# Patient Record
Sex: MALE | Race: OTHER | NOT HISPANIC OR LATINO | Employment: OTHER | URBAN - METROPOLITAN AREA
[De-identification: names, ages, dates, MRNs, and addresses within clinical notes are randomized per-mention and may not be internally consistent; named-entity substitution may affect disease eponyms.]

---

## 2017-01-12 ENCOUNTER — HISTORICAL (OUTPATIENT)
Dept: LAB | Facility: HOSPITAL | Age: 52
End: 2017-01-12

## 2017-01-13 ENCOUNTER — HISTORICAL (OUTPATIENT)
Dept: ADMINISTRATIVE | Facility: HOSPITAL | Age: 52
End: 2017-01-13

## 2017-01-26 ENCOUNTER — HISTORICAL (OUTPATIENT)
Dept: LAB | Facility: HOSPITAL | Age: 52
End: 2017-01-26

## 2017-01-27 ENCOUNTER — HISTORICAL (OUTPATIENT)
Dept: ADMINISTRATIVE | Facility: HOSPITAL | Age: 52
End: 2017-01-27

## 2017-05-25 ENCOUNTER — HISTORICAL (OUTPATIENT)
Dept: ADMINISTRATIVE | Facility: HOSPITAL | Age: 52
End: 2017-05-25

## 2017-05-25 LAB
ALBUMIN SERPL-MCNC: 3.7 GM/DL (ref 3.4–5)
ALBUMIN/GLOB SERPL: 1 RATIO (ref 1–2)
ALP SERPL-CCNC: 109 UNIT/L (ref 20–120)
ALT SERPL-CCNC: 21 UNIT/L
AST SERPL-CCNC: 22 UNIT/L
BILIRUB SERPL-MCNC: 0.6 MG/DL
BILIRUBIN DIRECT+TOT PNL SERPL-MCNC: 0.1 MG/DL
BILIRUBIN DIRECT+TOT PNL SERPL-MCNC: 0.5 MG/DL
BUN SERPL-MCNC: 8 MG/DL (ref 7–25)
CALCIUM SERPL-MCNC: 8.7 MG/DL (ref 8.4–10.3)
CHLORIDE SERPL-SCNC: 103 MMOL/L (ref 96–110)
CO2 SERPL-SCNC: 27 MMOL/L (ref 24–32)
CREAT SERPL-MCNC: 0.73 MG/DL (ref 0.7–1.4)
ERYTHROCYTE [DISTWIDTH] IN BLOOD BY AUTOMATED COUNT: 14.4 % (ref 11.5–14.5)
ERYTHROCYTE [SEDIMENTATION RATE] IN BLOOD: 28 MM/HR (ref 0–15)
GLOBULIN SER-MCNC: 4.2 GM/ML (ref 2.3–3.5)
GLUCOSE SERPL-MCNC: 172 MG/DL (ref 65–99)
HCT VFR BLD AUTO: 38.6 % (ref 40–51)
HGB BLD-MCNC: 12.3 GM/DL (ref 13.5–17.5)
MCH RBC QN AUTO: 27.2 PG (ref 26–34)
MCHC RBC AUTO-ENTMCNC: 31.9 GM/DL (ref 31–37)
MCV RBC AUTO: 85.4 FL (ref 80–100)
PLATELET # BLD AUTO: 248 X10(3)/MCL (ref 130–400)
PMV BLD AUTO: 13.6 FL (ref 7.4–10.4)
POTASSIUM SERPL-SCNC: 4.6 MMOL/L (ref 3.6–5.2)
PROT SERPL-MCNC: 7.9 GM/DL (ref 6–8)
RBC # BLD AUTO: 4.52 X10(6)/MCL (ref 4.5–5.9)
SODIUM SERPL-SCNC: 137 MMOL/L (ref 135–146)
WBC # SPEC AUTO: 11.5 X10(3)/MCL (ref 4.5–11)

## 2017-06-09 ENCOUNTER — HISTORICAL (OUTPATIENT)
Dept: GASTROENTEROLOGY | Facility: CLINIC | Age: 52
End: 2017-06-09

## 2017-06-19 ENCOUNTER — HISTORICAL (OUTPATIENT)
Dept: ADMINISTRATIVE | Facility: HOSPITAL | Age: 52
End: 2017-06-19

## 2017-06-19 LAB
APPEARANCE, UA: CLEAR
BACTERIA #/AREA URNS AUTO: ABNORMAL /[HPF]
BILIRUB UR QL STRIP: NEGATIVE
COLOR UR: YELLOW
GLUCOSE (UA): 200 MG/DL
HGB UR QL STRIP: NEGATIVE
HYALINE CASTS #/AREA URNS LPF: ABNORMAL /[LPF]
KETONES UR QL STRIP: NEGATIVE
LEUKOCYTE ESTERASE UR QL STRIP: NEGATIVE
NITRITE UR QL STRIP: NEGATIVE
PH UR STRIP: 5.5 [PH] (ref 4.5–8)
PROT UR QL STRIP: NEGATIVE
PSA SERPL-MCNC: 1.6 NG/ML (ref 0–4)
RBC #/AREA URNS AUTO: ABNORMAL /[HPF]
SP GR UR STRIP: 1.01 (ref 1–1.03)
SQUAMOUS #/AREA URNS LPF: ABNORMAL /[LPF]
UROBILINOGEN UR STRIP-ACNC: NORMAL
WBC #/AREA URNS AUTO: ABNORMAL /HPF

## 2017-08-03 ENCOUNTER — HISTORICAL (OUTPATIENT)
Dept: LAB | Facility: HOSPITAL | Age: 52
End: 2017-08-03

## 2017-08-03 LAB
ABS NEUT (OLG): 5.99 X10(3)/MCL (ref 2.1–9.2)
APTT PPP: 26 SECOND(S) (ref 21–30)
BUN SERPL-MCNC: 9 MG/DL (ref 7–18)
CALCIUM SERPL-MCNC: 9.2 MG/DL (ref 8.5–10.1)
CHLORIDE SERPL-SCNC: 102 MMOL/L (ref 98–107)
CO2 SERPL-SCNC: 27 MMOL/L (ref 21–32)
CREAT SERPL-MCNC: 1.1 MG/DL (ref 0.7–1.3)
ERYTHROCYTE [DISTWIDTH] IN BLOOD BY AUTOMATED COUNT: 13.2 % (ref 11.5–17)
GLUCOSE SERPL-MCNC: 252 MG/DL (ref 74–106)
HCT VFR BLD AUTO: 38.6 % (ref 42–52)
HGB BLD-MCNC: 12.8 GM/DL (ref 14–18)
INR PPP: 0.9
MCH RBC QN AUTO: 27.9 PG (ref 27–31)
MCHC RBC AUTO-ENTMCNC: 33.2 GM/DL (ref 33–36)
MCV RBC AUTO: 84.1 FL (ref 80–94)
PLATELET # BLD AUTO: 245 X10(3)/MCL (ref 130–400)
PMV BLD AUTO: 13.5 FL (ref 7.4–10.4)
POTASSIUM SERPL-SCNC: 4.4 MMOL/L (ref 3.5–5.1)
PROTHROMBIN TIME: 10 SECOND(S) (ref 9.8–12)
RBC # BLD AUTO: 4.59 X10(6)/MCL (ref 4.7–6.1)
SODIUM SERPL-SCNC: 136 MMOL/L (ref 136–145)
WBC # SPEC AUTO: 8.4 X10(3)/MCL (ref 4.5–11.5)

## 2017-08-04 ENCOUNTER — HISTORICAL (OUTPATIENT)
Dept: ADMINISTRATIVE | Facility: HOSPITAL | Age: 52
End: 2017-08-04

## 2017-08-05 LAB
ABS NEUT (OLG): 10.48 X10(3)/MCL (ref 2.1–9.2)
BASOPHILS # BLD AUTO: 0 X10(3)/MCL (ref 0–0.2)
BASOPHILS NFR BLD AUTO: 0 %
BUN SERPL-MCNC: 12 MG/DL (ref 7–18)
CALCIUM SERPL-MCNC: 9 MG/DL (ref 8.5–10.1)
CHLORIDE SERPL-SCNC: 102 MMOL/L (ref 98–107)
CO2 SERPL-SCNC: 28 MMOL/L (ref 21–32)
CREAT SERPL-MCNC: 1.02 MG/DL (ref 0.7–1.3)
EOSINOPHIL # BLD AUTO: 0.1 X10(3)/MCL (ref 0–0.9)
EOSINOPHIL NFR BLD AUTO: 1 %
ERYTHROCYTE [DISTWIDTH] IN BLOOD BY AUTOMATED COUNT: 13.2 % (ref 11.5–17)
GLUCOSE SERPL-MCNC: 242 MG/DL (ref 74–106)
HCT VFR BLD AUTO: 39.5 % (ref 42–52)
HGB BLD-MCNC: 12.7 GM/DL (ref 14–18)
LYMPHOCYTES # BLD AUTO: 1.8 X10(3)/MCL (ref 0.6–4.6)
LYMPHOCYTES NFR BLD AUTO: 14 %
MCH RBC QN AUTO: 27.3 PG (ref 27–31)
MCHC RBC AUTO-ENTMCNC: 32.2 GM/DL (ref 33–36)
MCV RBC AUTO: 84.9 FL (ref 80–94)
MONOCYTES # BLD AUTO: 0.7 X10(3)/MCL (ref 0.1–1.3)
MONOCYTES NFR BLD AUTO: 5 %
NEUTROPHILS # BLD AUTO: 10.48 X10(3)/MCL (ref 2.1–9.2)
NEUTROPHILS NFR BLD AUTO: 79 %
PLATELET # BLD AUTO: 252 X10(3)/MCL (ref 130–400)
PMV BLD AUTO: 13.3 FL (ref 9.4–12.4)
POTASSIUM SERPL-SCNC: 5.5 MMOL/L (ref 3.5–5.1)
RBC # BLD AUTO: 4.65 X10(6)/MCL (ref 4.7–6.1)
SODIUM SERPL-SCNC: 138 MMOL/L (ref 136–145)
WBC # SPEC AUTO: 13.2 X10(3)/MCL (ref 4.5–11.5)

## 2017-08-15 ENCOUNTER — HISTORICAL (OUTPATIENT)
Dept: ENDOSCOPY | Facility: HOSPITAL | Age: 52
End: 2017-08-15

## 2017-08-31 ENCOUNTER — HISTORICAL (OUTPATIENT)
Dept: LAB | Facility: HOSPITAL | Age: 52
End: 2017-08-31

## 2017-08-31 LAB
ABS NEUT (OLG): 6.77 X10(3)/MCL (ref 2.1–9.2)
APTT PPP: 26 SECOND(S) (ref 21–30)
BUN SERPL-MCNC: 8 MG/DL (ref 7–18)
CALCIUM SERPL-MCNC: 9.4 MG/DL (ref 8.5–10.1)
CHLORIDE SERPL-SCNC: 103 MMOL/L (ref 98–107)
CO2 SERPL-SCNC: 28 MMOL/L (ref 21–32)
CREAT SERPL-MCNC: 0.98 MG/DL (ref 0.7–1.3)
ERYTHROCYTE [DISTWIDTH] IN BLOOD BY AUTOMATED COUNT: 13.2 % (ref 11.5–17)
GLUCOSE SERPL-MCNC: 112 MG/DL (ref 74–106)
HCT VFR BLD AUTO: 38.7 % (ref 42–52)
HGB BLD-MCNC: 12.5 GM/DL (ref 14–18)
INR PPP: 0.9
MCH RBC QN AUTO: 27.4 PG (ref 27–31)
MCHC RBC AUTO-ENTMCNC: 32.3 GM/DL (ref 33–36)
MCV RBC AUTO: 84.7 FL (ref 80–94)
PLATELET # BLD AUTO: 245 X10(3)/MCL (ref 130–400)
PMV BLD AUTO: 12.7 FL (ref 7.4–10.4)
POTASSIUM SERPL-SCNC: 4.4 MMOL/L (ref 3.5–5.1)
PROTHROMBIN TIME: 9.8 SECOND(S) (ref 9.8–12)
RBC # BLD AUTO: 4.57 X10(6)/MCL (ref 4.7–6.1)
SODIUM SERPL-SCNC: 138 MMOL/L (ref 136–145)
WBC # SPEC AUTO: 9.4 X10(3)/MCL (ref 4.5–11.5)

## 2017-09-05 ENCOUNTER — HISTORICAL (OUTPATIENT)
Dept: ADMINISTRATIVE | Facility: HOSPITAL | Age: 52
End: 2017-09-05

## 2017-09-05 LAB
ABS NEUT (OLG): 8.33 X10(3)/MCL (ref 2.1–9.2)
APTT PPP: 56 SECOND(S) (ref 21–30)
BASOPHILS # BLD AUTO: 0.04 X10(3)/MCL (ref 0–0.2)
BASOPHILS NFR BLD AUTO: 0.3 % (ref 0–0.9)
BUN SERPL-MCNC: 10 MG/DL (ref 7–18)
CALCIUM SERPL-MCNC: 8.3 MG/DL (ref 8.5–10.1)
CHLORIDE SERPL-SCNC: 107 MMOL/L (ref 98–107)
CO2 SERPL-SCNC: 25 MMOL/L (ref 21–32)
CREAT SERPL-MCNC: 0.84 MG/DL (ref 0.7–1.3)
EOSINOPHIL # BLD AUTO: 0.26 X10(3)/MCL (ref 0–0.9)
EOSINOPHIL NFR BLD AUTO: 2.3 % (ref 0–6.5)
ERYTHROCYTE [DISTWIDTH] IN BLOOD BY AUTOMATED COUNT: 13.3 % (ref 11.5–17)
GLUCOSE SERPL-MCNC: 79 MG/DL (ref 74–106)
HCT VFR BLD AUTO: 34 % (ref 42–52)
HGB BLD-MCNC: 11.3 GM/DL (ref 14–18)
IMM GRANULOCYTES # BLD AUTO: 0.13 10*3/UL (ref 0–0.02)
IMM GRANULOCYTES NFR BLD AUTO: 1.1 % (ref 0–0.43)
INR PPP: 1.2
LYMPHOCYTES # BLD AUTO: 1.91 X10(3)/MCL (ref 0.6–4.6)
LYMPHOCYTES NFR BLD AUTO: 16.6 % (ref 16.2–38.3)
MCH RBC QN AUTO: 28 PG (ref 27–31)
MCHC RBC AUTO-ENTMCNC: 33.2 GM/DL (ref 33–36)
MCV RBC AUTO: 84.2 FL (ref 80–94)
MONOCYTES # BLD AUTO: 0.81 X10(3)/MCL (ref 0.1–1.3)
MONOCYTES NFR BLD AUTO: 7.1 % (ref 4.7–11.3)
NEUTROPHILS # BLD AUTO: 8.33 X10(3)/MCL (ref 2.1–9.2)
NEUTROPHILS NFR BLD AUTO: 72.6 % (ref 49.1–73.4)
NRBC BLD AUTO-RTO: 0 % (ref 0–0.2)
PLATELET # BLD AUTO: 235 X10(3)/MCL (ref 130–400)
PMV BLD AUTO: 12.4 FL (ref 7.4–10.4)
POTASSIUM SERPL-SCNC: 3.9 MMOL/L (ref 3.5–5.1)
PROTHROMBIN TIME: 13.1 SECOND(S) (ref 9.8–12)
RBC # BLD AUTO: 4.04 X10(6)/MCL (ref 4.7–6.1)
SODIUM SERPL-SCNC: 138 MMOL/L (ref 136–145)
WBC # SPEC AUTO: 11.5 X10(3)/MCL (ref 4.5–11.5)

## 2017-09-06 LAB
ABS NEUT (OLG): 12.01 X10(3)/MCL (ref 2.1–9.2)
BASOPHILS # BLD AUTO: 0.1 X10(3)/MCL (ref 0–0.2)
BASOPHILS NFR BLD AUTO: 0 %
BUN SERPL-MCNC: 15 MG/DL (ref 7–18)
CALCIUM SERPL-MCNC: 9 MG/DL (ref 8.5–10.1)
CHLORIDE SERPL-SCNC: 100 MMOL/L (ref 98–107)
CO2 SERPL-SCNC: 23 MMOL/L (ref 21–32)
CREAT SERPL-MCNC: 1.19 MG/DL (ref 0.7–1.3)
EOSINOPHIL # BLD AUTO: 0.3 X10(3)/MCL (ref 0–0.9)
EOSINOPHIL NFR BLD AUTO: 2 %
ERYTHROCYTE [DISTWIDTH] IN BLOOD BY AUTOMATED COUNT: 13.5 % (ref 11.5–17)
GLUCOSE SERPL-MCNC: 264 MG/DL (ref 74–106)
HCT VFR BLD AUTO: 40.7 % (ref 42–52)
HGB BLD-MCNC: 13.6 GM/DL (ref 14–18)
LYMPHOCYTES # BLD AUTO: 1.9 X10(3)/MCL (ref 0.6–4.6)
LYMPHOCYTES NFR BLD AUTO: 12 %
MCH RBC QN AUTO: 28.6 PG (ref 27–31)
MCHC RBC AUTO-ENTMCNC: 33.4 GM/DL (ref 33–36)
MCV RBC AUTO: 85.5 FL (ref 80–94)
MONOCYTES # BLD AUTO: 0.8 X10(3)/MCL (ref 0.1–1.3)
MONOCYTES NFR BLD AUTO: 5 %
NEUTROPHILS # BLD AUTO: 12.01 X10(3)/MCL (ref 1.4–7.9)
NEUTROPHILS NFR BLD AUTO: 79 %
PLATELET # BLD AUTO: 260 X10(3)/MCL (ref 130–400)
PMV BLD AUTO: 12.7 FL (ref 9.4–12.4)
POTASSIUM SERPL-SCNC: 4.8 MMOL/L (ref 3.5–5.1)
RBC # BLD AUTO: 4.76 X10(6)/MCL (ref 4.7–6.1)
SODIUM SERPL-SCNC: 133 MMOL/L (ref 136–145)
WBC # SPEC AUTO: 15.1 X10(3)/MCL (ref 4.5–11.5)

## 2018-01-09 ENCOUNTER — HISTORICAL (OUTPATIENT)
Dept: ENDOSCOPY | Facility: HOSPITAL | Age: 53
End: 2018-01-09

## 2018-02-14 ENCOUNTER — HISTORICAL (OUTPATIENT)
Dept: RADIOLOGY | Facility: HOSPITAL | Age: 53
End: 2018-02-14

## 2018-03-16 ENCOUNTER — HISTORICAL (OUTPATIENT)
Dept: SURGERY | Facility: HOSPITAL | Age: 53
End: 2018-03-16

## 2018-05-08 ENCOUNTER — HISTORICAL (OUTPATIENT)
Dept: ADMINISTRATIVE | Facility: HOSPITAL | Age: 53
End: 2018-05-08

## 2018-05-08 LAB
BUN SERPL-MCNC: 25 MG/DL (ref 7–18)
CALCIUM SERPL-MCNC: 8.8 MG/DL (ref 8.5–10.1)
CHLORIDE SERPL-SCNC: 104 MMOL/L (ref 98–107)
CO2 SERPL-SCNC: 27 MMOL/L (ref 21–32)
CREAT SERPL-MCNC: 1.6 MG/DL (ref 0.6–1.3)
CREAT/UREA NIT SERPL: 16
EST. AVERAGE GLUCOSE BLD GHB EST-MCNC: 169 MG/DL
GLUCOSE SERPL-MCNC: 136 MG/DL (ref 74–106)
HBA1C MFR BLD: 7.5 % (ref 4.2–6.3)
POTASSIUM SERPL-SCNC: 4.7 MMOL/L (ref 3.5–5.1)
SODIUM SERPL-SCNC: 137 MMOL/L (ref 136–145)

## 2018-08-09 ENCOUNTER — HISTORICAL (OUTPATIENT)
Dept: INTERNAL MEDICINE | Facility: CLINIC | Age: 53
End: 2018-08-09

## 2018-08-09 LAB
ABS NEUT (OLG): 10.19 X10(3)/MCL (ref 2.1–9.2)
ALBUMIN SERPL-MCNC: 3.7 GM/DL (ref 3.4–5)
ALBUMIN/GLOB SERPL: 1 RATIO (ref 1–2)
ALP SERPL-CCNC: 147 UNIT/L (ref 45–117)
ALT SERPL-CCNC: 29 UNIT/L (ref 12–78)
AST SERPL-CCNC: 21 UNIT/L (ref 15–37)
BASOPHILS # BLD AUTO: 0.07 X10(3)/MCL
BASOPHILS NFR BLD AUTO: 0 %
BILIRUB SERPL-MCNC: 0.3 MG/DL (ref 0.2–1)
BILIRUBIN DIRECT+TOT PNL SERPL-MCNC: <0.1 MG/DL
BILIRUBIN DIRECT+TOT PNL SERPL-MCNC: ABNORMAL MG/DL
BUN SERPL-MCNC: 27 MG/DL (ref 7–18)
CALCIUM SERPL-MCNC: 8.7 MG/DL (ref 8.5–10.1)
CHLORIDE SERPL-SCNC: 104 MMOL/L (ref 98–107)
CHOLEST SERPL-MCNC: 150 MG/DL
CHOLEST/HDLC SERPL: 3.8 {RATIO} (ref 0–5)
CO2 SERPL-SCNC: 27 MMOL/L (ref 21–32)
CREAT SERPL-MCNC: 1.8 MG/DL (ref 0.6–1.3)
CREAT UR-MCNC: 101 MG/DL
EOSINOPHIL # BLD AUTO: 0.32 10*3/UL
EOSINOPHIL NFR BLD AUTO: 2 %
ERYTHROCYTE [DISTWIDTH] IN BLOOD BY AUTOMATED COUNT: 12.1 % (ref 11.5–14.5)
EST. AVERAGE GLUCOSE BLD GHB EST-MCNC: 177 MG/DL
GLOBULIN SER-MCNC: 5 GM/ML (ref 2.3–3.5)
GLUCOSE SERPL-MCNC: 50 MG/DL (ref 74–106)
HBA1C MFR BLD: 7.8 % (ref 4.2–6.3)
HCT VFR BLD AUTO: 33.2 % (ref 40–51)
HDLC SERPL-MCNC: 39 MG/DL
HGB BLD-MCNC: 10.8 GM/DL (ref 13.5–17.5)
IMM GRANULOCYTES # BLD AUTO: 0.29 10*3/UL
IMM GRANULOCYTES NFR BLD AUTO: 2 %
LDLC SERPL CALC-MCNC: 70 MG/DL (ref 0–130)
LYMPHOCYTES # BLD AUTO: 1.85 X10(3)/MCL
LYMPHOCYTES NFR BLD AUTO: 14 % (ref 13–40)
MCH RBC QN AUTO: 28.9 PG (ref 26–34)
MCHC RBC AUTO-ENTMCNC: 32.5 GM/DL (ref 31–37)
MCV RBC AUTO: 88.8 FL (ref 80–100)
MICROALBUMIN UR-MCNC: 13 MG/L (ref 0–19)
MICROALBUMIN/CREAT RATIO PNL UR: 12.9 MCG/MG CR (ref 0–29)
MONOCYTES # BLD AUTO: 0.91 X10(3)/MCL
MONOCYTES NFR BLD AUTO: 7 % (ref 4–12)
NEUTROPHILS # BLD AUTO: 10.19 X10(3)/MCL
NEUTROPHILS NFR BLD AUTO: 75 X10(3)/MCL
PLATELET # BLD AUTO: 259 X10(3)/MCL (ref 130–400)
PMV BLD AUTO: 12.5 FL (ref 7.4–10.4)
POTASSIUM SERPL-SCNC: 4.2 MMOL/L (ref 3.5–5.1)
PROT SERPL-MCNC: 8.7 GM/DL (ref 6.4–8.2)
RBC # BLD AUTO: 3.74 X10(6)/MCL (ref 4.5–5.9)
SODIUM SERPL-SCNC: 138 MMOL/L (ref 136–145)
TRIGL SERPL-MCNC: 205 MG/DL
VLDLC SERPL CALC-MCNC: 41 MG/DL
WBC # SPEC AUTO: 13.6 X10(3)/MCL (ref 4.5–11)

## 2018-09-27 ENCOUNTER — HISTORICAL (OUTPATIENT)
Dept: RADIOLOGY | Facility: HOSPITAL | Age: 53
End: 2018-09-27

## 2018-09-27 LAB — POC CREATININE: 1.2 MG/DL (ref 0.6–1.3)

## 2018-10-09 LAB
ABS NEUT (OLG): 9.59 X10(3)/MCL (ref 2.1–9.2)
ALBUMIN SERPL-MCNC: 3.4 GM/DL (ref 3.4–5)
ALBUMIN/GLOB SERPL: 0.8 RATIO (ref 1.1–2)
ALP SERPL-CCNC: 140 UNIT/L (ref 50–136)
ALT SERPL-CCNC: 32 UNIT/L (ref 12–78)
APPEARANCE, UA: CLEAR
APTT PPP: 30.9 SECOND(S) (ref 24.8–36.9)
AST SERPL-CCNC: 21 UNIT/L (ref 15–37)
BACTERIA SPEC CULT: NORMAL /HPF
BASOPHILS # BLD AUTO: 0.1 X10(3)/MCL (ref 0–0.2)
BASOPHILS NFR BLD AUTO: 0 %
BILIRUB SERPL-MCNC: 0.4 MG/DL (ref 0.2–1)
BILIRUB UR QL STRIP: NEGATIVE
BILIRUBIN DIRECT+TOT PNL SERPL-MCNC: 0.1 MG/DL (ref 0–0.5)
BILIRUBIN DIRECT+TOT PNL SERPL-MCNC: 0.3 MG/DL (ref 0–0.8)
BUN SERPL-MCNC: 12 MG/DL (ref 7–18)
CALCIUM SERPL-MCNC: 8.8 MG/DL (ref 8.5–10.1)
CHLORIDE SERPL-SCNC: 104 MMOL/L (ref 98–107)
CHOLEST SERPL-MCNC: 163 MG/DL (ref 0–200)
CHOLEST/HDLC SERPL: 4.3 {RATIO} (ref 0–5)
CO2 SERPL-SCNC: 26 MMOL/L (ref 21–32)
COLOR UR: YELLOW
CREAT SERPL-MCNC: 1.42 MG/DL (ref 0.7–1.3)
EOSINOPHIL # BLD AUTO: 0.3 X10(3)/MCL (ref 0–0.9)
EOSINOPHIL NFR BLD AUTO: 2 %
ERYTHROCYTE [DISTWIDTH] IN BLOOD BY AUTOMATED COUNT: 13.2 % (ref 11.5–17)
GLOBULIN SER-MCNC: 4.4 GM/DL (ref 2.4–3.5)
GLUCOSE (UA): NEGATIVE
GLUCOSE SERPL-MCNC: 170 MG/DL (ref 74–106)
HCT VFR BLD AUTO: 32.9 % (ref 42–52)
HDLC SERPL-MCNC: 38 MG/DL (ref 35–60)
HGB BLD-MCNC: 10.2 GM/DL (ref 14–18)
HGB UR QL STRIP: NEGATIVE
INR PPP: 1.04 (ref 0–1.27)
KETONES UR QL STRIP: NEGATIVE
LDLC SERPL CALC-MCNC: 42 MG/DL (ref 0–129)
LEUKOCYTE ESTERASE UR QL STRIP: NEGATIVE
LYMPHOCYTES # BLD AUTO: 1.8 X10(3)/MCL (ref 0.6–4.6)
LYMPHOCYTES NFR BLD AUTO: 14 %
MCH RBC QN AUTO: 28 PG (ref 27–31)
MCHC RBC AUTO-ENTMCNC: 31 GM/DL (ref 33–36)
MCV RBC AUTO: 90.4 FL (ref 80–94)
MONOCYTES # BLD AUTO: 0.8 X10(3)/MCL (ref 0.1–1.3)
MONOCYTES NFR BLD AUTO: 6 %
NEUTROPHILS # BLD AUTO: 9.59 X10(3)/MCL (ref 2.1–9.2)
NEUTROPHILS NFR BLD AUTO: 75 %
NITRITE UR QL STRIP: NEGATIVE
PH UR STRIP: 5 [PH] (ref 5–9)
PLATELET # BLD AUTO: 235 X10(3)/MCL (ref 130–400)
PMV BLD AUTO: 13.3 FL (ref 9.4–12.4)
POTASSIUM SERPL-SCNC: 4.8 MMOL/L (ref 3.5–5.1)
PROT SERPL-MCNC: 7.8 GM/DL (ref 6.4–8.2)
PROT UR QL STRIP: NEGATIVE
PROTHROMBIN TIME: 13.9 SECOND(S) (ref 12.2–14.7)
RBC # BLD AUTO: 3.64 X10(6)/MCL (ref 4.7–6.1)
RBC #/AREA URNS HPF: NORMAL /[HPF]
SODIUM SERPL-SCNC: 138 MMOL/L (ref 136–145)
SP GR UR STRIP: 1.01 (ref 1–1.03)
SQUAMOUS EPITHELIAL, UA: NORMAL
TRIGL SERPL-MCNC: 413 MG/DL (ref 30–150)
UROBILINOGEN UR STRIP-ACNC: 0.2
VLDLC SERPL CALC-MCNC: 83 MG/DL
WBC # SPEC AUTO: 12.8 X10(3)/MCL (ref 4.5–11.5)
WBC #/AREA URNS HPF: NORMAL /HPF

## 2018-10-10 ENCOUNTER — HISTORICAL (OUTPATIENT)
Dept: CARDIOLOGY | Facility: HOSPITAL | Age: 53
End: 2018-10-10

## 2018-10-10 LAB
ABS NEUT (OLG): 11.06 X10(3)/MCL (ref 2.1–9.2)
ALBUMIN SERPL-MCNC: 3.9 GM/DL (ref 3.4–5)
ALBUMIN/GLOB SERPL: 0.8 RATIO (ref 1.1–2)
ALP SERPL-CCNC: 156 UNIT/L (ref 50–136)
ALT SERPL-CCNC: 34 UNIT/L (ref 12–78)
APTT PPP: 32.8 SECOND(S) (ref 24.8–36.9)
AST SERPL-CCNC: 41 UNIT/L (ref 15–37)
BASOPHILS # BLD AUTO: 0.1 X10(3)/MCL (ref 0–0.2)
BASOPHILS NFR BLD AUTO: 0 %
BILIRUB SERPL-MCNC: 0.5 MG/DL (ref 0.2–1)
BILIRUBIN DIRECT+TOT PNL SERPL-MCNC: 0.1 MG/DL (ref 0–0.5)
BILIRUBIN DIRECT+TOT PNL SERPL-MCNC: 0.4 MG/DL (ref 0–0.8)
BUN SERPL-MCNC: 10 MG/DL (ref 7–18)
CALCIUM SERPL-MCNC: 9.7 MG/DL (ref 8.5–10.1)
CHLORIDE SERPL-SCNC: 101 MMOL/L (ref 98–107)
CHOLEST SERPL-MCNC: 185 MG/DL (ref 0–200)
CHOLEST/HDLC SERPL: 4.3 {RATIO} (ref 0–5)
CO2 SERPL-SCNC: 27 MMOL/L (ref 21–32)
CREAT SERPL-MCNC: 1.24 MG/DL (ref 0.7–1.3)
EOSINOPHIL # BLD AUTO: 0.3 X10(3)/MCL (ref 0–0.9)
EOSINOPHIL NFR BLD AUTO: 2 %
ERYTHROCYTE [DISTWIDTH] IN BLOOD BY AUTOMATED COUNT: 13.1 % (ref 11.5–17)
GLOBULIN SER-MCNC: 4.6 GM/DL (ref 2.4–3.5)
GLUCOSE SERPL-MCNC: 161 MG/DL (ref 74–106)
HCT VFR BLD AUTO: 35.4 % (ref 42–52)
HDLC SERPL-MCNC: 43 MG/DL (ref 35–60)
HGB BLD-MCNC: 11.2 GM/DL (ref 14–18)
INR PPP: 0.93 (ref 0–1.27)
LDLC SERPL CALC-MCNC: 68 MG/DL (ref 0–129)
LYMPHOCYTES # BLD AUTO: 1.8 X10(3)/MCL (ref 0.6–4.6)
LYMPHOCYTES NFR BLD AUTO: 13 %
MCH RBC QN AUTO: 28 PG (ref 27–31)
MCHC RBC AUTO-ENTMCNC: 31.6 GM/DL (ref 33–36)
MCV RBC AUTO: 88.5 FL (ref 80–94)
MONOCYTES # BLD AUTO: 1 X10(3)/MCL (ref 0.1–1.3)
MONOCYTES NFR BLD AUTO: 7 %
NEUTROPHILS # BLD AUTO: 11.06 X10(3)/MCL (ref 2.1–9.2)
NEUTROPHILS NFR BLD AUTO: 77 %
PLATELET # BLD AUTO: 281 X10(3)/MCL (ref 130–400)
PMV BLD AUTO: 12.6 FL (ref 9.4–12.4)
POTASSIUM SERPL-SCNC: 4.9 MMOL/L (ref 3.5–5.1)
PROT SERPL-MCNC: 8.5 GM/DL (ref 6.4–8.2)
PROTHROMBIN TIME: 12.8 SECOND(S) (ref 12.2–14.7)
RBC # BLD AUTO: 4 X10(6)/MCL (ref 4.7–6.1)
SODIUM SERPL-SCNC: 137 MMOL/L (ref 136–145)
TRIGL SERPL-MCNC: 369 MG/DL (ref 30–150)
VLDLC SERPL CALC-MCNC: 74 MG/DL
WBC # SPEC AUTO: 14.4 X10(3)/MCL (ref 4.5–11.5)

## 2019-03-14 ENCOUNTER — HISTORICAL (OUTPATIENT)
Dept: RADIOLOGY | Facility: HOSPITAL | Age: 54
End: 2019-03-14

## 2019-04-05 ENCOUNTER — HISTORICAL (OUTPATIENT)
Dept: ENDOSCOPY | Facility: HOSPITAL | Age: 54
End: 2019-04-05

## 2019-04-25 ENCOUNTER — HISTORICAL (OUTPATIENT)
Dept: INTERNAL MEDICINE | Facility: CLINIC | Age: 54
End: 2019-04-25

## 2019-04-25 LAB
BUN SERPL-MCNC: 20 MG/DL (ref 7–18)
CALCIUM SERPL-MCNC: 9.6 MG/DL (ref 8.5–10.1)
CHLORIDE SERPL-SCNC: 100 MMOL/L (ref 98–107)
CO2 SERPL-SCNC: 27 MMOL/L (ref 21–32)
CREAT SERPL-MCNC: 1.5 MG/DL (ref 0.6–1.3)
CREAT/UREA NIT SERPL: 13
EST. AVERAGE GLUCOSE BLD GHB EST-MCNC: 163 MG/DL
GLUCOSE SERPL-MCNC: 204 MG/DL (ref 74–106)
HBA1C MFR BLD: 7.3 % (ref 4.2–6.3)
POTASSIUM SERPL-SCNC: 4.8 MMOL/L (ref 3.5–5.1)
SODIUM SERPL-SCNC: 134 MMOL/L (ref 136–145)

## 2019-05-08 ENCOUNTER — HOSPITAL ENCOUNTER (OUTPATIENT)
Dept: MEDSURG UNIT | Facility: HOSPITAL | Age: 54
End: 2019-05-09
Attending: INTERNAL MEDICINE | Admitting: INTERNAL MEDICINE

## 2019-05-09 LAB
ABS NEUT (OLG): 9.26 X10(3)/MCL (ref 2.1–9.2)
ALBUMIN SERPL-MCNC: 2.8 GM/DL (ref 3.4–5)
ALBUMIN/GLOB SERPL: 0.6 RATIO (ref 1.1–2)
ALP SERPL-CCNC: 152 UNIT/L (ref 50–136)
ALT SERPL-CCNC: 19 UNIT/L (ref 12–78)
AST SERPL-CCNC: 17 UNIT/L (ref 15–37)
BASOPHILS # BLD AUTO: 0 X10(3)/MCL (ref 0–0.2)
BASOPHILS NFR BLD AUTO: 0 %
BILIRUB SERPL-MCNC: 0.4 MG/DL (ref 0.2–1)
BILIRUBIN DIRECT+TOT PNL SERPL-MCNC: 0.1 MG/DL (ref 0–0.5)
BILIRUBIN DIRECT+TOT PNL SERPL-MCNC: 0.3 MG/DL (ref 0–0.8)
BUN SERPL-MCNC: 20 MG/DL (ref 7–18)
CALCIUM SERPL-MCNC: 9 MG/DL (ref 8.5–10.1)
CHLORIDE SERPL-SCNC: 105 MMOL/L (ref 98–107)
CO2 SERPL-SCNC: 25 MMOL/L (ref 21–32)
CREAT SERPL-MCNC: 1.18 MG/DL (ref 0.7–1.3)
EOSINOPHIL # BLD AUTO: 0.3 X10(3)/MCL (ref 0–0.9)
EOSINOPHIL NFR BLD AUTO: 2 %
ERYTHROCYTE [DISTWIDTH] IN BLOOD BY AUTOMATED COUNT: 12.9 % (ref 11.5–17)
GLOBULIN SER-MCNC: 4.4 GM/DL (ref 2.4–3.5)
GLUCOSE SERPL-MCNC: 102 MG/DL (ref 74–106)
HCT VFR BLD AUTO: 32.3 % (ref 42–52)
HGB BLD-MCNC: 9.8 GM/DL (ref 14–18)
LYMPHOCYTES # BLD AUTO: 1.3 X10(3)/MCL (ref 0.6–4.6)
LYMPHOCYTES NFR BLD AUTO: 11 %
MCH RBC QN AUTO: 26.9 PG (ref 27–31)
MCHC RBC AUTO-ENTMCNC: 30.3 GM/DL (ref 33–36)
MCV RBC AUTO: 88.7 FL (ref 80–94)
MONOCYTES # BLD AUTO: 1 X10(3)/MCL (ref 0.1–1.3)
MONOCYTES NFR BLD AUTO: 8 %
NEUTROPHILS # BLD AUTO: 9.26 X10(3)/MCL (ref 2.1–9.2)
NEUTROPHILS NFR BLD AUTO: 77 %
PLATELET # BLD AUTO: 210 X10(3)/MCL (ref 130–400)
PMV BLD AUTO: 13.3 FL (ref 9.4–12.4)
POTASSIUM SERPL-SCNC: 4.9 MMOL/L (ref 3.5–5.1)
PROT SERPL-MCNC: 7.2 GM/DL (ref 6.4–8.2)
RBC # BLD AUTO: 3.64 X10(6)/MCL (ref 4.7–6.1)
SODIUM SERPL-SCNC: 137 MMOL/L (ref 136–145)
WBC # SPEC AUTO: 12 X10(3)/MCL (ref 4.5–11.5)

## 2019-06-10 ENCOUNTER — HISTORICAL (OUTPATIENT)
Dept: ADMINISTRATIVE | Facility: HOSPITAL | Age: 54
End: 2019-06-10

## 2019-06-10 LAB
ABS NEUT (OLG): 11.65 X10(3)/MCL (ref 2.1–9.2)
BASOPHILS # BLD AUTO: 0.1 X10(3)/MCL (ref 0–0.2)
BASOPHILS NFR BLD AUTO: 0 %
BUN SERPL-MCNC: 19 MG/DL (ref 7–18)
CALCIUM SERPL-MCNC: 8.7 MG/DL (ref 8.5–10.1)
CHLORIDE SERPL-SCNC: 102 MMOL/L (ref 98–107)
CO2 SERPL-SCNC: 26 MMOL/L (ref 21–32)
CREAT SERPL-MCNC: 1.29 MG/DL (ref 0.7–1.3)
CREAT/UREA NIT SERPL: 14.7
EOSINOPHIL # BLD AUTO: 0.3 X10(3)/MCL (ref 0–0.9)
EOSINOPHIL NFR BLD AUTO: 2 %
ERYTHROCYTE [DISTWIDTH] IN BLOOD BY AUTOMATED COUNT: 13.1 % (ref 11.5–17)
GLUCOSE SERPL-MCNC: 210 MG/DL (ref 74–106)
HCT VFR BLD AUTO: 37.6 % (ref 42–52)
HGB BLD-MCNC: 11.5 GM/DL (ref 14–18)
LYMPHOCYTES # BLD AUTO: 2.2 X10(3)/MCL (ref 0.6–4.6)
LYMPHOCYTES NFR BLD AUTO: 15 %
MCH RBC QN AUTO: 27.3 PG (ref 27–31)
MCHC RBC AUTO-ENTMCNC: 30.6 GM/DL (ref 33–36)
MCV RBC AUTO: 89.3 FL (ref 80–94)
MONOCYTES # BLD AUTO: 0.9 X10(3)/MCL (ref 0.1–1.3)
MONOCYTES NFR BLD AUTO: 6 %
NEUTROPHILS # BLD AUTO: 11.65 X10(3)/MCL (ref 2.1–9.2)
NEUTROPHILS NFR BLD AUTO: 76 %
PLATELET # BLD AUTO: 290 X10(3)/MCL (ref 130–400)
PMV BLD AUTO: 12.7 FL (ref 9.4–12.4)
POTASSIUM SERPL-SCNC: 4.4 MMOL/L (ref 3.5–5.1)
RBC # BLD AUTO: 4.21 X10(6)/MCL (ref 4.7–6.1)
SODIUM SERPL-SCNC: 134 MMOL/L (ref 136–145)
WBC # SPEC AUTO: 15.4 X10(3)/MCL (ref 4.5–11.5)

## 2019-06-11 LAB
ABS NEUT (OLG): 10.26 X10(3)/MCL (ref 2.1–9.2)
ALBUMIN SERPL-MCNC: 3 GM/DL (ref 3.4–5)
ALBUMIN/GLOB SERPL: 0.7 RATIO (ref 1.1–2)
ALP SERPL-CCNC: 189 UNIT/L (ref 50–136)
ALT SERPL-CCNC: 20 UNIT/L (ref 12–78)
AST SERPL-CCNC: 16 UNIT/L (ref 15–37)
BASOPHILS # BLD AUTO: 0 X10(3)/MCL (ref 0–0.2)
BASOPHILS NFR BLD AUTO: 0 %
BILIRUB SERPL-MCNC: 0.5 MG/DL (ref 0.2–1)
BILIRUBIN DIRECT+TOT PNL SERPL-MCNC: 0.1 MG/DL (ref 0–0.5)
BILIRUBIN DIRECT+TOT PNL SERPL-MCNC: 0.4 MG/DL (ref 0–0.8)
BUN SERPL-MCNC: 28 MG/DL (ref 7–18)
CALCIUM SERPL-MCNC: 9.3 MG/DL (ref 8.5–10.1)
CHLORIDE SERPL-SCNC: 100 MMOL/L (ref 98–107)
CO2 SERPL-SCNC: 27 MMOL/L (ref 21–32)
CREAT SERPL-MCNC: 1.55 MG/DL (ref 0.7–1.3)
EOSINOPHIL # BLD AUTO: 0.2 X10(3)/MCL (ref 0–0.9)
EOSINOPHIL NFR BLD AUTO: 2 %
ERYTHROCYTE [DISTWIDTH] IN BLOOD BY AUTOMATED COUNT: 12.6 % (ref 11.5–17)
GLOBULIN SER-MCNC: 4.4 GM/DL (ref 2.4–3.5)
GLUCOSE SERPL-MCNC: 376 MG/DL (ref 74–106)
HCT VFR BLD AUTO: 31.7 % (ref 42–52)
HGB BLD-MCNC: 10 GM/DL (ref 14–18)
LYMPHOCYTES # BLD AUTO: 1.7 X10(3)/MCL (ref 0.6–4.6)
LYMPHOCYTES NFR BLD AUTO: 13 %
MAGNESIUM SERPL-MCNC: 2.8 MG/DL (ref 1.8–2.4)
MCH RBC QN AUTO: 27.6 PG (ref 27–31)
MCHC RBC AUTO-ENTMCNC: 31.5 GM/DL (ref 33–36)
MCV RBC AUTO: 87.6 FL (ref 80–94)
MONOCYTES # BLD AUTO: 0.8 X10(3)/MCL (ref 0.1–1.3)
MONOCYTES NFR BLD AUTO: 6 %
NEUTROPHILS # BLD AUTO: 10.26 X10(3)/MCL (ref 2.1–9.2)
NEUTROPHILS NFR BLD AUTO: 78 %
PLATELET # BLD AUTO: 248 X10(3)/MCL (ref 130–400)
PMV BLD AUTO: 12.8 FL (ref 9.4–12.4)
POTASSIUM SERPL-SCNC: 5.6 MMOL/L (ref 3.5–5.1)
PROT SERPL-MCNC: 7.4 GM/DL (ref 6.4–8.2)
RBC # BLD AUTO: 3.62 X10(6)/MCL (ref 4.7–6.1)
RESTICK: NORMAL
SODIUM SERPL-SCNC: 133 MMOL/L (ref 136–145)
WBC # SPEC AUTO: 13.2 X10(3)/MCL (ref 4.5–11.5)

## 2019-10-16 ENCOUNTER — HISTORICAL (OUTPATIENT)
Dept: CARDIOLOGY | Facility: HOSPITAL | Age: 54
End: 2019-10-16

## 2019-11-14 ENCOUNTER — HISTORICAL (OUTPATIENT)
Dept: INTERNAL MEDICINE | Facility: CLINIC | Age: 54
End: 2019-11-14

## 2019-11-14 LAB
ABS NEUT (OLG): 9.28 X10(3)/MCL (ref 2.1–9.2)
ALBUMIN SERPL-MCNC: 3.3 GM/DL (ref 3.4–5)
ALBUMIN/GLOB SERPL: 0.6 RATIO (ref 1.1–2)
ALP SERPL-CCNC: 159 UNIT/L (ref 45–117)
ALT SERPL-CCNC: 33 UNIT/L (ref 12–78)
AST SERPL-CCNC: 21 UNIT/L (ref 15–37)
BASOPHILS # BLD AUTO: 0 X10(3)/MCL (ref 0–0.2)
BASOPHILS NFR BLD AUTO: 0 %
BILIRUB SERPL-MCNC: 0.4 MG/DL (ref 0.2–1)
BILIRUBIN DIRECT+TOT PNL SERPL-MCNC: 0.1 MG/DL (ref 0–0.2)
BILIRUBIN DIRECT+TOT PNL SERPL-MCNC: 0.3 MG/DL
BUN SERPL-MCNC: 14 MG/DL (ref 7–18)
CALCIUM SERPL-MCNC: 9.3 MG/DL (ref 8.5–10.1)
CHLORIDE SERPL-SCNC: 103 MMOL/L (ref 98–107)
CHOLEST SERPL-MCNC: 192 MG/DL
CHOLEST/HDLC SERPL: 4.5 {RATIO} (ref 0–5)
CO2 SERPL-SCNC: 26 MMOL/L (ref 21–32)
CREAT SERPL-MCNC: 1.5 MG/DL (ref 0.6–1.3)
CREAT UR-MCNC: 108 MG/DL
EOSINOPHIL # BLD AUTO: 0.3 X10(3)/MCL (ref 0–0.9)
EOSINOPHIL NFR BLD AUTO: 2 %
ERYTHROCYTE [DISTWIDTH] IN BLOOD BY AUTOMATED COUNT: 14.4 % (ref 11.5–14.5)
EST. AVERAGE GLUCOSE BLD GHB EST-MCNC: 180 MG/DL
GLOBULIN SER-MCNC: 5.4 GM/ML (ref 2.3–3.5)
GLUCOSE SERPL-MCNC: 192 MG/DL (ref 74–106)
HBA1C MFR BLD: 7.9 % (ref 4.2–6.3)
HCT VFR BLD AUTO: 34.1 % (ref 40–51)
HDLC SERPL-MCNC: 43 MG/DL (ref 40–59)
HGB BLD-MCNC: 10.7 GM/DL (ref 13.5–17.5)
IMM GRANULOCYTES # BLD AUTO: 0.12 10*3/UL
IMM GRANULOCYTES NFR BLD AUTO: 1 %
LDLC SERPL CALC-MCNC: 86 MG/DL
LYMPHOCYTES # BLD AUTO: 1.7 X10(3)/MCL (ref 0.6–4.6)
LYMPHOCYTES NFR BLD AUTO: 14 %
MCH RBC QN AUTO: 26.6 PG (ref 26–34)
MCHC RBC AUTO-ENTMCNC: 31.4 GM/DL (ref 31–37)
MCV RBC AUTO: 84.6 FL (ref 80–100)
MICROALBUMIN UR-MCNC: 18.7 MG/L (ref 0–19)
MICROALBUMIN/CREAT RATIO PNL UR: 17.3 MCG/MG CR (ref 0–29)
MONOCYTES # BLD AUTO: 0.7 X10(3)/MCL (ref 0.1–1.3)
MONOCYTES NFR BLD AUTO: 6 %
NEUTROPHILS # BLD AUTO: 9.28 X10(3)/MCL (ref 2.1–9.2)
NEUTROPHILS NFR BLD AUTO: 76 %
PLATELET # BLD AUTO: 264 X10(3)/MCL (ref 130–400)
PMV BLD AUTO: 12.5 FL (ref 7.4–10.4)
POTASSIUM SERPL-SCNC: 4.7 MMOL/L (ref 3.5–5.1)
PROT SERPL-MCNC: 8.7 GM/DL (ref 6.4–8.2)
RBC # BLD AUTO: 4.03 X10(6)/MCL (ref 4.5–5.9)
SODIUM SERPL-SCNC: 135 MMOL/L (ref 136–145)
TRIGL SERPL-MCNC: 317 MG/DL
VLDLC SERPL CALC-MCNC: 63 MG/DL
WBC # SPEC AUTO: 12.1 X10(3)/MCL (ref 4.5–11)

## 2020-02-17 ENCOUNTER — HISTORICAL (OUTPATIENT)
Dept: INTERNAL MEDICINE | Facility: CLINIC | Age: 55
End: 2020-02-17

## 2020-02-17 LAB
ABS NEUT (OLG): 9.08 X10(3)/MCL (ref 2.1–9.2)
BASOPHILS # BLD AUTO: 0.1 X10(3)/MCL (ref 0–0.2)
BASOPHILS NFR BLD AUTO: 0 %
BUN SERPL-MCNC: 20 MG/DL (ref 7–18)
CALCIUM SERPL-MCNC: 9.1 MG/DL (ref 8.5–10.1)
CHLORIDE SERPL-SCNC: 105 MMOL/L (ref 98–107)
CO2 SERPL-SCNC: 27 MMOL/L (ref 21–32)
CREAT SERPL-MCNC: 1.4 MG/DL (ref 0.6–1.3)
CREAT/UREA NIT SERPL: 14
EOSINOPHIL # BLD AUTO: 0.4 X10(3)/MCL (ref 0–0.9)
EOSINOPHIL NFR BLD AUTO: 3 %
ERYTHROCYTE [DISTWIDTH] IN BLOOD BY AUTOMATED COUNT: 14.8 % (ref 11.5–14.5)
EST. AVERAGE GLUCOSE BLD GHB EST-MCNC: 189 MG/DL
GLUCOSE SERPL-MCNC: 152 MG/DL (ref 74–106)
HBA1C MFR BLD: 8.2 % (ref 4.2–6.3)
HCT VFR BLD AUTO: 35.6 % (ref 40–51)
HGB BLD-MCNC: 11 GM/DL (ref 13.5–17.5)
IMM GRANULOCYTES # BLD AUTO: 0.24 10*3/UL
IMM GRANULOCYTES NFR BLD AUTO: 2 %
LYMPHOCYTES # BLD AUTO: 2.7 X10(3)/MCL (ref 0.6–4.6)
LYMPHOCYTES NFR BLD AUTO: 20 %
MCH RBC QN AUTO: 26.8 PG (ref 26–34)
MCHC RBC AUTO-ENTMCNC: 30.9 GM/DL (ref 31–37)
MCV RBC AUTO: 86.8 FL (ref 80–100)
MONOCYTES # BLD AUTO: 1.1 X10(3)/MCL (ref 0.1–1.3)
MONOCYTES NFR BLD AUTO: 8 %
NEUTROPHILS # BLD AUTO: 9.08 X10(3)/MCL (ref 2.1–9.2)
NEUTROPHILS NFR BLD AUTO: 67 %
PLATELET # BLD AUTO: 296 X10(3)/MCL (ref 130–400)
PMV BLD AUTO: 12.4 FL (ref 7.4–10.4)
POTASSIUM SERPL-SCNC: 4.8 MMOL/L (ref 3.5–5.1)
RBC # BLD AUTO: 4.1 X10(6)/MCL (ref 4.5–5.9)
SODIUM SERPL-SCNC: 138 MMOL/L (ref 136–145)
TSH SERPL-ACNC: 3.38 MIU/L (ref 0.36–3.74)
WBC # SPEC AUTO: 13.6 X10(3)/MCL (ref 4.5–11)

## 2020-04-24 ENCOUNTER — HISTORICAL (OUTPATIENT)
Dept: CARDIOLOGY | Facility: HOSPITAL | Age: 55
End: 2020-04-24

## 2020-04-24 LAB
ABS NEUT (OLG): 10.56 X10(3)/MCL (ref 2.1–9.2)
BASOPHILS # BLD AUTO: 0 X10(3)/MCL (ref 0–0.2)
BASOPHILS NFR BLD AUTO: 0 %
BUN SERPL-MCNC: 17 MG/DL (ref 8.4–25.7)
CALCIUM SERPL-MCNC: 8.9 MG/DL (ref 8.4–10.2)
CHLORIDE SERPL-SCNC: 100 MMOL/L (ref 98–107)
CO2 SERPL-SCNC: 25 MMOL/L (ref 22–29)
CREAT SERPL-MCNC: 1.26 MG/DL (ref 0.73–1.18)
CREAT/UREA NIT SERPL: 13
EOSINOPHIL # BLD AUTO: 0.3 X10(3)/MCL (ref 0–0.9)
EOSINOPHIL NFR BLD AUTO: 2 %
ERYTHROCYTE [DISTWIDTH] IN BLOOD BY AUTOMATED COUNT: 14.2 % (ref 11.5–17)
GLUCOSE SERPL-MCNC: 99 MG/DL (ref 74–100)
HCT VFR BLD AUTO: 32.3 % (ref 42–52)
HGB BLD-MCNC: 10 GM/DL (ref 14–18)
LYMPHOCYTES # BLD AUTO: 3.2 X10(3)/MCL (ref 0.6–4.6)
LYMPHOCYTES NFR BLD AUTO: 21 %
MCH RBC QN AUTO: 27 PG (ref 27–31)
MCHC RBC AUTO-ENTMCNC: 31 GM/DL (ref 33–36)
MCV RBC AUTO: 87.3 FL (ref 80–94)
MONOCYTES # BLD AUTO: 1.2 X10(3)/MCL (ref 0.1–1.3)
MONOCYTES NFR BLD AUTO: 8 %
NEUTROPHILS # BLD AUTO: 10.56 X10(3)/MCL (ref 2.1–9.2)
NEUTROPHILS NFR BLD AUTO: 68 %
PLATELET # BLD AUTO: 238 X10(3)/MCL (ref 130–400)
PMV BLD AUTO: 13 FL (ref 9.4–12.4)
POTASSIUM SERPL-SCNC: 4.8 MMOL/L (ref 3.5–5.1)
RBC # BLD AUTO: 3.7 X10(6)/MCL (ref 4.7–6.1)
SODIUM SERPL-SCNC: 136 MMOL/L (ref 136–145)
WBC # SPEC AUTO: 15.6 X10(3)/MCL (ref 4.5–11.5)

## 2020-05-26 ENCOUNTER — HISTORICAL (OUTPATIENT)
Dept: LAB | Facility: HOSPITAL | Age: 55
End: 2020-05-26

## 2020-05-26 LAB
ABS NEUT (OLG): 8.32 X10(3)/MCL (ref 2.1–9.2)
BASOPHILS # BLD AUTO: 0.1 X10(3)/MCL (ref 0–0.2)
BASOPHILS NFR BLD AUTO: 0 %
BUN SERPL-MCNC: 14 MG/DL (ref 7–18)
CALCIUM SERPL-MCNC: 8.9 MG/DL (ref 8.5–10.1)
CHLORIDE SERPL-SCNC: 104 MMOL/L (ref 98–107)
CO2 SERPL-SCNC: 24 MMOL/L (ref 21–32)
CREAT SERPL-MCNC: 1.5 MG/DL (ref 0.6–1.3)
CREAT/UREA NIT SERPL: 9
EOSINOPHIL # BLD AUTO: 0.2 X10(3)/MCL (ref 0–0.9)
EOSINOPHIL NFR BLD AUTO: 2 %
ERYTHROCYTE [DISTWIDTH] IN BLOOD BY AUTOMATED COUNT: 13.5 % (ref 11.5–14.5)
EST. AVERAGE GLUCOSE BLD GHB EST-MCNC: 166 MG/DL
GLUCOSE SERPL-MCNC: 302 MG/DL (ref 74–106)
HBA1C MFR BLD: 7.4 % (ref 4.2–6.3)
HCT VFR BLD AUTO: 35.1 % (ref 40–51)
HGB BLD-MCNC: 11.1 GM/DL (ref 13.5–17.5)
IMM GRANULOCYTES # BLD AUTO: 0.11 10*3/UL
IMM GRANULOCYTES NFR BLD AUTO: 1 %
LYMPHOCYTES # BLD AUTO: 1.9 X10(3)/MCL (ref 0.6–4.6)
LYMPHOCYTES NFR BLD AUTO: 17 %
MCH RBC QN AUTO: 27.4 PG (ref 26–34)
MCHC RBC AUTO-ENTMCNC: 31.6 GM/DL (ref 31–37)
MCV RBC AUTO: 86.7 FL (ref 80–100)
MONOCYTES # BLD AUTO: 0.7 X10(3)/MCL (ref 0.1–1.3)
MONOCYTES NFR BLD AUTO: 6 %
NEUTROPHILS # BLD AUTO: 8.32 X10(3)/MCL (ref 2.1–9.2)
NEUTROPHILS NFR BLD AUTO: 74 %
PLATELET # BLD AUTO: 250 X10(3)/MCL (ref 130–400)
PMV BLD AUTO: 13.1 FL (ref 7.4–10.4)
POTASSIUM SERPL-SCNC: 5 MMOL/L (ref 3.5–5.1)
RBC # BLD AUTO: 4.05 X10(6)/MCL (ref 4.5–5.9)
SODIUM SERPL-SCNC: 135 MMOL/L (ref 136–145)
WBC # SPEC AUTO: 11.3 X10(3)/MCL (ref 4.5–11)

## 2020-06-22 ENCOUNTER — HISTORICAL (OUTPATIENT)
Dept: CARDIOLOGY | Facility: HOSPITAL | Age: 55
End: 2020-06-22

## 2020-06-22 LAB
ABS NEUT (OLG): 9.75 X10(3)/MCL (ref 2.1–9.2)
BASOPHILS # BLD AUTO: 0.1 X10(3)/MCL (ref 0–0.2)
BASOPHILS NFR BLD AUTO: 0 %
BUN SERPL-MCNC: 15.1 MG/DL (ref 8.4–25.7)
CALCIUM SERPL-MCNC: 8.6 MG/DL (ref 8.4–10.2)
CHLORIDE SERPL-SCNC: 103 MMOL/L (ref 98–107)
CO2 SERPL-SCNC: 23 MMOL/L (ref 22–29)
CREAT SERPL-MCNC: 1.12 MG/DL (ref 0.73–1.18)
CREAT/UREA NIT SERPL: 13
EOSINOPHIL # BLD AUTO: 0.2 X10(3)/MCL (ref 0–0.9)
EOSINOPHIL NFR BLD AUTO: 2 %
ERYTHROCYTE [DISTWIDTH] IN BLOOD BY AUTOMATED COUNT: 13.6 % (ref 11.5–17)
GLUCOSE SERPL-MCNC: 80 MG/DL (ref 74–100)
HCT VFR BLD AUTO: 32.8 % (ref 42–52)
HGB BLD-MCNC: 10.1 GM/DL (ref 14–18)
LYMPHOCYTES # BLD AUTO: 2 X10(3)/MCL (ref 0.6–4.6)
LYMPHOCYTES NFR BLD AUTO: 15 %
MCH RBC QN AUTO: 26.8 PG (ref 27–31)
MCHC RBC AUTO-ENTMCNC: 30.8 GM/DL (ref 33–36)
MCV RBC AUTO: 87 FL (ref 80–94)
MONOCYTES # BLD AUTO: 1.1 X10(3)/MCL (ref 0.1–1.3)
MONOCYTES NFR BLD AUTO: 8 %
NEUTROPHILS # BLD AUTO: 9.75 X10(3)/MCL (ref 2.1–9.2)
NEUTROPHILS NFR BLD AUTO: 73 %
PLATELET # BLD AUTO: 226 X10(3)/MCL (ref 130–400)
PMV BLD AUTO: 12.8 FL (ref 9.4–12.4)
POTASSIUM SERPL-SCNC: 4.3 MMOL/L (ref 3.5–5.1)
RBC # BLD AUTO: 3.77 X10(6)/MCL (ref 4.7–6.1)
SODIUM SERPL-SCNC: 138 MMOL/L (ref 136–145)
WBC # SPEC AUTO: 13.3 X10(3)/MCL (ref 4.5–11.5)

## 2020-08-07 ENCOUNTER — HISTORICAL (OUTPATIENT)
Dept: ADMINISTRATIVE | Facility: HOSPITAL | Age: 55
End: 2020-08-07

## 2020-08-07 LAB
ABS NEUT (OLG): 10.47 X10(3)/MCL (ref 2.1–9.2)
BASOPHILS # BLD AUTO: 0.1 X10(3)/MCL (ref 0–0.2)
BASOPHILS NFR BLD AUTO: 0 %
EOSINOPHIL # BLD AUTO: 0.2 X10(3)/MCL (ref 0–0.9)
EOSINOPHIL NFR BLD AUTO: 2 %
ERYTHROCYTE [DISTWIDTH] IN BLOOD BY AUTOMATED COUNT: 13.9 % (ref 11.5–14.5)
FERRITIN SERPL-MCNC: 10.4 NG/ML (ref 26–388)
HCT VFR BLD AUTO: 34 % (ref 40–51)
HGB BLD-MCNC: 10.7 GM/DL (ref 13.5–17.5)
IMM GRANULOCYTES # BLD AUTO: 0.17 10*3/UL
IMM GRANULOCYTES NFR BLD AUTO: 1 %
IRON SATN MFR SERPL: 18.4 % (ref 15–50)
IRON SERPL-MCNC: 88 MCG/DL (ref 65–175)
LYMPHOCYTES # BLD AUTO: 2.1 X10(3)/MCL (ref 0.6–4.6)
LYMPHOCYTES NFR BLD AUTO: 15 %
MCH RBC QN AUTO: 26.6 PG (ref 26–34)
MCHC RBC AUTO-ENTMCNC: 31.5 GM/DL (ref 31–37)
MCV RBC AUTO: 84.4 FL (ref 80–100)
MONOCYTES # BLD AUTO: 0.8 X10(3)/MCL (ref 0.1–1.3)
MONOCYTES NFR BLD AUTO: 6 %
NEUTROPHILS # BLD AUTO: 10.47 X10(3)/MCL (ref 2.1–9.2)
NEUTROPHILS NFR BLD AUTO: 76 %
PLATELET # BLD AUTO: 258 X10(3)/MCL (ref 130–400)
PMV BLD AUTO: 12.9 FL (ref 7.4–10.4)
RBC # BLD AUTO: 4.03 X10(6)/MCL (ref 4.5–5.9)
TIBC SERPL-MCNC: 479 MCG/DL (ref 250–450)
TRANSFERRIN SERPL-MCNC: 347 MG/DL (ref 200–360)
WBC # SPEC AUTO: 13.8 X10(3)/MCL (ref 4.5–11)

## 2020-11-02 ENCOUNTER — HISTORICAL (OUTPATIENT)
Dept: INTERNAL MEDICINE | Facility: CLINIC | Age: 55
End: 2020-11-02

## 2020-11-02 LAB
ABS NEUT (OLG): 12.78 X10(3)/MCL (ref 2.1–9.2)
ALBUMIN SERPL-MCNC: 3.8 GM/DL (ref 3.5–5)
ALBUMIN/GLOB SERPL: 0.7 RATIO (ref 1.1–2)
ALP SERPL-CCNC: 147 UNIT/L (ref 40–150)
ALT SERPL-CCNC: 15 UNIT/L (ref 0–55)
APPEARANCE, UA: CLEAR
AST SERPL-CCNC: 16 UNIT/L (ref 5–34)
BACTERIA #/AREA URNS AUTO: ABNORMAL /HPF
BASOPHILS # BLD AUTO: 0 X10(3)/MCL (ref 0–0.2)
BASOPHILS NFR BLD AUTO: 0 %
BILIRUB SERPL-MCNC: 0.5 MG/DL
BILIRUB UR QL STRIP: NEGATIVE
BILIRUBIN DIRECT+TOT PNL SERPL-MCNC: 0.2 MG/DL (ref 0–0.5)
BILIRUBIN DIRECT+TOT PNL SERPL-MCNC: 0.3 MG/DL (ref 0–0.8)
BUN SERPL-MCNC: 21 MG/DL (ref 8.4–25.7)
CALCIUM SERPL-MCNC: 9.9 MG/DL (ref 8.4–10.2)
CHLORIDE SERPL-SCNC: 99 MMOL/L (ref 98–107)
CHOLEST SERPL-MCNC: 197 MG/DL
CHOLEST/HDLC SERPL: 5 {RATIO} (ref 0–5)
CO2 SERPL-SCNC: 23 MMOL/L (ref 22–29)
COLOR UR: YELLOW
CREAT SERPL-MCNC: 1.6 MG/DL (ref 0.73–1.18)
CREAT UR-MCNC: 163.6 MG/DL (ref 58–161)
EOSINOPHIL # BLD AUTO: 0.2 X10(3)/MCL (ref 0–0.9)
EOSINOPHIL NFR BLD AUTO: 1 %
ERYTHROCYTE [DISTWIDTH] IN BLOOD BY AUTOMATED COUNT: 15.5 % (ref 11.5–14.5)
EST. AVERAGE GLUCOSE BLD GHB EST-MCNC: 145.6 MG/DL
GLOBULIN SER-MCNC: 5.3 GM/DL (ref 2.4–3.5)
GLUCOSE (UA): 70 MG/DL
GLUCOSE SERPL-MCNC: 162 MG/DL (ref 74–100)
HBA1C MFR BLD: 6.7 %
HCT VFR BLD AUTO: 38.2 % (ref 40–51)
HDLC SERPL-MCNC: 42 MG/DL (ref 35–60)
HGB BLD-MCNC: 11.6 GM/DL (ref 13.5–17.5)
HGB UR QL STRIP: NEGATIVE
HYALINE CASTS #/AREA URNS LPF: ABNORMAL /LPF
IMM GRANULOCYTES # BLD AUTO: 0.17 10*3/UL
IMM GRANULOCYTES NFR BLD AUTO: 1 %
KETONES UR QL STRIP: NEGATIVE
LDLC SERPL CALC-MCNC: 98 MG/DL (ref 50–140)
LEUKOCYTE ESTERASE UR QL STRIP: NEGATIVE
LYMPHOCYTES # BLD AUTO: 2.5 X10(3)/MCL (ref 0.6–4.6)
LYMPHOCYTES NFR BLD AUTO: 15 %
MCH RBC QN AUTO: 26 PG (ref 26–34)
MCHC RBC AUTO-ENTMCNC: 30.4 GM/DL (ref 31–37)
MCV RBC AUTO: 85.5 FL (ref 80–100)
MICROALBUMIN UR-MCNC: <50 UG/ML
MICROALBUMIN/CREAT RATIO PNL UR: ABNORMAL MG/GM CR (ref 0–30)
MONOCYTES # BLD AUTO: 0.9 X10(3)/MCL (ref 0.1–1.3)
MONOCYTES NFR BLD AUTO: 5 %
MUCOUS THREADS URNS QL MICRO: ABNORMAL
NEUTROPHILS # BLD AUTO: 12.78 X10(3)/MCL (ref 2.1–9.2)
NEUTROPHILS NFR BLD AUTO: 77 %
NITRITE UR QL STRIP: NEGATIVE
PH UR STRIP: 5.5 [PH] (ref 4.5–8)
PLATELET # BLD AUTO: 280 X10(3)/MCL (ref 130–400)
PMV BLD AUTO: 12.9 FL (ref 7.4–10.4)
POTASSIUM SERPL-SCNC: 4.4 MMOL/L (ref 3.5–5.1)
PROT SERPL-MCNC: 9.1 GM/DL (ref 6.4–8.3)
PROT UR QL STRIP: 20 MG/DL
PSA SERPL-MCNC: 0.77 NG/ML
RBC # BLD AUTO: 4.47 X10(6)/MCL (ref 4.5–5.9)
RBC #/AREA URNS AUTO: ABNORMAL /HPF
SODIUM SERPL-SCNC: 137 MMOL/L (ref 136–145)
SP GR UR STRIP: 1.01 (ref 1–1.03)
SQUAMOUS #/AREA URNS LPF: ABNORMAL /LPF
TRIGL SERPL-MCNC: 287 MG/DL (ref 34–140)
TSH SERPL-ACNC: 3.96 UIU/ML (ref 0.35–4.94)
UROBILINOGEN UR STRIP-ACNC: NORMAL
VLDLC SERPL CALC-MCNC: 57 MG/DL
WBC # SPEC AUTO: 16.6 X10(3)/MCL (ref 4.5–11)
WBC #/AREA URNS AUTO: ABNORMAL /HPF

## 2021-02-02 ENCOUNTER — HISTORICAL (OUTPATIENT)
Dept: INTERNAL MEDICINE | Facility: CLINIC | Age: 56
End: 2021-02-02

## 2021-02-02 LAB
ABS NEUT (OLG): 8.25 X10(3)/MCL (ref 2.1–9.2)
APPEARANCE, UA: CLEAR
BACTERIA #/AREA URNS AUTO: ABNORMAL /HPF
BASOPHILS # BLD AUTO: 0.1 X10(3)/MCL (ref 0–0.2)
BASOPHILS NFR BLD AUTO: 1 %
BILIRUB UR QL STRIP: NEGATIVE
BUN SERPL-MCNC: 21 MG/DL (ref 8.4–25.7)
CALCIUM SERPL-MCNC: 9.9 MG/DL (ref 8.4–10.2)
CHLORIDE SERPL-SCNC: 101 MMOL/L (ref 98–107)
CO2 SERPL-SCNC: 27 MMOL/L (ref 22–29)
COLOR UR: YELLOW
CREAT SERPL-MCNC: 1.12 MG/DL (ref 0.73–1.18)
CREAT/UREA NIT SERPL: 19
EOSINOPHIL # BLD AUTO: 0.3 X10(3)/MCL (ref 0–0.9)
EOSINOPHIL NFR BLD AUTO: 2 %
ERYTHROCYTE [DISTWIDTH] IN BLOOD BY AUTOMATED COUNT: 14.4 % (ref 11.5–14.5)
GLUCOSE (UA): NEGATIVE
GLUCOSE SERPL-MCNC: 103 MG/DL (ref 74–100)
HCT VFR BLD AUTO: 39.1 % (ref 40–51)
HGB BLD-MCNC: 12.4 GM/DL (ref 13.5–17.5)
HGB UR QL STRIP: NEGATIVE
HYALINE CASTS #/AREA URNS LPF: ABNORMAL /LPF
IMM GRANULOCYTES # BLD AUTO: 0.18 10*3/UL
IMM GRANULOCYTES NFR BLD AUTO: 2 %
KETONES UR QL STRIP: NEGATIVE
LEUKOCYTE ESTERASE UR QL STRIP: NEGATIVE
LYMPHOCYTES # BLD AUTO: 2 X10(3)/MCL (ref 0.6–4.6)
LYMPHOCYTES NFR BLD AUTO: 17 %
MCH RBC QN AUTO: 28.2 PG (ref 26–34)
MCHC RBC AUTO-ENTMCNC: 31.7 GM/DL (ref 31–37)
MCV RBC AUTO: 88.9 FL (ref 80–100)
MONOCYTES # BLD AUTO: 1 X10(3)/MCL (ref 0.1–1.3)
MONOCYTES NFR BLD AUTO: 9 %
NEUTROPHILS # BLD AUTO: 8.25 X10(3)/MCL (ref 2.1–9.2)
NEUTROPHILS NFR BLD AUTO: 70 %
NITRITE UR QL STRIP: NEGATIVE
PH UR STRIP: 6 [PH] (ref 4.5–8)
PLATELET # BLD AUTO: 198 X10(3)/MCL (ref 130–400)
PMV BLD AUTO: 13.4 FL (ref 7.4–10.4)
POTASSIUM SERPL-SCNC: 4.3 MMOL/L (ref 3.5–5.1)
PROT UR QL STRIP: 20 MG/DL
RBC # BLD AUTO: 4.4 X10(6)/MCL (ref 4.5–5.9)
RBC #/AREA URNS AUTO: ABNORMAL /HPF
SODIUM SERPL-SCNC: 138 MMOL/L (ref 136–145)
SP GR UR STRIP: 1.02 (ref 1–1.03)
SQUAMOUS #/AREA URNS LPF: ABNORMAL /LPF
UROBILINOGEN UR STRIP-ACNC: NORMAL
WBC # SPEC AUTO: 11.8 X10(3)/MCL (ref 4.5–11)
WBC #/AREA URNS AUTO: ABNORMAL /HPF

## 2021-04-28 ENCOUNTER — HISTORICAL (OUTPATIENT)
Dept: INTERNAL MEDICINE | Facility: CLINIC | Age: 56
End: 2021-04-28

## 2021-04-28 LAB
ALBUMIN SERPL-MCNC: 3.4 GM/DL (ref 3.5–5)
ALBUMIN/GLOB SERPL: 0.7 RATIO (ref 1.1–2)
ALP SERPL-CCNC: 134 UNIT/L (ref 40–150)
ALT SERPL-CCNC: 20 UNIT/L (ref 0–55)
AST SERPL-CCNC: 19 UNIT/L (ref 5–34)
BILIRUB SERPL-MCNC: 0.4 MG/DL
BILIRUBIN DIRECT+TOT PNL SERPL-MCNC: 0.1 MG/DL (ref 0–0.5)
BILIRUBIN DIRECT+TOT PNL SERPL-MCNC: 0.3 MG/DL (ref 0–0.8)
BUN SERPL-MCNC: 15.4 MG/DL (ref 8.4–25.7)
CALCIUM SERPL-MCNC: 9.5 MG/DL (ref 8.4–10.2)
CHLORIDE SERPL-SCNC: 103 MMOL/L (ref 98–107)
CO2 SERPL-SCNC: 22 MMOL/L (ref 22–29)
CREAT SERPL-MCNC: 1.24 MG/DL (ref 0.73–1.18)
CREAT UR-MCNC: 64.8 MG/DL (ref 58–161)
EST. AVERAGE GLUCOSE BLD GHB EST-MCNC: 157.1 MG/DL
GLOBULIN SER-MCNC: 4.8 GM/DL (ref 2.4–3.5)
GLUCOSE SERPL-MCNC: 133 MG/DL (ref 74–100)
HBA1C MFR BLD: 7.1 %
MICROALBUMIN UR-MCNC: 15.2 MG/L
MICROALBUMIN/CREAT RATIO PNL UR: 23.5 MG/GM CR (ref 0–30)
POTASSIUM SERPL-SCNC: 4.4 MMOL/L (ref 3.5–5.1)
PROT SERPL-MCNC: 8.2 GM/DL (ref 6.4–8.3)
SODIUM SERPL-SCNC: 136 MMOL/L (ref 136–145)

## 2021-05-13 ENCOUNTER — HISTORICAL (OUTPATIENT)
Dept: CARDIOLOGY | Facility: HOSPITAL | Age: 56
End: 2021-05-13

## 2021-06-03 ENCOUNTER — HISTORICAL (OUTPATIENT)
Dept: RADIOLOGY | Facility: HOSPITAL | Age: 56
End: 2021-06-03

## 2021-09-13 ENCOUNTER — HISTORICAL (OUTPATIENT)
Dept: INTERNAL MEDICINE | Facility: CLINIC | Age: 56
End: 2021-09-13

## 2021-09-13 LAB
ABS NEUT (OLG): 11.18 X10(3)/MCL (ref 2.1–9.2)
ALBUMIN SERPL-MCNC: 3.7 GM/DL (ref 3.5–5)
ALBUMIN/GLOB SERPL: 0.8 RATIO (ref 1.1–2)
ALP SERPL-CCNC: 141 UNIT/L (ref 40–150)
ALT SERPL-CCNC: 18 UNIT/L (ref 0–55)
AST SERPL-CCNC: 19 UNIT/L (ref 5–34)
BASOPHILS # BLD AUTO: 0.1 X10(3)/MCL (ref 0–0.2)
BASOPHILS NFR BLD AUTO: 0 %
BILIRUB SERPL-MCNC: 0.4 MG/DL
BILIRUBIN DIRECT+TOT PNL SERPL-MCNC: 0.1 MG/DL (ref 0–0.5)
BILIRUBIN DIRECT+TOT PNL SERPL-MCNC: 0.3 MG/DL (ref 0–0.8)
BUN SERPL-MCNC: 14 MG/DL (ref 8.4–25.7)
CALCIUM SERPL-MCNC: 9.5 MG/DL (ref 8.4–10.2)
CHLORIDE SERPL-SCNC: 102 MMOL/L (ref 98–107)
CHOLEST SERPL-MCNC: 175 MG/DL
CHOLEST/HDLC SERPL: 5 {RATIO} (ref 0–5)
CO2 SERPL-SCNC: 23 MMOL/L (ref 22–29)
CREAT SERPL-MCNC: 1.39 MG/DL (ref 0.73–1.18)
EOSINOPHIL # BLD AUTO: 0.2 X10(3)/MCL (ref 0–0.9)
EOSINOPHIL NFR BLD AUTO: 2 %
ERYTHROCYTE [DISTWIDTH] IN BLOOD BY AUTOMATED COUNT: 12.8 % (ref 11.5–14.5)
EST. AVERAGE GLUCOSE BLD GHB EST-MCNC: 168.6 MG/DL
GLOBULIN SER-MCNC: 4.9 GM/DL (ref 2.4–3.5)
GLUCOSE SERPL-MCNC: 104 MG/DL (ref 74–100)
HBA1C MFR BLD: 7.5 %
HCT VFR BLD AUTO: 38.9 % (ref 40–51)
HDLC SERPL-MCNC: 37 MG/DL (ref 35–60)
HGB BLD-MCNC: 12.3 GM/DL (ref 13.5–17.5)
IMM GRANULOCYTES # BLD AUTO: 0.31 10*3/UL
IMM GRANULOCYTES NFR BLD AUTO: 2 %
LDLC SERPL CALC-MCNC: 76 MG/DL (ref 50–140)
LYMPHOCYTES # BLD AUTO: 2.5 X10(3)/MCL (ref 0.6–4.6)
LYMPHOCYTES NFR BLD AUTO: 16 %
MCH RBC QN AUTO: 27.7 PG (ref 26–34)
MCHC RBC AUTO-ENTMCNC: 31.6 GM/DL (ref 31–37)
MCV RBC AUTO: 87.6 FL (ref 80–100)
MONOCYTES # BLD AUTO: 1.1 X10(3)/MCL (ref 0.1–1.3)
MONOCYTES NFR BLD AUTO: 7 %
NEUTROPHILS # BLD AUTO: 11.18 X10(3)/MCL (ref 2.1–9.2)
NEUTROPHILS NFR BLD AUTO: 73 %
NRBC BLD AUTO-RTO: 0 % (ref 0–0.2)
PLATELET # BLD AUTO: 262 X10(3)/MCL (ref 130–400)
PMV BLD AUTO: 12.7 FL (ref 7.4–10.4)
POTASSIUM SERPL-SCNC: 4.4 MMOL/L (ref 3.5–5.1)
PROT SERPL-MCNC: 8.6 GM/DL (ref 6.4–8.3)
RBC # BLD AUTO: 4.44 X10(6)/MCL (ref 4.5–5.9)
SODIUM SERPL-SCNC: 138 MMOL/L (ref 136–145)
TRIGL SERPL-MCNC: 309 MG/DL (ref 34–140)
TSH SERPL-ACNC: 3.09 UIU/ML (ref 0.35–4.94)
VLDLC SERPL CALC-MCNC: 62 MG/DL
WBC # SPEC AUTO: 15.4 X10(3)/MCL (ref 4.5–11)

## 2021-10-25 ENCOUNTER — HISTORICAL (OUTPATIENT)
Dept: HEMATOLOGY/ONCOLOGY | Facility: CLINIC | Age: 56
End: 2021-10-25

## 2021-10-25 LAB
ALBUMIN SERPL-MCNC: 3.5 GM/DL (ref 3.5–5)
ALBUMIN/GLOB SERPL: 0.8 RATIO (ref 1.1–2)
ALP SERPL-CCNC: 138 UNIT/L (ref 40–150)
ALT SERPL-CCNC: 19 UNIT/L (ref 0–55)
AST SERPL-CCNC: 21 UNIT/L (ref 5–34)
BILIRUB SERPL-MCNC: 0.5 MG/DL
BILIRUBIN DIRECT+TOT PNL SERPL-MCNC: 0.2 MG/DL (ref 0–0.5)
BILIRUBIN DIRECT+TOT PNL SERPL-MCNC: 0.3 MG/DL (ref 0–0.8)
BUN SERPL-MCNC: 15.2 MG/DL (ref 8.4–25.7)
CALCIUM SERPL-MCNC: 9.7 MG/DL (ref 8.7–10.5)
CHLORIDE SERPL-SCNC: 98 MMOL/L (ref 98–107)
CO2 SERPL-SCNC: 29 MMOL/L (ref 22–29)
CREAT SERPL-MCNC: 1.47 MG/DL (ref 0.73–1.18)
CRP SERPL-MCNC: 1.67 MG/DL
ERYTHROCYTE [DISTWIDTH] IN BLOOD BY AUTOMATED COUNT: 12.8 % (ref 11.5–14.5)
ERYTHROCYTE [SEDIMENTATION RATE] IN BLOOD: 65 MM/HR (ref 0–15)
GLOBULIN SER-MCNC: 4.5 GM/DL (ref 2.4–3.5)
GLUCOSE SERPL-MCNC: 200 MG/DL (ref 74–100)
HAPTOGLOB SERPL-MCNC: 436 MG/DL (ref 14–258)
HCT VFR BLD AUTO: 36.6 % (ref 40–51)
HGB BLD-MCNC: 11.6 GM/DL (ref 13.5–17.5)
IRON SATN MFR SERPL: 23 % (ref 20–50)
IRON SERPL-MCNC: 75 UG/DL (ref 65–175)
LDH SERPL-CCNC: 232 UNIT/L (ref 140–271)
MCH RBC QN AUTO: 27.6 PG (ref 26–34)
MCHC RBC AUTO-ENTMCNC: 31.7 GM/DL (ref 31–37)
MCV RBC AUTO: 87.1 FL (ref 80–100)
NRBC BLD AUTO-RTO: 0 % (ref 0–0.2)
PLATELET # BLD AUTO: 256 X10(3)/MCL (ref 130–400)
PMV BLD AUTO: 13 FL (ref 7.4–10.4)
POTASSIUM SERPL-SCNC: 4.7 MMOL/L (ref 3.5–5.1)
PROT SERPL-MCNC: 8 GM/DL (ref 6.4–8.3)
RBC # BLD AUTO: 4.2 X10(6)/MCL (ref 4.5–5.9)
RET# (OHS): 0.11 X10(6)/MCL (ref 0.02–0.09)
RETICULOCYTE COUNT AUTOMATED (OLG): 2.7 % (ref 0.5–1.5)
SODIUM SERPL-SCNC: 136 MMOL/L (ref 136–145)
TIBC SERPL-MCNC: 250 UG/DL (ref 69–240)
TIBC SERPL-MCNC: 325 UG/DL (ref 250–450)
TRANSFERRIN SERPL-MCNC: 293 MG/DL (ref 174–364)
VIT B12 SERPL-MCNC: 198 PG/ML (ref 213–816)
WBC # SPEC AUTO: 13.8 X10(3)/MCL (ref 4.5–11)

## 2021-11-08 ENCOUNTER — HISTORICAL (OUTPATIENT)
Dept: HEMATOLOGY/ONCOLOGY | Facility: CLINIC | Age: 56
End: 2021-11-08

## 2021-12-10 ENCOUNTER — HISTORICAL (OUTPATIENT)
Dept: ADMINISTRATIVE | Facility: HOSPITAL | Age: 56
End: 2021-12-10

## 2021-12-10 LAB
ABS NEUT (OLG): 9.05 X10(3)/MCL (ref 2.1–9.2)
ALBUMIN SERPL-MCNC: 3.7 GM/DL (ref 3.5–5)
ALBUMIN/GLOB SERPL: 0.9 RATIO (ref 1.1–2)
ALP SERPL-CCNC: 152 UNIT/L (ref 40–150)
ALT SERPL-CCNC: 18 UNIT/L (ref 0–55)
AST SERPL-CCNC: 17 UNIT/L (ref 5–34)
BASOPHILS # BLD AUTO: 0 X10(3)/MCL (ref 0–0.2)
BASOPHILS NFR BLD AUTO: 0 %
BILIRUB SERPL-MCNC: 0.4 MG/DL
BILIRUBIN DIRECT+TOT PNL SERPL-MCNC: 0.2 MG/DL (ref 0–0.5)
BILIRUBIN DIRECT+TOT PNL SERPL-MCNC: 0.2 MG/DL (ref 0–0.8)
BUN SERPL-MCNC: 16.2 MG/DL (ref 8.4–25.7)
CALCIUM SERPL-MCNC: 9.7 MG/DL (ref 8.7–10.5)
CHLORIDE SERPL-SCNC: 103 MMOL/L (ref 98–107)
CO2 SERPL-SCNC: 28 MMOL/L (ref 22–29)
CREAT SERPL-MCNC: 1.43 MG/DL (ref 0.73–1.18)
EOSINOPHIL # BLD AUTO: 0.2 X10(3)/MCL (ref 0–0.9)
EOSINOPHIL NFR BLD AUTO: 2 %
ERYTHROCYTE [DISTWIDTH] IN BLOOD BY AUTOMATED COUNT: 13 % (ref 11.5–14.5)
FERRITIN SERPL-MCNC: 106.83 NG/ML (ref 21.81–274.66)
GLOBULIN SER-MCNC: 4.2 GM/DL (ref 2.4–3.5)
GLUCOSE SERPL-MCNC: 79 MG/DL (ref 74–100)
HCT VFR BLD AUTO: 38 % (ref 40–51)
HGB BLD-MCNC: 12.1 GM/DL (ref 13.5–17.5)
IMM GRANULOCYTES # BLD AUTO: 0.25 10*3/UL
IMM GRANULOCYTES NFR BLD AUTO: 2 %
LYMPHOCYTES # BLD AUTO: 2.4 X10(3)/MCL (ref 0.6–4.6)
LYMPHOCYTES NFR BLD AUTO: 19 %
MCH RBC QN AUTO: 27.4 PG (ref 26–34)
MCHC RBC AUTO-ENTMCNC: 31.8 GM/DL (ref 31–37)
MCV RBC AUTO: 86 FL (ref 80–100)
MONOCYTES # BLD AUTO: 0.9 X10(3)/MCL (ref 0.1–1.3)
MONOCYTES NFR BLD AUTO: 7 %
NEUTROPHILS # BLD AUTO: 9.05 X10(3)/MCL (ref 2.1–9.2)
NEUTROPHILS NFR BLD AUTO: 70 %
NRBC BLD AUTO-RTO: 0 % (ref 0–0.2)
PLATELET # BLD AUTO: 276 X10(3)/MCL (ref 130–400)
PMV BLD AUTO: 12.6 FL (ref 7.4–10.4)
POTASSIUM SERPL-SCNC: 4.8 MMOL/L (ref 3.5–5.1)
PROT SERPL-MCNC: 7.9 GM/DL (ref 6.4–8.3)
RBC # BLD AUTO: 4.42 X10(6)/MCL (ref 4.5–5.9)
SODIUM SERPL-SCNC: 142 MMOL/L (ref 136–145)
VIT B12 SERPL-MCNC: 533 PG/ML (ref 213–816)
WBC # SPEC AUTO: 12.9 X10(3)/MCL (ref 4.5–11)

## 2021-12-15 ENCOUNTER — HISTORICAL (OUTPATIENT)
Dept: LAB | Facility: HOSPITAL | Age: 56
End: 2021-12-15

## 2021-12-15 LAB
ABS NEUT (OLG): 8.93 X10(3)/MCL (ref 2.1–9.2)
APPEARANCE, UA: CLEAR
BACTERIA #/AREA URNS AUTO: ABNORMAL /HPF
BASOPHILS # BLD AUTO: 0 X10(3)/MCL (ref 0–0.2)
BASOPHILS NFR BLD AUTO: 0 %
BILIRUB UR QL STRIP: NEGATIVE
COLOR UR: ABNORMAL
EOSINOPHIL # BLD AUTO: 0.2 X10(3)/MCL (ref 0–0.9)
EOSINOPHIL NFR BLD AUTO: 2 %
ERYTHROCYTE [DISTWIDTH] IN BLOOD BY AUTOMATED COUNT: 13.4 % (ref 11.5–14.5)
EST. AVERAGE GLUCOSE BLD GHB EST-MCNC: 171.4 MG/DL
GLUCOSE (UA): 200 MG/DL
HBA1C MFR BLD: 7.6 %
HCT VFR BLD AUTO: 40.3 % (ref 40–51)
HGB BLD-MCNC: 12.8 GM/DL (ref 13.5–17.5)
HGB UR QL STRIP: NEGATIVE
HYALINE CASTS #/AREA URNS LPF: ABNORMAL /LPF
IMM GRANULOCYTES # BLD AUTO: 0.17 10*3/UL
IMM GRANULOCYTES NFR BLD AUTO: 1 %
KETONES UR QL STRIP: NEGATIVE
LEUKOCYTE ESTERASE UR QL STRIP: NEGATIVE
LYMPHOCYTES # BLD AUTO: 2.3 X10(3)/MCL (ref 0.6–4.6)
LYMPHOCYTES NFR BLD AUTO: 18 %
MCH RBC QN AUTO: 27.4 PG (ref 26–34)
MCHC RBC AUTO-ENTMCNC: 31.8 GM/DL (ref 31–37)
MCV RBC AUTO: 86.1 FL (ref 80–100)
MONOCYTES # BLD AUTO: 0.8 X10(3)/MCL (ref 0.1–1.3)
MONOCYTES NFR BLD AUTO: 6 %
NEUTROPHILS # BLD AUTO: 8.93 X10(3)/MCL (ref 2.1–9.2)
NEUTROPHILS NFR BLD AUTO: 72 %
NITRITE UR QL STRIP: NEGATIVE
NRBC BLD AUTO-RTO: 0 % (ref 0–0.2)
PH UR STRIP: 5 [PH] (ref 4.5–8)
PLATELET # BLD AUTO: ABNORMAL 10*3/UL (ref 130–400)
PMV BLD AUTO: ABNORMAL FL (ref 7.4–10.4)
PROT UR QL STRIP: NEGATIVE
PSA SERPL-MCNC: 1.03 NG/ML
RBC # BLD AUTO: 4.68 X10(6)/MCL (ref 4.5–5.9)
RBC #/AREA URNS AUTO: ABNORMAL /HPF
SP GR UR STRIP: 1.01 (ref 1–1.03)
SQUAMOUS #/AREA URNS LPF: ABNORMAL /LPF
UROBILINOGEN UR STRIP-ACNC: NORMAL
VIT B12 SERPL-MCNC: 605 PG/ML (ref 213–816)
WBC # SPEC AUTO: 12.4 X10(3)/MCL (ref 4.5–11)
WBC #/AREA URNS AUTO: ABNORMAL /HPF

## 2022-03-02 ENCOUNTER — HISTORICAL (OUTPATIENT)
Dept: ADMINISTRATIVE | Facility: HOSPITAL | Age: 57
End: 2022-03-02

## 2022-04-04 ENCOUNTER — HISTORICAL (OUTPATIENT)
Dept: ADMINISTRATIVE | Facility: HOSPITAL | Age: 57
End: 2022-04-04

## 2022-04-12 ENCOUNTER — HISTORICAL (OUTPATIENT)
Dept: ADMINISTRATIVE | Facility: HOSPITAL | Age: 57
End: 2022-04-12
Payer: MEDICARE

## 2022-04-30 VITALS
HEIGHT: 73 IN | OXYGEN SATURATION: 97 % | SYSTOLIC BLOOD PRESSURE: 137 MMHG | BODY MASS INDEX: 36.29 KG/M2 | WEIGHT: 273.81 LBS | DIASTOLIC BLOOD PRESSURE: 88 MMHG

## 2022-04-30 NOTE — PROGRESS NOTES
Patient:   Luigi Gotti            MRN: [id number]            FIN: [id number]               Age:   53 years     Sex:  Male     :  1965   Associated Diagnoses:   None   Author:   Jim FLETCHER, Ese Le reviewed: Will discuss with patient during follow up appointment on  Aug. 15, 2018 at 7:30am.

## 2022-04-30 NOTE — OP NOTE
DATE OF SURGERY:    01/09/2018    SURGEON:  Maury Cochran MD    attending physician:  Maury Cochran MD    PREOPERATIVE DIAGNOSES:    1. Benign prostatic hypertrophy with obstructive voiding.  2. History of urethral stricture.    POSTOPERATIVE DIAGNOSES:    1. Benign prostatic hypertrophy with obstructive voiding.  2. History of urethral stricture.    PROCEDURE PERFORMED:  Cystoscopy.    COMPLICATIONS:  None.    BLOOD LOSS:  None.    FINDINGS:  Moderate BPH with a high bladder neck.  No evidence of urethral stricture at this time.    HISTORY OF PRESENT ILLNESS:  A 52-year-old male with history of urethral stricture.  He has been having increasing nocturia and decreased force of stream.  He is currently on Flomax 2 tablets a day.  His nocturia has improved slightly and his stream is a little better.  He comes in today to rule out a stricture.     Informed consent was obtained.  Taken the cysto suite.  He was placed supine.  He was prepped with Betadine.  Viscous lidocaine instilled into the urethra.  Flexible scope was inserted.  Urethra was unremarkable.  No evidence of any obstructing stricture.  Prostatic fossa with mild to moderate bilobar hypertrophy.  High bladder neck.  Looking in the bladder, ureteral orifices normal size, shape, position, clear efflux bilaterally.  No tumor, stones, foreign bodies seen.  Minimal to moderate trabeculation.  Dome of bladder neck reviewed by retroflexion scope and free of lesions.    ASSESSMENT:  Benign prostatic hypertrophy.      PLAN:  Continue the Flomax.  I am going to see him back in [facility] in 6 months, sooner should he have any problems.        ______________________________  Maury Cochran MD    TAB/MODL  DD:  01/09/2018  Time:  11:26AM  DT:  01/09/2018  Time:  11:47AM  Job #:  [id number]

## 2022-04-30 NOTE — DISCHARGE SUMMARY
Patient:   Luigi Gotti            MRN: [id number]            FIN: [id number]               Age:   53 years     Sex:  Male     :  1965   Associated Diagnoses:   None   Author:   Eusebio LUI, Matt WAGGONER      Basic Information   Patient well know to our [facility] RTC  complaining of constipation and lower abdominal discomfort. This is different than his complaint several months ago which was more upper abdominal cramping. His recent left heart catheterization in March did not image the celiac artery. His last CT angiogram was in .  We will repeat the CT angiogram to identify the exact of stenosis or occlusion and plan our surgical approach.    10/4/18 f/u visit  Radiologic interpretation of the CT angiogram indicates the celiac artery stent is patent. However, in my view I cannot say with complete surety that this is the case. Therefore, I will recommend abdominal aortogram with celiac angiogram next week. If there is an in-stent restenosis at or a kink, etc., we will fix it then in there. If the stent is widely patent we will stop there, or if the stent is occluded and we cannot traverse the site we will stop there as well.    Brief Operative Note   Operative Information   Date/ Time:  10/10/2018 12:20:00.     Preoperative Diagnosis: Celiac artery occlusion.     Postoperative Diagnosis: same.     Procedures Performed: Celiac angiogram  Flush aortagram  RCFA entry.     Surgeon: Rubina FLETCHER, Matt DALLAS.     Esimated blood loss: loss  50  cc.     Description of Procedure/Findings/    Complications: Could not cannulate the celiac. Pt will require open bypass procedure.          A/P  DC home today  RTC to discuss/ schedule surgical revascularization       Health Status   Allergies:    Allergic Reactions (Selected)  Severe  Pork- Pork.  Severity Not Documented  Plavix- No reactions were documented.,    Allergies (2) Active Reaction  Pork Pork  Plavix None Documented     Current medications:   (Selected)   Inpatient Medications  Ordered  Sodium Chloride 0.9% intravenous solution 1,000 mL: 1,000 mL, 1,000 mL, IV, 20 mL/hr, start date 10/10/18 8:11:00 CDT  Pending Complete  midazolam: 2 mg, form: Injection, IV Push, q5min, Order duration: 2 dose(s), first dose 12 7:10:00 CDT, stop date 12 7:19:00 CDT, (up to 5 mg for moderate anxiety)  Prescriptions  Prescribed  Effient 10 mg oral tablet: 10 mg = 1 tab(s), Oral, Daily, # 30 tab(s), 11 Refill(s)  Imdur 30 mg oral tablet, extended release: 30 mg = 1 tab(s), Oral, qAM, # 30 tab(s), 11 Refill(s), Pharmacy: [facility]/pharmacy #[id number]  NovoLIN 70/30 subcutaneous susp: 50 units, Subcutaneous, BID, # 10 mL, 4 Refill(s), Pharmacy: [facility]/pharmacy #[id number]  Ranexa 1000 mg oral tablet, extended release: 1,000 mg = 1 tab(s), Oral, BID, # 60 tab(s), 11 Refill(s), Pharmacy: [facility]/pharmacy #[id number]  aspirin 81 mg oral tablet: 81 mg = 1 tab(s), Oral, Daily, # 30 tab(s), 11 Refill(s)  omeprazole 40 mg oral DR capsule: 40 mg = 1 cap(s), Oral, Daily, # 30 cap(s), 6 Refill(s), Pharmacy: [facility]/pharmacy #[id number]  tamsulosin 0.4 mg oral capsule: See Instructions, TAKE ONE CAPSULE BY MOUTH ONCE EVERY DAY BEFORE MEALS, # 30 cap(s), 11 Refill(s), eRx: [facility]/pharmacy #[id number]  Documented Medications  Documented  CLONIDINE    TAB 0.1M.1 mg = 1 tab(s), Oral, qPM  Coreg 25 mg oral tablet: 25 mg = 1 tab(s), Oral, BID, # 60 tab(s), 0 Refill(s)  DIGOXIN      TAB 0.125M mcg = 1 tab(s), Oral, Daily  HYDROXYZ HCL TAB 50M mg = 1 tab(s), Oral, TID  Norco 10 mg-325 mg oral tablet: 1 tab(s), Oral, BID, PRN PRN for pain, 0 Refill(s)  Ventolin HFA 90 mcg/inh inhalation aerosol: 1 puff(s), INH, Once, PRN PRN as needed for wheezing, # 8 gm, 0 Refill(s)  atorvastatin 80 mg oral tablet: 80 mg = 1 tab(s), Oral, Daily, # 30 tab(s), 0 Refill(s)  azelastine 137 mcg/inh (0.1%) nasal spray: 1 spray(s), Nasal, BID, in each nostril, # 30 mL, 0 Refill(s)  fluticasone 50 mcg/inh nasal spray: 1 spray(s), Nasal,  BID, # 1 bottle(s), 0 Refill(s)  furosemide 40 mg oral tablet: 40 mg = 1 tab(s), Oral, Daily, # 30 tab(s), 0 Refill(s)  metoclopramide 10 mg oral tablet, disintegrating: 10 mg = 1 tab(s), Oral, Daily, # 30 tab(s), 0 Refill(s)  montelukast 10 mg oral TABLET: 10 mg = 1 tab(s), Oral, qPM, # 30 tab(s), 0 Refill(s)  nitroglycerin 0.4 mg sublingual spray: = 1 spray(s), SL, q5min, PRN PRN as needed for chest pain, # 12 gm, 0 Refill(s)  spironolactone 25 mg oral tablet: 25 mg = 1 tab(s), Oral, Daily, # 30 tab(s), 0 Refill(s),    Medications (1) Active  Scheduled: (0)  Continuous: (1)  sodium chloride 0.9% 1,000 mL  1,000 mL, IV, 20 mL/hr  PRN: (0)     Problem list:    All Problems  Atherosclerosis / SNOMED CT 657477392 / Confirmed  LIMB AND CLAUDICATION  BPH without urinary obstruction / SNOMED CT 6716539616 / Confirmed  CAD (coronary artery disease) / SNOMED CT [id number] / Confirmed  CHF - Congestive heart failure / SNOMED CT 216888702 / Confirmed  Claudication / SNOMED CT 994403701 / Confirmed  CAD (coronary artery disease) / SNOMED CT 43160072 / Confirmed  Dysphagia / SNOMED CT 21867197 / Confirmed  Gastroparesis / SNOMED CT [id number] / Confirmed  Hyperlipidemia / SNOMED CT 49106025 / Confirmed  MI (myocardial infarction) / SNOMED CT [id number] / Confirmed  PVD (peripheral vascular disease) / SNOMED CT 6023588577 / Confirmed  Sleep apnea, unspecified / SNOMED CT [id number] / Confirmed  Celiac artery stenosis / SNOMED CT 783124713 / Confirmed      Physical Examination      Vital Signs (last 24 hrs)_____  Last Charted___________  Temp Oral     36.8 DegC  (OCT 10 08:41)  Heart Rate Peripheral   83 bpm  (OCT 10 08:41)  SBP      H 162mmHg  (OCT 10 08:41)  DBP      H 92mmHg  (OCT 10 08:41)  SpO2      97 %  (OCT 10 08:41)  Weight      110.9 kg  (OCT 10 08:21)        Review / Management   Results review:     Labs (Last four  charted values)  WBC                  H 14.4 (OCT 10) H 12.8 (OCT 09)   Hgb                  L 11.2 (OCT 10) L 10.2 (OCT 09)   Hct                  L 35.4 (OCT 10) L 32.9 (OCT 09)   Plt                  281 (OCT 10) 235 (OCT 09)   Na                   137 (OCT 10) 138 (OCT 09)   K                    4.9 (OCT 10) 4.8 (OCT 09)   CO2                  27.0 (OCT 10) 26.0 (OCT 09)   Cl                   101 (OCT 10) 104 (OCT 09)   Cr                   1.24 (OCT 10) H 1.42 (OCT 09)   BUN                  10.0 (OCT 10) 12.0 (OCT 09)   Glucose Random       H 161 (OCT 10) H 170 (OCT 09)   PT                   12.8 (OCT 10) 13.9 (OCT 09)   INR                  0.93 (OCT 10) 1.04 (OCT 09)   PTT                  32.8 (OCT 10) 30.9 (OCT 09) .

## 2022-04-30 NOTE — OP NOTE
DATE OF SURGERY:    09/05/2017    SURGEON:  Kedar Reilly MD    PROCEDURE:  Abdominal aortography, bilateral peripheral angiogram, laser atherectomy of the left superficial femoral artery, percutaneous transluminal angioplasty of the left superficial femoral artery, filter placement of the left superficial femoral artery.    PREOPERATIVE DIAGNOSIS:  Symptom limiting claudication, Faby Class 3, in a patient with known history of peripheral vascular disease, known history of peripheral intervention, known history of peripheral atherectomy, known history of peripheral stenting, with abnormal noninvasive studies, on maximal medical treatment, with continued symptoms of severe claudication.    POSTOPERATIVE DIAGNOSIS:  Symptom limiting claudication, [place] Class 3, in a patient with known history of peripheral vascular disease, known history of peripheral intervention, known history of peripheral atherectomy, known history of peripheral stenting, with abnormal noninvasive studies, on maximal medical treatment, with continued symptoms of severe claudication.    PROCEDURE IN DETAIL:  After the procedure was explained to the patient in detail and consents obtained the right groin was prepped and draped in a sterile fashion.  A 7-Cymraes sheath was placed to the right common femoral artery.  Omniflush catheter was advanced into the abdominal aorta.  Aortography was performed.  The iliac circulation was patent bilaterally.  A 7-Cymraes crossover sheath was placed to the left external iliac artery with the use of a guidewire under fluoroscopic guidance.  Angiography of the left lower extremity revealed severe disease of the superficial femoral artery with multiple lesions exceeding 90% throughout the course of the superficial femoral artery in the left lower extremity.  Three vessel runoff was noted in the infrapopliteal circulation.  After the appropriate administration of Angiomax, a filter wire was  placed into the popliteal artery.  Laser atherectomy was performed with a 2.3 mm laser atherectomy device.  The lesions were postdilated with a 6 mm x 150 mm balloon with multiple inflations.  Excellent angiographic results were obtained with less than 10% residual stenosis and brisk distal flow.  Upon retrieval of stent, there was noted to be embolic material.  The sheath was then placed into the right external iliac artery.  Angiography of the right lower extremity revealed mild disease of the right superficial femoral artery with three vessel runoff in the right lower extremity.  The patient tolerated the procedure well.    IMPRESSION:    1. Severe peripheral vascular disease.  2. Sequential 90% lesions of the left superficial femoral artery.  3. Mild disease of the right superficial femoral artery.  4. Three vessel runoff in the infrapopliteal circulation bilaterally.  5. Successful filter placement in the left superficial femoral artery.  6. Laser atherectomy of the left superficial femoral artery.  7. Percutaneous transluminal angioplasty of the left superficial femoral artery with a 6 x 150 balloon with multiple inflations.  8. Less than 10% residual stenosis of the multiple lesions with brisk distal flow.  9. Effient 60 mg loading dose.  10. Daily aspirin and Effient therapy.  11. 0 cc blood loss.  12. No specimens removed.  13. Cardiac rehabilitation consultation.  14. Smoking cessation.  15. Sodium restriction.  16. Cardiac diet.  17. Diet, exercise, weight loss to improve cardiovascular outcomes.  18. Identification and modification of any and all possibly existing cardiovascular risk factors to decrease future cardiovascular event rate such as increased risk of cardiovascular morbidity, increased risk of cardiovascular mortality, increased risk of cardiovascular complications, in an overall attempt to improve medical outcomes and prognosis.        ______________________________  Kedar Reilly  MD JO/ANALI  DD:  09/05/2017  Time:  01:23PM  DT:  09/06/2017  Time:  09:32AM  Job #:  [id number]

## 2022-04-30 NOTE — OP NOTE
DATE OF SURGERY:    08/04/2017    SURGEON:  Kedar Reilly MD    PROCEDURE:  Left heart catheterization,ventriculography, angiography of left internal mammary artery, abdominal aortography, bilateral peripheral angiogram, PTCA stent of the circumflex.    PREOPERATIVE DIAGNOSIS:  Anginal symptoms which inhibit the patient's daily activities which makes him anginal class 3 in a patient with a known history of coronary artery disease, a known history of coronary artery bypass surgery, a known history of coronary artery angioplasty, known history of coronary artery angioplasty and stenting, on multiple medications which places the patient at increased risk for future cardiovascular events such as increased risk for cardiovascular morbidity, increased risk of cardiovascular mortality, increased risk of cardiovascular complications, and overall poor medical outcomes and prognosis, with [place] Class 3 classification and claudication in a patient with known history of peripheral intervention, with known history of peripheral angioplasty and stenting on medical treatment.    POSTOPERATIVE DIAGNOSIS:  Anginal symptoms which inhibit the patient's daily activities which makes him anginal class 3 in a patient with a known history of coronary artery disease, a known history of coronary artery bypass surgery, a known history of coronary artery angioplasty, known history of coronary artery angioplasty and stenting, on multiple medications which places the patient at increased risk for future cardiovascular events such as increased risk for cardiovascular morbidity, increased risk of cardiovascular mortality, increased risk of cardiovascular complications, and overall poor medical outcomes and prognosis, with Faby Class 3 classification and claudication in a patient with known history of peripheral intervention, with known history of peripheral angioplasty and stenting on medical treatment.    PROCEDURE IN  DETAIL:  After the procedure was explained to the patient in detail, and consents obtained, the right groin was prepped and draped in a sterile fashion.  A 6-Mosotho sheath was placed into the right common femoral artery.  JR4 catheter was advanced to the ascending aorta.  Cannulation of the left main coronary artery was performed.  Angiography revealed patent left main coronary artery.  There was noted to be severe disease of the left anterior descending coronary artery.  There was noted to be a 90% lesion in the mid to distal portion of the circumflex coronary artery.  The JL4 catheter was then exchanged for a JR4 catheter.  Cannulation of the right coronary artery was performed.  Angiography revealed patent stent in the mid right coronary.  The right coronary artery was noted to be dominant in distribution.  A JR4 catheter was then placed to the left subclavian artery.  Angiography of the left internal mammary artery revealed patent left internal mammary artery to the diagonal with a jump segment to the left anterior descending coronary artery.  Brisk flow was noted.  JR4 catheter was then exchanged for a pigtail catheter.  Prolapse across the aortic valve was then performed.  Ventriculography was performed in the right anterior oblique projection and revealed moderate systolic dysfunction.  Pullback across the aortic valve revealed no significant gradient across the aortic valve.  Pigtail catheter was then placed to the abdominal aorta.  Aortography was then performed.  Renal arteries were noted to be patent bilaterally.  The pigtail catheter was then placed into the contralateral left external iliac.  Angiography revealed severe of the left superficial femoral artery.  Pigtail catheter was then placed into the right external iliac.  Angiography revealed significant disease of the right superficial femoral artery.  Catheter was then exchanged for a 6-Mosotho EBU guiding catheter.  The catheter was then advanced  into the ascending aorta.  Cannulation of the left main coronary artery was performed.  Angiography identified the high grade stenosis in the circumflex coronary artery.  After the appropriate administration of Angiomax, a Choice floppy tip wire was passed through the stenosis into the distal vessel.  A 2.25 mm x 12 mm drug eluting stent was then deployed in the affected portion at high atmospheres.  Excellent angiographic results were obtained with 0% residual stenosis and distal LYDIA 3 flow.  Patient tolerated the procedure well.    IMPRESSION:    1. Severe disease of the left anterior descending coronary artery.  2. 90% stenosis of the mid to distal portion of the circumflex.  3. Patent stent in the right coronary artery.  4. Right dominant coronary circulation.  5. Moderate left ventricular dysfunction.  6. Patent left internal mammary artery to the diagonal and left anterior descending coronary artery.  7. Moderate left ventricular dysfunction.  8. Patent renal arteries bilaterally.  9. Severe disease of the left superficial femoral artery.  10. Moderate disease of the right superficial femoral artery.  11. Successful PTCA stenting of the circumflex with a 2.25 mm x 12 mm drug eluting stent.  12. 0% residual stenosis with distal LYDIA 3 flow.  13. Continue Aggrastat infusion times 18 hours.  14. Load with Effient 60 mg.  15. Daily aspirin and Effient therapy.  16. Monitor patient overnight.  17. 0 cc blood loss.  18. No specimens removed.  19. Cardiac rehabilitation consultation.  20. Smoking cessation.  21. Sodium restriction.  22. Cardiac diet.  23. Diet, exercise, weight loss.  24. Beta blocker therapy if clinically indicated and as tolerated by the patient.  25. ACE inhibitor therapy if clinically indicated and as tolerated by the patient.  26. Angiotensin receptor blocker therapy if clinically indicated and as tolerated by the patient.  27. Statin therapy if clinically indicated and as tolerated by the  patient.  28. Identification and modification of any and all possibly existing cardiovascular risk factors to decrease future cardiovascular event rate such as increased risk of cardiovascular morbidity, increased risk of cardiovascular mortality, increased risk of cardiovascular complications, in an overall attempt to improve medical outcomes and prognosis.        ______________________________  MD DEYSI Pearson/ANALI  DD:  08/04/2017  Time:  12:11PM  DT:  08/06/2017  Time:  09:04AM  Job #:  [id number]

## 2022-04-30 NOTE — OP NOTE
DATE OF SURGERY:    04/05/2019    SURGEON:  Emmanuel Jc MD    PROCEDURE:  Esophagogastroduodenoscopy.    PREOPERATIVE DIAGNOSES:    1. Anemia.  2. Some element of nausea and vomiting and epigastric discomfort, need for ongoing Effient.    3. History of celiac occlusive disease with a stent.    4. Questionable history of indomethacin use.    POSTOPERATIVE DIAGNOSES:    1. Adequate sedation with Diprivan per Anesthesia.  2. Terrible dentition, extreme caution undertaken with bite block insertion.  3. Fairly straightforward endoscopy with a normal appearing esophagus.  Some liquid in the fundus suctioned, but no food stuff to suggest profound gastroparesis.  Normal appearing gastric mucosa with no evidence for gastritis or ulceration.  Patent pylorus with normal appearing duodenum in the 1st, 2nd and 3rd portions.   No biopsies were obtained, no other procedure is undertaken.    PROCEDURE IN DETAIL:  The patient consented for the procedure after a detailed explanation of the risks and complications, including bleeding and perforation, as well as adverse reaction to sedation.     The patient was placed in the left lateral decubitus position.  A video endoscope was inserted in the oropharynx and passed under direct vision.  The hypopharynx and larynx were briefly examined and the esophagus entered.  After evaluation of the upper, mid and lower esophagus, the scope was passed into the stomach and retroflexed.  The cardia and fundus were evaluated.  The scope was de-retroflexed and we evaluated the body and antrum.     We traversed the pylorus, evaluating the first and second portions of the duodenum.  The scope was withdrawn and biopsies and specimens obtained as outlined below.  The scope was removed in its entirety and the patient tolerated the procedure well.     Mr. Gotti's findings are as detailed.  He had a fairly straightforward exam, and though he had a bit of liquid in the fundus there was no  food stuff to suggest profound gastroparesis.  This exam was otherwise unremarkable and no biopsies were obtained.  There was no blood, and no bleed source evident.    DISCUSSION AND DISCHARGE SUMMARY:  After his procedure was complete, we discussed our findings with the patient and his attendant.  We examined him and found him stable for discharge.  We allowed that he could resume his usual diet today and activities tomorrow.  The patient has no source up top for blood loss.  I was encouraged by the fact that he did not have retained food stuff to suggest profound gastroparesis, and this procedure was undertaken early in the morning.  Despite the fact that indomethacin was listed in his previous medications, he has no evidence for gastritis or ulcer disease.     In reality, the patient's hemoglobin seemed relatively reasonable of late, given his multiple medical issues.  He is not evidencing any overt blood loss from below, and I am not inclined to propose that he should undergo a repeat colonoscopy at this point in time.  I will not alter his current regimen, but will allow him to remain on proton pump inhibition and the single dose Reglan.  He will resume his Effient given his vascular concerns.  We will have him call with updates down the line, and ask that blood counts be sent our way.  If he does evidence more in the way of anemia and it seems related to blood loss, then we can reconsider whether a colonoscopy or capsule enteroscopy is indicated.        ______________________________  MD ELIGIO Monahan/  DD:  04/05/2019  Time:  09:38AM  DT:  04/05/2019  Time:  09:58AM  Job #:  [id number]    cc: MD Cory Tapia MD James C. Bienvenu, MD

## 2022-04-30 NOTE — H&P
Patient:   Luigi Gotti            MRN: [id number]            FIN: [id number]               Age:   53 years     Sex:  Male     :  1965   Associated Diagnoses:   None   Author:   Matt Kaplan PA-C      Health Status   The H&P was reviewed, the patient was examined, and there are no changes to the patient's condition..

## 2022-04-30 NOTE — DISCHARGE SUMMARY
Patient:   Luigi Gotti            MRN: [id number]            FIN: [id number]               Age:   52 years     Sex:  Male     :  1965   Associated Diagnoses:   None   Author:   Abel Wadsworth NP      Basic Information   s/p aser atherectomy left superficial femoral artery, PT a of the left superficial femoral artery, Filter placement left superficial femoral artery on 17         Review of Systems   Constitutional:  Negative.    Eye:  Negative.    Ear/Nose/Mouth/Throat:  Negative.    Respiratory:  Negative.    Cardiovascular:  Negative.    Gastrointestinal:  Negative.    Genitourinary:  Negative.    Immunologic:  Negative.    Musculoskeletal:  Negative.    Integumentary:  Negative.    Neurologic:  Negative.    Psychiatric:  Negative.    All other systems are negative      Health Status   Allergies:    Allergies (1) Active Reaction  Plavix None Documented        Physical Examination      Vital Signs (last 24 hrs)_____  Last Charted___________  Resp Rate         18 br/min  (SEP 06 08:)  SBP      125 mmHg  (SEP 06 08:)  DBP      81 mmHg  (SEP 06 08:)  SpO2      96 %  (SEP 06 08:)     General:  Alert and oriented, No acute distress.    Eye:  Pupils are equal, round and reactive to light, Extraocular movements are intact.    HENT:  Normocephalic, Oral mucosa is moist.    Neck:  Supple, Non-tender.    Respiratory:  Lungs are clear to auscultation, Respirations are non-labored, Breath sounds are equal.    Cardiovascular:  Normal rate, Regular rhythm, No murmur, Good pulses equal in all extremities, Normal peripheral perfusion, No edema.    Gastrointestinal:  Soft, Non-tender, Non-distended, Normal bowel sounds, No organomegaly.    Musculoskeletal:  Normal range of motion, Normal strength.    Integumentary:  Warm, Dry, Pink, Intact, Groin Site is Clean, Dry and intact .    Neurologic:  Alert, Oriented, Normal sensory, Normal motor function.    Psychiatric:  Cooperative, Appropriate mood  & affect, Normal judgment.       Review / Management   Results review:  All Results   9/6/2017 5:45 CDT        WBC                       15.1 x10(3)/mcL  HI                             RBC                       4.76 x10(6)/mcL                             Hgb                       13.6 gm/dL  LOW                             Hct                       40.7 %  LOW                             Platelet                  260 x10(3)/mcL                             MCV                       85.5 fL                             MCH                       28.6 pg                             MCHC                      33.4 gm/dL                             RDW                       13.5 %                             MPV                       12.7 fL  HI                             Abs Neut                  12.01 x10(3)/mcL  HI                             Neutro Auto               79 %  NA                             Lymph Auto                12 %  NA                             Mono Auto                 5 %  NA                             Eos Auto                  2 %  NA                             Abs Eos                   0.3 x10(3)/mcL                             Basophil Auto             0 %  NA                             Abs Neutro                12.01 x10(3)/mcL  HI                             Abs Lymph                 1.9 x10(3)/mcL                             Abs Mono                  0.8 x10(3)/mcL                             Abs Baso                  0.1 x10(3)/mcL                             Sodium Lvl                133 mmol/L  LOW                             Potassium Lvl             4.8 mmol/L                             Chloride                  100 mmol/L                             CO2                       23.0 mmol/L                             Calcium Lvl               9.0 mg/dL                             Glucose Lvl               264 mg/dL  HI                             BUN                       15.0 mg/dL                              Creatinine                1.19 mg/dL                             eGFR-AA                   >60 mL/min/1.73 m2  NA                             eGFR-LEISA                  >60 mL/min/1.73 m2  NA  .    Cardiac Monitor:  At  9/6/2017 08:08:00, Rate  87 beats per minute, Reveals a Normal sinus rhythm.    Condition:  Stable.       Impression and Plan   PAD    -s/p aser atherectomy left superficial femoral artery, PT a of the left superficial femoral artery,  Filter placement left superficial femoral artery on (9.5.17)    -Hx of celiac stent    CAD   - CHUY CFX 8.4.17   - Multiple PCI and stenting on MV in the past   -EF 30-35%  DLP      PLAN:   Continue effient, aspirin and statin therapy   Groin Site is Clean, Dry and intact   Patient has been instructed on importance of adherence to medications   Discharge home today

## 2022-04-30 NOTE — PROGRESS NOTES
Patient:   Luigi Gotti            MRN: [id number]            FIN: [id number]               Age:   54 years     Sex:  Male     :  1965   Associated Diagnoses:   None   Author:   Jim FLETCHER, Ese Le reviewed: Will discuss with patient during follow up appointment on  May 1, 2019 at 9:30am.

## 2022-04-30 NOTE — OP NOTE
DATE OF SURGERY:    03/16/2018    SURGEON:  Kedar Reilly MD    PROCEDURE:  Left heart catheterization, angiography of left internal mammary artery, ventriculography, abdominal aortography, bilateral peripheral angiogram.    PREOPERATIVE DIAGNOSIS:  Chest discomfort, anginal class 3, with symptoms of claudication, Kendall Class 3, in a patient with a known history of coronary artery disease, a known history of coronary artery angioplasty, known history of coronary angioplasty and stenting, known history of coronary artery bypass surgery, with a history of peripheral intervention with multiple stents in the superficial femoral artery bilaterally with recurrent symptoms on medical treatment.    POSTOPERATIVE DIAGNOSIS:  Chest discomfort, anginal class 3, with symptoms of claudication, Kendall Class 3, in a patient with a known history of coronary artery disease, a known history of coronary artery angioplasty, known history of coronary angioplasty and stenting, known history of coronary artery bypass surgery, with a history of peripheral intervention with multiple stents in the superficial femoral artery bilaterally with recurrent symptoms on medical treatment.    PROCEDURE IN DETAIL:  After the procedure was explained to the patient in detail and consents obtained the right groin was prepped and draped in a sterile fashion.  A 5-Sammarinese sheath was placed to the right common femoral artery.  A JL4 catheter was advanced to the ascending aorta.  Cannulation of the left main coronary artery was performed.  Angiography revealed patent left main coronary artery.  Left anterior descending coronary artery was noted to be occluded.  There was noted to be competitive flow in the diagonal branch.  The stent in the circumflex was noted to be patent.  There was noted to be disease in the small obtuse marginal artery distally.  Catheter was then exchanged for a JR4 catheter.  Cannulation of the right coronary  artery was performed.  Angiography revealed luminal irregularities of the right coronary artery.  The stent was patent in the midportion of the right coronary artery.  The right coronary artery was dominant in distribution.  JL4 catheter was then placed into the left subclavian artery.  Cannulation of the left internal mammary artery was performed.  Angiography revealed patent left internal mammary artery which provided a sequential graft to the diagonal with jump segment to the left anterior descending coronary artery.  Brisk flow was noted.  JL4 catheter was then exchanged for a pigtail catheter.  Prolapse across the aortic valve was performed.  Ventriculography was performed in the right anterior oblique projection and revealed moderate LV dysfunction.  Pullback across the aortic valve revealed no significant gradient across the aortic valve.  Pigtail catheter was then placed into the abdominal aorta.  Aortography was performed.  Iliac system was noted to be patent bilaterally.  Pigtail catheter was then placed to the contralateral left external iliac artery.  Angiography of the left lower extremity revealed patent stents in the left superficial femoral artery.  Two vessel runoff was noted in the infrapopliteal circulation.  Pigtail catheter was then placed to the right external iliac artery.  Angiography of the right lower extremity revealed patent stents in the right superficial femoral artery with two vessel runoff in the infrapopliteal circulation.  The patient tolerated the procedure well.    IMPRESSION:    1. Occluded left anterior descending coronary artery.  2. Patent sequential vein graft to the diagonal and left anterior descending.  3. Patent stent in the circumflex.  4. Patent stent in the right coronary artery.  5. Right dominant coronary distribution.  6. Moderate left ventricular dysfunction.  7. Patent stent in the right superficial femoral artery.  8. Patent stent in the left superficial femoral  artery.  9. Two vessel runoff bilaterally in the infrapopliteal circulation.  10. Maximize medical treatment.  11. 0 cc blood loss.  12. No specimens removed.  13. Cardiac rehabilitation consultation.  14. Smoking cessation.  15. Sodium restriction.  16. Cardiac diet.  17. Diet, exercise, weight loss to improve cardiovascular outcomes.        ______________________________  MD DEYSI Pearson/ANALI  DD:  03/16/2018  Time:  11:12AM  DT:  03/17/2018  Time:  10:32AM  Job #:  [id number]

## 2022-04-30 NOTE — OP NOTE
DATE OF SURGERY:    08/15/2017    ATTENDING PHYSICIAN:  Dr. lSoan Stern MD    RESIDENT SURGEON:  Joy Chavez MD.    PROCEDURE PERFORMED:  Esophagogastroduodenoscopy, colonoscopy.    PREOPERATIVE DIAGNOSES:    1. Diabetes mellitus with gastroparesis.  2. History of ischemic colitis.    POSTOPERATIVE DIAGNOSES:    1. Diabetes mellitus with gastroparesis.  2. History of ischemic colitis.  3. Diverticulitis.   4. Gastritis.    FINDINGS:    1. Diverticulosis of the left colon.  2. Mild gastritis.  3. Small hiatal hernia.    COMPLICATIONS:  None.    SPECIMENS:  None.    ESTIMATED BLOOD LOSS:  0 cc.    PROCEDURE IN DETAIL:  A brief history was obtained in the preop holding area.  Risks and benefits of EGD and colonoscopy were discussed, and informed consent was obtained.  The patient was taken to the GI suite and placed in the left lateral decubitus position.  A bite block was placed in the patient's mouth.  The gastroscope was then inserted over the patient's tongue and through the esophagus.  The esophageal mucosa appeared normal, without pathology.  We passed through the GE junction and entered the stomach.  We noted scant areas of erosion characteristic of gastritis.  No other pathology was noted.  We then passed through the pylorus into the duodenum.  We transversed to the 3rd portion the duodenum and examined the duodenal mucosa for any abnormalities in color, texture, nodularity, ulceration, polyps, or other lesions.  No pathology was identified.  Moving back into the stomach, the scope was retroflexed to allow visualization of the proximal stomach, including the cardia, fundus, and proximal body.  We did note that the patient had a small hiatal hernia from this view.  No other pathology was noted, other than scant areas of gastritis.  The scope was then unretroflexed and retracted back through the GE junction and out the patient's mouth, and the procedure terminated.  The bed was then rotated to allow for  initiation of colonoscopy.     A digital rectal exam was performed, which was within normal limits.  A well-lubricated colonoscope was then inserted into the patient's rectum and advanced under direct visualization to the cecum.  The cecum was identified visually and with anatomic landmarks.  A photograph was obtained of the appendiceal orifice.  The scope was then gently retracted, examining the entire extent of colonic mucosa for any abnormalities in color, texture, nodularity, ulceration, polyps, diverticula, or other lesions.  We did note diverticulosis throughout the sigmoid colon and descending colon with no signs of diverticulitis.  The scope was retroflexed in the distal rectum to allow better visualization of the rectal and anal verge.  No pathology was noted.  The scope was then retracted to the patient's anus and the procedure terminated.  The patient was awakened from anesthesia and transferred to Recovery in stable condition.    RECOMMENDATIONS:    1. Continue optimal medical management for diabetes.   2. Patient should return for repeat colonoscopy in 10 years.        ______________________________  Joy Chavez MD    ______________________________  Sloan Stern MD AM/GISELLA  DD:  08/15/2017  Time:  01:44PM  DT:  08/15/2017  Time:  02:08PM  Job #:  [id number]

## 2022-04-30 NOTE — OP NOTE
DATE OF SURGERY:    10/10/2018    SURGEON:  Matt Cespedes MD    PREOPERATIVE DIAGNOSIS:  Chronic celiac artery occlusion.    POSTOPERATIVE DIAGNOSIS:  Chronic celiac artery occlusion.    PROCEDURES PERFORMED:    1. Celiac artery angiogram.    2. Flush aortogram via right common femoral artery entry, attempted cannulation of celiac artery stent.    INDICATIONS:  The patient is a 53-year-old gentleman with a long history of abdominal pain, previously treated by several percutaneous interventions placing stents into the ostium of his celiac artery.  For the past several months, the patient has been having increased abdominal pain and a CT angiogram as indicated that the stent was patent.  However, we have known that it had been occluded previously and so in order to settle the issue and decide whether or not to try a percutaneous intervention or open surgical procedure, we are performing angiography today.    DESCRIPTION OF PROCEDURE:  The patient was placed in the cath lab in the supine position and administered light intravenous sedation.  Both groins were prepped and draped in a sterile field, 1% Xylocaine was used for local anesthesia in the right groin.  The right common femoral artery was entered percutaneously with a Cook needle.  There was a significant amount of scar tissue and so we had to run an Amplatz wire through the needle, followed by a micropuncture cannula, followed by a 4-Nepalese dilator, followed by the 5-Nepalese sheath.  Once this was done, flush iliac angiography showed this to be a satisfactory puncture on the mid common femoral artery.  An Omni Flush catheter was then directed into the upper abdominal aorta and in the [place] axis.  Flush aortography was performed which showed complete occlusion of the distal aspect of the stent approximately 1 to 1.5 cm past the origin.  A JR4 catheter was then exchanged and cannulated the stent as the end of it was sticking out into the aorta and  several passes with a Glidewire were made and we could not get past the obstruction.  In spite of our best efforts, we could not cannulate and, therefore, could not achieve a percutaneous solution.  The Glidewire and the     catheter were removed and the procedure was terminated at that point.  The sheath will be removed in the CV short-stay.        ______________________________  MD MAU Zacarias/ALONSO  DD:  10/10/2018  Time:  12:29PM  DT:  10/10/2018  Time:  12:43PM  Job #:  [id number]

## 2022-06-24 ENCOUNTER — TELEPHONE (OUTPATIENT)
Dept: INTERNAL MEDICINE | Facility: CLINIC | Age: 57
End: 2022-06-24

## 2022-06-24 NOTE — TELEPHONE ENCOUNTER
Shoshana from [facility] called and wants to schedule a F/U appt for the pt.

## 2022-07-12 PROBLEM — M19.90 ARTHRITIS: Status: ACTIVE | Noted: 2022-07-12

## 2022-07-12 PROBLEM — E78.5 HYPERLIPIDEMIA: Status: ACTIVE | Noted: 2022-03-23

## 2022-07-12 PROBLEM — I42.9 CARDIOMYOPATHY: Status: ACTIVE | Noted: 2022-07-12

## 2022-07-12 PROBLEM — I50.23 ACUTE ON CHRONIC SYSTOLIC HEART FAILURE: Status: ACTIVE | Noted: 2022-05-03

## 2022-07-12 PROBLEM — I77.1 STRICTURE OF ARTERY: Status: ACTIVE | Noted: 2022-07-12

## 2022-07-12 PROBLEM — G47.30 SLEEP APNEA: Status: ACTIVE | Noted: 2022-03-23

## 2022-07-12 PROBLEM — K31.84 GASTROPARESIS: Status: ACTIVE | Noted: 2022-03-23

## 2022-07-12 PROBLEM — I25.10 CORONARY ATHEROSCLEROSIS: Status: ACTIVE | Noted: 2022-03-23

## 2022-07-12 PROBLEM — I70.90 ARTERIOSCLEROTIC VASCULAR DISEASE: Status: ACTIVE | Noted: 2022-07-12

## 2022-07-12 PROBLEM — I10 ESSENTIAL HYPERTENSION: Status: ACTIVE | Noted: 2022-03-23

## 2022-07-12 PROBLEM — I50.9 CONGESTIVE HEART FAILURE: Status: ACTIVE | Noted: 2022-03-23

## 2022-07-12 PROBLEM — I73.9 CLAUDICATION: Status: ACTIVE | Noted: 2022-03-23

## 2022-07-12 PROBLEM — I21.9 MYOCARDIAL INFARCTION: Status: ACTIVE | Noted: 2022-03-23

## 2022-07-12 PROBLEM — K21.9 GASTROESOPHAGEAL REFLUX DISEASE: Status: ACTIVE | Noted: 2022-03-23

## 2022-07-12 PROBLEM — I87.2 VENOUS INSUFFICIENCY OF LOWER EXTREMITY: Status: ACTIVE | Noted: 2022-07-12

## 2022-07-12 PROBLEM — I77.1 STENOSIS OF CELIAC ARTERY: Status: ACTIVE | Noted: 2022-07-12

## 2022-07-12 PROBLEM — D64.9 ANEMIA: Status: ACTIVE | Noted: 2022-03-23

## 2022-07-12 PROBLEM — N40.0 BENIGN PROSTATIC HYPERPLASIA WITHOUT URINARY OBSTRUCTION: Status: ACTIVE | Noted: 2022-03-23

## 2022-07-12 PROBLEM — R06.09 DYSPNEA ON EXERTION: Status: ACTIVE | Noted: 2022-03-23

## 2022-07-12 PROBLEM — Z09 HOSPITAL DISCHARGE FOLLOW-UP: Status: ACTIVE | Noted: 2022-07-12

## 2022-07-12 PROBLEM — K51.90 ULCERATIVE COLITIS: Status: ACTIVE | Noted: 2022-03-23

## 2022-07-12 PROBLEM — G45.9 TRANSIENT ISCHEMIC ATTACK: Status: ACTIVE | Noted: 2022-07-12

## 2022-07-12 PROBLEM — K90.0 CELIAC DISEASE: Status: ACTIVE | Noted: 2022-03-23

## 2022-07-12 PROBLEM — D12.2 ADENOMATOUS POLYP OF ASCENDING COLON: Status: ACTIVE | Noted: 2020-09-30

## 2022-07-12 PROBLEM — I77.9 PERIPHERAL ARTERIAL OCCLUSIVE DISEASE: Status: ACTIVE | Noted: 2022-03-23

## 2022-07-12 PROBLEM — I77.4 STENOSIS OF CELIAC ARTERY: Status: ACTIVE | Noted: 2022-07-12

## 2022-07-12 PROBLEM — G62.9 PERIPHERAL NERVE DISEASE: Status: ACTIVE | Noted: 2022-03-23

## 2022-07-12 PROBLEM — I73.9 PERIPHERAL VASCULAR DISEASE: Status: ACTIVE | Noted: 2022-07-12

## 2022-07-12 NOTE — PROGRESS NOTES
RON Esquivel   [facility]  [address] [facility] [id number]      PATIENT NAME: Luigi Gotti  : 1965  DATE: 22  MRN: [id number]      Billing Provider: RON Esquivel  Level of Service:   Patient PCP Information     Provider PCP Type    RON Esquivel General          Reason for Visit / Chief Complaint: Follow-up (Hospital f/u)       History of Present Illness / Problem Focused Workflow     Luigi Gotti presents to the clinic with Follow-up (Hospital f/u)     Initial Visit (2020): 54 y.o. Mauritanian male presenting to [facility] [place] [place] to establish primary care.   Previous PCP: Dr. CHA Fernandez, last visit 19   PmHx: Type II DM controlled with insulin, Chronic GERD, Hx of Gastroparesis/Gastritis/Ischemic colitis (following GI, Dr. Green), CAD (following Dr. Oreilly, [facility]), PVD (following an interventional cardiologist, Dr. Cespedes), CHF, HLD, and BPH   SHx: EGD, Celiac Bypass, Colonoscopy, Visceral Arteriogram, coronary artery graft angiography, cataract extraction, CABG x 2, Tonsillectomy, Billie, mx vascular procedures (see hx)   FHx: HTN, T2DM, Clotting D/o (mother), Seizure, CVA (father), Breast Ca (mother)   Complaints today: Medication refills     Bp at goal at present.     HgA1c: 7.9%, slightly above  goal   Medications: Novolin 70/30 80 units in the morning and 90 units SQ at night  CBG log: None provided. But pt did express CBGs run between 140s-250s   S/S of hyperglycemia, no or hypoglycemia, no     Chronic GERD managed with Nexium.   He's following Cardio for hx of CAD, PVD, and CHF. He's currently taking ASA, Atorvastatin 80 mg daily, Coreg 25 mg po BID, Digoxin 125 mcg, Furosemide 40 mg po daily, Imdur 60 mg po daily, Ranexa 1,000 mg po BID, and Effient 10 mg po daily. Also has Nitro for use as needed.   Triglycerides 317, LDL 86. Taking Atorvastatin 80 mg po daily.     No acute concerns today.    (20):  "Pt presenting for f/u.   Bp at goal at present.   HgA1c: 8.2%, slightly above  goal and increased from previous   Medications: Novolin 70/30 80 units in the morning and 90 units SQ at night  CBG log: None provided. But pt did express CBGs run between 140s-180s; reports lowest in one-teens; fasting CBG was 152 on yesterday   Denies over S/S of hyperglycemia or hypoglycemia.     eGFR < 60 mL/min. Creatinine 1.40; slight improvement from previous.     Total WBC count elevated. Has been elevated since last year. Total RBC count, H/H slightly decreased. He does report being seen at [facility] clinic on [address] ~ 6  days ago due to cough. He states that he was diagnosed with "bronchitis with wheezing." He reports that a CXR was performed. He was prescribed a Z-pack and cough syrup, both of which he's completed. He continues to c/o dry cough. Denies fever, chills, HA, or SOB.    (8/7/2020): 54 y.o. Danish male with a PmHx of Type II DM controlled with insulin, Chronic GERD, h/o dysphagia, Hx of Gastroparesis/Gastritis/Ischemic colitis (following GI, Dr. Green), CAD (following Dr. Oreilly, [facility]), PVD (following an interventional cardiologist, Dr. Cespedes), CHF, HLD, and BPH, presenting for routine f/u. HgA1c 7.4%. He is taking Novolin 70/30 80 units in the morning and 92 units SQ at night. He denies any s/s of hypoglycemia or hyperglycemia. Total WBC count elevated. Has been elevated since last year. Total RBC count, H/H slightly decreased as well. He reports cough from last OV is improved and doesn't occur all of the time. Declines any work-up at present after being offered to do a CXR. He continues to follow Cards, Dr. Oreilly, and is following Dr. Georges for foot care. He does have a h/o BPH but hasn't followed up with Uro in some time. Reports feelings of incomplete ejaculation and was told it was probably from prostate issues by another provider. No other problems stated.    (11/9/2020): 55 y.o. " "Salvad[place] male with a PmHx of Type II DM controlled with insulin, Chronic GERD, h/o dysphagia, Hx of Gastroparesis/Gastritis/Ischemic colitis (following GI, Dr. Green), CAD (following [facility]), PVD (following an interventional cardiologist, Dr. Cespedes), CHF, HLD, and BPH, presenting for routine f/u. HgA1c 6.7%. He is taking Novolin 70/30 80 units in the morning and 92 units SQ at night. He denies any s/s of hypoglycemia or hyperglycemia. Total WBC count elevated. Has been elevated since last year. Renal indices slightly decreased w/ small rise in creatinine. He admits that he's  not been drinking a lot of water. Previously referred to Uro but reports no appt made. He is requesting the second Shingrix today. He continues to follow Cards, CV surgeon, GI, and podiatry. States had a colonoscopy about 1 week ago and noted to have polyps. Reports GI prescribed ferrous gluconate. Will request results. He reports chronic dyspnea x many years. Reports no improvement after cardiac sx but denies any worsening dyspnea. No other problems stated.    Telemedicine Visit (12/21/2020): 55 y.o. Salvadorean male with a PmHx of Type II DM controlled with insulin, Chronic GERD, h/o dysphagia, Hx of Gastroparesis/Gastritis/Ischemic colitis (following GI, Dr. Green), CAD (following [facility]), PVD (following an interventional cardiologist, Dr. Cespedes), CHF, HLD, and BPH, presenting for ED f/u via telemedicine. Apparently pt presented to [facility] last week due to issues with blood sugars but states he was diagnosed with influenza and hospitalized for suspected COVID. He is stating that sugar was in the "20s," but also stated that during the hospitalization "they checked it six times a day until it came down to 100." So unknown if pt had issues with hyperglycemia or hypoglycemia. States "They gave me something (insulin) different in there and it messed my numbers up!" At present, he remains on his routine dose of Novolin " "70/30 and reports CBGs have been in the 120s-140s. He reports, "I'm doing good now." At time of visit, ED records have not been received but have been requested. Reports has a cough but it is improved. Denies CP or SOB. He has no other concerns. F/u scheduled 2/2021.    (2/10/2021): 55 y.o. Nigerien male with a PmHx of Type II DM controlled with insulin, Chronic GERD, h/o dysphagia, Hx of Gastroparesis/Gastritis/Ischemic colitis (following GI, Dr. Green), Celiac Dz, CAD (following Dr. Oreilly, [facility]), PVD (following an interventional cardiologist, Dr. Cespedes), CHF, HLD, and BPH, presenting for routine f/u. Conducted a phone visit 12/2020 for ED f/u. At the time, pt reported that he was diagnosed and treated for the flu while hospitalized at [facility]. Records received later revealed that that pt was being treated for COVID pneumonia after testing + for COVID ~9 days prior to hospital admit on 12/10/20. However, pt denies this and states he was never told he had COVID despite prescription medications being prescribed on 12/1/20 by a physician who's noted to have worked in the ED at [facility]. During his hospitalization from 12/10/20 to 12/17/20, he was treated for COVID pneumonia. D-dimer was elevated but CTA was negative for PE. He is taking Eliquis. Plans to speak to Cards tomorrow about continued use of this as he's on mx antiplatelets. Chemistry panel improved. Glucose low 100s. He denies any s/s of hypoglycemia. Total WBC count improved. He was previously referred to Heme clinic for unexplained leukocytosis. He has an appt 3/23/21. He will see his Cardiologist, Dr. Oreilly, on tomorrow. He has a chronic h/o dyspnea (a CT thorax was ordered in November but pt never completed) and intermittent angina that is no different from baseline. He denies any CP or SOB at this time. Last used Nitro a few days ago. He has no other complaints.     During hospitalization, pt had a CTA chest that revealed:  1.2 cm nodule in right lobe " of thyroid gland, no evidence of PE, and consolidative changes throughout lungs    (5/11/2021): 55 y.o. Latvian male with a PmHx of Type II DM controlled with insulin, Chronic GERD, h/o dysphagia, Hx of Gastroparesis/Gastritis/Ischemic colitis (following GI, Dr. Green), Celiac Dz, CAD (following [facility]), PVD (following an interventional cardiologist, Dr. Cespedes), CHF, HLD, and BPH, presenting for routine f/u. He was previously referred to Heme clinic for unexplained leukocytosis. He had an appt 3/23/21 but was a No Show. A letter was mailed for pt to call and re-schedule. He will undergo a thyroid US to monitor a 1.2 cm nodule on 6/3/21. Reviewed colonoscopy done by Dr. Emmanuel Green on 9/30/2020. Dx: two polyps removed, tubular adenomas; diverticula disease.     Lab review:   HgA1c 7.1. He continues with Novolin N as prescribed. He does not check CBGs. Denies overt s/s of hypoglycemia or hyperglycemia.   Renal indices stable    He reports that he follows Dr. Alex Duckworth for a retinal d/o. Scheduled to f/u 5/18/21.   Scheduled for LHC 5/13/21 with Dr. Oreilly.   Scheduled with Uro 7/8/21.     No other concerns.    (9/14/2021): 56 y.o. Latvian male with a PmHx of Type II DM controlled with insulin, Chronic GERD, h/o dysphagia, Hx of Gastroparesis/Gastritis/Ischemic colitis (following GI, Dr. Green), Celiac Dz, CAD (following [facility]), PVD (following an interventional cardiologist, Dr. Cespedes), CHF, HLD, and BPH, presenting for routine f/u. Pt underwent a thyroid US in May that did not reveal any acute abnormalities. He was seen by Uro in July for abnl ejaculation. Told likely d/t Flomax. He wished to remain on Flomax. Can f/u PRN. HgA1c 7.5%. Reports compliance with insulin. No log provided. CBC stable compared to baseline. Previously referred to Heme/Onc. Triglycerides 309. Prescribed Atorvastatin 80 mg po daily and Zetia 10 mg po daily. Does not take Zetia daily. Pt will f/u  with Dr. Oreilly next month. No other problems stated.    (12/16/2021): 56 y.o. Argentine male with a PmHx of Type II DM controlled with insulin, Chronic GERD, h/o dysphagia, Hx of Gastroparesis/Gastritis/Ischemic colitis (following GI, Dr. Green), Celiac Dz, CAD (following [facility]), PVD (following an interventional cardiologist, Dr. Cespedes), CHF, HLD, CKD, and BPH, presenting for routine f/u. Pt previously referred to Heme/Onc for unexplained leukocytosis and anemia. So far, work-up unrevealing aside from dx of B12 deficiency. He was last seen 12/10 to projected 3 month f/u. HgA1c 7.5%. Reports compliance with insulin. Denies overt s/s of hypoglycemia. No log provided. PSA 1.03. Renal indices stable. Pt reports undergoing a vein procedure to LLE per Dr. Oreilly yesterday. F/u 1/5/22. He reports hitting right great toe ~2 weeks ago. Podiatrist removed nail. Area doing well. He is UTD on influenza vaccine. No other concerns.    Today's Visit (7/14/2022): 57 y.o. [place] male with a PmHx of Type II DM controlled with insulin, Chronic GERD, h/o dysphagia, Hx of Gastroparesis/Gastritis/Ischemic colitis (following GI, Dr. Green), Celiac Dz, CAD (following [facility]), PVD (following an interventional cardiologist, Dr. Cespedes), CHF, HLD, CKD, and BPH, presenting for routine f/u. Since last visit, patient was hospitalized on 4/4/22 until 4/13/22 (to [facility]) after experiencing PEA while en route to the ED via EMS from home. He achieved ROSC after about 12 min of CPD, epi, and bicarb. He was intubated and admitted to the ICU upon arrival the the ED. He was treated for possible PNA while hospitalized. Cards did see him but signed off, recommending outpatient f/u. He'd had a LHC 3/9/22 that revealed nonobstructive CAD. In hospital, an Echo done 4/5/22 showed LVEF 35-40% with grade 2 diastolic dysfunction. He required 2 units of PRBCs in hospital. He also experienced a fall during  admission. CT head was negative for acute bleeds. He was ultimately discharged to inpatient rehab at [facility] where he reports he stayed for 2 weeks. Then, he presented to [facility] ED on 5/2/22 with c/o shortness of breath and bilateral lower extremity swelling. EMS reported patient with respiratory distress and pulse ox in the mid 70s upon their arrival to his home. He was placed on CPAP and has some improvement in oxygenation and work of breathing. There is mention of seizures occurring while hospitalized at [facility] (apparently MRI brain and EEG done--unable to see results at this time). He developed a wound to right ankle/heel during last hospitalization as well. He was discharged from [facility] on 5/24/22 and [place] [facility] [facility] [place] ago with [facility]. He is receiving wound care to ankle/heel wound and P.T. at home. Admits some decompensation in overall physical status due to an approximate 3-month long hospitalization. He still mentions chronic dizziness, which was present prior to April hospitalization, but overall stable. Will see Dr. Oreilly on 7/22/22. This will be his first visit post-hospitalization. No other concerns.      Review of Systems     Review of Systems   Constitutional: Positive for fatigue.   HENT: Negative.    Eyes: Negative.    Respiratory: Negative.    Cardiovascular: Negative.    Gastrointestinal: Negative.    Endocrine: Negative.    Genitourinary: Negative.    Musculoskeletal: Negative.    Skin: Negative.    Allergic/Immunologic: Negative.    Neurological: Positive for dizziness and seizures.   Hematological: Negative.    Psychiatric/Behavioral: Negative.        Medical / Social / Family History     Past Medical History:   Diagnosis Date    BPH (benign prostatic hyperplasia)     CAD (coronary artery disease)     CHF (congestive heart failure)     GERD (gastroesophageal reflux disease)     Other hyperlipidemia     PVD (peripheral  vascular disease)     Type 2 diabetes mellitus without complications        Past Surgical History:   Procedure Laterality Date    CHOLECYSTECTOMY      CORONARY ARTERY BYPASS GRAFT      TONSILLECTOMY         Social History    reports that he has never smoked. He has never used smokeless tobacco. He reports previous alcohol use. He reports previous drug use.    Family History  's family history includes Cancer in his mother; Hypertension in his father and mother; Stroke in his father.    Medications and Allergies     Medications  Medication List with Changes/Refills   Current Medications    AMITRIPTYLINE (ELAVIL) 50 MG TABLET    Take 50 mg by mouth every evening.    AMLODIPINE (NORVASC) 5 MG TABLET    Take 5 mg by mouth once daily.    ASPIRIN (ECOTRIN) 81 MG EC TABLET    Take 81 mg by mouth once daily.    ATORVASTATIN (LIPITOR) 80 MG TABLET    Take 80 mg by mouth every evening.    AZELASTINE (ASTELIN) 137 MCG (0.1 %) NASAL SPRAY    1 spray by Nasal route 2 (two) times daily.    CARVEDILOL (COREG) 25 MG TABLET    Take 25 mg by mouth 2 (two) times daily.    CLONIDINE (CATAPRES) 0.1 MG TABLET    Take 0.1 mg by mouth once daily at 6am.    CYANOCOBALAMIN (VITAMIN B-12) 1000 MCG TABLET    Take 2,000 mcg by mouth once daily at 6am.    DIGOXIN (LANOXIN) 125 MCG TABLET    Take 0.125 mg by mouth once daily at 6am.    EZETIMIBE (ZETIA) 10 MG TABLET    Take 10 mg by mouth once daily at 6am.    FERROUS GLUCONATE (FERGON) 324 MG TABLET    Take 324 mg by mouth once daily.    FLUTICASONE PROPIONATE (FLONASE) 50 MCG/ACTUATION NASAL SPRAY    Flonase Allergy Relief Take No date recorded No form recorded No frequency recorded No route recorded No set duration recorded No set duration amount recorded active No dosage strength recorded No dosage strength units of measure recorded    GLUCAGON (GLUCAGEN HYPOKIT INJ)    Inject as directed.    HYDROCODONE-ACETAMINOPHEN (NORCO)  MG PER TABLET    Take 1 tablet by mouth 2 (two)  times daily as needed.    HYDROXYZINE (ATARAX) 50 MG TABLET    Take 50 mg by mouth 3 (three) times daily.    INSULIN ASPART U-100 (NOVOLOG) 100 UNIT/ML (3 ML) INPN PEN    Inject  Units into the skin 2 (two) times daily.    INSULIN GLARGINE,HUM.REC.ANLOG (LANTUS SUBQ)    Inject 65 Units into the skin 2 (two) times a day.    INSULIN LISPRO PROTAMIN-LISPRO (HUMALOG 50/50) 100 UNIT/ML (50-50) SUSP    Humalog Mix 50-50 Take No date recorded No form recorded No frequency recorded No route recorded No set duration recorded No set duration amount recorded active No dosage strength recorded No dosage strength units of measure recorded    INSULIN NPH-INSULIN REGULAR, 70/30, (NOVOLIN 70/30) 100 UNIT/ML (70-30) INJECTION    Inject 80 Units into the skin.    ISOSORBIDE MONONITRATE (IMDUR) 60 MG 24 HR TABLET    Take 60 mg by mouth once daily.    LACOSAMIDE (VIMPAT ORAL)    Take 50 mg by mouth 2 (two) times a day.    LEVETIRACETAM (KEPPRA) 500 MG TAB    Take 500 mg by mouth 3 (three) times daily.    LEVOCETIRIZINE (XYZAL) 5 MG TABLET    Take 5 mg by mouth once daily.    LEVOTHYROXINE (SYNTHROID) 25 MCG TABLET    Take 25 mcg by mouth once daily.    LOSARTAN (COZAAR) 100 MG TABLET    Take 100 mg by mouth once daily at 6am.    METOCLOPRAMIDE HCL (REGLAN) 10 MG TABLET    Take 10 mg by mouth once daily.    MONTELUKAST (SINGULAIR) 10 MG TABLET    Take 10 mg by mouth once daily.    NITROGLYCERIN (NITROSTAT) 0.4 MG SL TABLET    Place 0.4 mg under the tongue as needed.    NOVOLIN 70/30 U-100 INSULIN 100 UNIT/ML (70-30) INJECTION    INJECT 80 UNITS UNDER THE SKIN IN THE MORNING AND 92 UNITS IN THE EVENING    NOVOLOG U-100 INSULIN ASPART 100 UNIT/ML INJECTION    INJECT 20 - 25 UNITS UNDER THE SKIN 3 TIMES A DAY BEFORE MEALS    OMEPRAZOLE (PRILOSEC) 40 MG CAPSULE    Take 40 mg by mouth every evening.    PANTOPRAZOLE (PROTONIX) 40 MG TABLET    Take 40 mg by mouth once daily.    PRASUGREL (EFFIENT) 10 MG TAB    Take 10 mg by mouth once  daily.    QUETIAPINE (SEROQUEL) 25 MG TAB    Take 25 mg by mouth every evening.    RANOLAZINE (RANEXA) 500 MG TB12    Take 1,000 mg by mouth 2 (two) times a day.    RIVAROXABAN (XARELTO) 2.5 MG TAB    Take 2.5 mg by mouth 2 (two) times a day.    SPIRONOLACTONE (ALDACTONE) 25 MG TABLET    Take 25 mg by mouth once daily.    SUCRALFATE (CARAFATE) 1 GRAM TABLET    Take 1 g by mouth 4 (four) times daily before meals and nightly.    TAMSULOSIN (FLOMAX) 0.4 MG CAP    Take 0.4 mg by mouth once daily at 6am.    TORSEMIDE (DEMADEX) 20 MG TAB    Take 20 mg by mouth once daily at 6am.    TRUETRACK TEST STRP    TEST 3 TIMES A DAY   Discontinued Medications    AMLODIPINE BESYLATE, BULK, MISC    5 mg by Other route.    ESOMEPRAZOLE (NEXIUM) 40 MG CAPSULE    Take 40 mg by mouth once daily.    EZETIMIBE (ZETIA) 10 MG TABLET    Take 10 mg by mouth once daily.    FUROSEMIDE (LASIX) 40 MG TABLET    Take 40 mg by mouth once daily.    HYDROXYZINE (ATARAX) 50 MG TABLET    Take 50 mg by mouth 3 (three) times daily.       Allergies  Review of patient's allergies indicates:   Allergen Reactions    Pork derived (porcine)      Other reaction(s): Pork    Clopidogrel Hives and Rash       Physical Examination     Vitals:    07/14/22 1512   BP: 110/77   Pulse: 104   Resp:    Temp:        Physical Exam  Constitutional:       Appearance: Normal appearance.   HENT:      Head: Normocephalic and atraumatic.      Mouth/Throat:      Mouth: Mucous membranes are moist.      Dentition: Dental caries present.   Eyes:      Extraocular Movements: Extraocular movements intact.      Conjunctiva/sclera: Conjunctivae normal.      Pupils: Pupils are equal, round, and reactive to light.   Neck:      Vascular: No carotid bruit.   Cardiovascular:      Rate and Rhythm: Normal rate and regular rhythm.      Pulses:           Dorsalis pedis pulses are 1+ on the right side and 1+ on the left side.      Heart sounds: Normal heart sounds.   Pulmonary:      Effort:  Pulmonary effort is normal.      Breath sounds: Normal breath sounds.   Abdominal:      Palpations: Abdomen is soft.   Musculoskeletal:         General: Normal range of motion.      Cervical back: Normal range of motion.      Right lower leg: No edema.      Left lower leg: No edema.   Feet:      Right foot:      Protective Sensation: 5 sites tested. 4 sites sensed.      Skin integrity: Dry skin present.      Toenail Condition: Right toenails are abnormally thick.      Left foot:      Protective Sensation: 5 sites tested. 4 sites sensed.      Skin integrity: Dry skin present.      Toenail Condition: Left toenails are abnormally thick.   Skin:     General: Skin is warm and dry.      Findings: Wound present.          Neurological:      General: No focal deficit present.      Mental Status: He is alert and oriented to person, place, and time.   Psychiatric:         Mood and Affect: Mood normal.         Behavior: Behavior normal.         Thought Content: Thought content normal.         Judgment: Judgment normal.           Results     Lab Results   Component Value Date    WBC 8.8 04/13/2022    RBC 3.81 04/13/2022    HGB 10.6 04/13/2022    HCT 34.2 04/13/2022    MCV 89.8 04/13/2022    MCH 27.8 04/13/2022    MCHC 31.0 04/13/2022    RDW 14.9 04/13/2022     04/13/2022    MPV 12.3 04/13/2022     CMP  Sodium Level   Date Value Ref Range Status   04/13/2022 140 136 - 145      Potassium Level   Date Value Ref Range Status   04/13/2022 3.6 3.5 - 5.1      Carbon Dioxide   Date Value Ref Range Status   04/13/2022 27 22 - 29      Blood Urea Nitrogen   Date Value Ref Range Status   04/13/2022 16.9 8.4 - 25.7      Creatinine   Date Value Ref Range Status   04/13/2022 1.06 0.73 - 1.18      Calcium Level Total   Date Value Ref Range Status   04/13/2022 9.0 8.7 - 10.5      Albumin Level   Date Value Ref Range Status   04/13/2022 3.0 3.5 - 5.0      Bilirubin Total   Date Value Ref Range Status   04/13/2022 0.9 <=1.5      Alkaline  Phosphatase   Date Value Ref Range Status   04/13/2022 106 40 - 150      Aspartate Aminotransferase   Date Value Ref Range Status   04/13/2022 22 5 - 34      Alanine Aminotransferase   Date Value Ref Range Status   04/13/2022 36 0 - 55      Estimated GFR-Non    Date Value Ref Range Status   04/13/2022 >60       Lab Results   Component Value Date    CHOL 175 09/13/2021     Lab Results   Component Value Date    HDL 37 09/13/2021     No results found for: LDLCALC  Lab Results   Component Value Date    TRIG 148 05/22/2022     No results found for: CHOLHDL  Lab Results   Component Value Date    TSH 9.4282 03/05/2022     Lab Results   Component Value Date    PHUR 5.5 04/09/2022    PROTEINUA Negative 04/09/2022    GLUCUA 3+ 04/09/2022    KETONESU Negative 04/09/2022    OCCULTUA 1+ 04/09/2022    NITRITE Negative 04/09/2022    LEUKOCYTESUR Negative 04/09/2022           Assessment and Plan (including Health Maintenance)     Plan:         Health Maintenance Due   Topic Date Due    Hepatitis C Screening  Never done    HIV Screening  Never done    TETANUS VACCINE  Never done    Pneumococcal Vaccines (Age 0-64) (2 - PCV) 02/10/2022    Diabetes Urine Screening  04/28/2022    Eye Exam  05/14/2022    COVID-19 Vaccine (4 - Booster for Pfizer series) 06/11/2022    Hemoglobin A1c  06/15/2022       Problem List Items Addressed This Visit        Neuro    New onset seizure    Current Assessment & Plan     States started during hospitalization at [facility]. Now managed with Keppra and Vimpat. Denies sz activity since hospitalization  Continue medications  No driving at this time  Refer to Neuro           Relevant Orders    Ambulatory referral/consult to Neurology       Cardiac/Vascular    Acute on chronic systolic heart failure - Primary    Current Assessment & Plan     Take your medicines, even if you feel well   ?Watch for changes in your symptoms- notify the office with any concerns!  ?Call the office if you gain  weight suddenly - Weigh yourself every morning after you urinate, but before you eat breakfast. Wear roughly the same amount of clothing every time. And make sure to write down your weight every day on a calendar. Call if your weight goes up by 2 or more pounds (1 kilogram) in 1 day, or 4 or more pounds (2 kilograms) in 1 week. When you have heart failure, sudden weight gain is a sign that your body could be holding on to too much fluid. You might need a change in your medicines.  ?Cut down on salt - Try not to add salt at the table or when you cook. Also, avoid foods that come in boxes and cans, unless their labels say they are low in sodium. The best choices for food are fresh or fresh frozen foods, and foods you prepare yourself  ?Lose weight, if you are overweight - If you are overweight, your heart has to work extra hard to keep up with your body's needs.  ?Stop smoking - Smoking worsens heart failure and increases the chance that you will have a heart attack or die.  ?Limit alcohol - If you are a woman, do not have more than 1 drink a day. If you are a man, do not have more than 2.  ?Be active     Keep appt with Dr. Oreilly on 7/22/22  Strict ED precautions           Arteriosclerotic vascular disease    Current Assessment & Plan     Coronary artery disease is a type of heart disease that occurs when a substance called plaque builds up in the arteries that supply blood to the heart.     Heart disease is the leading cause of death in the [place]. The term heart disease refers to several types of heart conditions, with the most common being coronary artery disease. Other kinds of heart disease may involve the valves in the heart, or the heart may not pump well and cause heart failure. Some people are born with heart disease.      Are you at risk?   Anyone, including children, can develop heart disease.  It occurs when a substance called plaque builds up in your arteries. When this happens, your arteries can  narrow over time, reducing blood flow to the heart.  Smoking, eating an unhealthy diet, and not getting enough exercise all increase your risk for having heart disease.   Having high cholesterol, high blood pressure, or diabetes also can increase your risk for heart disease     What are the signs and symptoms?      For many people, chest discomfort or a heart attack is the first sign.     Someone having a heart attack may experience several symptoms, including:      Chest pain or discomfort that doesnt go away after a few minutes.   Pain or discomfort in the jaw, neck, or back.  Weakness, light-headedness, nausea, or a cold sweat.  Pain or discomfort in the arms or shoulder.  Shortness of breath.     If you think that you or someone you know is having a heart attack, call 9-1-1 immediately.    Keep appt with Dr. Oreilly on 7/22/22  Strict ED precautions           Coronary atherosclerosis    Current Assessment & Plan     Keep appt with Dr. Oreilly on 7/22/22  Strict ED precautions              Endocrine    Diabetes mellitus type 2, uncontrolled, with complications    Overview     Current medications: Novolin 70/30 80 units in the morning and 92 units SQ at night  Medication Adjustments: None at present  A1c level: 7.6%; goal <8% for pt due to risk of hypoglycemia  CBG trends: None provided  Educated on diabetic diet: Low-fat, Low-carb, Low-cholesterol. Avoid sugary/dark drinks/sodas and white foods (i.e., bread, pasta, potatoes, rice)  Aerobic exercise: 20-30 min/day x 5 days/week  Diabetic Eye Exam: UTD  Diabetic Foot Exam: UTD. Following Dr. Escalante  Kidney Protection: ARB  Statin Therapy: Yes  Educated on Hypoglycemic s/s and interventions. Call office with any questions or concerns  Strict glucose monitoring. Bring blood glucose logs/meter to each OV             Relevant Medications    insulin aspart U-100 (NOVOLOG) 100 unit/mL (3 mL) InPn pen    NOVOLOG U-100 INSULIN ASPART 100 unit/mL injection    insulin lispro  protamin-lispro (HUMALOG 50/50) 100 unit/mL (50-50) Susp    insulin NPH-insulin regular, 70/30, (NOVOLIN 70/30) 100 unit/mL (70-30) injection    NOVOLIN 70/30 U-100 INSULIN 100 unit/mL (70-30) injection    insulin glargine,hum.rec.anlog (LANTUS SUBQ)    Other Relevant Orders    CBC Auto Differential    Comprehensive Metabolic Panel    TSH    Hemoglobin A1C    Urinalysis, Reflex to Urine Culture Urine, Clean Catch    Microalbumin/Creatinine Ratio, Urine       Orthopedic    Pressure ulcer of right ankle, stage 2    Current Assessment & Plan     Continue wound care per [facility]. Offered to refer to wound care here, but patient declined at present. Overall, wound is stable. If no improvement at f/u, will proceed with wound care referral              Other    Hospital discharge follow-up      Other Visit Diagnoses     Routine screening for STI (sexually transmitted infection)        Relevant Orders    Hepatitis Panel, Acute    HIV 1/2 Ag/Ab (4th Gen)    SYPHILIS ANTIBODY (WITH REFLEX RPR)          Health Maintenance Topics with due status: Not Due       Topic Last Completion Date    Influenza Vaccine 09/18/2020    Lipid Panel 05/22/2022    Foot Exam 07/14/2022    Colorectal Cancer Screening Not Due       Future Appointments   Date Time Provider Department Center   8/24/2022  7:15 AM RON Esquivel [facility]            Signature:  RON Esquivel  [facility]  [address] [facility] [id number]    Date of encounter: 7/14/22

## 2022-07-13 RX ORDER — INSULIN ASPART 100 [IU]/ML
INJECTION, SOLUTION INTRAVENOUS; SUBCUTANEOUS
COMMUNITY
Start: 2022-03-04 | End: 2022-11-25 | Stop reason: DRUGHIGH

## 2022-07-13 RX ORDER — LOSARTAN POTASSIUM 100 MG/1
100 TABLET ORAL DAILY
COMMUNITY
Start: 2021-09-14 | End: 2022-11-25 | Stop reason: ALTCHOICE

## 2022-07-13 RX ORDER — AMITRIPTYLINE HYDROCHLORIDE 50 MG/1
50 TABLET, FILM COATED ORAL NIGHTLY
COMMUNITY
End: 2022-08-24

## 2022-07-13 RX ORDER — TORSEMIDE 20 MG/1
20 TABLET ORAL 2 TIMES DAILY
COMMUNITY
Start: 2022-03-12

## 2022-07-13 RX ORDER — FLUTICASONE PROPIONATE 50 MCG
SPRAY, SUSPENSION (ML) NASAL
COMMUNITY

## 2022-07-13 RX ORDER — MONTELUKAST SODIUM 10 MG/1
10 TABLET ORAL DAILY
COMMUNITY
Start: 2022-02-07 | End: 2022-08-24 | Stop reason: SDUPTHER

## 2022-07-13 RX ORDER — OMEPRAZOLE 40 MG/1
40 CAPSULE, DELAYED RELEASE ORAL NIGHTLY
COMMUNITY
Start: 2021-12-16 | End: 2022-08-24 | Stop reason: SDUPTHER

## 2022-07-13 RX ORDER — SUCRALFATE 1 G/1
1 TABLET ORAL
COMMUNITY
Start: 2022-03-11 | End: 2022-12-19 | Stop reason: ALTCHOICE

## 2022-07-13 RX ORDER — NITROGLYCERIN 0.4 MG/1
0.4 TABLET SUBLINGUAL
COMMUNITY

## 2022-07-13 RX ORDER — AZELASTINE 1 MG/ML
1 SPRAY, METERED NASAL 2 TIMES DAILY
COMMUNITY

## 2022-07-13 RX ORDER — AMLODIPINE BESYLATE 5 MG/1
5 TABLET ORAL DAILY
COMMUNITY
Start: 2022-03-11 | End: 2022-08-24

## 2022-07-13 RX ORDER — DIAPER,BRIEF,ADULT, DISPOSABLE
EACH MISCELLANEOUS
COMMUNITY
Start: 2022-03-08 | End: 2023-10-11

## 2022-07-13 RX ORDER — EZETIMIBE 10 MG/1
10 TABLET ORAL DAILY
COMMUNITY
Start: 2022-01-16 | End: 2022-07-14 | Stop reason: SDUPTHER

## 2022-07-13 RX ORDER — RANOLAZINE 500 MG/1
1000 TABLET, EXTENDED RELEASE ORAL 2 TIMES DAILY
COMMUNITY
End: 2022-08-24

## 2022-07-13 RX ORDER — DIGOXIN 125 MCG
0.12 TABLET ORAL DAILY
COMMUNITY
End: 2022-08-24

## 2022-07-13 RX ORDER — CLONIDINE HYDROCHLORIDE 0.1 MG/1
0.1 TABLET ORAL DAILY
COMMUNITY
End: 2022-08-24

## 2022-07-13 RX ORDER — HYDROCODONE BITARTRATE AND ACETAMINOPHEN 10; 325 MG/1; MG/1
1 TABLET ORAL 2 TIMES DAILY PRN
COMMUNITY
End: 2022-08-24

## 2022-07-13 RX ORDER — INSULIN ASPART 100 [IU]/ML
80-100 INJECTION, SOLUTION INTRAVENOUS; SUBCUTANEOUS 2 TIMES DAILY
COMMUNITY
End: 2022-08-24

## 2022-07-13 RX ORDER — LEVOCETIRIZINE DIHYDROCHLORIDE 5 MG/1
5 TABLET, FILM COATED ORAL DAILY
COMMUNITY
Start: 2022-01-29 | End: 2023-02-22 | Stop reason: SDUPTHER

## 2022-07-13 RX ORDER — ESOMEPRAZOLE MAGNESIUM 40 MG/1
40 CAPSULE, DELAYED RELEASE ORAL DAILY
COMMUNITY
End: 2022-07-14 | Stop reason: SDUPTHER

## 2022-07-13 RX ORDER — FUROSEMIDE 40 MG/1
40 TABLET ORAL DAILY
COMMUNITY
Start: 2022-01-24 | End: 2022-07-14

## 2022-07-13 RX ORDER — FERROUS GLUCONATE 324(38)MG
324 TABLET ORAL DAILY
COMMUNITY
Start: 2022-01-03 | End: 2022-12-19

## 2022-07-13 RX ORDER — PRASUGREL 10 MG/1
10 TABLET, FILM COATED ORAL DAILY
COMMUNITY

## 2022-07-13 RX ORDER — ATORVASTATIN CALCIUM 80 MG/1
80 TABLET, FILM COATED ORAL NIGHTLY
COMMUNITY
Start: 2021-12-16

## 2022-07-13 RX ORDER — ISOSORBIDE MONONITRATE 60 MG/1
60 TABLET, EXTENDED RELEASE ORAL DAILY
COMMUNITY
Start: 2022-05-15 | End: 2022-10-14 | Stop reason: ALTCHOICE

## 2022-07-13 RX ORDER — RIVAROXABAN 2.5 MG/1
2.5 TABLET, FILM COATED ORAL 2 TIMES DAILY
COMMUNITY
Start: 2021-09-14 | End: 2022-11-25 | Stop reason: ALTCHOICE

## 2022-07-13 RX ORDER — EZETIMIBE 10 MG/1
10 TABLET ORAL DAILY
COMMUNITY
Start: 2022-01-16

## 2022-07-13 RX ORDER — HUMAN INSULIN 100 [USP'U]/ML
45 INJECTION, SUSPENSION SUBCUTANEOUS 2 TIMES DAILY WITH MEALS
COMMUNITY
Start: 2022-02-18 | End: 2023-05-15 | Stop reason: SDUPTHER

## 2022-07-13 RX ORDER — TAMSULOSIN HYDROCHLORIDE 0.4 MG/1
0.4 CAPSULE ORAL DAILY
COMMUNITY
Start: 2021-10-29 | End: 2022-11-22

## 2022-07-13 RX ORDER — HYDROXYZINE HYDROCHLORIDE 50 MG/1
50 TABLET, FILM COATED ORAL 3 TIMES DAILY
COMMUNITY
Start: 2022-01-24

## 2022-07-13 RX ORDER — CARVEDILOL 25 MG/1
25 TABLET ORAL 2 TIMES DAILY
COMMUNITY
Start: 2022-01-11

## 2022-07-13 RX ORDER — SPIRONOLACTONE 25 MG/1
25 TABLET ORAL DAILY
COMMUNITY
Start: 2022-01-24 | End: 2022-08-24 | Stop reason: SDUPTHER

## 2022-07-13 RX ORDER — METOCLOPRAMIDE 10 MG/1
10 TABLET ORAL DAILY
COMMUNITY
Start: 2022-01-24 | End: 2023-08-17 | Stop reason: SDUPTHER

## 2022-07-13 RX ORDER — HYDROXYZINE HYDROCHLORIDE 50 MG/1
50 TABLET, FILM COATED ORAL 3 TIMES DAILY
COMMUNITY
End: 2022-07-14 | Stop reason: SDUPTHER

## 2022-07-13 RX ORDER — LANOLIN ALCOHOL/MO/W.PET/CERES
2000 CREAM (GRAM) TOPICAL DAILY
COMMUNITY

## 2022-07-13 RX ORDER — ASPIRIN 81 MG/1
81 TABLET ORAL DAILY
COMMUNITY

## 2022-07-13 RX ORDER — LEVOTHYROXINE SODIUM 25 UG/1
25 TABLET ORAL DAILY
COMMUNITY
Start: 2022-03-11 | End: 2022-08-24 | Stop reason: SDUPTHER

## 2022-07-14 ENCOUNTER — OFFICE VISIT (OUTPATIENT)
Dept: INTERNAL MEDICINE | Facility: CLINIC | Age: 57
End: 2022-07-14
Payer: MEDICARE

## 2022-07-14 VITALS
WEIGHT: 227.38 LBS | OXYGEN SATURATION: 98 % | RESPIRATION RATE: 18 BRPM | TEMPERATURE: 98 F | DIASTOLIC BLOOD PRESSURE: 77 MMHG | HEIGHT: 73 IN | BODY MASS INDEX: 30.14 KG/M2 | SYSTOLIC BLOOD PRESSURE: 110 MMHG | HEART RATE: 104 BPM

## 2022-07-14 DIAGNOSIS — R56.9 NEW ONSET SEIZURE: ICD-10-CM

## 2022-07-14 DIAGNOSIS — Z09 HOSPITAL DISCHARGE FOLLOW-UP: ICD-10-CM

## 2022-07-14 DIAGNOSIS — I70.90 ARTERIOSCLEROTIC VASCULAR DISEASE: ICD-10-CM

## 2022-07-14 DIAGNOSIS — I25.10 ATHEROSCLEROSIS OF CORONARY ARTERY, UNSPECIFIED VESSEL OR LESION TYPE, UNSPECIFIED WHETHER ANGINA PRESENT, UNSPECIFIED WHETHER NATIVE OR TRANSPLANTED HEART: ICD-10-CM

## 2022-07-14 DIAGNOSIS — Z11.3 ROUTINE SCREENING FOR STI (SEXUALLY TRANSMITTED INFECTION): ICD-10-CM

## 2022-07-14 DIAGNOSIS — I50.23 ACUTE ON CHRONIC SYSTOLIC HEART FAILURE: Primary | ICD-10-CM

## 2022-07-14 DIAGNOSIS — L89.512 PRESSURE ULCER OF RIGHT ANKLE, STAGE 2: ICD-10-CM

## 2022-07-14 PROBLEM — I50.9 CONGESTIVE HEART FAILURE: Status: RESOLVED | Noted: 2022-03-23 | Resolved: 2022-07-14

## 2022-07-14 PROBLEM — G62.9 PERIPHERAL NERVE DISEASE: Status: RESOLVED | Noted: 2022-03-23 | Resolved: 2022-07-14

## 2022-07-14 PROBLEM — I73.9 CLAUDICATION: Status: RESOLVED | Noted: 2022-03-23 | Resolved: 2022-07-14

## 2022-07-14 PROBLEM — R06.09 DYSPNEA ON EXERTION: Status: RESOLVED | Noted: 2022-03-23 | Resolved: 2022-07-14

## 2022-07-14 PROCEDURE — 99215 OFFICE O/P EST HI 40 MIN: CPT | Mod: PBBFAC | Performed by: NURSE PRACTITIONER

## 2022-07-14 PROCEDURE — 99215 OFFICE O/P EST HI 40 MIN: CPT | Mod: S$PBB,,, | Performed by: NURSE PRACTITIONER

## 2022-07-14 PROCEDURE — 99215 PR OFFICE/OUTPT VISIT, EST, LEVL V, 40-54 MIN: ICD-10-PCS | Mod: S$PBB,,, | Performed by: NURSE PRACTITIONER

## 2022-07-14 RX ORDER — LEVETIRACETAM 500 MG/1
500 TABLET ORAL 3 TIMES DAILY
COMMUNITY
End: 2022-08-24 | Stop reason: SDUPTHER

## 2022-07-14 RX ORDER — PANTOPRAZOLE SODIUM 40 MG/1
40 TABLET, DELAYED RELEASE ORAL DAILY
COMMUNITY
End: 2022-08-24

## 2022-07-14 RX ORDER — QUETIAPINE FUMARATE 25 MG/1
25 TABLET, FILM COATED ORAL NIGHTLY
COMMUNITY
End: 2022-08-24 | Stop reason: SDUPTHER

## 2022-07-14 NOTE — ASSESSMENT & PLAN NOTE
Take your medicines, even if you feel well   ?Watch for changes in your symptoms- notify the office with any concerns!  ?Call the office if you gain weight suddenly - Weigh yourself every morning after you urinate, but before you eat breakfast. Wear roughly the same amount of clothing every time. And make sure to write down your weight every day on a calendar. Call if your weight goes up by 2 or more pounds (1 kilogram) in 1 day, or 4 or more pounds (2 kilograms) in 1 week. When you have heart failure, sudden weight gain is a sign that your body could be holding on to too much fluid. You might need a change in your medicines.  ?Cut down on salt - Try not to add salt at the table or when you cook. Also, avoid foods that come in boxes and cans, unless their labels say they are low in sodium. The best choices for food are fresh or fresh frozen foods, and foods you prepare yourself  ?Lose weight, if you are overweight - If you are overweight, your heart has to work extra hard to keep up with your body's needs.  ?Stop smoking - Smoking worsens heart failure and increases the chance that you will have a heart attack or die.  ?Limit alcohol - If you are a woman, do not have more than 1 drink a day. If you are a man, do not have more than 2.  ?Be active     Keep appt with Dr. Oreilly on 7/22/22  Strict ED precautions

## 2022-07-14 NOTE — ASSESSMENT & PLAN NOTE
Coronary artery disease is a type of heart disease that occurs when a substance called plaque builds up in the arteries that supply blood to the heart.     Heart disease is the leading cause of death in the [place]. The term heart disease refers to several types of heart conditions, with the most common being coronary artery disease. Other kinds of heart disease may involve the valves in the heart, or the heart may not pump well and cause heart failure. Some people are born with heart disease.      Are you at risk?   Anyone, including children, can develop heart disease.  It occurs when a substance called plaque builds up in your arteries. When this happens, your arteries can narrow over time, reducing blood flow to the heart.  Smoking, eating an unhealthy diet, and not getting enough exercise all increase your risk for having heart disease.   Having high cholesterol, high blood pressure, or diabetes also can increase your risk for heart disease     What are the signs and symptoms?      For many people, chest discomfort or a heart attack is the first sign.     Someone having a heart attack may experience several symptoms, including:      Chest pain or discomfort that doesnt go away after a few minutes.   Pain or discomfort in the jaw, neck, or back.  Weakness, light-headedness, nausea, or a cold sweat.  Pain or discomfort in the arms or shoulder.  Shortness of breath.     If you think that you or someone you know is having a heart attack, call 9-1-1 immediately.    Keep appt with Dr. Oreilly on 7/22/22  Strict ED precautions

## 2022-07-14 NOTE — ASSESSMENT & PLAN NOTE
Continue wound care per [facility]. Offered to refer to wound care here, but patient declined at present. Overall, wound is stable. If no improvement at f/u, will proceed with wound care referral

## 2022-07-14 NOTE — ASSESSMENT & PLAN NOTE
States started during hospitalization at [facility]. Now managed with Keppra and Vimpat. Denies sz activity since hospitalization  Continue medications  No driving at this time  Refer to Neuro

## 2022-08-22 ENCOUNTER — LAB VISIT (OUTPATIENT)
Dept: LAB | Facility: HOSPITAL | Age: 57
End: 2022-08-22
Attending: NURSE PRACTITIONER
Payer: MEDICARE

## 2022-08-22 DIAGNOSIS — Z11.3 ROUTINE SCREENING FOR STI (SEXUALLY TRANSMITTED INFECTION): ICD-10-CM

## 2022-08-22 LAB
ALBUMIN SERPL-MCNC: 3.6 GM/DL (ref 3.5–5)
ALBUMIN/GLOB SERPL: 0.8 RATIO (ref 1.1–2)
ALP SERPL-CCNC: 123 UNIT/L (ref 40–150)
ALT SERPL-CCNC: 15 UNIT/L (ref 0–55)
AST SERPL-CCNC: 17 UNIT/L (ref 5–34)
BASOPHILS # BLD AUTO: 0.05 X10(3)/MCL (ref 0–0.2)
BASOPHILS NFR BLD AUTO: 0.4 %
BILIRUBIN DIRECT+TOT PNL SERPL-MCNC: 0.4 MG/DL
BUN SERPL-MCNC: 14.4 MG/DL (ref 8.4–25.7)
CALCIUM SERPL-MCNC: 9.8 MG/DL (ref 8.4–10.2)
CHLORIDE SERPL-SCNC: 102 MMOL/L (ref 98–107)
CO2 SERPL-SCNC: 28 MMOL/L (ref 22–29)
CREAT SERPL-MCNC: 1.62 MG/DL (ref 0.73–1.18)
EOSINOPHIL # BLD AUTO: 0.33 X10(3)/MCL (ref 0–0.9)
EOSINOPHIL NFR BLD AUTO: 2.9 %
ERYTHROCYTE [DISTWIDTH] IN BLOOD BY AUTOMATED COUNT: 15.9 % (ref 11.5–17)
EST. AVERAGE GLUCOSE BLD GHB EST-MCNC: 185.8 MG/DL
GFR SERPLBLD CREATININE-BSD FMLA CKD-EPI: 49 MLS/MIN/1.73/M2
GLOBULIN SER-MCNC: 4.8 GM/DL (ref 2.4–3.5)
GLUCOSE SERPL-MCNC: 91 MG/DL (ref 74–100)
HAV IGM SERPL QL IA: NONREACTIVE
HBA1C MFR BLD: 8.1 %
HBV CORE IGM SERPL QL IA: NONREACTIVE
HBV SURFACE AG SERPL QL IA: NONREACTIVE
HCT VFR BLD AUTO: 34.9 % (ref 42–52)
HCV AB SERPL QL IA: NONREACTIVE
HGB BLD-MCNC: 11.3 GM/DL (ref 14–18)
HIV 1+2 AB+HIV1 P24 AG SERPL QL IA: NONREACTIVE
IMM GRANULOCYTES # BLD AUTO: 0.05 X10(3)/MCL (ref 0–0.04)
IMM GRANULOCYTES NFR BLD AUTO: 0.4 %
LYMPHOCYTES # BLD AUTO: 2.24 X10(3)/MCL (ref 0.6–4.6)
LYMPHOCYTES NFR BLD AUTO: 19.7 %
MCH RBC QN AUTO: 26.5 PG (ref 27–31)
MCHC RBC AUTO-ENTMCNC: 32.4 MG/DL (ref 33–36)
MCV RBC AUTO: 81.9 FL (ref 80–94)
MONOCYTES # BLD AUTO: 0.65 X10(3)/MCL (ref 0.1–1.3)
MONOCYTES NFR BLD AUTO: 5.7 %
NEUTROPHILS # BLD AUTO: 8.1 X10(3)/MCL (ref 2.1–9.2)
NEUTROPHILS NFR BLD AUTO: 70.9 %
NRBC BLD AUTO-RTO: 0 %
PLATELET # BLD AUTO: 253 X10(3)/MCL (ref 130–400)
PMV BLD AUTO: 13.3 FL (ref 7.4–10.4)
POTASSIUM SERPL-SCNC: 4 MMOL/L (ref 3.5–5.1)
PROT SERPL-MCNC: 8.4 GM/DL (ref 6.4–8.3)
RBC # BLD AUTO: 4.26 X10(6)/MCL (ref 4.7–6.1)
SODIUM SERPL-SCNC: 141 MMOL/L (ref 136–145)
T PALLIDUM AB SER QL: NONREACTIVE
TSH SERPL-ACNC: 2.52 UIU/ML (ref 0.35–4.94)
WBC # SPEC AUTO: 11.4 X10(3)/MCL (ref 4.5–11.5)

## 2022-08-22 PROCEDURE — 87389 HIV-1 AG W/HIV-1&-2 AB AG IA: CPT

## 2022-08-22 PROCEDURE — 80053 COMPREHEN METABOLIC PANEL: CPT

## 2022-08-22 PROCEDURE — 85025 COMPLETE CBC W/AUTO DIFF WBC: CPT

## 2022-08-22 PROCEDURE — 83036 HEMOGLOBIN GLYCOSYLATED A1C: CPT

## 2022-08-22 PROCEDURE — 84443 ASSAY THYROID STIM HORMONE: CPT

## 2022-08-22 PROCEDURE — 86780 TREPONEMA PALLIDUM: CPT

## 2022-08-22 PROCEDURE — 36415 COLL VENOUS BLD VENIPUNCTURE: CPT

## 2022-08-22 PROCEDURE — 80074 ACUTE HEPATITIS PANEL: CPT

## 2022-08-23 LAB — PATH REV: NORMAL

## 2022-08-24 ENCOUNTER — OFFICE VISIT (OUTPATIENT)
Dept: INTERNAL MEDICINE | Facility: CLINIC | Age: 57
End: 2022-08-24
Payer: MEDICARE

## 2022-08-24 VITALS
BODY MASS INDEX: 30.96 KG/M2 | TEMPERATURE: 99 F | HEART RATE: 86 BPM | HEIGHT: 73 IN | DIASTOLIC BLOOD PRESSURE: 72 MMHG | RESPIRATION RATE: 20 BRPM | SYSTOLIC BLOOD PRESSURE: 107 MMHG | WEIGHT: 233.63 LBS

## 2022-08-24 DIAGNOSIS — L89.512 PRESSURE ULCER OF RIGHT ANKLE, STAGE 2: ICD-10-CM

## 2022-08-24 DIAGNOSIS — I50.23 ACUTE ON CHRONIC SYSTOLIC HEART FAILURE: ICD-10-CM

## 2022-08-24 DIAGNOSIS — F41.9 ANXIETY: ICD-10-CM

## 2022-08-24 DIAGNOSIS — R94.4 DECREASED GFR: ICD-10-CM

## 2022-08-24 DIAGNOSIS — I10 ESSENTIAL HYPERTENSION: Primary | ICD-10-CM

## 2022-08-24 DIAGNOSIS — D51.8 OTHER VITAMIN B12 DEFICIENCY ANEMIA: ICD-10-CM

## 2022-08-24 DIAGNOSIS — R56.9 NEW ONSET SEIZURE: ICD-10-CM

## 2022-08-24 DIAGNOSIS — E03.9 HYPOTHYROIDISM, UNSPECIFIED TYPE: ICD-10-CM

## 2022-08-24 PROBLEM — Z09 HOSPITAL DISCHARGE FOLLOW-UP: Status: RESOLVED | Noted: 2022-07-12 | Resolved: 2022-08-24

## 2022-08-24 PROBLEM — J30.9 ALLERGIC RHINITIS: Status: ACTIVE | Noted: 2022-08-15

## 2022-08-24 PROCEDURE — 99214 PR OFFICE/OUTPT VISIT, EST, LEVL IV, 30-39 MIN: ICD-10-PCS | Mod: S$PBB,,, | Performed by: NURSE PRACTITIONER

## 2022-08-24 PROCEDURE — 99214 OFFICE O/P EST MOD 30 MIN: CPT | Mod: S$PBB,,, | Performed by: NURSE PRACTITIONER

## 2022-08-24 PROCEDURE — 99215 OFFICE O/P EST HI 40 MIN: CPT | Mod: PBBFAC | Performed by: NURSE PRACTITIONER

## 2022-08-24 RX ORDER — LACOSAMIDE 100 MG/1
100 TABLET ORAL 2 TIMES DAILY
COMMUNITY
Start: 2022-07-08 | End: 2022-08-24 | Stop reason: SDUPTHER

## 2022-08-24 RX ORDER — LACTULOSE 10 G/15ML
10 SOLUTION ORAL 2 TIMES DAILY PRN
Qty: 900 ML | Refills: 1 | Status: SHIPPED | OUTPATIENT
Start: 2022-08-24 | End: 2022-10-14 | Stop reason: SDUPTHER

## 2022-08-24 RX ORDER — LEVOTHYROXINE SODIUM 25 UG/1
25 TABLET ORAL
Qty: 90 TABLET | Refills: 1 | Status: SHIPPED | OUTPATIENT
Start: 2022-08-24 | End: 2022-10-14 | Stop reason: SDUPTHER

## 2022-08-24 RX ORDER — LACOSAMIDE 100 MG/1
100 TABLET ORAL 2 TIMES DAILY
Qty: 60 TABLET | Refills: 2 | Status: SHIPPED | OUTPATIENT
Start: 2022-08-24 | End: 2022-11-25 | Stop reason: SDUPTHER

## 2022-08-24 RX ORDER — HYDROCODONE BITARTRATE AND ACETAMINOPHEN 7.5; 325 MG/1; MG/1
1 TABLET ORAL EVERY 8 HOURS PRN
COMMUNITY
Start: 2022-08-11 | End: 2023-08-17

## 2022-08-24 RX ORDER — MONTELUKAST SODIUM 10 MG/1
10 TABLET ORAL DAILY
Qty: 90 TABLET | Refills: 1 | Status: SHIPPED | OUTPATIENT
Start: 2022-08-24 | End: 2023-04-12

## 2022-08-24 RX ORDER — RANOLAZINE 1000 MG/1
1000 TABLET, EXTENDED RELEASE ORAL 2 TIMES DAILY
COMMUNITY
Start: 2022-07-22

## 2022-08-24 RX ORDER — LEVETIRACETAM 500 MG/1
500 TABLET ORAL 2 TIMES DAILY
Qty: 180 TABLET | Refills: 1 | Status: SHIPPED | OUTPATIENT
Start: 2022-08-24 | End: 2022-11-25 | Stop reason: SDUPTHER

## 2022-08-24 RX ORDER — SPIRONOLACTONE 25 MG/1
25 TABLET ORAL DAILY
Qty: 90 TABLET | Refills: 1 | Status: SHIPPED | OUTPATIENT
Start: 2022-08-24 | End: 2023-02-14 | Stop reason: SDUPTHER

## 2022-08-24 RX ORDER — OMEPRAZOLE 40 MG/1
40 CAPSULE, DELAYED RELEASE ORAL NIGHTLY
Qty: 90 CAPSULE | Refills: 1 | Status: SHIPPED | OUTPATIENT
Start: 2022-08-24 | End: 2023-02-14 | Stop reason: SDUPTHER

## 2022-08-24 RX ORDER — QUETIAPINE FUMARATE 25 MG/1
25 TABLET, FILM COATED ORAL NIGHTLY
Qty: 90 TABLET | Refills: 1 | Status: SHIPPED | OUTPATIENT
Start: 2022-08-24 | End: 2023-02-14 | Stop reason: SDUPTHER

## 2022-08-24 NOTE — ASSESSMENT & PLAN NOTE
Diuretic therapy recently dec per Cards. Cautioned increasing water intake above levels recommended by Cards 2/2 CHF  Avoid NSAIDs (i.e., Advil, Aleve, Ibuprofen, Motrin, Naproxen, Diclofenac, Indocin, Celebrex, Mobic, Voltaren). Avoid the following GI meds: Milk of Mag, Mylanta, Fleets Enema, and Pepto-Bismol. Okay to take Tylenol (acetaminophen) (if no end-stage liver disease), low dose ASA (81 mg), Miralax, Tums, and Colace. Avoid dark sodas.  Repeat prior to f/u

## 2022-08-24 NOTE — ASSESSMENT & PLAN NOTE
A1c dayana slightly above goal, but pt relates elevated readings during recent hospitalization. Had one hypoglycemic episode last night due to taking insulin w/o eating for > 2 hours. Enc to eat breakfast, lunch, and dinner, and possible nighttime snack. Should not skip meals or go several hours w/o eating after taking insulin.  Continue insulin regimen for now  Will refer to Diabetic education

## 2022-08-24 NOTE — PROGRESS NOTES
RON Esquivel   [facility]  [address] [facility] [id number]      PATIENT NAME: Luigi Gotti  : 1965  DATE: 22  MRN: [id number]      Billing Provider: RON Esquivel  Level of Service:   Patient PCP Information     Provider PCP Type    RON Esquivel General          Reason for Visit / Chief Complaint: Follow-up (Lab review/Hypoglycemia/)       History of Present Illness / Problem Focused Workflow     Luigi Gotti presents to the clinic with Follow-up (Lab review/Hypoglycemia/)     Initial Visit (2020): 54 y.o. Mont[place] male presenting to [facility] to establish primary care.   Previous PCP: Dr. CHA Fernandez, last visit 19   PmHx: Type II DM controlled with insulin, Chronic GERD, Hx of Gastroparesis/Gastritis/Ischemic colitis (following GI, Dr. Green), CAD (following Dr. Oreilly, [facility]), PVD (following an interventional cardiologist, Dr. Cespedes), CHF, HLD, and BPH   SHx: EGD, Celiac Bypass, Colonoscopy, Visceral Arteriogram, coronary artery graft angiography, cataract extraction, CABG x 2, Tonsillectomy, Billie, mx vascular procedures (see hx)   FHx: HTN, T2DM, Clotting D/o (mother), Seizure, CVA (father), Breast Ca (mother)   Complaints today: Medication refills     Bp at goal at present.     HgA1c: 7.9%, slightly above  goal   Medications: Novolin 70/30 80 units in the morning and 90 units SQ at night  CBG log: None provided. But pt did express CBGs run between 140s-250s   S/S of hyperglycemia, no or hypoglycemia, no     Chronic GERD managed with Nexium.   He's following Cardio for hx of CAD, PVD, and CHF. He's currently taking ASA, Atorvastatin 80 mg daily, Coreg 25 mg po BID, Digoxin 125 mcg, Furosemide 40 mg po daily, Imdur 60 mg po daily, Ranexa 1,000 mg po BID, and Effient 10 mg po daily. Also has Nitro for use as needed.   Triglycerides 317, LDL 86. Taking Atorvastatin 80 mg po daily.     No acute  "concerns today.    (2/18/20): Pt presenting for f/u.   Bp at goal at present.   HgA1c: 8.2%, slightly above  goal and increased from previous   Medications: Novolin 70/30 80 units in the morning and 90 units SQ at night  CBG log: None provided. But pt did express CBGs run between 140s-180s; reports lowest in one-teens; fasting CBG was 152 on yesterday   Denies over S/S of hyperglycemia or hypoglycemia.     eGFR < 60 mL/min. Creatinine 1.40; slight improvement from previous.     Total WBC count elevated. Has been elevated since last year. Total RBC count, H/H slightly decreased. He does report being seen at [facility] clinic on [address] ~ 6  days ago due to cough. He states that he was diagnosed with "bronchitis with wheezing." He reports that a CXR was performed. He was prescribed a Z-pack and cough syrup, both of which he's completed. He continues to c/o dry cough. Denies fever, chills, HA, or SOB.    (8/7/2020): 54 y.o. [place] male with a PmHx of Type II DM controlled with insulin, Chronic GERD, h/o dysphagia, Hx of Gastroparesis/Gastritis/Ischemic colitis (following GI, Dr. Green), CAD (following Dr. Oreilly, [facility]), PVD (following an interventional cardiologist, Dr. Cespedes), CHF, HLD, and BPH, presenting for routine f/u. HgA1c 7.4%. He is taking Novolin 70/30 80 units in the morning and 92 units SQ at night. He denies any s/s of hypoglycemia or hyperglycemia. Total WBC count elevated. Has been elevated since last year. Total RBC count, H/H slightly decreased as well. He reports cough from last OV is improved and doesn't occur all of the time. Declines any work-up at present after being offered to do a CXR. He continues to follow Cards, Dr. Oreilly, and is following Dr. Georges for foot care. He does have a h/o BPH but hasn't followed up with Uro in some time. Reports feelings of incomplete ejaculation and was told it was probably from prostate issues by another provider. No other problems " "stated.    (11/9/2020): 55 y.o. [place] male with a PmHx of Type II DM controlled with insulin, Chronic GERD, h/o dysphagia, Hx of Gastroparesis/Gastritis/Ischemic colitis (following GI, Dr. Green), CAD (following [facility]), PVD (following an interventional cardiologist, Dr. Cespedes), CHF, HLD, and BPH, presenting for routine f/u. HgA1c 6.7%. He is taking Novolin 70/30 80 units in the morning and 92 units SQ at night. He denies any s/s of hypoglycemia or hyperglycemia. Total WBC count elevated. Has been elevated since last year. Renal indices slightly decreased w/ small rise in creatinine. He admits that he's  not been drinking a lot of water. Previously referred to Uro but reports no appt made. He is requesting the second Shingrix today. He continues to follow Cards, CV surgeon, GI, and podiatry. States had a colonoscopy about 1 week ago and noted to have polyps. Reports GI prescribed ferrous gluconate. Will request results. He reports chronic dyspnea x many years. Reports no improvement after cardiac sx but denies any worsening dyspnea. No other problems stated.    Telemedicine Visit (12/21/2020): 55 y.o. [place] male with a PmHx of Type II DM controlled with insulin, Chronic GERD, h/o dysphagia, Hx of Gastroparesis/Gastritis/Ischemic colitis (following GI, Dr. Green), CAD (following [facility]), PVD (following an interventional cardiologist, Dr. Cespedes), CHF, HLD, and BPH, presenting for ED f/u via telemedicine. Apparently pt presented to [facility] last week due to issues with blood sugars but states he was diagnosed with influenza and hospitalized for suspected COVID. He is stating that sugar was in the "20s," but also stated that during the hospitalization "they checked it six times a day until it came down to 100." So unknown if pt had issues with hyperglycemia or hypoglycemia. States "They gave me something (insulin) different in there and it messed my numbers up!" At present, he remains " "on his routine dose of Novolin 70/30 and reports CBGs have been in the 120s-140s. He reports, "I'm doing good now." At time of visit, ED records have not been received but have been requested. Reports has a cough but it is improved. Denies CP or SOB. He has no other concerns. F/u scheduled 2/2021.    (2/10/2021): 55 y.o. Ch[place] male with a PmHx of Type II DM controlled with insulin, Chronic GERD, h/o dysphagia, Hx of Gastroparesis/Gastritis/Ischemic colitis (following GI, Dr. Green), Celiac Dz, CAD (following Dr. Oreilly, [facility]), PVD (following an interventional cardiologist, Dr. Cespedes), CHF, HLD, and BPH, presenting for routine f/u. Conducted a phone visit 12/2020 for ED f/u. At the time, pt reported that he was diagnosed and treated for the flu while hospitalized at [facility]. Records received later revealed that that pt was being treated for COVID pneumonia after testing + for COVID ~9 days prior to hospital admit on 12/10/20. However, pt denies this and states he was never told he had COVID despite prescription medications being prescribed on 12/1/20 by a physician who's noted to have worked in the ED at [facility]. During his hospitalization from 12/10/20 to 12/17/20, he was treated for COVID pneumonia. D-dimer was elevated but CTA was negative for PE. He is taking Eliquis. Plans to speak to Cards tomorrow about continued use of this as he's on mx antiplatelets. Chemistry panel improved. Glucose low 100s. He denies any s/s of hypoglycemia. Total WBC count improved. He was previously referred to Heme clinic for unexplained leukocytosis. He has an appt 3/23/21. He will see his Cardiologist, Dr. Oreilly, on tomorrow. He has a chronic h/o dyspnea (a CT thorax was ordered in November but pt never completed) and intermittent angina that is no different from baseline. He denies any CP or SOB at this time. Last used Nitro a few days ago. He has no other complaints.     During hospitalization, pt had a CTA chest that " revealed:  1.2 cm nodule in right lobe of thyroid gland, no evidence of PE, and consolidative changes throughout lungs    (5/11/2021): 55 y.o. [place] male with a PmHx of Type II DM controlled with insulin, Chronic GERD, h/o dysphagia, Hx of Gastroparesis/Gastritis/Ischemic colitis (following GI, Dr. Green), Celiac Dz, CAD (following [facility]), PVD (following an interventional cardiologist, Dr. Cespedes), CHF, HLD, and BPH, presenting for routine f/u. He was previously referred to Heme clinic for unexplained leukocytosis. He had an appt 3/23/21 but was a No Show. A letter was mailed for pt to call and re-schedule. He will undergo a thyroid US to monitor a 1.2 cm nodule on 6/3/21. Reviewed colonoscopy done by Dr. Emmanuel Green on 9/30/2020. Dx: two polyps removed, tubular adenomas; diverticula disease.     Lab review:   HgA1c 7.1. He continues with Novolin N as prescribed. He does not check CBGs. Denies overt s/s of hypoglycemia or hyperglycemia.   Renal indices stable    He reports that he follows Dr. Alex Duckworth for a retinal d/o. Scheduled to f/u 5/18/21.   Scheduled for LHC 5/13/21 with Dr. Oreilly.   Scheduled with Uro 7/8/21.     No other concerns.    (9/14/2021): 56 y.o. [place] male with a PmHx of Type II DM controlled with insulin, Chronic GERD, h/o dysphagia, Hx of Gastroparesis/Gastritis/Ischemic colitis (following GI, Dr. Green), Celiac Dz, CAD (following [facility]), PVD (following an interventional cardiologist, Dr. Cespedes), CHF, HLD, and BPH, presenting for routine f/u. Pt underwent a thyroid US in May that did not reveal any acute abnormalities. He was seen by Uro in July for abnl ejaculation. Told likely d/t Flomax. He wished to remain on Flomax. Can f/u PRN. HgA1c 7.5%. Reports compliance with insulin. No log provided. CBC stable compared to baseline. Previously referred to Heme/Onc. Triglycerides 309. Prescribed Atorvastatin 80 mg po daily and Zetia 10 mg po daily.  Does not take Zetia daily. Pt will f/u with Dr. Oreilly next month. No other problems stated.    (12/16/2021): 56 y.o. Thai male with a PmHx of Type II DM controlled with insulin, Chronic GERD, h/o dysphagia, Hx of Gastroparesis/Gastritis/Ischemic colitis (following GI, Dr. Green), Celiac Dz, CAD (following Dr. Oreilly, [facility]), PVD (following an interventional cardiologist, Dr. Cespedes), CHF, HLD, CKD, and BPH, presenting for routine f/u. Pt previously referred to Heme/Onc for unexplained leukocytosis and anemia. So far, work-up unrevealing aside from dx of B12 deficiency. He was last seen 12/10 to projected 3 month f/u. HgA1c 7.5%. Reports compliance with insulin. Denies overt s/s of hypoglycemia. No log provided. PSA 1.03. Renal indices stable. Pt reports undergoing a vein procedure to LLE per Dr. Oreilly yesterday. F/u 1/5/22. He reports hitting right great toe ~2 weeks ago. Podiatrist removed nail. Area doing well. He is UTD on influenza vaccine. No other concerns.    (7/14/2022): 57 y.o. Thai male with a PmHx of Type II DM controlled with insulin, Chronic GERD, h/o dysphagia, Hx of Gastroparesis/Gastritis/Ischemic colitis (following GI, Dr. Green), Celiac Dz, CAD (following Dr. Oreilly, [facility]), PVD (following an interventional cardiologist, Dr. Cespedes), CHF, HLD, CKD, and BPH, presenting for routine f/u. Since last visit, patient was hospitalized on 4/4/22 until 4/13/22 (to [facility]) after experiencing PEA while en route to the ED via EMS from home. He achieved ROSC after about 12 min of CPD, epi, and bicarb. He was intubated and admitted to the ICU upon arrival the the ED. He was treated for possible PNA while hospitalized. Cards did see him but signed off, recommending outpatient f/u. He'd had a LHC 3/9/22 that revealed nonobstructive CAD. In hospital, an Echo done 4/5/22 showed LVEF 35-40% with grade 2 diastolic dysfunction. He required 2 units of PRBCs in hospital. He also  "experienced a fall during admission. CT head was negative for acute bleeds. He was ultimately discharged to inpatient rehab at [facility] where he reports he stayed for 2 weeks. Then, he presented to [facility] ED on 5/2/22 with c/o shortness of breath and bilateral lower extremity swelling. EMS reported patient with respiratory distress and pulse ox in the mid 70s upon their arrival to his home. He was placed on CPAP and has some improvement in oxygenation and work of breathing. There is mention of seizures occurring while hospitalized at [facility] (apparently MRI brain and EEG done--unable to see results at this time). He developed a wound to right ankle/heel during last hospitalization as well. He was discharged from [facility] on 5/24/22 and transferred to [facility]. States discharged from [facility] approximately one week ago with [facility]. He is receiving wound care to ankle/heel wound and P.T. at home. Admits some decompensation in overall physical status due to an approximate 3-month long hospitalization. He still mentions chronic dizziness, which was present prior to April hospitalization, but overall stable. Will see Dr. Oreilly on 7/22/22. This will be his first visit post-hospitalization. No other concerns.    Today's Visit (8/24/2022): 57 y.o. Citizen of [place] male with a PmHx of Type II DM controlled with insulin, Chronic GERD, h/o dysphagia, Hx of Gastroparesis/Gastritis/Ischemic colitis (following GI, Dr. Green), Celiac Dz, CAD (following Dr. Oreilly, [facility]), PVD (following an interventional cardiologist, Dr. Cespedes), CHF, HLD, CKD, and BPH, presenting for routine f/u. Lab review significant for: H/H 11.3/34.9, BUN 14, creatinine 1.62, eGFR 49, HgA1c 8.1, FBG 91. Patient reports recently seeing Dr. Oreilly and having "fluid pill" decreased by 50% 2/2 "dehydration." He will f/u at the end of September. Does report 1-lb weight gain since yesterday but no LE swelling at present, SOB, or CP. HgA1c did increase from previous, " "however, patient reports glucose was as high as 400s during hospitalization. States during last week, glucose 100s-200. Alleges glucose dropped to "15" last night. He did not lose consciousness, but was dizzy and weak. Drank orange juice with complete resolution in symptoms. Admits last ate [employer] around 4 pm yesterday and did not eat when he took insulin (Novolin 70/30) last night. Right ankle wound is improved per report. C/w home health care. Scheduled in Neuro in November. Will need refill on Keppra and Vimpat at this time. No seizure activity reported since last visit.    Follow-up        Review of Systems     Review of Systems   All other systems reviewed and are negative.      Medical / Social / Family History     Past Medical History:   Diagnosis Date    BPH (benign prostatic hyperplasia)     CAD (coronary artery disease)     CHF (congestive heart failure)     GERD (gastroesophageal reflux disease)     Other hyperlipidemia     PVD (peripheral vascular disease)     Type 2 diabetes mellitus without complications        Past Surgical History:   Procedure Laterality Date    CHOLECYSTECTOMY      CORONARY ARTERY BYPASS GRAFT      TONSILLECTOMY         Social History    reports that he has never smoked. He has never used smokeless tobacco. He reports previous alcohol use. He reports previous drug use.    Family History  's family history includes Cancer in his mother; Hypertension in his father and mother; Stroke in his father.    Medications and Allergies     Medications  Medication List with Changes/Refills   Current Medications    ASPIRIN (ECOTRIN) 81 MG EC TABLET    Take 81 mg by mouth once daily.    ATORVASTATIN (LIPITOR) 80 MG TABLET    Take 80 mg by mouth every evening.    AZELASTINE (ASTELIN) 137 MCG (0.1 %) NASAL SPRAY    1 spray by Nasal route 2 (two) times daily.    CARVEDILOL (COREG) 25 MG TABLET    Take 25 mg by mouth 2 (two) times daily.    CYANOCOBALAMIN (VITAMIN B-12) 1000 MCG TABLET "    Take 2,000 mcg by mouth once daily at 6am.    EZETIMIBE (ZETIA) 10 MG TABLET    Take 10 mg by mouth once daily at 6am.    FERROUS GLUCONATE (FERGON) 324 MG TABLET    Take 324 mg by mouth once daily.    FLUTICASONE PROPIONATE (FLONASE) 50 MCG/ACTUATION NASAL SPRAY    Flonase Allergy Relief Take No date recorded No form recorded No frequency recorded No route recorded No set duration recorded No set duration amount recorded active No dosage strength recorded No dosage strength units of measure recorded    HYDROCODONE-ACETAMINOPHEN (NORCO) 7.5-325 MG PER TABLET    Take 1 tablet by mouth every 8 (eight) hours as needed.    HYDROXYZINE (ATARAX) 50 MG TABLET    Take 50 mg by mouth 3 (three) times daily.    ISOSORBIDE MONONITRATE (IMDUR) 60 MG 24 HR TABLET    Take 60 mg by mouth once daily.    LEVOCETIRIZINE (XYZAL) 5 MG TABLET    Take 5 mg by mouth once daily.    LOSARTAN (COZAAR) 100 MG TABLET    Take 100 mg by mouth once daily at 6am.    METOCLOPRAMIDE HCL (REGLAN) 10 MG TABLET    Take 10 mg by mouth once daily.    NITROGLYCERIN (NITROSTAT) 0.4 MG SL TABLET    Place 0.4 mg under the tongue as needed.    NOVOLIN 70/30 U-100 INSULIN 100 UNIT/ML (70-30) INJECTION    INJECT 80 UNITS UNDER THE SKIN IN THE MORNING AND 92 UNITS IN THE EVENING    NOVOLOG U-100 INSULIN ASPART 100 UNIT/ML INJECTION    INJECT 20 - 25 UNITS UNDER THE SKIN 3 TIMES A DAY BEFORE MEALS    PRASUGREL (EFFIENT) 10 MG TAB    Take 10 mg by mouth once daily.    RANOLAZINE (RANEXA) 1,000 MG TB12    Take 1,000 mg by mouth 2 (two) times daily.    RIVAROXABAN (XARELTO) 2.5 MG TAB    Take 2.5 mg by mouth 2 (two) times a day.    SUCRALFATE (CARAFATE) 1 GRAM TABLET    Take 1 g by mouth 4 (four) times daily before meals and nightly.    TAMSULOSIN (FLOMAX) 0.4 MG CAP    Take 0.4 mg by mouth once daily at 6am.    TORSEMIDE (DEMADEX) 20 MG TAB    Take 20 mg by mouth once daily at 6am.    TRUETRACK TEST STRP    TEST 3 TIMES A DAY   Changed and/or Refilled Medications     Modified Medication Previous Medication    LACOSAMIDE (VIMPAT) 100 MG TAB lacosamide (VIMPAT) 100 mg Tab       Take 1 tablet (100 mg total) by mouth 2 (two) times daily.    Take 100 mg by mouth 2 (two) times daily.    LACTULOSE (CHRONULAC) 20 GRAM/30 ML SOLN lactulose (CHRONULAC) 20 gram/30 mL Soln       Take 15 mLs (10 g total) by mouth 2 (two) times daily as needed (constipation).    Take 15 mLs (10 g total) by mouth 2 (two) times daily as needed (constipation).    LEVETIRACETAM (KEPPRA) 500 MG TAB levETIRAcetam (KEPPRA) 500 MG Tab       Take 1 tablet (500 mg total) by mouth 2 (two) times daily.    Take 500 mg by mouth 3 (three) times daily.    LEVOTHYROXINE (SYNTHROID) 25 MCG TABLET levothyroxine (SYNTHROID) 25 MCG tablet       Take 1 tablet (25 mcg total) by mouth before breakfast.    Take 25 mcg by mouth once daily.    MONTELUKAST (SINGULAIR) 10 MG TABLET montelukast (SINGULAIR) 10 mg tablet       Take 1 tablet (10 mg total) by mouth once daily.    Take 10 mg by mouth once daily.    OMEPRAZOLE (PRILOSEC) 40 MG CAPSULE omeprazole (PRILOSEC) 40 MG capsule       Take 1 capsule (40 mg total) by mouth every evening.    Take 40 mg by mouth every evening.    QUETIAPINE (SEROQUEL) 25 MG TAB QUEtiapine (SEROQUEL) 25 MG Tab       Take 1 tablet (25 mg total) by mouth every evening.    Take 25 mg by mouth every evening.    SPIRONOLACTONE (ALDACTONE) 25 MG TABLET spironolactone (ALDACTONE) 25 MG tablet       Take 1 tablet (25 mg total) by mouth once daily.    Take 25 mg by mouth once daily.   Discontinued Medications    AMITRIPTYLINE (ELAVIL) 50 MG TABLET    Take 50 mg by mouth every evening.    AMLODIPINE (NORVASC) 5 MG TABLET    Take 5 mg by mouth once daily.    CLONIDINE (CATAPRES) 0.1 MG TABLET    Take 0.1 mg by mouth once daily at 6am.    DIGOXIN (LANOXIN) 125 MCG TABLET    Take 0.125 mg by mouth once daily at 6am.    GLUCAGON (GLUCAGEN HYPOKIT INJ)    Inject as directed.    HYDROCODONE-ACETAMINOPHEN (NORCO)   MG PER TABLET    Take 1 tablet by mouth 2 (two) times daily as needed.    INSULIN ASPART U-100 (NOVOLOG) 100 UNIT/ML (3 ML) INPN PEN    Inject  Units into the skin 2 (two) times daily.    INSULIN GLARGINE,HUM.REC.ANLOG (LANTUS SUBQ)    Inject 65 Units into the skin 2 (two) times a day.    INSULIN LISPRO PROTAMIN-LISPRO (HUMALOG 50/50) 100 UNIT/ML (50-50) SUSP    Humalog Mix 50-50 Take No date recorded No form recorded No frequency recorded No route recorded No set duration recorded No set duration amount recorded active No dosage strength recorded No dosage strength units of measure recorded    INSULIN NPH-INSULIN REGULAR, 70/30, (NOVOLIN 70/30) 100 UNIT/ML (70-30) INJECTION    Inject 80 Units into the skin.    LACOSAMIDE (VIMPAT ORAL)    Take 50 mg by mouth 2 (two) times a day.    PANTOPRAZOLE (PROTONIX) 40 MG TABLET    Take 40 mg by mouth once daily.    RANOLAZINE (RANEXA) 500 MG TB12    Take 1,000 mg by mouth 2 (two) times a day.       Allergies  Review of patient's allergies indicates:   Allergen Reactions    Pork derived (porcine)      Other reaction(s): Pork    Clopidogrel Hives and Rash       Physical Examination     Vitals:    08/24/22 0736   BP: 107/72   Pulse: 86   Resp: 20   Temp: 98.5 °F (36.9 °C)       Physical Exam  Constitutional:       Appearance: Normal appearance.   HENT:      Head: Normocephalic and atraumatic.      Mouth/Throat:      Mouth: Mucous membranes are moist.   Eyes:      Extraocular Movements: Extraocular movements intact.      Conjunctiva/sclera: Conjunctivae normal.      Pupils: Pupils are equal, round, and reactive to light.   Cardiovascular:      Rate and Rhythm: Normal rate and regular rhythm.   Pulmonary:      Effort: Pulmonary effort is normal.   Abdominal:      Palpations: Abdomen is soft.   Musculoskeletal:         General: Normal range of motion.      Cervical back: Normal range of motion.      Right lower leg: No edema.      Left lower leg: No edema.   Skin:      General: Skin is warm and dry.      Findings: Wound present.          Neurological:      General: No focal deficit present.      Mental Status: He is alert and oriented to person, place, and time.   Psychiatric:         Mood and Affect: Mood normal.         Behavior: Behavior normal.         Thought Content: Thought content normal.         Judgment: Judgment normal.           Results     Lab Results   Component Value Date    WBC 11.4 08/22/2022    RBC 4.26 (L) 08/22/2022    HGB 11.3 (L) 08/22/2022    HCT 34.9 (L) 08/22/2022    MCV 81.9 08/22/2022    MCH 26.5 (L) 08/22/2022    MCHC 32.4 (L) 08/22/2022    RDW 15.9 08/22/2022     08/22/2022    MPV 13.3 (H) 08/22/2022     CMP  Sodium Level   Date Value Ref Range Status   08/22/2022 141 136 - 145 mmol/L Final     Potassium Level   Date Value Ref Range Status   08/22/2022 4.0 3.5 - 5.1 mmol/L Final     Carbon Dioxide   Date Value Ref Range Status   08/22/2022 28 22 - 29 mmol/L Final     Blood Urea Nitrogen   Date Value Ref Range Status   08/22/2022 14.4 8.4 - 25.7 mg/dL Final     Creatinine   Date Value Ref Range Status   08/22/2022 1.62 (H) 0.73 - 1.18 mg/dL Final     Calcium Level Total   Date Value Ref Range Status   08/22/2022 9.8 8.4 - 10.2 mg/dL Final     Albumin Level   Date Value Ref Range Status   08/22/2022 3.6 3.5 - 5.0 gm/dL Final     Bilirubin Total   Date Value Ref Range Status   08/22/2022 0.4 <=1.5 mg/dL Final     Alkaline Phosphatase   Date Value Ref Range Status   08/22/2022 123 40 - 150 unit/L Final     Aspartate Aminotransferase   Date Value Ref Range Status   08/22/2022 17 5 - 34 unit/L Final     Alanine Aminotransferase   Date Value Ref Range Status   08/22/2022 15 0 - 55 unit/L Final     Estimated GFR-Non    Date Value Ref Range Status   04/13/2022 >60       Lab Results   Component Value Date    CHOL 175 09/13/2021     Lab Results   Component Value Date    HDL 37 09/13/2021     No results found for: LDLCALC  Lab Results    Component Value Date    TRIG 148 05/22/2022     No results found for: CHOLHDL  Lab Results   Component Value Date    TSH 2.5189 08/22/2022     Lab Results   Component Value Date    PHUR 5.5 04/09/2022    PROTEINUA Negative 04/09/2022    GLUCUA 3+ 04/09/2022    KETONESU Negative 04/09/2022    OCCULTUA 1+ 04/09/2022    NITRITE Negative 04/09/2022    LEUKOCYTESUR Negative 04/09/2022           Assessment and Plan (including Health Maintenance)     Plan:         Health Maintenance Due   Topic Date Due    Eye Exam  Never done    TETANUS VACCINE  Never done    Pneumococcal Vaccines (Age 0-64) (2 - PCV) 02/10/2022    Diabetes Urine Screening  04/28/2022    COVID-19 Vaccine (4 - Booster for Pfizer series) 06/11/2022       Problem List Items Addressed This Visit        Neuro    New onset seizure    Current Assessment & Plan     Appt with neuro 11/25/22           Relevant Medications    lacosamide (VIMPAT) 100 mg Tab    levETIRAcetam (KEPPRA) 500 MG Tab       Cardiac/Vascular    Acute on chronic systolic heart failure    Current Assessment & Plan     Take your medicines, even if you feel well   ?Watch for changes in your symptoms- notify the office with any concerns!  ?Call the office if you gain weight suddenly - Weigh yourself every morning after you urinate, but before you eat breakfast. Wear roughly the same amount of clothing every time. And make sure to write down your weight every day on a calendar. Call if your weight goes up by 2 or more pounds (1 kilogram) in 1 day, or 4 or more pounds (2 kilograms) in 1 week. When you have heart failure, sudden weight gain is a sign that your body could be holding on to too much fluid. You might need a change in your medicines.  ?Cut down on salt - Try not to add salt at the table or when you cook. Also, avoid foods that come in boxes and cans, unless their labels say they are low in sodium. The best choices for food are fresh or fresh frozen foods, and foods you prepare  yourself  ?Lose weight, if you are overweight - If you are overweight, your heart has to work extra hard to keep up with your body's needs.  ?Stop smoking - Smoking worsens heart failure and increases the chance that you will have a heart attack or die.  ?Limit alcohol - If you are a woman, do not have more than 1 drink a day. If you are a man, do not have more than 2.  ?Be active     F/u with Dr. Oreilly regarding diuretic therapy if fluid overload (rapid weight gain as above) occurs.           Relevant Medications    spironolactone (ALDACTONE) 25 MG tablet    Essential hypertension - Primary    Current Assessment & Plan     Bp at goal  Continue current regimen  Educated on aerobic exercise (3-5 days/week) and a low-fat, low-sodium diet  Avoid excess ETOH consumption. Smoking cessation if applicable  ED precautions (s/s of CVA, etc)                  Renal/    Decreased GFR    Current Assessment & Plan     Diuretic therapy recently dec per Cards. Cautioned increasing water intake above levels recommended by Cards 2/2 CHF  Avoid NSAIDs (i.e., Advil, Aleve, Ibuprofen, Motrin, Naproxen, Diclofenac, Indocin, Celebrex, Mobic, Voltaren). Avoid the following GI meds: Milk of Mag, Mylanta, Fleets Enema, and Pepto-Bismol. Okay to take Tylenol (acetaminophen) (if no end-stage liver disease), low dose ASA (81 mg), Miralax, Tums, and Colace. Avoid dark sodas.  Repeat prior to f/u           Relevant Orders    Comprehensive Metabolic Panel       Oncology    Anemia    Current Assessment & Plan     Pt previously referred to Heme/Onc for unexplained leukocytosis and anemia. So far, work-up unrevealing aside from dx of B12 deficiency. Overall, H/H stable              Endocrine    Diabetes mellitus type 2, uncontrolled, with complications    Overview     Current medications: Novolin 70/30 80 units in the morning and 92 units SQ at night  Medication Adjustments: None at present  A1c level: 8.1%; goal <8% for pt due to risk of  hypoglycemia  CBG trends: None provided  Educated on diabetic diet: Low-fat, Low-carb, Low-cholesterol. Avoid sugary/dark drinks/sodas and white foods (i.e., bread, pasta, potatoes, rice)  Aerobic exercise: 20-30 min/day x 5 days/week  Diabetic Eye Exam: UTD. Will see Dr. Barrientos 9/13/22  Diabetic Foot Exam: UTD. Following Dr. Escalante  Kidney Protection: ARB  Statin Therapy: Yes  Educated on Hypoglycemic s/s and interventions. Call office with any questions or concerns  Strict glucose monitoring. Bring blood glucose logs/meter to each OV             Current Assessment & Plan     A1c dayana slightly above goal, but pt relates elevated readings during recent hospitalization. Had one hypoglycemic episode last night due to taking insulin w/o eating for > 2 hours. Enc to eat breakfast, lunch, and dinner, and possible nighttime snack. Should not skip meals or go several hours w/o eating after taking insulin.  Continue insulin regimen for now  Will refer to Diabetic education           Relevant Orders    Comprehensive Metabolic Panel    Hemoglobin A1C    Urinalysis, Reflex to Urine Culture Urine, Clean Catch    Microalbumin/Creatinine Ratio, Urine    Ambulatory referral/consult to Diabetes Education    Hypothyroidism    Current Assessment & Plan     TSH WNL  Continue LT4 25 mcg po daily           Relevant Medications    levothyroxine (SYNTHROID) 25 MCG tablet       Orthopedic    Pressure ulcer of right ankle, stage 2    Current Assessment & Plan     Improved. Continue with HH care             Other Visit Diagnoses     Anxiety        Relevant Medications    QUEtiapine (SEROQUEL) 25 MG Tab          Health Maintenance Topics with due status: Not Due       Topic Last Completion Date    Influenza Vaccine 09/18/2020    Lipid Panel 05/22/2022    Foot Exam 07/14/2022    Hemoglobin A1c 08/22/2022    High Dose Statin 08/24/2022    Colorectal Cancer Screening Not Due       Future Appointments   Date Time Provider Department Center    10/5/2022  7:30 AM [facility]   11/25/2022 10:00 AM Claribel Canales MD [facility] NEURO [facility]        I spent a total of 39 minutes on the day of the visit.  This includes face to face time and non-face to face time preparing to see the patient (eg, review of tests), obtaining and/or reviewing separately obtained history, documenting clinical information in the electronic or other health record, independently interpreting results and communicating results to the patient/family/caregiver, or care coordinator.      Signature:  RON Esquivel  [facility]  [address] [id number]    Date of encounter: 8/24/22

## 2022-08-24 NOTE — ASSESSMENT & PLAN NOTE
Take your medicines, even if you feel well   ?Watch for changes in your symptoms- notify the office with any concerns!  ?Call the office if you gain weight suddenly - Weigh yourself every morning after you urinate, but before you eat breakfast. Wear roughly the same amount of clothing every time. And make sure to write down your weight every day on a calendar. Call if your weight goes up by 2 or more pounds (1 kilogram) in 1 day, or 4 or more pounds (2 kilograms) in 1 week. When you have heart failure, sudden weight gain is a sign that your body could be holding on to too much fluid. You might need a change in your medicines.  ?Cut down on salt - Try not to add salt at the table or when you cook. Also, avoid foods that come in boxes and cans, unless their labels say they are low in sodium. The best choices for food are fresh or fresh frozen foods, and foods you prepare yourself  ?Lose weight, if you are overweight - If you are overweight, your heart has to work extra hard to keep up with your body's needs.  ?Stop smoking - Smoking worsens heart failure and increases the chance that you will have a heart attack or die.  ?Limit alcohol - If you are a woman, do not have more than 1 drink a day. If you are a man, do not have more than 2.  ?Be active     F/u with Dr. Oreilly regarding diuretic therapy if fluid overload (rapid weight gain as above) occurs.

## 2022-08-24 NOTE — ASSESSMENT & PLAN NOTE
Pt previously referred to Heme/Onc for unexplained leukocytosis and anemia. So far, work-up unrevealing aside from dx of B12 deficiency. Overall, H/H stable

## 2022-08-24 NOTE — ASSESSMENT & PLAN NOTE
Bp at goal  Continue current regimen  Educated on aerobic exercise (3-5 days/week) and a low-fat, low-sodium diet  Avoid excess ETOH consumption. Smoking cessation if applicable  ED precautions (s/s of CVA, etc)

## 2022-09-20 ENCOUNTER — HOSPITAL ENCOUNTER (OUTPATIENT)
Facility: HOSPITAL | Age: 57
Discharge: HOME | End: 2022-09-20
Attending: INTERNAL MEDICINE | Admitting: INTERNAL MEDICINE
Payer: MEDICARE

## 2022-09-20 VITALS
RESPIRATION RATE: 19 BRPM | DIASTOLIC BLOOD PRESSURE: 80 MMHG | OXYGEN SATURATION: 99 % | HEART RATE: 74 BPM | HEIGHT: 73 IN | BODY MASS INDEX: 30.91 KG/M2 | TEMPERATURE: 98 F | SYSTOLIC BLOOD PRESSURE: 146 MMHG | WEIGHT: 233.25 LBS

## 2022-09-20 DIAGNOSIS — I42.9: ICD-10-CM

## 2022-09-20 DIAGNOSIS — I50.22 CHRONIC SYSTOLIC HEART FAILURE: Primary | ICD-10-CM

## 2022-09-20 LAB — POCT GLUCOSE: 254 MG/DL (ref 70–110)

## 2022-09-20 PROCEDURE — 33289 TCAT IMPL WRLS P-ART PRS SNR: CPT | Performed by: INTERNAL MEDICINE

## 2022-09-20 PROCEDURE — 63600175 PHARM REV CODE 636 W HCPCS: Performed by: INTERNAL MEDICINE

## 2022-09-20 PROCEDURE — 25500020 PHARM REV CODE 255: Performed by: INTERNAL MEDICINE

## 2022-09-20 PROCEDURE — 25000003 PHARM REV CODE 250: Performed by: INTERNAL MEDICINE

## 2022-09-20 PROCEDURE — 99153 MOD SED SAME PHYS/QHP EA: CPT | Performed by: INTERNAL MEDICINE

## 2022-09-20 PROCEDURE — C2624 WIRELESS PRESSURE SENSOR: HCPCS | Performed by: INTERNAL MEDICINE

## 2022-09-20 PROCEDURE — C1769 GUIDE WIRE: HCPCS | Performed by: INTERNAL MEDICINE

## 2022-09-20 PROCEDURE — C1894 INTRO/SHEATH, NON-LASER: HCPCS | Performed by: INTERNAL MEDICINE

## 2022-09-20 PROCEDURE — 99152 MOD SED SAME PHYS/QHP 5/>YRS: CPT | Performed by: INTERNAL MEDICINE

## 2022-09-20 PROCEDURE — 27201423 OPTIME MED/SURG SUP & DEVICES STERILE SUPPLY: Performed by: INTERNAL MEDICINE

## 2022-09-20 DEVICE — PA SENSOR AND DELIVERY SYSTEM
Type: IMPLANTABLE DEVICE | Site: CORONARY | Status: FUNCTIONAL
Brand: CARDIOMEMS™

## 2022-09-20 RX ORDER — SODIUM CHLORIDE 9 MG/ML
INJECTION, SOLUTION INTRAVENOUS ONCE
Status: DISCONTINUED | OUTPATIENT
Start: 2022-09-20 | End: 2022-10-14

## 2022-09-20 RX ORDER — DIAZEPAM 5 MG/1
10 TABLET ORAL ONCE
Status: COMPLETED | OUTPATIENT
Start: 2022-09-20 | End: 2022-09-20

## 2022-09-20 RX ORDER — MIDAZOLAM HYDROCHLORIDE 1 MG/ML
INJECTION, SOLUTION INTRAMUSCULAR; INTRAVENOUS
Status: DISCONTINUED | OUTPATIENT
Start: 2022-09-20 | End: 2022-09-20 | Stop reason: HOSPADM

## 2022-09-20 RX ORDER — SODIUM CHLORIDE 0.9 % (FLUSH) 0.9 %
10 SYRINGE (ML) INJECTION
Status: DISCONTINUED | OUTPATIENT
Start: 2022-09-20 | End: 2022-10-14

## 2022-09-20 RX ORDER — LIDOCAINE HYDROCHLORIDE 20 MG/ML
INJECTION, SOLUTION EPIDURAL; INFILTRATION; INTRACAUDAL; PERINEURAL
Status: DISCONTINUED | OUTPATIENT
Start: 2022-09-20 | End: 2022-09-20 | Stop reason: HOSPADM

## 2022-09-20 RX ORDER — HEPARIN SODIUM 1000 [USP'U]/ML
INJECTION, SOLUTION INTRAVENOUS; SUBCUTANEOUS
Status: DISCONTINUED | OUTPATIENT
Start: 2022-09-20 | End: 2022-09-20 | Stop reason: HOSPADM

## 2022-09-20 RX ORDER — FENTANYL CITRATE 50 UG/ML
INJECTION, SOLUTION INTRAMUSCULAR; INTRAVENOUS
Status: DISCONTINUED | OUTPATIENT
Start: 2022-09-20 | End: 2022-09-20 | Stop reason: HOSPADM

## 2022-09-20 RX ORDER — DIPHENHYDRAMINE HCL 25 MG
50 CAPSULE ORAL
Status: DISCONTINUED | OUTPATIENT
Start: 2022-09-20 | End: 2022-10-14

## 2022-09-20 RX ORDER — ACETAMINOPHEN 325 MG/1
650 TABLET ORAL EVERY 4 HOURS PRN
Status: DISCONTINUED | OUTPATIENT
Start: 2022-09-20 | End: 2022-09-20 | Stop reason: HOSPADM

## 2022-09-20 RX ORDER — ONDANSETRON 4 MG/1
8 TABLET, ORALLY DISINTEGRATING ORAL EVERY 8 HOURS PRN
Status: DISCONTINUED | OUTPATIENT
Start: 2022-09-20 | End: 2022-09-20 | Stop reason: HOSPADM

## 2022-09-20 RX ADMIN — DIPHENHYDRAMINE HYDROCHLORIDE 50 MG: 25 CAPSULE ORAL at 09:09

## 2022-09-20 RX ADMIN — DIAZEPAM 10 MG: 5 TABLET ORAL at 09:09

## 2022-09-20 NOTE — DISCHARGE SUMMARY
[facility]  Discharge Note  Short Stay    Procedure(s) (LRB):  INSERTION, CATHETER, RIGHT HEART (Right)    OUTCOME: Patient tolerated treatment/procedure well without complication and is now ready for discharge.    DISPOSITION: Home or Self Care    FINAL DIAGNOSIS:  <principal problem not specified>    FOLLOWUP: In clinic    DISCHARGE INSTRUCTIONS:    Discharge Procedure Orders   Diet Cardiac     Diet Cardiac     Diet diabetic     Remove dressing in 24 hours     Call MD for:  redness, tenderness, or signs of infection (pain, swelling, redness, odor or green/yellow discharge around incision site)     Call MD for:  temperature >100.4     Activity as tolerated         Clinical Reference Documents Added to Patient Instructions         Document    CARDIOMEMS DISCHARGE INSTRUCTIONS (ENGLISH)    MODERATE SEDATION IN ADULTS DISCHARGE INSTRUCTIONS (ENGLISH)            TIME SP[facility] ON [facility]GE: [facility]  Discharge Note  Short Stay    Procedure(s) (LRB):  INSERTION, CATHETER, RIGHT HEART (Right)    OUTCOME: Patient tolerated treatment/procedure well without complication and is now ready for discharge.    DISPOSITION: Home or Self Care    FINAL DIAGNOSIS:  Systolic HF - chronic    FOLLOWUP: In clinic    DISCHARGE INSTRUCTIONS:    Discharge Procedure Orders   Diet Cardiac     Diet Cardiac     Diet diabetic     Remove dressing in 24 hours     Call MD for:  redness, tenderness, or signs of infection (pain, swelling, redness, odor or green/yellow discharge around incision site)     Call MD for:  temperature >100.4     Activity as tolerated         Clinical Reference Documents Added to Patient Instructions         Document    CARDIOMEMS DISCHARGE INSTRUCTIONS (ENGLISH)    MODERATE SEDATION IN ADULTS DISCHARGE INSTRUCTIONS (ENGLISH)            TIME SPENT ON DISCHARGE: 31 minutes

## 2022-09-20 NOTE — Clinical Note
The right chest and right neck was prepped. The site was prepped with ChloraPrep. The site was clipped. The patient was draped. The patient was positioned supine.

## 2022-09-20 NOTE — Clinical Note
The catheter was inserted into the and was inserted over the wire into the RPW. Hemodynamics were performed.  An angiography was performed of the Wedge angio. The angiography was performed via hand injection with .

## 2022-09-20 NOTE — Clinical Note
dry, intact, no bleeding and no hematoma. 11F venous sheath to R neck left in place and dressed with tegaderm.

## 2022-10-11 ENCOUNTER — LAB VISIT (OUTPATIENT)
Dept: LAB | Facility: HOSPITAL | Age: 57
End: 2022-10-11
Attending: NURSE PRACTITIONER
Payer: MEDICARE

## 2022-10-11 DIAGNOSIS — R94.4 DECREASED GFR: ICD-10-CM

## 2022-10-11 LAB
ALBUMIN SERPL-MCNC: 3.8 GM/DL (ref 3.5–5)
ALBUMIN/GLOB SERPL: 0.8 RATIO (ref 1.1–2)
ALP SERPL-CCNC: 155 UNIT/L (ref 40–150)
ALT SERPL-CCNC: 13 UNIT/L (ref 0–55)
APPEARANCE UR: CLEAR
AST SERPL-CCNC: 15 UNIT/L (ref 5–34)
BACTERIA #/AREA URNS AUTO: ABNORMAL /HPF
BILIRUB UR QL STRIP.AUTO: NEGATIVE MG/DL
BILIRUBIN DIRECT+TOT PNL SERPL-MCNC: 0.8 MG/DL
BUN SERPL-MCNC: 23.2 MG/DL (ref 8.4–25.7)
CALCIUM SERPL-MCNC: 9.6 MG/DL (ref 8.4–10.2)
CHLORIDE SERPL-SCNC: 102 MMOL/L (ref 98–107)
CO2 SERPL-SCNC: 28 MMOL/L (ref 22–29)
COLOR UR AUTO: ABNORMAL
CREAT SERPL-MCNC: 1.78 MG/DL (ref 0.73–1.18)
CREAT UR-MCNC: 63.5 MG/DL (ref 63–166)
EST. AVERAGE GLUCOSE BLD GHB EST-MCNC: 159.9 MG/DL
GFR SERPLBLD CREATININE-BSD FMLA CKD-EPI: 44 MLS/MIN/1.73/M2
GLOBULIN SER-MCNC: 4.7 GM/DL (ref 2.4–3.5)
GLUCOSE SERPL-MCNC: 106 MG/DL (ref 74–100)
GLUCOSE UR QL STRIP.AUTO: NORMAL MG/DL
HBA1C MFR BLD: 7.2 %
HYALINE CASTS #/AREA URNS LPF: ABNORMAL /LPF
KETONES UR QL STRIP.AUTO: NEGATIVE MG/DL
LEUKOCYTE ESTERASE UR QL STRIP.AUTO: NEGATIVE UNIT/L
MICROALBUMIN UR-MCNC: <5 UG/ML
MICROALBUMIN/CREAT RATIO PNL UR: <7.9 MG/GM CR (ref 0–30)
MUCOUS THREADS URNS QL MICRO: ABNORMAL /LPF
NITRITE UR QL STRIP.AUTO: NEGATIVE
PH UR STRIP.AUTO: 5.5 [PH]
POTASSIUM SERPL-SCNC: 4.7 MMOL/L (ref 3.5–5.1)
PROT SERPL-MCNC: 8.5 GM/DL (ref 6.4–8.3)
PROT UR QL STRIP.AUTO: NEGATIVE MG/DL
RBC #/AREA URNS AUTO: ABNORMAL /HPF
RBC UR QL AUTO: NEGATIVE UNIT/L
SODIUM SERPL-SCNC: 140 MMOL/L (ref 136–145)
SP GR UR STRIP.AUTO: 1.01
SQUAMOUS #/AREA URNS LPF: ABNORMAL /HPF
UROBILINOGEN UR STRIP-ACNC: NORMAL MG/DL
WBC #/AREA URNS AUTO: ABNORMAL /HPF

## 2022-10-11 PROCEDURE — 81001 URINALYSIS AUTO W/SCOPE: CPT

## 2022-10-11 PROCEDURE — 36415 COLL VENOUS BLD VENIPUNCTURE: CPT

## 2022-10-11 PROCEDURE — 82043 UR ALBUMIN QUANTITATIVE: CPT

## 2022-10-11 PROCEDURE — 80053 COMPREHEN METABOLIC PANEL: CPT

## 2022-10-11 PROCEDURE — 83036 HEMOGLOBIN GLYCOSYLATED A1C: CPT

## 2022-10-14 ENCOUNTER — TELEPHONE (OUTPATIENT)
Dept: INTERNAL MEDICINE | Facility: CLINIC | Age: 57
End: 2022-10-14
Payer: MEDICARE

## 2022-10-14 ENCOUNTER — OFFICE VISIT (OUTPATIENT)
Dept: INTERNAL MEDICINE | Facility: CLINIC | Age: 57
End: 2022-10-14
Payer: MEDICARE

## 2022-10-14 VITALS
HEART RATE: 88 BPM | TEMPERATURE: 98 F | WEIGHT: 242 LBS | HEIGHT: 73 IN | DIASTOLIC BLOOD PRESSURE: 67 MMHG | SYSTOLIC BLOOD PRESSURE: 111 MMHG | BODY MASS INDEX: 32.07 KG/M2 | RESPIRATION RATE: 20 BRPM

## 2022-10-14 DIAGNOSIS — E03.9 HYPOTHYROIDISM, UNSPECIFIED TYPE: ICD-10-CM

## 2022-10-14 DIAGNOSIS — K59.00 CONSTIPATION, UNSPECIFIED CONSTIPATION TYPE: ICD-10-CM

## 2022-10-14 DIAGNOSIS — Z79.4 TYPE 2 DIABETES MELLITUS WITH DIABETIC POLYNEUROPATHY, WITH LONG-TERM CURRENT USE OF INSULIN: ICD-10-CM

## 2022-10-14 DIAGNOSIS — N18.32 STAGE 3B CHRONIC KIDNEY DISEASE: ICD-10-CM

## 2022-10-14 DIAGNOSIS — Z23 NEED FOR INFLUENZA VACCINATION: Primary | ICD-10-CM

## 2022-10-14 DIAGNOSIS — E11.42 TYPE 2 DIABETES MELLITUS WITH DIABETIC POLYNEUROPATHY, WITH LONG-TERM CURRENT USE OF INSULIN: ICD-10-CM

## 2022-10-14 PROCEDURE — 99214 OFFICE O/P EST MOD 30 MIN: CPT | Mod: PBBFAC,25 | Performed by: NURSE PRACTITIONER

## 2022-10-14 PROCEDURE — 99214 OFFICE O/P EST MOD 30 MIN: CPT | Mod: S$PBB,,, | Performed by: NURSE PRACTITIONER

## 2022-10-14 PROCEDURE — 99214 PR OFFICE/OUTPT VISIT, EST, LEVL IV, 30-39 MIN: ICD-10-PCS | Mod: S$PBB,,, | Performed by: NURSE PRACTITIONER

## 2022-10-14 PROCEDURE — G0008 ADMIN INFLUENZA VIRUS VAC: HCPCS | Mod: PBBFAC

## 2022-10-14 RX ORDER — LACTULOSE 10 G/15ML
10 SOLUTION ORAL 2 TIMES DAILY PRN
Qty: 900 ML | Refills: 3 | Status: SHIPPED | OUTPATIENT
Start: 2022-10-14 | End: 2022-11-22

## 2022-10-14 RX ORDER — AMLODIPINE BESYLATE 5 MG/1
5 TABLET ORAL DAILY
COMMUNITY
Start: 2022-10-05 | End: 2022-11-25 | Stop reason: ALTCHOICE

## 2022-10-14 RX ORDER — BLOOD-GLUCOSE METER
EACH MISCELLANEOUS
COMMUNITY
Start: 2022-09-21

## 2022-10-14 RX ORDER — LEVOTHYROXINE SODIUM 25 UG/1
25 TABLET ORAL
Qty: 90 TABLET | Refills: 1 | Status: SHIPPED | OUTPATIENT
Start: 2022-10-14 | End: 2023-02-23

## 2022-10-14 RX ORDER — ISOSORBIDE DINITRATE 20 MG/1
20 TABLET ORAL 3 TIMES DAILY
COMMUNITY
Start: 2022-07-11 | End: 2022-11-25 | Stop reason: CLARIF

## 2022-10-14 NOTE — ASSESSMENT & PLAN NOTE
Overall stable, but stage 3  Refer to Renal  Avoid NSAIDs (i.e., Advil, Aleve, Ibuprofen, Motrin, Naproxen, Diclofenac, Indocin, Celebrex, Mobic, Voltaren). Avoid the following GI meds: Milk of Mag, Mylanta, Fleets Enema, and Pepto-Bismol. Okay to take Tylenol (acetaminophen) (if no end-stage liver disease), low dose ASA (81 mg), Miralax, Tums, and Colace. Increase clear fluid intake. Avoid dark sodas.

## 2022-10-14 NOTE — PROGRESS NOTES
RON Esquivel   [facility]  [address] [address]      PATIENT NAME: Luigi Gotti  : 1965  DATE: 10/14/22  MRN: [id number]      Billing Provider: RON Esquivel  Level of Service:   Patient PCP Information       Provider PCP Type    RON Esquivel General            Reason for Visit / Chief Complaint: Follow-up (Lab review) and Constipation       History of Present Illness / Problem Focused Workflow     Luigi Gotti presents to the clinic with Follow-up (Lab review) and Constipation     Initial Visit (2020): 54 y.o. [place] [facility] [place].   Previous PCP: Dr. CHA Fernandez, last visit 19   PmHx: Type II DM controlled with insulin, Chronic GERD, Hx of Gastroparesis/Gastritis/Ischemic colitis (following GI, Dr. Green), CAD (following Dr. Oreilly, [facility]), PVD (following an interventional cardiologist, Dr. Cespedes), CHF, HLD, and BPH   SHx: EGD, Celiac Bypass, Colonoscopy, Visceral Arteriogram, coronary artery graft angiography, cataract extraction, CABG x 2, Tonsillectomy, Billie, mx vascular procedures (see hx)   FHx: HTN, T2DM, Clotting D/o (mother), Seizure, CVA (father), Breast Ca (mother)   Complaints today: Medication refills     Bp at goal at present.     HgA1c: 7.9%, slightly above  goal   Medications: Novolin 70/30 80 units in the morning and 90 units SQ at night  CBG log: None provided. But pt did express CBGs run between 140s-250s   S/S of hyperglycemia, no or hypoglycemia, no     Chronic GERD managed with Nexium.   He's following Cardio for hx of CAD, PVD, and CHF. He's currently taking ASA, Atorvastatin 80 mg daily, Coreg 25 mg po BID, Digoxin 125 mcg, Furosemide 40 mg po daily, Imdur 60 mg po daily, Ranexa 1,000 mg po BID, and Effient 10 mg po daily. Also has Nitro for use as needed.   Triglycerides 317, LDL 86. Taking Atorvastatin 80 mg po daily.     No  "acute concerns today.    (2/18/20): Pt presenting for f/u.   Bp at goal at present.   HgA1c: 8.2%, slightly above  goal and increased from previous   Medications: Novolin 70/30 80 units in the morning and 90 units SQ at night  CBG log: None provided. But pt did express CBGs run between 140s-180s; reports lowest in one-teens; fasting CBG was 152 on yesterday   Denies over S/S of hyperglycemia or hypoglycemia.     eGFR < 60 mL/min. Creatinine 1.40; slight improvement from previous.     Total WBC count elevated. Has been elevated since last year. Total RBC count, H/H slightly decreased. He does report being seen at [facility] clinic on [address] ~ 6  days ago due to cough. He states that he was diagnosed with "bronchitis with wheezing." He reports that a CXR was performed. He was prescribed a Z-pack and cough syrup, both of which he's completed. He continues to c/o dry cough. Denies fever, chills, HA, or SOB.    (8/7/2020): 54 y.o. [place]une[place] male with a PmHx of Type II DM controlled with insulin, Chronic GERD, h/o dysphagia, Hx of Gastroparesis/Gastritis/Ischemic colitis (following GI, Dr. Green), CAD (following Dr. Oreilly, [facility]), PVD (following an interventional cardiologist, Dr. Cespedes), CHF, HLD, and BPH, presenting for routine f/u. HgA1c 7.4%. He is taking Novolin 70/30 80 units in the morning and 92 units SQ at night. He denies any s/s of hypoglycemia or hyperglycemia. Total WBC count elevated. Has been elevated since last year. Total RBC count, H/H slightly decreased as well. He reports cough from last OV is improved and doesn't occur all of the time. Declines any work-up at present after being offered to do a CXR. He continues to follow Cards, Dr. Oreilly, and is following Dr. Georges for foot care. He does have a h/o BPH but hasn't followed up with Uro in some time. Reports feelings of incomplete ejaculation and was told it was probably from prostate issues by another provider. No other problems " "stated.    (11/9/2020): 55 y.o. Spanish male with a PmHx of Type II DM controlled with insulin, Chronic GERD, h/o dysphagia, Hx of Gastroparesis/Gastritis/Ischemic colitis (following GI, Dr. Green), CAD (following [facility]), PVD (following an interventional cardiologist, Dr. Cespedes), CHF, HLD, and BPH, presenting for routine f/u. HgA1c 6.7%. He is taking Novolin 70/30 80 units in the morning and 92 units SQ at night. He denies any s/s of hypoglycemia or hyperglycemia. Total WBC count elevated. Has been elevated since last year. Renal indices slightly decreased w/ small rise in creatinine. He admits that he's  not been drinking a lot of water. Previously referred to Uro but reports no appt made. He is requesting the second Shingrix today. He continues to follow Cards, CV surgeon, GI, and podiatry. States had a colonoscopy about 1 week ago and noted to have polyps. Reports GI prescribed ferrous gluconate. Will request results. He reports chronic dyspnea x many years. Reports no improvement after cardiac sx but denies any worsening dyspnea. No other problems stated.    Telemedicine Visit (12/21/2020): 55 y.o. Spanish male with a PmHx of Type II DM controlled with insulin, Chronic GERD, h/o dysphagia, Hx of Gastroparesis/Gastritis/Ischemic colitis (following GI, Dr. Green), CAD (following [facility]), PVD (following an interventional cardiologist, Dr. Cespedes), CHF, HLD, and BPH, presenting for ED f/u via telemedicine. Apparently pt presented to [facility] last week due to issues with blood sugars but states he was diagnosed with influenza and hospitalized for suspected COVID. He is stating that sugar was in the "20s," but also stated that during the hospitalization "they checked it six times a day until it came down to 100." So unknown if pt had issues with hyperglycemia or hypoglycemia. States "They gave me something [insulin] different in there and it messed my numbers up!" At present, he remains " "on his routine dose of Novolin 70/30 and reports CBGs have been in the 120s-140s. He reports, "I'm doing good now." At time of visit, ED records have not been received but have been requested. Reports has a cough but it is improved. Denies CP or SOB. He has no other concerns. F/u scheduled 2/2021.    (2/10/2021): 55 y.o. Mexican male with a PmHx of Type II DM controlled with insulin, Chronic GERD, h/o dysphagia, Hx of Gastroparesis/Gastritis/Ischemic colitis (following GI, Dr. Green), Celiac Dz, CAD (following Dr. Oreilly, [facility]), PVD (following an interventional cardiologist, Dr. Cespedes), CHF, HLD, and BPH, presenting for routine f/u. Conducted a phone visit 12/2020 for ED f/u. At the time, pt reported that he was diagnosed and treated for the flu while hospitalized at [facility]. Records received later revealed that that pt was being treated for COVID pneumonia after testing + for COVID ~9 days prior to hospital admit on 12/10/20. However, pt denies this and states he was never told he had COVID despite prescription medications being prescribed on 12/1/20 by a physician who's noted to have worked in the ED at [facility]. During his hospitalization from 12/10/20 to 12/17/20, he was treated for COVID pneumonia. D-dimer was elevated but CTA was negative for PE. He is taking Eliquis. Plans to speak to Cards tomorrow about continued use of this as he's on mx antiplatelets. Chemistry panel improved. Glucose low 100s. He denies any s/s of hypoglycemia. Total WBC count improved. He was previously referred to Heme clinic for unexplained leukocytosis. He has an appt 3/23/21. He will see his Cardiologist, Dr. Oreilly, on tomorrow. He has a chronic h/o dyspnea (a CT thorax was ordered in November but pt never completed) and intermittent angina that is no different from baseline. He denies any CP or SOB at this time. Last used Nitro a few days ago. He has no other complaints.     During hospitalization, pt had a CTA chest that " revealed:  1.2 cm nodule in right lobe of thyroid gland, no evidence of PE, and consolidative changes throughout lungs    (5/11/2021): 55 y.o. Swedish male with a PmHx of Type II DM controlled with insulin, Chronic GERD, h/o dysphagia, Hx of Gastroparesis/Gastritis/Ischemic colitis (following GI, Dr. Green), Celiac Dz, CAD (following [facility]), PVD (following an interventional cardiologist, Dr. Cespedes), CHF, HLD, and BPH, presenting for routine f/u. He was previously referred to Heme clinic for unexplained leukocytosis. He had an appt 3/23/21 but was a No Show. A letter was mailed for pt to call and re-schedule. He will undergo a thyroid US to monitor a 1.2 cm nodule on 6/3/21. Reviewed colonoscopy done by Dr. Emmanuel Green on 9/30/2020. Dx: two polyps removed, tubular adenomas; diverticula disease.     Lab review:   HgA1c 7.1. He continues with Novolin N as prescribed. He does not check CBGs. Denies overt s/s of hypoglycemia or hyperglycemia.   Renal indices stable    He reports that he follows Dr. Alex Duckworth for a retinal d/o. Scheduled to f/u 5/18/21.   Scheduled for LHC 5/13/21 with Dr. Oreilly.   Scheduled with Uro 7/8/21.     No other concerns.    (9/14/2021): 56 y.o. Swedish male with a PmHx of Type II DM controlled with insulin, Chronic GERD, h/o dysphagia, Hx of Gastroparesis/Gastritis/Ischemic colitis (following GI, Dr. Green), Celiac Dz, CAD (following [facility]), PVD (following an interventional cardiologist, Dr. Cespedes), CHF, HLD, and BPH, presenting for routine f/u. Pt underwent a thyroid US in May that did not reveal any acute abnormalities. He was seen by Uro in July for abnl ejaculation. Told likely d/t Flomax. He wished to remain on Flomax. Can f/u PRN. HgA1c 7.5%. Reports compliance with insulin. No log provided. CBC stable compared to baseline. Previously referred to Heme/Onc. Triglycerides 309. Prescribed Atorvastatin 80 mg po daily and Zetia 10 mg po daily.  Does not take Zetia daily. Pt will f/u with Dr. Oreilly next month. No other problems stated.    (12/16/2021): 56 y.o. Greenlandic male with a PmHx of Type II DM controlled with insulin, Chronic GERD, h/o dysphagia, Hx of Gastroparesis/Gastritis/Ischemic colitis (following GI, Dr. Green), Celiac Dz, CAD (following Dr. Oreilly, [facility]), PVD (following an interventional cardiologist, Dr. Cespedes), CHF, HLD, CKD, and BPH, presenting for routine f/u. Pt previously referred to Heme/Onc for unexplained leukocytosis and anemia. So far, work-up unrevealing aside from dx of B12 deficiency. He was last seen 12/10 to projected 3 month f/u. HgA1c 7.5%. Reports compliance with insulin. Denies overt s/s of hypoglycemia. No log provided. PSA 1.03. Renal indices stable. Pt reports undergoing a vein procedure to LLE per Dr. Oreilly yesterday. F/u 1/5/22. He reports hitting right great toe ~2 weeks ago. Podiatrist removed nail. Area doing well. He is UTD on influenza vaccine. No other concerns.    (7/14/2022): 57 y.o. Greenlandic male with a PmHx of Type II DM controlled with insulin, Chronic GERD, h/o dysphagia, Hx of Gastroparesis/Gastritis/Ischemic colitis (following GI, Dr. Green), Celiac Dz, CAD (following Dr. Oreilly, [facility]), PVD (following an interventional cardiologist, Dr. Cespedes), CHF, HLD, CKD, and BPH, presenting for routine f/u. Since last visit, patient was hospitalized on 4/4/22 until 4/13/22 (to [facility]) after experiencing PEA while en route to the ED via EMS from home. He achieved ROSC after about 12 min of CPD, epi, and bicarb. He was intubated and admitted to the ICU upon arrival the the ED. He was treated for possible PNA while hospitalized. Cards did see him but signed off, recommending outpatient f/u. He'd had a LHC 3/9/22 that revealed nonobstructive CAD. In hospital, an Echo done 4/5/22 showed LVEF 35-40% with grade 2 diastolic dysfunction. He required 2 units of PRBCs in hospital. He also  "experienced a fall during admission. CT head was negative for acute bleeds. He was ultimately discharged to inpatient rehab at [facility] where he reports he stayed for 2 weeks. Then, he presented to [facility] ED on 5/2/22 with c/o shortness of breath and bilateral lower extremity swelling. EMS reported patient with respiratory distress and pulse ox in the mid 70s upon their arrival to his home. He was placed on CPAP and has some improvement in oxygenation and work of breathing. There is mention of seizures occurring while hospitalized at [facility] (apparently MRI brain and EEG done--unable to see results at this time). He developed a wound to right ankle/heel during last hospitalization as well. He was discharged from [facility] on 5/24/22 and transferred to [facility]. States discharged from [facility] approximately one week ago with [facility]. He is receiving wound care to ankle/heel wound and P.T. at home. Admits some decompensation in overall physical status due to an approximate 3-month long hospitalization. He still mentions chronic dizziness, which was present prior to April hospitalization, but overall stable. Will see Dr. Oreilly on 7/22/22. This will be his first visit post-hospitalization. No other concerns.    (8/24/2022): 57 y.o. [place]and[place] male with a PmHx of Type II DM controlled with insulin, Chronic GERD, h/o dysphagia, Hx of Gastroparesis/Gastritis/Ischemic colitis (following GI, Dr. Green), Celiac Dz, CAD (following Dr. Oreilly, [facility]), PVD (following an interventional cardiologist, Dr. Cespedes), CHF, HLD, CKD, and BPH, presenting for routine f/u. Lab review significant for: H/H 11.3/34.9, BUN 14, creatinine 1.62, eGFR 49, HgA1c 8.1, FBG 91. Patient reports recently seeing Dr. Oreilly and having "fluid pill" decreased by 50% 2/2 "dehydration." He will f/u at the end of September. Does report 1-lb weight gain since yesterday but no LE swelling at present, SOB, or CP. HgA1c did increase from previous, however, " "patient reports glucose was as high as 400s during hospitalization. States during last week, glucose 100s-200. Alleges glucose dropped to "15" last night. He did not lose consciousness, but was dizzy and weak. Drank orange juice with complete resolution in symptoms. Admits last ate [employer] around 4 pm yesterday and did not eat when he took insulin (Novolin 70/30) last night. Right ankle wound is improved per report. C/w home health care. Scheduled in Neuro in November. Will need refill on Keppra and Vimpat at this time. No seizure activity reported since last visit.    Today's Visit (10/14/2022): 57 y.o. Brazilian male with a PmHx of Type II DM controlled with insulin, Chronic GERD, h/o dysphagia, Hx of Gastroparesis/Gastritis/Ischemic colitis (following GI, Dr. Green), Celiac Dz, CAD (following Dr. Oreilly, [facility]), PVD (following an interventional cardiologist, Dr. Cespedes), CHF, HLD, CKD, and BPH, presenting for routine f/u. Lab review significant for: creatinine 1.78, eGFR 44, HgA1c 7.2, , alk phos 155. Patient reports recently seeing Dr. Oreilly and having torsemide increased back to BID. Reports seen by Dr. Barrientos last month for eye exam. Right ankle wound continues to improve per report. C/w home health care. C/o hard, infrequent BM. Last BM 2 days ago. He was taking Lactulose with relief, but is out. Amenable to receiving the influenza vaccine. No other concerns.      Review of Systems     Review of Systems   Gastrointestinal:  Positive for constipation. Negative for abdominal distention, anal bleeding and blood in stool.   All other systems reviewed and are negative.    Medical / Social / Family History     Past Medical History:   Diagnosis Date    BPH (benign prostatic hyperplasia)     CAD (coronary artery disease)     CHF (congestive heart failure)     GERD (gastroesophageal reflux disease)     Other hyperlipidemia     PVD (peripheral vascular disease)     Type 2 diabetes mellitus without " complications        Past Surgical History:   Procedure Laterality Date    CHOLECYSTECTOMY      CORONARY ARTERY BYPASS GRAFT      RIGHT HEART CATHETERIZATION Right 9/20/2022    Procedure: INSERTION, CATHETER, RIGHT HEART;  Surgeon: Cory Oreilly MD;  Location: [facility] CATH LAB;  Service: Cardiology;  Laterality: Right;  CARDIOMEMS RJ ACCESS    TONSILLECTOMY         Social History    reports that he has never smoked. He has never used smokeless tobacco. He reports that he does not currently use alcohol. He reports that he does not currently use drugs.    Family History  's family history includes Cancer in his mother; Hypertension in his father and mother; Stroke in his father.    Medications and Allergies     Medications  Medication List with Changes/Refills   Current Medications    AMLODIPINE (NORVASC) 5 MG TABLET    Take 5 mg by mouth once daily.    ASPIRIN (ECOTRIN) 81 MG EC TABLET    Take 81 mg by mouth once daily.    ATORVASTATIN (LIPITOR) 80 MG TABLET    Take 80 mg by mouth every evening.    AZELASTINE (ASTELIN) 137 MCG (0.1 %) NASAL SPRAY    1 spray by Nasal route 2 (two) times daily.    CARVEDILOL (COREG) 25 MG TABLET    Take 25 mg by mouth 2 (two) times daily.    CYANOCOBALAMIN (VITAMIN B-12) 1000 MCG TABLET    Take 2,000 mcg by mouth once daily at 6am.    EZETIMIBE (ZETIA) 10 MG TABLET    Take 10 mg by mouth once daily at 6am.    FERROUS GLUCONATE (FERGON) 324 MG TABLET    Take 324 mg by mouth once daily.    FLUTICASONE PROPIONATE (FLONASE) 50 MCG/ACTUATION NASAL SPRAY    Flonase Allergy Relief Take No date recorded No form recorded No frequency recorded No route recorded No set duration recorded No set duration amount recorded active No dosage strength recorded No dosage strength units of measure recorded    HYDROCODONE-ACETAMINOPHEN (NORCO) 7.5-325 MG PER TABLET    Take 1 tablet by mouth every 8 (eight) hours as needed.    HYDROXYZINE (ATARAX) 50 MG TABLET    Take 50 mg by mouth 3 (three) times daily.     ISOSORBIDE DINITRATE (ISORDIL) 20 MG TABLET    Take 20 mg by mouth 3 (three) times daily.    LACOSAMIDE (VIMPAT) 100 MG TAB    Take 1 tablet (100 mg total) by mouth 2 (two) times daily.    LEVETIRACETAM (KEPPRA) 500 MG TAB    Take 1 tablet (500 mg total) by mouth 2 (two) times daily.    LEVOCETIRIZINE (XYZAL) 5 MG TABLET    Take 5 mg by mouth once daily.    LOSARTAN (COZAAR) 100 MG TABLET    Take 100 mg by mouth once daily at 6am.    METOCLOPRAMIDE HCL (REGLAN) 10 MG TABLET    Take 10 mg by mouth once daily.    MONTELUKAST (SINGULAIR) 10 MG TABLET    Take 1 tablet (10 mg total) by mouth once daily.    NITROGLYCERIN (NITROSTAT) 0.4 MG SL TABLET    Place 0.4 mg under the tongue as needed.    NOVOLIN 70/30 U-100 INSULIN 100 UNIT/ML (70-30) INJECTION    INJECT 80 UNITS UNDER THE SKIN IN THE MORNING AND 92 UNITS IN THE EVENING    NOVOLOG U-100 INSULIN ASPART 100 UNIT/ML INJECTION    INJECT 20 - 25 UNITS UNDER THE SKIN 3 TIMES A DAY BEFORE MEALS    OMEPRAZOLE (PRILOSEC) 40 MG CAPSULE    Take 1 capsule (40 mg total) by mouth every evening.    PRASUGREL (EFFIENT) 10 MG TAB    Take 10 mg by mouth once daily.    QUETIAPINE (SEROQUEL) 25 MG TAB    Take 1 tablet (25 mg total) by mouth every evening.    RANOLAZINE (RANEXA) 1,000 MG TB12    Take 1,000 mg by mouth 2 (two) times daily.    RIVAROXABAN (XARELTO) 2.5 MG TAB    Take 2.5 mg by mouth 2 (two) times a day.    SPIRONOLACTONE (ALDACTONE) 25 MG TABLET    Take 1 tablet (25 mg total) by mouth once daily.    SUCRALFATE (CARAFATE) 1 GRAM TABLET    Take 1 g by mouth 4 (four) times daily before meals and nightly.    TAMSULOSIN (FLOMAX) 0.4 MG CAP    Take 0.4 mg by mouth once daily at 6am.    TORSEMIDE (DEMADEX) 20 MG TAB    Take 20 mg by mouth 2 (two) times a day.    TRUE METRIX GLUCOSE METER MISC    TEST 3 TIMES A DAY    TRUETRACK TEST STRP    TEST 3 TIMES A DAY   Changed and/or Refilled Medications    Modified Medication Previous Medication    LACTULOSE (CHRONULAC) 20 GRAM/30  ML SOLN lactulose (CHRONULAC) 20 gram/30 mL Soln       Take 15 mLs (10 g total) by mouth 2 (two) times daily as needed (constipation).    Take 15 mLs (10 g total) by mouth 2 (two) times daily as needed (constipation).    LEVOTHYROXINE (SYNTHROID) 25 MCG TABLET levothyroxine (SYNTHROID) 25 MCG tablet       Take 1 tablet (25 mcg total) by mouth before breakfast.    Take 1 tablet (25 mcg total) by mouth before breakfast.   Discontinued Medications    ISOSORBIDE MONONITRATE (IMDUR) 60 MG 24 HR TABLET    Take 60 mg by mouth once daily.       Allergies  Review of patient's allergies indicates:   Allergen Reactions    Pork derived (porcine)      Other reaction(s): Pork    Clopidogrel Hives and Rash       Physical Examination     Vitals:    10/14/22 0837   BP: 111/67   Pulse: 88   Resp: 20   Temp: 97.8 °F (36.6 °C)       Physical Exam  Constitutional:       Appearance: Normal appearance.   HENT:      Head: Normocephalic and atraumatic.      Mouth/Throat:      Mouth: Mucous membranes are moist.   Eyes:      Extraocular Movements: Extraocular movements intact.      Conjunctiva/sclera: Conjunctivae normal.      Pupils: Pupils are equal, round, and reactive to light.   Cardiovascular:      Rate and Rhythm: Regular rhythm.      Pulses: Normal pulses.      Heart sounds: Normal heart sounds.   Pulmonary:      Effort: Pulmonary effort is normal.   Abdominal:      General: Bowel sounds are normal. There is no distension.      Palpations: Abdomen is soft. There is no mass.      Tenderness: There is no abdominal tenderness.   Musculoskeletal:         General: Normal range of motion.      Cervical back: Normal range of motion.      Right lower leg: No edema.      Left lower leg: No edema.   Skin:     General: Skin is warm and dry.      Findings: Wound present.          Neurological:      General: No focal deficit present.      Mental Status: He is alert and oriented to person, place, and time.   Psychiatric:         Mood and Affect:  Mood normal.         Behavior: Behavior normal.         Thought Content: Thought content normal.         Judgment: Judgment normal.         Results     Lab Results   Component Value Date    WBC 11.4 08/22/2022    RBC 4.26 (L) 08/22/2022    HGB 11.3 (L) 08/22/2022    HCT 34.9 (L) 08/22/2022    MCV 81.9 08/22/2022    MCH 26.5 (L) 08/22/2022    MCHC 32.4 (L) 08/22/2022    RDW 15.9 08/22/2022     08/22/2022    MPV 13.3 (H) 08/22/2022     CMP  Sodium Level   Date Value Ref Range Status   10/11/2022 140 136 - 145 mmol/L Final     Potassium Level   Date Value Ref Range Status   10/11/2022 4.7 3.5 - 5.1 mmol/L Final     Carbon Dioxide   Date Value Ref Range Status   10/11/2022 28 22 - 29 mmol/L Final     Blood Urea Nitrogen   Date Value Ref Range Status   10/11/2022 23.2 8.4 - 25.7 mg/dL Final     Creatinine   Date Value Ref Range Status   10/11/2022 1.78 (H) 0.73 - 1.18 mg/dL Final     Calcium Level Total   Date Value Ref Range Status   10/11/2022 9.6 8.4 - 10.2 mg/dL Final     Albumin Level   Date Value Ref Range Status   10/11/2022 3.8 3.5 - 5.0 gm/dL Final     Bilirubin Total   Date Value Ref Range Status   10/11/2022 0.8 <=1.5 mg/dL Final     Alkaline Phosphatase   Date Value Ref Range Status   10/11/2022 155 (H) 40 - 150 unit/L Final     Aspartate Aminotransferase   Date Value Ref Range Status   10/11/2022 15 5 - 34 unit/L Final     Alanine Aminotransferase   Date Value Ref Range Status   10/11/2022 13 0 - 55 unit/L Final     Estimated GFR-Non    Date Value Ref Range Status   04/13/2022 >60       Lab Results   Component Value Date    CHOL 175 09/13/2021     Lab Results   Component Value Date    HDL 37 09/13/2021     No results found for: LDLCALC  Lab Results   Component Value Date    TRIG 148 05/22/2022     No results found for: CHOLHDL  Lab Results   Component Value Date    TSH 2.5189 08/22/2022     Lab Results   Component Value Date    PHUR 5.5 04/09/2022    PROTEINUA Negative 10/11/2022     GLUCUA 3+ 04/09/2022    KETONESU Negative 04/09/2022    OCCULTUA 1+ 04/09/2022    NITRITE Negative 04/09/2022    LEUKOCYTESUR Negative 10/11/2022           Assessment and Plan (including Health Maintenance)     Plan:         Health Maintenance Due   Topic Date Due    Eye Exam  Never done    COVID-19 Vaccine (4 - Booster for [employer] series) 04/08/2022       Problem List Items Addressed This Visit          Renal/    Stage 3b chronic kidney disease    Current Assessment & Plan     Overall stable, but stage 3  Refer to Renal  Avoid NSAIDs (i.e., Advil, Aleve, Ibuprofen, Motrin, Naproxen, Diclofenac, Indocin, Celebrex, Mobic, Voltaren). Avoid the following GI meds: Milk of Mag, Mylanta, Fleets Enema, and Pepto-Bismol. Okay to take Tylenol (acetaminophen) (if no end-stage liver disease), low dose ASA (81 mg), Miralax, Tums, and Colace. Increase clear fluid intake. Avoid dark sodas.           Relevant Orders    Ambulatory referral/consult to Nephrology    Comprehensive Metabolic Panel       Endocrine    Type 2 diabetes mellitus with diabetic polyneuropathy, with long-term current use of insulin    Overview     Current medications: Novolin 70/30 80 units in the morning and 92 units SQ at night  A1c level: 7.2%; goal <8% for pt due to risk of hypoglycemia  CBG trends: None provided  Educated on diabetic diet: Low-fat, Low-carb, Low-cholesterol. Avoid sugary/dark drinks/sodas and white foods (i.e., bread, pasta, potatoes, rice)  Aerobic exercise: 20-30 min/day x 5 days/week  Diabetic Eye Exam: UTD. Will see Dr. Barrientos 9/13/22  Diabetic Foot Exam: UTD. Following Dr. Escalante  Kidney Protection: ARB  Statin Therapy: Yes             Current Assessment & Plan     Medication Adjustments: None at present         Relevant Orders    CBC Auto Differential    Hemoglobin A1C    Comprehensive Metabolic Panel    Lipid Panel    Hypothyroidism    Relevant Medications    levothyroxine (SYNTHROID) 25 MCG tablet       GI    Constipation     Relevant Medications    lactulose (CHRONULAC) 20 gram/30 mL Soln     Other Visit Diagnoses       Need for influenza vaccination    -  Primary    Relevant Orders    Influenza - Quadrivalent (PF) (Completed)            Health Maintenance Topics with due status: Not Due       Topic Last Completion Date    TETANUS VACCINE 05/08/2018    Lipid Panel 05/22/2022    Foot Exam 07/14/2022    Diabetes Urine Screening 10/11/2022    Hemoglobin A1c 10/11/2022    High Dose Statin 10/14/2022    Colorectal Cancer Screening Not Due       Future Appointments   Date Time Provider Department Center   11/25/2022 10:00 AM [facility]   2/14/2023  7:15 AM RON Esquivel [facility]      I spent a total of 31 minutes on the day of the visit.  This includes face to face time and non-face to face time preparing to see the patient (eg, review of tests), obtaining and/or reviewing separately obtained history, documenting clinical information in the electronic or other health record, independently interpreting results and communicating results to the patient/family/caregiver, or care coordinator.      Signature:  RON Esquivel  [facility]  [address] [id number]    Date of encounter: 10/14/22

## 2022-11-18 DIAGNOSIS — N18.9 CHRONIC KIDNEY DISEASE, UNSPECIFIED CKD STAGE: Primary | ICD-10-CM

## 2022-11-23 NOTE — PROGRESS NOTES
[facility] Neurology Initial Office Visit Note    Initial Visit Date: 11/25/2022  Current Visit Date:  11/25/2022    Chief Complaint:     Chief Complaint   Patient presents with    New Patient [id number] last one was 4 months        History of Present Illness:      This is 57 y.o. right hand dominant male with history of multiple cardiovascular risk factors, ischemic cardiomyopathy, insulin-dependent type 2 diabetes, CKD III, PEA arrest in 2/2022, who is referred for seizure disorder.    Age of Onset : 5/19/2022 while in the hospital for CHF exacerbation     Semiology: cannot recall    Frequency: last event during hospitalization in 5/2022     Provocation Factors: CHF exacerbation     Risk Factors  - Family history of seizure disorders:  No  - History of focal CNS lesions: Yes history of PEA arrest in 2/2022  - History of CNS infections: No  - History head trauma with prolonged LOC: No  - History of childhood seizures, including febrile seizures: No  - History of development delay: No   - History of underlying mood disorder: No   - History of sleep disorder: Yes JOESPH on CPAP  - Recreational drug use: No  - Alcohol use: No  - Family planning and contraceptive use: Not Applicable     Medications:     Current AEDs:  Vimpat 100 mg twice a day - only taking 100 mg once a day  Levetiracetam 500 mg twice a day    Prior AEDs:  Denied    Surgical Intervention & Devices:     - VNS:  - DBS  - RNS:  - Lobectomy:    Labs:     Results for orders placed or performed in visit on 10/11/22   Comprehensive Metabolic Panel   Result Value Ref Range    Sodium Level 140 136 - 145 mmol/L    Potassium Level 4.7 3.5 - 5.1 mmol/L    Chloride 102 98 - 107 mmol/L    Carbon Dioxide 28 22 - 29 mmol/L    Glucose Level 106 (H) 74 - 100 mg/dL    Blood Urea Nitrogen 23.2 8.4 - 25.7 mg/dL    Creatinine 1.78 (H) 0.73 - 1.18 mg/dL    Calcium Level Total 9.6 8.4 - 10.2 mg/dL    Protein Total 8.5 (H) 6.4 - 8.3 gm/dL    Albumin Level 3.8 3.5 - 5.0  gm/dL    Globulin 4.7 (H) 2.4 - 3.5 gm/dL    Albumin/Globulin Ratio 0.8 (L) 1.1 - 2.0 ratio    Bilirubin Total 0.8 <=1.5 mg/dL    Alkaline Phosphatase 155 (H) 40 - 150 unit/L    Alanine Aminotransferase 13 0 - 55 unit/L    Aspartate Aminotransferase 15 5 - 34 unit/L    eGFR 44 mls/min/1.73/m2   Hemoglobin A1C   Result Value Ref Range    Hemoglobin A1c 7.2 (H) <=7.0 %    Estimated Average Glucose 159.9 mg/dL   Urinalysis, Reflex to Urine Culture Urine, Clean Catch    Specimen: Urine   Result Value Ref Range    Color, UA Light-Yellow Yellow, Light-Yellow, Dark Yellow, Tiffany, Straw    Appearance, UA Clear Clear    Specific Gravity, UA 1.010     pH, UA 5.5 5.0, 5.5, 6.0, 6.5, 7.0, 7.5, 8.0, 8.5    Protein, UA Negative Negative mg/dL    Glucose, UA Normal Negative, Normal mg/dL    Ketones, UA Negative Negative mg/dL    Blood, UA Negative Negative unit/L    Bilirubin, UA Negative Negative mg/dL    Urobilinogen, UA Normal 0.2, 1.0, Normal mg/dL    Nitrites, UA Negative Negative    Leukocyte Esterase, UA Negative Negative unit/L    WBC, UA 0-5 None Seen, 0-2, 3-5, 0-5 /HPF    Bacteria, UA Trace (A) None Seen /HPF    Squamous Epithelial Cells, UA Occ (A) None Seen /HPF    Mucous, UA Trace (A) None Seen /LPF    Hyaline Casts, UA 11-20 (A) None Seen /lpf    RBC, UA 0-5 None Seen, 0-2, 3-5, 0-5 /HPF   Microalbumin/Creatinine Ratio, Urine   Result Value Ref Range    Urine Microalbumin <5.0 <=30.0 ug/ml    Urine Creatinine 63.5 63.0 - 166.0 mg/dL    Microalbumin Creatinine Ratio <7.9 0.0 - 30.0 mg/gm Cr       Studies:      - routine EEG:   - one hour EEG:   - 24 hour EEG 5/20/2022 - 5/21/2022 at [facility]:  Encephalopathic changes.  Probable left frontal central spike.  - Ambulatory EEG:  - EMU Video EEG:  - MRI Brain without contrast May 21, 2022 at [facility]:  As per documentation, extensive chronic microvascular changes.    - NCHCT:  - WADA:    Review of Systems:     Review of Systems   All other systems reviewed and are  negative.    Physical Exams:     Vitals:    11/25/22 0935   BP: 105/70   Pulse: 88   Resp: 16   Temp: 97.9 °F (36.6 °C)       Physical Exam  Vitals and nursing note reviewed.   Constitutional:       Appearance: Normal appearance.   HENT:      Head: Normocephalic and atraumatic.      Nose: Nose normal.      Mouth/Throat:      Mouth: Mucous membranes are moist.      Pharynx: Oropharynx is clear.   Eyes:      Conjunctiva/sclera: Conjunctivae normal.   Cardiovascular:      Rate and Rhythm: Normal rate and regular rhythm.      Pulses: Normal pulses.      Heart sounds: Normal heart sounds.   Pulmonary:      Effort: Pulmonary effort is normal.      Breath sounds: Normal breath sounds.   Abdominal:      General: Abdomen is flat.      Palpations: Abdomen is soft.   Musculoskeletal:         General: Normal range of motion.      Cervical back: Normal range of motion.   Neurological:      Mental Status: He is alert.   Psychiatric:         Mood and Affect: Mood normal.       Comprehensive Neurological Exam:  Mental Status: Alert Oriented to Self, Date, and Place. Comprehension wnl. No dysarthria.   CN II - XII: MARSHALL, No APD, VFFC, No ptosis OU, EOMI without nystagmus, LT/Temp symmetric in CN V1-3 distribution, Face Symmetric, Tongue and Uvula midline, Trapezius symmetric bilateral.   Motor: tone and bulk wnl throughout, no abnormal involuntary or voluntary movements, 5/5 confrontation, Fine finger movements wnl b/l, No pronator drift.   Sensory: LT, Proprioception, Vibration, PP, Temp symmetric. .   Reflexes: 1+ throughout  Cerebellar: FNF wnl b/l  Gait: normal.    Assessment:     This is 57 y.o. right hand dominant male with history of  multiple cardiovascular risk factors, insulin-dependent type 2 diabetes, CKD III, PEA arrest in 2/2022, who is referred for provoked seizure in the setting of acute illness. Seizure free since discharge in 5/2022.     Problem List Items Addressed This Visit          Cardiac/Vascular    Chronic  systolic heart failure    Arteriosclerotic vascular disease - Primary    Cardiomyopathy    Peripheral arterial occlusive disease    Essential hypertension       Endocrine    Type 2 diabetes mellitus with diabetic polyneuropathy, with long-term current use of insulin    Hypothyroidism       Other    RESOLVED: Hospital discharge follow-up       Plan:     [] decrease Lacosamide to 50 mg once a day   [] continue with Levetiracetam 500 mg twice a day    RTC 3 months      Seizure education provided: including family planning and teratogenicity of AEDs, risk of uncontrolled seizure disorder, SUDEP. No driving until seizure free for six months ([place] state law). No swimming, climbing heights, or operate heavy machinery without supervision. Patient is advised to avoid Benadryl, Tramadol, Wellbutrin, flashing lights, alcohol, sleep deprivation, and certain anti-biotics that can lower seizure threshold.     I have explained the treatment plan, diagnosis, and prognosis to patient. All questions are answered to the best of my knowledge.     Face to face time 45 minutes, including documentation, chart review, counseling, education, review of test results, relevant medical records, and coordination of care.   I have explained the treatment plan, diagnosis, and prognosis to patient. All questions are answered to the best of my knowledge.         Claribel Canales MD   General Neurology  11/25/2022

## 2022-11-25 ENCOUNTER — OFFICE VISIT (OUTPATIENT)
Dept: NEUROLOGY | Facility: CLINIC | Age: 57
End: 2022-11-25
Payer: MEDICARE

## 2022-11-25 VITALS
BODY MASS INDEX: 33.13 KG/M2 | HEART RATE: 88 BPM | DIASTOLIC BLOOD PRESSURE: 70 MMHG | HEIGHT: 73 IN | SYSTOLIC BLOOD PRESSURE: 105 MMHG | RESPIRATION RATE: 16 BRPM | OXYGEN SATURATION: 97 % | WEIGHT: 250 LBS | TEMPERATURE: 98 F

## 2022-11-25 DIAGNOSIS — Z79.4 TYPE 2 DIABETES MELLITUS WITH DIABETIC POLYNEUROPATHY, WITH LONG-TERM CURRENT USE OF INSULIN: ICD-10-CM

## 2022-11-25 DIAGNOSIS — E11.42 TYPE 2 DIABETES MELLITUS WITH DIABETIC POLYNEUROPATHY, WITH LONG-TERM CURRENT USE OF INSULIN: ICD-10-CM

## 2022-11-25 DIAGNOSIS — Z09 HOSPITAL DISCHARGE FOLLOW-UP: ICD-10-CM

## 2022-11-25 DIAGNOSIS — R56.9 NEW ONSET SEIZURE: Primary | ICD-10-CM

## 2022-11-25 DIAGNOSIS — I70.90 ARTERIOSCLEROTIC VASCULAR DISEASE: ICD-10-CM

## 2022-11-25 DIAGNOSIS — I10 ESSENTIAL HYPERTENSION: ICD-10-CM

## 2022-11-25 DIAGNOSIS — I50.22 CHRONIC SYSTOLIC HEART FAILURE: ICD-10-CM

## 2022-11-25 DIAGNOSIS — I77.9 PERIPHERAL ARTERIAL OCCLUSIVE DISEASE: ICD-10-CM

## 2022-11-25 DIAGNOSIS — I25.5 ISCHEMIC CARDIOMYOPATHY: ICD-10-CM

## 2022-11-25 DIAGNOSIS — E03.9 HYPOTHYROIDISM, UNSPECIFIED TYPE: ICD-10-CM

## 2022-11-25 PROCEDURE — 99204 PR OFFICE/OUTPT VISIT, NEW, LEVL IV, 45-59 MIN: ICD-10-PCS | Mod: S$PBB,,, | Performed by: PSYCHIATRY & NEUROLOGY

## 2022-11-25 PROCEDURE — 99204 OFFICE O/P NEW MOD 45 MIN: CPT | Mod: S$PBB,,, | Performed by: PSYCHIATRY & NEUROLOGY

## 2022-11-25 PROCEDURE — 99213 OFFICE O/P EST LOW 20 MIN: CPT | Mod: PBBFAC | Performed by: PSYCHIATRY & NEUROLOGY

## 2022-11-25 RX ORDER — LEVETIRACETAM 500 MG/1
500 TABLET ORAL 2 TIMES DAILY
Qty: 180 TABLET | Refills: 1 | Status: SHIPPED | OUTPATIENT
Start: 2022-11-25 | End: 2023-03-07 | Stop reason: SDUPTHER

## 2022-11-25 RX ORDER — LACOSAMIDE 50 MG/1
50 TABLET ORAL 2 TIMES DAILY
Qty: 60 TABLET | Refills: 3 | Status: SHIPPED | OUTPATIENT
Start: 2022-11-25 | End: 2022-11-25 | Stop reason: SDUPTHER

## 2022-11-25 RX ORDER — LACOSAMIDE 50 MG/1
50 TABLET ORAL DAILY
Qty: 30 TABLET | Refills: 3 | Status: SHIPPED | OUTPATIENT
Start: 2022-11-25 | End: 2023-03-07

## 2022-11-25 RX ORDER — ACETAMINOPHEN 500 MG
5000 TABLET ORAL DAILY
COMMUNITY
End: 2022-12-19

## 2022-11-25 RX ORDER — ISOSORBIDE MONONITRATE 60 MG/1
60 TABLET, EXTENDED RELEASE ORAL DAILY
COMMUNITY

## 2022-11-28 ENCOUNTER — LAB REQUISITION (OUTPATIENT)
Dept: LAB | Facility: HOSPITAL | Age: 57
End: 2022-11-28

## 2022-11-28 DIAGNOSIS — I13.0 HYPERTENSIVE HEART AND CHRONIC KIDNEY DISEASE WITH HEART FAILURE AND STAGE 1 THROUGH STAGE 4 CHRONIC KIDNEY DISEASE, OR UNSPECIFIED CHRONIC KIDNEY DISEASE: ICD-10-CM

## 2022-11-28 LAB
ANION GAP SERPL CALC-SCNC: 11 MEQ/L
BUN SERPL-MCNC: 21.4 MG/DL (ref 8.4–25.7)
CALCIUM SERPL-MCNC: 9.5 MG/DL (ref 8.4–10.2)
CHLORIDE SERPL-SCNC: 100 MMOL/L (ref 98–107)
CO2 SERPL-SCNC: 28 MMOL/L (ref 22–29)
CREAT SERPL-MCNC: 1.95 MG/DL (ref 0.73–1.18)
CREAT/UREA NIT SERPL: 11
GFR SERPLBLD CREATININE-BSD FMLA CKD-EPI: 39 MLS/MIN/1.73/M2
GLUCOSE SERPL-MCNC: 113 MG/DL (ref 74–100)
MAGNESIUM SERPL-MCNC: 2.2 MG/DL (ref 1.6–2.6)
POTASSIUM SERPL-SCNC: 4.3 MMOL/L (ref 3.5–5.1)
SODIUM SERPL-SCNC: 139 MMOL/L (ref 136–145)

## 2022-11-28 PROCEDURE — 80048 BASIC METABOLIC PNL TOTAL CA: CPT | Performed by: INTERNAL MEDICINE

## 2022-11-28 PROCEDURE — 83735 ASSAY OF MAGNESIUM: CPT | Performed by: INTERNAL MEDICINE

## 2022-12-16 ENCOUNTER — LAB VISIT (OUTPATIENT)
Dept: LAB | Facility: HOSPITAL | Age: 57
End: 2022-12-16
Attending: NURSE PRACTITIONER
Payer: MEDICARE

## 2022-12-16 DIAGNOSIS — N18.9 CHRONIC KIDNEY DISEASE, UNSPECIFIED CKD STAGE: ICD-10-CM

## 2022-12-16 LAB
ALBUMIN SERPL-MCNC: 3.7 G/DL (ref 3.5–5)
ALBUMIN/GLOB SERPL: 0.8 RATIO (ref 1.1–2)
ALP SERPL-CCNC: 148 UNIT/L (ref 40–150)
ALT SERPL-CCNC: 14 UNIT/L (ref 0–55)
APPEARANCE UR: CLEAR
AST SERPL-CCNC: 17 UNIT/L (ref 5–34)
BACTERIA #/AREA URNS AUTO: ABNORMAL /HPF
BASOPHILS # BLD AUTO: 0.05 X10(3)/MCL (ref 0–0.2)
BASOPHILS NFR BLD AUTO: 0.5 %
BILIRUB UR QL STRIP.AUTO: NEGATIVE MG/DL
BILIRUBIN DIRECT+TOT PNL SERPL-MCNC: 0.4 MG/DL
BUN SERPL-MCNC: 16.3 MG/DL (ref 8.4–25.7)
CALCIUM SERPL-MCNC: 10 MG/DL (ref 8.4–10.2)
CASTS URNS MICRO: ABNORMAL /LPF
CHLORIDE SERPL-SCNC: 105 MMOL/L (ref 98–107)
CO2 SERPL-SCNC: 26 MMOL/L (ref 22–29)
COLOR UR AUTO: ABNORMAL
CREAT SERPL-MCNC: 1.73 MG/DL (ref 0.73–1.18)
CREAT UR-MCNC: 53.1 MG/DL (ref 63–166)
EOSINOPHIL # BLD AUTO: 0.26 X10(3)/MCL (ref 0–0.9)
EOSINOPHIL NFR BLD AUTO: 2.6 %
ERYTHROCYTE [DISTWIDTH] IN BLOOD BY AUTOMATED COUNT: 12.7 % (ref 11.6–14.4)
GFR SERPLBLD CREATININE-BSD FMLA CKD-EPI: 45 MLS/MIN/1.73/M2
GLOBULIN SER-MCNC: 4.8 GM/DL (ref 2.4–3.5)
GLUCOSE SERPL-MCNC: 103 MG/DL (ref 74–100)
GLUCOSE UR QL STRIP.AUTO: NORMAL MG/DL
HCT VFR BLD AUTO: 37 % (ref 42–52)
HGB BLD-MCNC: 11.9 GM/DL (ref 14–18)
HYALINE CASTS #/AREA URNS LPF: ABNORMAL /LPF
IMM GRANULOCYTES # BLD AUTO: 0.04 X10(3)/MCL (ref 0–0.04)
IMM GRANULOCYTES NFR BLD AUTO: 0.4 %
KETONES UR QL STRIP.AUTO: NEGATIVE MG/DL
LEUKOCYTE ESTERASE UR QL STRIP.AUTO: NEGATIVE UNIT/L
LYMPHOCYTES # BLD AUTO: 1.74 X10(3)/MCL (ref 0.6–4.6)
LYMPHOCYTES NFR BLD AUTO: 17.5 %
MCH RBC QN AUTO: 27.6 PG
MCHC RBC AUTO-ENTMCNC: 32.2 MG/DL (ref 33–36)
MCV RBC AUTO: 85.8 FL (ref 80–94)
MONOCYTES # BLD AUTO: 0.72 X10(3)/MCL (ref 0.1–1.3)
MONOCYTES NFR BLD AUTO: 7.3 %
MUCOUS THREADS URNS QL MICRO: ABNORMAL /LPF
NEUTROPHILS # BLD AUTO: 7.12 X10(3)/MCL (ref 2.1–9.2)
NEUTROPHILS NFR BLD AUTO: 71.7 %
NITRITE UR QL STRIP.AUTO: NEGATIVE
NRBC BLD AUTO-RTO: 0 % (ref 0–1)
PH UR STRIP.AUTO: 5.5 [PH]
PLATELET # BLD AUTO: 233 X10(3)/MCL (ref 140–371)
PMV BLD AUTO: 12.8 FL (ref 9.4–12.4)
POTASSIUM SERPL-SCNC: 4.3 MMOL/L (ref 3.5–5.1)
PROT SERPL-MCNC: 8.5 GM/DL (ref 6.4–8.3)
PROT UR QL STRIP.AUTO: NEGATIVE MG/DL
PROT UR STRIP-MCNC: <6.8 MG/DL
RBC # BLD AUTO: 4.31 X10(6)/MCL (ref 4.7–6.1)
RBC #/AREA URNS AUTO: ABNORMAL /HPF
RBC UR QL AUTO: NEGATIVE UNIT/L
SODIUM SERPL-SCNC: 142 MMOL/L (ref 136–145)
SP GR UR STRIP.AUTO: 1.01
SQUAMOUS #/AREA URNS LPF: ABNORMAL /HPF
UROBILINOGEN UR STRIP-ACNC: NORMAL MG/DL
WBC # SPEC AUTO: 9.9 X10(3)/MCL (ref 4.5–11.5)
WBC #/AREA URNS AUTO: ABNORMAL /HPF

## 2022-12-16 PROCEDURE — 80053 COMPREHEN METABOLIC PANEL: CPT

## 2022-12-16 PROCEDURE — 82570 ASSAY OF URINE CREATININE: CPT

## 2022-12-16 PROCEDURE — 81001 URINALYSIS AUTO W/SCOPE: CPT

## 2022-12-16 PROCEDURE — 85025 COMPLETE CBC W/AUTO DIFF WBC: CPT

## 2022-12-16 PROCEDURE — 36415 COLL VENOUS BLD VENIPUNCTURE: CPT

## 2022-12-19 ENCOUNTER — OFFICE VISIT (OUTPATIENT)
Dept: NEPHROLOGY | Facility: CLINIC | Age: 57
End: 2022-12-19
Payer: MEDICARE

## 2022-12-19 VITALS
HEART RATE: 85 BPM | TEMPERATURE: 98 F | OXYGEN SATURATION: 99 % | RESPIRATION RATE: 18 BRPM | HEIGHT: 73 IN | BODY MASS INDEX: 33.8 KG/M2 | DIASTOLIC BLOOD PRESSURE: 75 MMHG | WEIGHT: 255 LBS | SYSTOLIC BLOOD PRESSURE: 117 MMHG

## 2022-12-19 DIAGNOSIS — I10 ESSENTIAL HYPERTENSION: ICD-10-CM

## 2022-12-19 DIAGNOSIS — N18.32 STAGE 3B CHRONIC KIDNEY DISEASE: Primary | ICD-10-CM

## 2022-12-19 DIAGNOSIS — R39.9 LOWER URINARY TRACT SYMPTOMS (LUTS): ICD-10-CM

## 2022-12-19 PROCEDURE — 99214 OFFICE O/P EST MOD 30 MIN: CPT | Mod: PBBFAC | Performed by: NURSE PRACTITIONER

## 2022-12-19 PROCEDURE — 99214 PR OFFICE/OUTPT VISIT, EST, LEVL IV, 30-39 MIN: ICD-10-PCS | Mod: S$PBB,,, | Performed by: NURSE PRACTITIONER

## 2022-12-19 PROCEDURE — 99214 OFFICE O/P EST MOD 30 MIN: CPT | Mod: S$PBB,,, | Performed by: NURSE PRACTITIONER

## 2022-12-19 NOTE — PROGRESS NOTES
"[facility]  Nephrology Clinic Note    Chief complaint: Chronic Kidney Disease (New pt, referred, c/o trouble urinating, wants to know kidney function)    History of present illness:   Luigi Gotti is a 57 y.o. [place] male with past medical history of chronic kidney disease, heart failure with reduced ejection fraction, coronary artery disease, gastroparesis, gastritis, ischemic colitis, peripheral vascular disease, dyslipidemia, hypertension, BPH, diabetes mellitus type 2, and obesity.  Patient presents to establish care in Nephrology Clinic today.  Complains of occasional dizziness.  Denies shortness of breath or lower extremity edema.      Review of Systems  12 point review of systems conducted, negative except as stated in the history of present illness.    Allergies: Patient is allergic to pork derived (porcine) and clopidogrel.     Past Medical History:  has a past medical history of BPH (benign prostatic hyperplasia), CAD (coronary artery disease), CHF (congestive heart failure), GERD (gastroesophageal reflux disease), Other hyperlipidemia, PVD (peripheral vascular disease), and Type 2 diabetes mellitus without complications.    Procedure History:  has a past surgical history that includes Coronary artery bypass graft; Tonsillectomy; Cholecystectomy; and Right heart catheterization (Right, 9/20/2022).    Family History: family history includes Cancer in his mother; Diabetes in his brother, mother, and sister; Hypertension in his father and mother; Kidney disease in his brother; Stroke in his father.    Social History:  reports that he has never smoked. He has never used smokeless tobacco. He reports that he does not currently use drugs. He reports that he does not drink alcohol.    Physical exam  /75 (BP Location: Right arm, Patient Position: Standing, BP Method: Large (Automatic))   Pulse 85   Temp 98 °F (36.7 °C) (Oral)   Resp 18   Ht 6' 0.84" (1.85 m)   Wt " 115.7 kg (255 lb)   SpO2 99%   BMI 33.80 kg/m²   General appearance: Patient is in no acute distress.  Skin: No rashes or wounds.  HEENT: PERRLA, EOMI, no scleral icterus, no JVD. Neck is supple.  Chest: Respirations are unlabored. Lungs sounds are clear.   Heart: S1, S2.   Abdomen: Benign.  Obese.  : Deferred.  Extremities: No edema, peripheral pulses are palpable.   Neuro: No focal deficits.     Home Medications:    Current Outpatient Medications:     aspirin (ECOTRIN) 81 MG EC tablet, Take 81 mg by mouth once daily., Disp: , Rfl:     atorvastatin (LIPITOR) 80 MG tablet, Take 80 mg by mouth every evening., Disp: , Rfl:     azelastine (ASTELIN) 137 mcg (0.1 %) nasal spray, 1 spray by Nasal route 2 (two) times daily., Disp: , Rfl:     carvediloL (COREG) 25 MG tablet, Take 25 mg by mouth 2 (two) times daily., Disp: , Rfl:     cyanocobalamin (VITAMIN B-12) 1000 MCG tablet, Take 2,000 mcg by mouth once daily at 6am., Disp: , Rfl:     ezetimibe (ZETIA) 10 mg tablet, Take 10 mg by mouth once daily at 6am., Disp: , Rfl:     fluticasone propionate (FLONASE) 50 mcg/actuation nasal spray, Flonase Allergy Relief Take No date recorded No form recorded No frequency recorded No route recorded No set duration recorded No set duration amount recorded active No dosage strength recorded No dosage strength units of measure recorded, Disp: , Rfl:     HYDROcodone-acetaminophen (NORCO) 7.5-325 mg per tablet, Take 1 tablet by mouth every 8 (eight) hours as needed., Disp: , Rfl:     hydrOXYzine (ATARAX) 50 MG tablet, Take 50 mg by mouth 3 (three) times daily., Disp: , Rfl:     isosorbide mononitrate (IMDUR) 60 MG 24 hr tablet, Take 60 mg by mouth once daily., Disp: , Rfl:     lacosamide (VIMPAT) 50 mg Tab, Take 1 tablet (50 mg total) by mouth once daily., Disp: 30 tablet, Rfl: 3    lactulose (CHRONULAC) 10 gram/15 mL solution, TAKE 15 MLS BY MOUTH 2 TIMES DAILY AS NEEDED FOR CONSTIPATION, Disp: 2700 mL, Rfl: 1    levETIRAcetam  (KEPPRA) 500 MG Tab, Take 1 tablet (500 mg total) by mouth 2 (two) times daily., Disp: 180 tablet, Rfl: 1    levocetirizine (XYZAL) 5 MG tablet, Take 5 mg by mouth once daily., Disp: , Rfl:     levothyroxine (SYNTHROID) 25 MCG tablet, Take 1 tablet (25 mcg total) by mouth before breakfast., Disp: 90 tablet, Rfl: 1    metoclopramide HCl (REGLAN) 10 MG tablet, Take 10 mg by mouth once daily., Disp: , Rfl:     montelukast (SINGULAIR) 10 mg tablet, Take 1 tablet (10 mg total) by mouth once daily., Disp: 90 tablet, Rfl: 1    nitroGLYCERIN (NITROSTAT) 0.4 MG SL tablet, Place 0.4 mg under the tongue as needed., Disp: , Rfl:     NOVOLIN 70/30 U-100 INSULIN 100 unit/mL (70-30) injection, 80 units am , 92 units pm and 3 times a day before each meal per sliding scale, Disp: , Rfl:     omeprazole (PRILOSEC) 40 MG capsule, Take 1 capsule (40 mg total) by mouth every evening., Disp: 90 capsule, Rfl: 1    prasugreL (EFFIENT) 10 mg Tab, Take 10 mg by mouth once daily., Disp: , Rfl:     QUEtiapine (SEROQUEL) 25 MG Tab, Take 1 tablet (25 mg total) by mouth every evening., Disp: 90 tablet, Rfl: 1    ranolazine (RANEXA) 1,000 mg Tb12, Take 1,000 mg by mouth 2 (two) times daily., Disp: , Rfl:     spironolactone (ALDACTONE) 25 MG tablet, Take 1 tablet (25 mg total) by mouth once daily., Disp: 90 tablet, Rfl: 1    tamsulosin (FLOMAX) 0.4 mg Cap, TAKE 1 CAPSULE BY MOUTH ONCE DAILY BEFORE A MEAL, Disp: 90 capsule, Rfl: 3    torsemide (DEMADEX) 20 MG Tab, Take 20 mg by mouth 2 (two) times a day., Disp: , Rfl:     TRUE METRIX GLUCOSE METER Misc, TEST 3 TIMES A DAY, Disp: , Rfl:     TRUETRACK TEST Strp, TEST 3 TIMES A DAY, Disp: , Rfl:     Laboratory data    Hematology  Lab Results   Component Value Date    WBC 9.9 12/16/2022    HGB 11.9 (L) 12/16/2022    HCT 37.0 (L) 12/16/2022     12/16/2022     Chemistry  Lab Results   Component Value Date     12/16/2022    K 4.3 12/16/2022    CHLORIDE 105 12/16/2022    BUN 16.3 12/16/2022     CREATININE 1.73 (H) 12/16/2022    EGFRNORACEVR 45 12/16/2022    GLUCOSE 103 (H) 12/16/2022    CALCIUM 10.0 12/16/2022    ALKPHOS 148 12/16/2022    LABPROT 8.5 (H) 12/16/2022    ALBUMIN 3.7 12/16/2022    BILIDIR 0.5 04/13/2022    IBILI 0.40 04/13/2022    AST 17 12/16/2022    ALT 14 12/16/2022    MG 1.70 04/12/2022    PHOS 2.5 03/10/2022      Urine:  Lab Results   Component Value Date    COLORUA Light-Yellow 12/16/2022    APPEARANCEUA Clear 12/16/2022    SGUA 1.009 12/16/2022    PHUA 5.5 12/16/2022    PROTEINUA Negative 12/16/2022    GLUCOSEUA Normal 12/16/2022    KETONESUA Negative 12/16/2022    BLOODUA Negative 12/16/2022    NITRITESUA Negative 12/16/2022    LEUKOCYTESUR Negative 12/16/2022    RBCUA 0-5 12/16/2022    WBCUA 0-5 12/16/2022    BACTERIA None Seen 12/16/2022    SQEPUA Occ (A) 12/16/2022    HYALINECASTS 6-10 (A) 12/16/2022    CREATRANDUR 53.1 (L) 12/16/2022    PROTEINURINE <6.8 12/16/2022         Lab Results   Component Value Date    HGBA1C 7.2 (H) 10/11/2022    GLUCOSE 103 (H) 12/16/2022     Lab Results   Component Value Date    IRON 19 04/05/2022    TIBC 291 04/05/2022    TRANS 270 04/05/2022    LABIRON 7 04/05/2022    FERRITIN 190.69 04/05/2022    FOLATE 6.1 04/05/2022    XIJBMYKU77 943 04/05/2022    HAPTO 436 (H) 10/25/2021     10/25/2021       Lab Results   Component Value Date    HIV Nonreactive 08/22/2022    HEPCAB Nonreactive 08/22/2022    HEPBSURFAG Nonreactive 08/22/2022         Imaging  US Retroperitoneal Complete   Right Kidney:  Length: 10.1 x 5 x 5.0 cm  Appearance: Normal echogenicity.  Collecting system: No hydronephrosis  Stones: Tiny echogenic foci identified no clear posterior shadowing  nonocclusive stone cannot be completely excluded  Cyst/Mass: None    Left Kidney:  Length: 12.9 x 5.2 x 5.6 cm  Appearance: Normal echogenicity.  Collecting system: No hydronephrosis  Stones: None  Cyst/Mass: None    Bladder:  Not optimally seen  IMPRESSION: No significant abnormality  identified      Impression and plan     Stage 3b chronic kidney disease  -     Ambulatory referral/consult to Nephrology  -     Comprehensive Metabolic Panel; Future; Expected date: 01/30/2023  -     Urinalysis, Reflex to Urine Culture Urine, Clean Catch; Future; Expected date: 01/30/2023  -     Protein/Creatinine Ratio, Urine; Future; Expected date: 01/30/2023  -     Phosphorus; Future; Expected date: 01/30/2023  -     US Retroperitoneal Limited; Future; Expected date: 01/09/2023  Serum creatinine has been relatively stable over the past several months, there is no significant proteinuria or active urinary sediment.  Continue risk factor management and periodic monitoring.  Will repeat kidney ultrasound to rule out obstructive uropathy.  Continue DANICA blockade.    Return to clinic with repeat labs in 2 months.      Essential hypertension  Blood pressure reading is at goal, continue current antihypertensive regimen and 2 g a day dietary sodium restriction.      BMI 33.0-33.9,adult  Lifestyle and dietary interventions discussed, patient counseled on weight loss using portion control, non- sedentary lifestyle, low-carbohydrate/low fat diet.    Lower urinary tract symptoms (LUTS)  Continue tamsulosin.  Will order kidney ultrasound to rule out obstructive uropathy.  Patient is reluctant to be referred to Urology at this time.  Will readdress referral on next visit.       12/19/2022  Robina Garrison NP  [facility] Nephrology

## 2022-12-28 ENCOUNTER — TELEPHONE (OUTPATIENT)
Dept: INTERNAL MEDICINE | Facility: CLINIC | Age: 57
End: 2022-12-28
Payer: MEDICARE

## 2022-12-28 NOTE — TELEPHONE ENCOUNTER
[facility] and will discharge pt after the 4 wks of therapy are completed. Stated that she will be back next week to re-evaluated pt because of how the week fell.     Please advise

## 2023-01-06 ENCOUNTER — HOSPITAL ENCOUNTER (OUTPATIENT)
Dept: RADIOLOGY | Facility: HOSPITAL | Age: 58
Discharge: HOME | End: 2023-01-06
Attending: NURSE PRACTITIONER
Payer: MEDICARE

## 2023-01-06 DIAGNOSIS — N18.32 STAGE 3B CHRONIC KIDNEY DISEASE: ICD-10-CM

## 2023-01-06 PROCEDURE — 76775 US EXAM ABDO BACK WALL LIM: CPT | Mod: TC

## 2023-01-10 ENCOUNTER — TELEPHONE (OUTPATIENT)
Dept: NEPHROLOGY | Facility: CLINIC | Age: 58
End: 2023-01-10
Payer: MEDICARE

## 2023-01-10 NOTE — TELEPHONE ENCOUNTER
----- Message from RON Valdez sent at 1/9/2023  6:00 PM CST -----  Regarding: Results  Please let patient know that there were no concerning findings on his ultrasound.  Thank you  ----- Message -----  From: Interface, Rad Results In  Sent: 1/6/2023   3:21 PM CST  To: RON Valdez

## 2023-02-03 ENCOUNTER — HOSPITAL ENCOUNTER (INPATIENT)
Facility: HOSPITAL | Age: 58
LOS: 3 days | Discharge: HOME | DRG: 287 | End: 2023-02-09
Attending: EMERGENCY MEDICINE | Admitting: INTERNAL MEDICINE
Payer: MEDICARE

## 2023-02-03 DIAGNOSIS — I25.10 ATHEROSCLEROSIS OF CORONARY ARTERY, UNSPECIFIED VESSEL OR LESION TYPE, UNSPECIFIED WHETHER ANGINA PRESENT, UNSPECIFIED WHETHER NATIVE OR TRANSPLANTED HEART: ICD-10-CM

## 2023-02-03 DIAGNOSIS — Z86.79 H/O CHF: Primary | ICD-10-CM

## 2023-02-03 DIAGNOSIS — R06.02 SOB (SHORTNESS OF BREATH): ICD-10-CM

## 2023-02-03 DIAGNOSIS — R06.02 SHORTNESS OF BREATH: ICD-10-CM

## 2023-02-03 DIAGNOSIS — N18.32 STAGE 3B CHRONIC KIDNEY DISEASE: ICD-10-CM

## 2023-02-03 LAB
ALBUMIN SERPL-MCNC: 3.8 G/DL (ref 3.5–5)
ALBUMIN/GLOB SERPL: 0.8 RATIO (ref 1.1–2)
ALP SERPL-CCNC: 129 UNIT/L (ref 40–150)
ALT SERPL-CCNC: 19 UNIT/L (ref 0–55)
AST SERPL-CCNC: 21 UNIT/L (ref 5–34)
BASOPHILS # BLD AUTO: 0.04 X10(3)/MCL (ref 0–0.2)
BASOPHILS NFR BLD AUTO: 0.4 %
BILIRUBIN DIRECT+TOT PNL SERPL-MCNC: 0.5 MG/DL
BNP BLD-MCNC: 89.6 PG/ML
BUN SERPL-MCNC: 21.2 MG/DL (ref 8.4–25.7)
CALCIUM SERPL-MCNC: 9.5 MG/DL (ref 8.4–10.2)
CHLORIDE SERPL-SCNC: 104 MMOL/L (ref 98–107)
CO2 SERPL-SCNC: 25 MMOL/L (ref 22–29)
CREAT SERPL-MCNC: 1.96 MG/DL (ref 0.73–1.18)
EOSINOPHIL # BLD AUTO: 0.28 X10(3)/MCL (ref 0–0.9)
EOSINOPHIL NFR BLD AUTO: 2.6 %
ERYTHROCYTE [DISTWIDTH] IN BLOOD BY AUTOMATED COUNT: 13 % (ref 11.5–17)
EST. AVERAGE GLUCOSE BLD GHB EST-MCNC: 134.1 MG/DL
GFR SERPLBLD CREATININE-BSD FMLA CKD-EPI: 39 MLS/MIN/1.73/M2
GLOBULIN SER-MCNC: 4.7 GM/DL (ref 2.4–3.5)
GLUCOSE SERPL-MCNC: 134 MG/DL (ref 74–100)
HBA1C MFR BLD: 6.3 %
HCT VFR BLD AUTO: 35.2 % (ref 42–52)
HGB BLD-MCNC: 11.3 GM/DL (ref 14–18)
IMM GRANULOCYTES # BLD AUTO: 0.1 X10(3)/MCL (ref 0–0.04)
IMM GRANULOCYTES NFR BLD AUTO: 0.9 %
INR BLD: 0.97 (ref 0–1.3)
LYMPHOCYTES # BLD AUTO: 2.14 X10(3)/MCL (ref 0.6–4.6)
LYMPHOCYTES NFR BLD AUTO: 19.7 %
MCH RBC QN AUTO: 27.6 PG
MCHC RBC AUTO-ENTMCNC: 32.1 MG/DL (ref 33–36)
MCV RBC AUTO: 85.9 FL (ref 80–94)
MONOCYTES # BLD AUTO: 0.73 X10(3)/MCL (ref 0.1–1.3)
MONOCYTES NFR BLD AUTO: 6.7 %
NEUTROPHILS # BLD AUTO: 7.57 X10(3)/MCL (ref 2.1–9.2)
NEUTROPHILS NFR BLD AUTO: 69.7 %
NRBC BLD AUTO-RTO: 0 %
PLATELET # BLD AUTO: 229 X10(3)/MCL (ref 130–400)
PMV BLD AUTO: 12.4 FL (ref 7.4–10.4)
POCT GLUCOSE: 108 MG/DL (ref 70–110)
POCT GLUCOSE: 48 MG/DL (ref 70–110)
POTASSIUM SERPL-SCNC: 4.2 MMOL/L (ref 3.5–5.1)
PROT SERPL-MCNC: 8.5 GM/DL (ref 6.4–8.3)
PROTHROMBIN TIME: 12.8 SECONDS (ref 12.5–14.5)
RBC # BLD AUTO: 4.1 X10(6)/MCL (ref 4.7–6.1)
SODIUM SERPL-SCNC: 139 MMOL/L (ref 136–145)
TROPONIN I SERPL-MCNC: <0.01 NG/ML (ref 0–0.04)
WBC # SPEC AUTO: 10.9 X10(3)/MCL (ref 4.5–11.5)

## 2023-02-03 PROCEDURE — 93010 EKG 12-LEAD: ICD-10-PCS | Mod: ,,, | Performed by: INTERNAL MEDICINE

## 2023-02-03 PROCEDURE — G0378 HOSPITAL OBSERVATION PER HR: HCPCS

## 2023-02-03 PROCEDURE — 82962 GLUCOSE BLOOD TEST: CPT

## 2023-02-03 PROCEDURE — 83880 ASSAY OF NATRIURETIC PEPTIDE: CPT | Performed by: NURSE PRACTITIONER

## 2023-02-03 PROCEDURE — 85025 COMPLETE CBC W/AUTO DIFF WBC: CPT | Performed by: NURSE PRACTITIONER

## 2023-02-03 PROCEDURE — 80053 COMPREHEN METABOLIC PANEL: CPT | Performed by: NURSE PRACTITIONER

## 2023-02-03 PROCEDURE — 25000003 PHARM REV CODE 250: Performed by: EMERGENCY MEDICINE

## 2023-02-03 PROCEDURE — 93010 ELECTROCARDIOGRAM REPORT: CPT | Mod: ,,, | Performed by: INTERNAL MEDICINE

## 2023-02-03 PROCEDURE — 93005 ELECTROCARDIOGRAM TRACING: CPT

## 2023-02-03 PROCEDURE — 83036 HEMOGLOBIN GLYCOSYLATED A1C: CPT | Performed by: EMERGENCY MEDICINE

## 2023-02-03 PROCEDURE — 99285 EMERGENCY DEPT VISIT HI MDM: CPT | Mod: 25

## 2023-02-03 PROCEDURE — 84484 ASSAY OF TROPONIN QUANT: CPT | Performed by: NURSE PRACTITIONER

## 2023-02-03 PROCEDURE — 85610 PROTHROMBIN TIME: CPT | Performed by: NURSE PRACTITIONER

## 2023-02-03 RX ORDER — GLUCAGON 1 MG
1 KIT INJECTION
Status: DISCONTINUED | OUTPATIENT
Start: 2023-02-03 | End: 2023-02-09 | Stop reason: HOSPADM

## 2023-02-03 RX ORDER — IBUPROFEN 200 MG
24 TABLET ORAL
Status: DISCONTINUED | OUTPATIENT
Start: 2023-02-03 | End: 2023-02-09 | Stop reason: HOSPADM

## 2023-02-03 RX ORDER — LEVETIRACETAM 500 MG/1
500 TABLET ORAL 2 TIMES DAILY
Status: DISCONTINUED | OUTPATIENT
Start: 2023-02-03 | End: 2023-02-09 | Stop reason: HOSPADM

## 2023-02-03 RX ORDER — IBUPROFEN 200 MG
16 TABLET ORAL
Status: DISCONTINUED | OUTPATIENT
Start: 2023-02-03 | End: 2023-02-09 | Stop reason: HOSPADM

## 2023-02-03 RX ORDER — ASPIRIN 325 MG
325 TABLET ORAL DAILY
Status: DISCONTINUED | OUTPATIENT
Start: 2023-02-04 | End: 2023-02-05

## 2023-02-03 RX ORDER — TALC
6 POWDER (GRAM) TOPICAL NIGHTLY PRN
Status: DISCONTINUED | OUTPATIENT
Start: 2023-02-03 | End: 2023-02-09 | Stop reason: HOSPADM

## 2023-02-03 RX ORDER — SODIUM CHLORIDE 0.9 % (FLUSH) 0.9 %
3 SYRINGE (ML) INJECTION EVERY 6 HOURS PRN
Status: DISCONTINUED | OUTPATIENT
Start: 2023-02-03 | End: 2023-02-09 | Stop reason: HOSPADM

## 2023-02-03 RX ORDER — ACETAMINOPHEN 325 MG/1
650 TABLET ORAL EVERY 8 HOURS PRN
Status: DISCONTINUED | OUTPATIENT
Start: 2023-02-03 | End: 2023-02-09 | Stop reason: HOSPADM

## 2023-02-03 RX ORDER — ONDANSETRON 2 MG/ML
4 INJECTION INTRAMUSCULAR; INTRAVENOUS EVERY 8 HOURS PRN
Status: DISCONTINUED | OUTPATIENT
Start: 2023-02-03 | End: 2023-02-09 | Stop reason: HOSPADM

## 2023-02-03 RX ORDER — LABETALOL HYDROCHLORIDE 5 MG/ML
10 INJECTION, SOLUTION INTRAVENOUS EVERY 4 HOURS PRN
Status: DISCONTINUED | OUTPATIENT
Start: 2023-02-03 | End: 2023-02-09 | Stop reason: HOSPADM

## 2023-02-03 RX ADMIN — LEVETIRACETAM 500 MG: 500 TABLET, FILM COATED ORAL at 08:02

## 2023-02-03 RX ADMIN — Medication 24 G: at 08:02

## 2023-02-03 NOTE — Clinical Note
The catheter was inserted into the left ventricle. Hemodynamics were performed.  and Pullback was recorded.  Pigtail

## 2023-02-03 NOTE — FIRST PROVIDER EVALUATION
"Medical screening examination initiated.  I have conducted a focused provider triage encounter, findings are as follows:    Brief history of present illness:  Patient states that SOB and "too much fluid."     There were no vitals filed for this visit.    Pertinent physical exam:  Awake, alert, ambulatory      Brief workup plan:  labs, EKG    Preliminary workup initiated; this workup will be continued and followed by the physician or advanced practice provider that is assigned to the patient when roomed.  "

## 2023-02-03 NOTE — Clinical Note
The catheter was repositioned into the ostial LIMA graft. An angiography was performed Multiple views were taken. The angiography was performed via power injection.

## 2023-02-03 NOTE — Clinical Note
The catheter was inserted into the ostium   left main. Hemodynamics were performed.  An angiography was performed of the left coronary arteries. Multiple views were taken. The angiography was performed via power injection.  JL

## 2023-02-03 NOTE — ED PROVIDER NOTES
Encounter Date: 2/3/2023    SCRIBE #1 NOTE: I, Yuri Buchanan, am scribing for, and in the presence of,  Dr. Calix. I have scribed the following portions of the note - the EKG reading. Other sections scribed: HPI, ROS, Physical Exam, MDM, Attending.     History     Chief Complaint   Patient presents with    Shortness of Breath     C/o SOB & abdominal swelling x3 months. Pt seen by cardiologist today & sent to ED for admission. Hx of CAD/stents. Compliant with lasix.     58 y/o CM with history of HLD, CHF, CAD s/p stents, type 2 DM and PVD presents to ED for SOB and abdominal distention onset about 3 months ago.  Pt also reports an episode of chest pain onset around 0800 this morning, which lasted about 15-20 minutes.  He says episodes like this are normal for him.  Pt was sent by his cardiologist for IV diuretics.  He is compliant with his Lasix.  Pt is on insulin and Xarelto 2.5mg BID as well.  Pt does not smoke tobacco.  He denies cough or fever.  His PCP is Dr. Guidry.    The history is provided by the patient.   Shortness of Breath  This is a recurrent problem. Episode onset: 3 months ago. Associated symptoms include chest pain (resolved). Pertinent negatives include no fever, no headaches, no sore throat, no ear pain, no cough, no wheezing, no vomiting, no abdominal pain, no rash and no leg swelling. Associated medical issues include CAD and heart failure.   Review of patient's allergies indicates:   Allergen Reactions    Pork derived (porcine)      Other reaction(s): Pork    Clopidogrel Hives and Rash     Past Medical History:   Diagnosis Date    BPH (benign prostatic hyperplasia)     CAD (coronary artery disease)     CHF (congestive heart failure)     GERD (gastroesophageal reflux disease)     Other hyperlipidemia     PVD (peripheral vascular disease)     Type 2 diabetes mellitus without complications      Past Surgical History:   Procedure Laterality Date    CHOLECYSTECTOMY      CORONARY ARTERY BYPASS GRAFT       RIGHT HEART CATHETERIZATION Right 9/20/2022    Procedure: INSERTION, CATHETER, RIGHT HEART;  Surgeon: Cory Oreilly MD;  Location: [facility] CATH LAB;  Service: Cardiology;  Laterality: Right;  CARDIOMEMS RJ ACCESS    TONSILLECTOMY       Family History   Problem Relation Age of Onset    Diabetes Mother     Hypertension Mother     Cancer Mother     Hypertension Father     Stroke Father     Diabetes Sister     Diabetes Brother     Kidney disease Brother      Social History     Tobacco Use    Smoking status: Never    Smokeless tobacco: Never   Substance Use Topics    Alcohol use: Never    Drug use: Not Currently     Review of Systems   Constitutional:  Negative for activity change, chills, diaphoresis, fatigue and fever.   HENT:  Negative for congestion, ear pain, sinus pain and sore throat.    Eyes:  Negative for visual disturbance.   Respiratory:  Positive for shortness of breath. Negative for cough, wheezing and stridor.    Cardiovascular:  Positive for chest pain (resolved). Negative for palpitations and leg swelling.   Gastrointestinal:  Negative for abdominal pain, constipation, diarrhea, nausea, rectal pain and vomiting.   Genitourinary:  Negative for dysuria and hematuria.   Musculoskeletal:  Negative for arthralgias, back pain and myalgias.   Skin:  Negative for rash.   Neurological:  Negative for dizziness, syncope, weakness, numbness and headaches.   All other systems reviewed and are negative.    Physical Exam     Initial Vitals [02/03/23 1309]   BP Pulse Resp Temp SpO2   (!) 159/84 84 20 98.1 °F (36.7 °C) 99 %      MAP       --         Physical Exam    Nursing note and vitals reviewed.  Constitutional: No distress.   HENT:   Head: Normocephalic and atraumatic.   Eyes: EOM are normal.   Neck: Trachea normal. Neck supple.   Normal range of motion.  Cardiovascular:  Normal rate and regular rhythm.           No murmur heard.  Pulmonary/Chest: Breath sounds normal. No respiratory distress.   Abdominal: Abdomen  is soft. Bowel sounds are normal. He exhibits no distension. There is no abdominal tenderness. There is no rebound and no guarding.   Musculoskeletal:         General: No edema. Normal range of motion.      Cervical back: Normal range of motion and neck supple.      Lumbar back: Normal.     Neurological: He is alert and oriented to person, place, and time. He has normal strength.   Skin: Skin is warm and dry. No rash noted.   Psychiatric: He has a normal mood and affect.       ED Course   Procedures  Labs Reviewed   COMPREHENSIVE METABOLIC PANEL - Abnormal; Notable for the following components:       Result Value    Glucose Level 134 (*)     Creatinine 1.96 (*)     Protein Total 8.5 (*)     Globulin 4.7 (*)     Albumin/Globulin Ratio 0.8 (*)     All other components within normal limits   CBC WITH DIFFERENTIAL - Abnormal; Notable for the following components:    RBC 4.10 (*)     Hgb 11.3 (*)     Hct 35.2 (*)     MCHC 32.1 (*)     MPV 12.4 (*)     IG# 0.10 (*)     All other components within normal limits   B-TYPE NATRIURETIC PEPTIDE - Normal   TROPONIN I - Normal   PROTIME-INR - Normal   CBC W/ AUTO DIFFERENTIAL    Narrative:     The following orders were created for panel order CBC Auto Differential.  Procedure                               Abnormality         Status                     ---------                               -----------         ------                     CBC with Differential[[id number]]        Abnormal            Final result                 Please view results for these tests on the individual orders.   HEMOGLOBIN A1C   POCT GLUCOSE MONITORING CONTINUOUS     EKG Readings: (Independently Interpreted)   Initial Reading: No STEMI. Rhythm: Normal Sinus Rhythm. Heart Rate: 79. Ectopy: No Ectopy. Conduction: Normal. ST Segments: Normal ST Segments. T Waves: Normal. Axis: Normal.   1308   ECG Results              EKG 12-lead (Final result)  Result time 02/03/23 15:53:29      Final result by Interface,  Lab In [facility] (02/03/23 15:53:29)                   Narrative:    Test Reason : R06.02,    Vent. Rate : 079 BPM     Atrial Rate : 079 BPM     P-R Int : 172 ms          QRS Dur : 094 ms      QT Int : 380 ms       P-R-T Axes : 024 -14 221 degrees     QTc Int : 435 ms    Normal sinus rhythm  Minimal voltage criteria for LVH, may be normal variant ( R in aVL )  Possible Lateral infarct ,age undetermined  Abnormal ECG  No previous ECGs available  Confirmed by Johan Perera MD ([id number]) on 2/3/2023 3:53:22 PM    Referred By:             Confirmed By:Johan Perera MD                                  Imaging Results              X-Ray Chest PA And Lateral (Final result)  Result time 02/03/23 13:44:21      Final result by Melisa Morales MD (02/03/23 13:44:21)                   Impression:      No acute abnormality of the chest.      Electronically signed by: Melisa Morales  Date:    02/03/2023  Time:    13:44               Narrative:    EXAMINATION:  XR CHEST PA AND LATERAL    CLINICAL HISTORY:  Shortness of Breath;    TECHNIQUE:  PA and lateral chest radiographs    COMPARISON:  Chest x-ray dated 04/09/2022    FINDINGS:  The heart is normal in size.  The lungs are clear without focal consolidation.  There is no pleural effusion or visible pneumothorax.                                       Medications   sodium chloride 0.9% flush 3 mL (has no administration in time range)   acetaminophen tablet 650 mg (has no administration in time range)   ondansetron injection 4 mg (has no administration in time range)   melatonin tablet 6 mg (has no administration in time range)   labetaloL injection 10 mg (has no administration in time range)   levETIRAcetam tablet 500 mg (has no administration in time range)   glucose chewable tablet 16 g (has no administration in time range)   glucose chewable tablet 24 g (has no administration in time range)   glucagon (human recombinant) injection 1 mg (has no administration in time  range)   dextrose 10% bolus 125 mL 125 mL (has no administration in time range)   dextrose 10% bolus 250 mL 250 mL (has no administration in time range)   aspirin tablet 325 mg (has no administration in time range)     Medical Decision Making:   Clinical Tests:   Lab Tests: Ordered and Reviewed  Radiological Study: Ordered and Reviewed  Medical Tests: Ordered and Reviewed        Scribe Attestation:   Scribe #1: I performed the above scribed service and the documentation accurately describes the services I performed. I attest to the accuracy of the note.    Attending Attestation:           Physician Attestation for Scribe:  Physician Attestation Statement for Scribe #1: I, reviewed documentation, as scribed by Yuri Buchanan in my presence, and it is both accurate and complete.           ED Course as of 02/03/23 2004 Fri Feb 03, 2023   1908 Yoanna RAMSEY, OK to admit for observation, give 1 dose of IV Lasix [KG]      ED Course User Index  [KG] Chirag Calix MD             Medical Decision Making  Differential diagnoses include, but are not limited to: CHF, bronchospasm, pneumonia, deconditioning, acute coronary syndrome    Amount and/or Complexity of Data Reviewed  Labs: ordered.  Radiology: ordered.  ECG/medicine tests: ordered.          Clinical Impression:   Final diagnoses:  [R06.02] SOB (shortness of breath)  [Z86.79] H/O CHF (Primary)  [R06.02] Shortness of breath        ED Disposition Condition    Observation Stable                Chirag Calix MD  02/03/23 2004

## 2023-02-04 LAB
ANION GAP SERPL CALC-SCNC: 11 MEQ/L
BNP BLD-MCNC: 226.9 PG/ML
BUN SERPL-MCNC: 17.6 MG/DL (ref 8.4–25.7)
CALCIUM SERPL-MCNC: 9.3 MG/DL (ref 8.4–10.2)
CHLORIDE SERPL-SCNC: 104 MMOL/L (ref 98–107)
CO2 SERPL-SCNC: 25 MMOL/L (ref 22–29)
CREAT SERPL-MCNC: 1.6 MG/DL (ref 0.73–1.18)
CREAT/UREA NIT SERPL: 11
GFR SERPLBLD CREATININE-BSD FMLA CKD-EPI: 50 MLS/MIN/1.73/M2
GLUCOSE SERPL-MCNC: 220 MG/DL (ref 74–100)
POCT GLUCOSE: 201 MG/DL (ref 70–110)
POCT GLUCOSE: 249 MG/DL (ref 70–110)
POCT GLUCOSE: 268 MG/DL (ref 70–110)
POTASSIUM SERPL-SCNC: 4.6 MMOL/L (ref 3.5–5.1)
SODIUM SERPL-SCNC: 140 MMOL/L (ref 136–145)
TROPONIN I SERPL-MCNC: <0.01 NG/ML (ref 0–0.04)

## 2023-02-04 PROCEDURE — 96372 THER/PROPH/DIAG INJ SC/IM: CPT

## 2023-02-04 PROCEDURE — 83880 ASSAY OF NATRIURETIC PEPTIDE: CPT | Performed by: NURSE PRACTITIONER

## 2023-02-04 PROCEDURE — G0378 HOSPITAL OBSERVATION PER HR: HCPCS

## 2023-02-04 PROCEDURE — 80048 BASIC METABOLIC PNL TOTAL CA: CPT | Performed by: EMERGENCY MEDICINE

## 2023-02-04 PROCEDURE — 84484 ASSAY OF TROPONIN QUANT: CPT | Performed by: EMERGENCY MEDICINE

## 2023-02-04 PROCEDURE — 25500020 PHARM REV CODE 255: Performed by: INTERNAL MEDICINE

## 2023-02-04 PROCEDURE — 25000003 PHARM REV CODE 250: Performed by: NURSE PRACTITIONER

## 2023-02-04 PROCEDURE — 63600175 PHARM REV CODE 636 W HCPCS

## 2023-02-04 PROCEDURE — 25000003 PHARM REV CODE 250: Performed by: EMERGENCY MEDICINE

## 2023-02-04 RX ORDER — TAMSULOSIN HYDROCHLORIDE 0.4 MG/1
0.4 CAPSULE ORAL DAILY
Status: DISCONTINUED | OUTPATIENT
Start: 2023-02-04 | End: 2023-02-09 | Stop reason: HOSPADM

## 2023-02-04 RX ORDER — FLUTICASONE PROPIONATE 50 MCG
1 SPRAY, SUSPENSION (ML) NASAL DAILY
Status: DISCONTINUED | OUTPATIENT
Start: 2023-02-05 | End: 2023-02-09 | Stop reason: HOSPADM

## 2023-02-04 RX ORDER — PRASUGREL 10 MG/1
10 TABLET, FILM COATED ORAL DAILY
Status: DISCONTINUED | OUTPATIENT
Start: 2023-02-04 | End: 2023-02-09 | Stop reason: HOSPADM

## 2023-02-04 RX ORDER — QUETIAPINE FUMARATE 25 MG/1
25 TABLET, FILM COATED ORAL NIGHTLY
Status: DISCONTINUED | OUTPATIENT
Start: 2023-02-04 | End: 2023-02-09 | Stop reason: HOSPADM

## 2023-02-04 RX ORDER — LEVOTHYROXINE SODIUM 25 UG/1
25 TABLET ORAL
Status: DISCONTINUED | OUTPATIENT
Start: 2023-02-05 | End: 2023-02-09 | Stop reason: HOSPADM

## 2023-02-04 RX ORDER — LEVETIRACETAM 500 MG/1
500 TABLET ORAL 2 TIMES DAILY
Status: DISCONTINUED | OUTPATIENT
Start: 2023-02-04 | End: 2023-02-04

## 2023-02-04 RX ORDER — ATORVASTATIN CALCIUM 40 MG/1
80 TABLET, FILM COATED ORAL NIGHTLY
Status: DISCONTINUED | OUTPATIENT
Start: 2023-02-04 | End: 2023-02-09 | Stop reason: HOSPADM

## 2023-02-04 RX ORDER — EZETIMIBE 10 MG/1
10 TABLET ORAL DAILY
Status: DISCONTINUED | OUTPATIENT
Start: 2023-02-04 | End: 2023-02-09 | Stop reason: HOSPADM

## 2023-02-04 RX ORDER — SPIRONOLACTONE 25 MG/1
25 TABLET ORAL DAILY
Status: DISCONTINUED | OUTPATIENT
Start: 2023-02-04 | End: 2023-02-06

## 2023-02-04 RX ORDER — IBUPROFEN 200 MG
24 TABLET ORAL
Status: DISCONTINUED | OUTPATIENT
Start: 2023-02-04 | End: 2023-02-09 | Stop reason: HOSPADM

## 2023-02-04 RX ORDER — LACTULOSE 10 G/15ML
10 SOLUTION ORAL EVERY 6 HOURS PRN
Status: DISCONTINUED | OUTPATIENT
Start: 2023-02-04 | End: 2023-02-08

## 2023-02-04 RX ORDER — ASPIRIN 81 MG/1
81 TABLET ORAL DAILY
Status: DISCONTINUED | OUTPATIENT
Start: 2023-02-04 | End: 2023-02-09 | Stop reason: HOSPADM

## 2023-02-04 RX ORDER — CETIRIZINE HYDROCHLORIDE 10 MG/1
10 TABLET ORAL DAILY
Status: DISCONTINUED | OUTPATIENT
Start: 2023-02-04 | End: 2023-02-09 | Stop reason: HOSPADM

## 2023-02-04 RX ORDER — IBUPROFEN 200 MG
16 TABLET ORAL
Status: DISCONTINUED | OUTPATIENT
Start: 2023-02-04 | End: 2023-02-09 | Stop reason: HOSPADM

## 2023-02-04 RX ORDER — UBIDECARENONE 75 MG
2000 CAPSULE ORAL DAILY
Status: DISCONTINUED | OUTPATIENT
Start: 2023-02-04 | End: 2023-02-09 | Stop reason: HOSPADM

## 2023-02-04 RX ORDER — HYDROCODONE BITARTRATE AND ACETAMINOPHEN 7.5; 325 MG/1; MG/1
1 TABLET ORAL EVERY 8 HOURS PRN
Status: DISCONTINUED | OUTPATIENT
Start: 2023-02-04 | End: 2023-02-09 | Stop reason: HOSPADM

## 2023-02-04 RX ORDER — INSULIN ASPART 100 [IU]/ML
1-10 INJECTION, SOLUTION INTRAVENOUS; SUBCUTANEOUS
Status: DISCONTINUED | OUTPATIENT
Start: 2023-02-04 | End: 2023-02-09 | Stop reason: HOSPADM

## 2023-02-04 RX ORDER — CARVEDILOL 12.5 MG/1
25 TABLET ORAL 2 TIMES DAILY
Status: DISCONTINUED | OUTPATIENT
Start: 2023-02-04 | End: 2023-02-09 | Stop reason: HOSPADM

## 2023-02-04 RX ORDER — HYDROXYZINE HYDROCHLORIDE 25 MG/1
50 TABLET, FILM COATED ORAL 3 TIMES DAILY
Status: DISCONTINUED | OUTPATIENT
Start: 2023-02-04 | End: 2023-02-09 | Stop reason: HOSPADM

## 2023-02-04 RX ORDER — RANOLAZINE 500 MG/1
1000 TABLET, EXTENDED RELEASE ORAL 2 TIMES DAILY
Status: DISCONTINUED | OUTPATIENT
Start: 2023-02-04 | End: 2023-02-09 | Stop reason: HOSPADM

## 2023-02-04 RX ORDER — TORSEMIDE 20 MG/1
20 TABLET ORAL 2 TIMES DAILY
Status: DISCONTINUED | OUTPATIENT
Start: 2023-02-04 | End: 2023-02-06

## 2023-02-04 RX ORDER — MONTELUKAST SODIUM 10 MG/1
10 TABLET ORAL DAILY
Status: DISCONTINUED | OUTPATIENT
Start: 2023-02-04 | End: 2023-02-09 | Stop reason: HOSPADM

## 2023-02-04 RX ORDER — PANTOPRAZOLE SODIUM 40 MG/1
40 TABLET, DELAYED RELEASE ORAL DAILY
Status: DISCONTINUED | OUTPATIENT
Start: 2023-02-04 | End: 2023-02-09 | Stop reason: HOSPADM

## 2023-02-04 RX ORDER — NITROGLYCERIN 0.4 MG/1
0.4 TABLET SUBLINGUAL EVERY 5 MIN PRN
Status: DISCONTINUED | OUTPATIENT
Start: 2023-02-04 | End: 2023-02-09 | Stop reason: HOSPADM

## 2023-02-04 RX ORDER — GLUCAGON 1 MG
1 KIT INJECTION
Status: DISCONTINUED | OUTPATIENT
Start: 2023-02-04 | End: 2023-02-09 | Stop reason: HOSPADM

## 2023-02-04 RX ORDER — LACOSAMIDE 50 MG/1
50 TABLET ORAL DAILY
Status: DISCONTINUED | OUTPATIENT
Start: 2023-02-04 | End: 2023-02-09 | Stop reason: HOSPADM

## 2023-02-04 RX ORDER — AZELASTINE 1 MG/ML
1 SPRAY, METERED NASAL 2 TIMES DAILY
Status: DISCONTINUED | OUTPATIENT
Start: 2023-02-04 | End: 2023-02-09 | Stop reason: HOSPADM

## 2023-02-04 RX ORDER — ISOSORBIDE MONONITRATE 60 MG/1
60 TABLET, EXTENDED RELEASE ORAL DAILY
Status: DISCONTINUED | OUTPATIENT
Start: 2023-02-04 | End: 2023-02-09 | Stop reason: HOSPADM

## 2023-02-04 RX ADMIN — CARVEDILOL 25 MG: 12.5 TABLET, FILM COATED ORAL at 10:02

## 2023-02-04 RX ADMIN — PANTOPRAZOLE SODIUM 40 MG: 40 TABLET, DELAYED RELEASE ORAL at 10:02

## 2023-02-04 RX ADMIN — RANOLAZINE 1000 MG: 500 TABLET, EXTENDED RELEASE ORAL at 08:02

## 2023-02-04 RX ADMIN — LEVETIRACETAM 500 MG: 500 TABLET, FILM COATED ORAL at 08:02

## 2023-02-04 RX ADMIN — TAMSULOSIN HYDROCHLORIDE 0.4 MG: 0.4 CAPSULE ORAL at 10:02

## 2023-02-04 RX ADMIN — SPIRONOLACTONE 25 MG: 25 TABLET ORAL at 10:02

## 2023-02-04 RX ADMIN — CETIRIZINE HYDROCHLORIDE 10 MG: 10 TABLET, FILM COATED ORAL at 10:02

## 2023-02-04 RX ADMIN — HYDROXYZINE HYDROCHLORIDE 50 MG: 25 TABLET ORAL at 03:02

## 2023-02-04 RX ADMIN — RANOLAZINE 1000 MG: 500 TABLET, EXTENDED RELEASE ORAL at 10:02

## 2023-02-04 RX ADMIN — MONTELUKAST 10 MG: 10 TABLET, FILM COATED ORAL at 10:02

## 2023-02-04 RX ADMIN — EZETIMIBE 10 MG: 10 TABLET ORAL at 10:02

## 2023-02-04 RX ADMIN — CARVEDILOL 25 MG: 12.5 TABLET, FILM COATED ORAL at 08:02

## 2023-02-04 RX ADMIN — ASPIRIN 325 MG ORAL TABLET 325 MG: 325 PILL ORAL at 08:02

## 2023-02-04 RX ADMIN — CYANOCOBALAMIN TAB 500 MCG 2000 MCG: 500 TAB at 10:02

## 2023-02-04 RX ADMIN — INSULIN ASPART 4 UNITS: 100 INJECTION, SOLUTION INTRAVENOUS; SUBCUTANEOUS at 06:02

## 2023-02-04 RX ADMIN — IOPAMIDOL 100 ML: 755 INJECTION, SOLUTION INTRAVENOUS at 10:02

## 2023-02-04 RX ADMIN — ATORVASTATIN CALCIUM 80 MG: 40 TABLET, FILM COATED ORAL at 08:02

## 2023-02-04 RX ADMIN — QUETIAPINE FUMARATE 25 MG: 25 TABLET ORAL at 08:02

## 2023-02-04 RX ADMIN — TORSEMIDE 20 MG: 20 TABLET ORAL at 10:02

## 2023-02-04 RX ADMIN — PRASUGREL 10 MG: 10 TABLET, FILM COATED ORAL at 10:02

## 2023-02-04 RX ADMIN — HYDROXYZINE HYDROCHLORIDE 50 MG: 25 TABLET ORAL at 08:02

## 2023-02-04 RX ADMIN — LACOSAMIDE 50 MG: 50 TABLET, FILM COATED ORAL at 10:02

## 2023-02-04 RX ADMIN — LEVETIRACETAM 500 MG: 500 TABLET, FILM COATED ORAL at 09:02

## 2023-02-04 RX ADMIN — TORSEMIDE 20 MG: 20 TABLET ORAL at 08:02

## 2023-02-04 RX ADMIN — ASPIRIN 81 MG: 81 TABLET, COATED ORAL at 10:02

## 2023-02-04 RX ADMIN — ISOSORBIDE MONONITRATE 60 MG: 60 TABLET, EXTENDED RELEASE ORAL at 10:02

## 2023-02-04 NOTE — CONSULTS
[facility]  Pulmonary Critical Care Note    Patient Name: Luigi Gotti  MRN: [id number]  Admission Date: 2/3/2023  Hospital Length of Stay: 0 days  Code Status: Full Code  Attending Provider: Cory Oreilly MD  Primary Care Provider: RON Esquivel     Subjective:     HPI:   This is a 57-year old male with a past medical history of HF and CAD who was sent to the ED by his cardiologist due to shortness of breath and abdominal swelling. He was sent for IV diuretics. Initial BNP was 89.6, however today it 226.9. Troponin WNL. CTA of chest negative for PE, lungs clear without consolidation or any significant findings. Pulmonary is being consulted for shortness of breath. He has not received IV diuretics and was started on oral spironolactone and torsemide. Patient reports ongoing shortness of breath for the past several weeks. He states he is a nonsmoker. Denies fever. Reports an occasional cough with clear sputum. No wheezing. Reports weight gain and abdominal swelling. Nursing stated he does use a CPAP at home but did not bring it to the hospital. He is on room air. Per cardiology notes his EF is 40% from echo on 1/26/23 with mild/mod MR and echo from 4/5/22 demonstrated grade 2 diastolic dysfunction.    Hospital Course/Significant events:  As noted above    24 Hour Interval History:  N/A    Past Medical History:   Diagnosis Date    BPH (benign prostatic hyperplasia)     CAD (coronary artery disease)     CHF (congestive heart failure)     GERD (gastroesophageal reflux disease)     Other hyperlipidemia     PVD (peripheral vascular disease)     Type 2 diabetes mellitus without complications        Past Surgical History:   Procedure Laterality Date    CHOLECYSTECTOMY      CORONARY ARTERY BYPASS GRAFT      RIGHT HEART CATHETERIZATION Right 9/20/2022    Procedure: INSERTION, CATHETER, RIGHT HEART;  Surgeon: Cory Oreilly MD;  Location: [facility] CATH LAB;  Service: Cardiology;   Laterality: Right;  CARDIOMEMS RJ ACCESS    TONSILLECTOMY         Social History     Socioeconomic History    Marital status:      Spouse name: Viktoria    Number of children: 1   Tobacco Use    Smoking status: Never    Smokeless tobacco: Never   Substance and Sexual Activity    Alcohol use: Never    Drug use: Not Currently     Social Determinants of Health     Financial Resource Strain: Low Risk     Difficulty of Paying Living Expenses: Not very hard   Food Insecurity: No Food Insecurity    Worried About Running Out of Food in the Last Year: Never true    Ran Out of Food in the Last Year: Never true   Transportation Needs: No Transportation Needs    Lack of Transportation (Medical): No    Lack of Transportation (Non-Medical): No   Physical Activity: Inactive    Days of Exercise per Week: 0 days    Minutes of Exercise per Session: 0 min   Stress: Stress Concern Present    Feeling of Stress : Very much   Social Connections: Socially Isolated    Frequency of Communication with Friends and Family: Once a week    Frequency of Social Gatherings with Friends and Family: Never    Attends Anabaptist Services: Never    Active Member of Clubs or Organizations: No    Attends Club or Organization Meetings: Never    Marital Status:    Housing Stability: Low Risk     Unable to Pay for Housing in the Last Year: No    Number of Places Lived in the Last Year: 1    Unstable Housing in the Last Year: No           Current Outpatient Medications   Medication Instructions    aspirin (ECOTRIN) 81 mg, Oral, Daily    atorvastatin (LIPITOR) 80 mg, Oral, Nightly    azelastine (ASTELIN) 137 mcg (0.1 %) nasal spray 1 spray, Nasal, 2 times daily    carvediloL (COREG) 25 mg, Oral, 2 times daily    cyanocobalamin (VITAMIN B-12) 2,000 mcg, Oral, Daily    ezetimibe (ZETIA) 10 mg, Oral, Daily    fluticasone propionate (FLONASE) 50 mcg/actuation nasal spray Flonase Allergy Relief Take No date recorded No form recorded No frequency recorded  No route recorded No set duration recorded No set duration amount recorded active No dosage strength recorded No dosage strength units of measure recorded    HYDROcodone-acetaminophen (NORCO) 7.5-325 mg per tablet 1 tablet, Oral, Every 8 hours PRN    hydrOXYzine (ATARAX) 50 mg, Oral, 3 times daily    isosorbide mononitrate (IMDUR) 60 mg, Oral, Daily    lacosamide (VIMPAT) 50 mg, Oral, Daily    lactulose (CHRONULAC) 10 gram/15 mL solution TAKE 15 MLS BY MOUTH 2 TIMES DAILY AS NEEDED FOR CONSTIPATION    levETIRAcetam (KEPPRA) 500 mg, Oral, 2 times daily    levocetirizine (XYZAL) 5 mg, Oral, Daily    levothyroxine (SYNTHROID) 25 mcg, Oral, Before breakfast    metoclopramide HCl (REGLAN) 10 mg, Oral, Daily    montelukast (SINGULAIR) 10 mg, Oral, Daily    nitroGLYCERIN (NITROSTAT) 0.4 mg, Sublingual, As needed (PRN)    NOVOLIN 70/30 U-100 INSULIN 100 unit/mL (70-30) injection 80 units am , 92 units pm and 3 times a day before each meal per sliding scale    omeprazole (PRILOSEC) 40 mg, Oral, Nightly    prasugreL (EFFIENT) 10 mg, Oral, Daily    QUEtiapine (SEROQUEL) 25 mg, Oral, Nightly    ranolazine (RANEXA) 1,000 mg, Oral, 2 times daily    spironolactone (ALDACTONE) 25 mg, Oral, Daily    tamsulosin (FLOMAX) 0.4 mg Cap TAKE 1 CAPSULE BY MOUTH ONCE DAILY BEFORE A MEAL    torsemide (DEMADEX) 20 mg, Oral, 2 times daily    TRUE METRIX GLUCOSE METER Misc TEST 3 TIMES A DAY    TRUETRACK TEST Strp TEST 3 TIMES A DAY       Current Inpatient Medications   aspirin  81 mg Oral Daily    aspirin  325 mg Oral Daily    atorvastatin  80 mg Oral QHS    azelastine  1 spray Nasal BID    carvediloL  25 mg Oral BID    cetirizine  10 mg Oral Daily    cyanocobalamin  2,000 mcg Oral Daily    ezetimibe  10 mg Oral Daily    [START ON 2/5/2023] fluticasone propionate  1 spray Each Nostril Daily    hydrOXYzine  50 mg Oral TID    insulin NPH-insulin regular (70/30)  45 Units Subcutaneous BID AC    isosorbide mononitrate  60 mg Oral Daily     lacosamide  50 mg Oral Daily    levETIRAcetam  500 mg Oral BID    levETIRAcetam  500 mg Oral BID    [START ON 2/5/2023] levothyroxine  25 mcg Oral Before breakfast    montelukast  10 mg Oral Daily    pantoprazole  40 mg Oral Daily    prasugreL  10 mg Oral Daily    QUEtiapine  25 mg Oral QHS    ranolazine  1,000 mg Oral BID    spironolactone  25 mg Oral Daily    tamsulosin  0.4 mg Oral Daily    torsemide  20 mg Oral BID       Current Intravenous Infusions        ROS       Objective:     No intake or output data in the 24 hours ending 02/04/23 1314      Vital Signs (Most Recent):  Temp: 98.1 °F (36.7 °C) (02/04/23 1144)  Pulse: 77 (02/04/23 1144)  Resp: 18 (02/04/23 1144)  BP: (!) 142/78 (02/04/23 1144)  SpO2: 98 % (02/04/23 1144)    Body mass index is 34.3 kg/m².  Weight: 117.9 kg (260 lb) Vital Signs (24h Range):  Temp:  [97.8 °F (36.6 °C)-98.1 °F (36.7 °C)] 98.1 °F (36.7 °C)  Pulse:  [72-83] 77  Resp:  [14-18] 18  SpO2:  [95 %-100 %] 98 %  BP: (133-169)/(77-98) 142/78     Physical Exam  Vitals reviewed.   Constitutional:       Appearance: Normal appearance.   HENT:      Head: Normocephalic and atraumatic.   Cardiovascular:      Rate and Rhythm: Normal rate and regular rhythm.   Pulmonary:      Effort: Pulmonary effort is normal.      Breath sounds: Normal breath sounds. No wheezing or rhonchi.   Abdominal:      General: Bowel sounds are normal. There is distension.      Palpations: Abdomen is soft.   Neurological:      General: No focal deficit present.      Mental Status: He is alert.   Psychiatric:         Mood and Affect: Mood normal.         Lines/Drains/Airways       Peripheral Intravenous Line  Duration                  Peripheral IV - Single Lumen 02/03/23 1740 20 G Right Antecubital <1 day                    Significant Labs:    Lab Results   Component Value Date    WBC 10.9 02/03/2023    HGB 11.3 (L) 02/03/2023    HCT 35.2 (L) 02/03/2023    MCV 85.9 02/03/2023     02/03/2023         BMP  Lab Results    Component Value Date     02/04/2023    K 4.6 02/04/2023    CHLORIDE 104 02/04/2023    CO2 25 02/04/2023    BUN 17.6 02/04/2023    CREATININE 1.60 (H) 02/04/2023    CALCIUM 9.3 02/04/2023    AGAP 11.0 02/04/2023    EGFRNONAA >60 04/13/2022       ABG  No results for input(s): PH, PO2, PCO2, HCO3, BE in the last 168 hours.    Mechanical Ventilation Support:       Significant Imaging:  I have reviewed the pertinent imaging within the past 24 hours.        Assessment/Plan:     Assessment  Shortness of breath  Chronic combined systolic and diastolic HF      Plan  - Reviewed CT of chest. There does not appear to be any pulmonary process evident on imaging to explain patients shortness of breath. Patient is not wheezing and does not have a history of asthma. He also reports being a nonsmoker. He is on room air.       RON Ag  Pulmonary Critical Care Medicine  [facility] - [facility]

## 2023-02-04 NOTE — NURSING
Pharmacy can't get 70/30 insulin, paged and NP earl Garcia ordered sliding scale for pt. If pt can bring home med she stated can adjust back to home dosing tomorrow.

## 2023-02-04 NOTE — H&P
[facility] - [facility] Telemetry  Cardiology  History and Physical     Patient Name: Luigi Gotti  MRN: [id number]  Admission Date: 2/3/2023  Code Status: Full Code   Attending Provider: Cory Oreilly MD   Primary Care Physician: RON Esquivel  Principal Problem:<principal problem not specified>    Patient information was obtained from patient, past medical records, and ER records.     Subjective:     Chief Complaint:  SOB     HPI:   Mr. Gotti is a 58 y/o male who is known to [facility], Dr. Oreilly. The patient presented to [facility] ER with c/o SOB after being seen in clinic by Dr. Oreilly.  Patient had been given multiple rounds of IV Lasix in the clinic without good response.  He remains significantly volume overloaded with increasing shortness of breath and increasing chest pain which was found to be volume related.  He was directed to the ER from clinic.  Patient was found to have NINFA on CKD.  BNP was within normal limits.  Chest x-ray was negative for acute cardiopulmonary process.  [facility] has admitted the patient for fluid volume overload.      PMH: CAD/CABG/PCI, HTN/HHD with Hx of HF, ICMO, HLD, PAD, VI, Celiac Artery Occlusion, DM II, Chronic Pain, GERD, Gastroparesis/Gastritis/Ischemic Colitis (Dr. Green), BPH  PSH: Tonsillectomy, Cholecystectomy, LHC/Stent, CABG x2 (LIMA to LAD and sequentially to Diagonal), Celiac Bypass, Visceral Arteriogram, Cataract Extraction  Social History: Denies ETOH, Tobacco, and Illicit Drug Use  Family History: Father, D, CVA; Mother, D, Breast Cancer, Clotting Disorder; Brother, D, Cardiac Arrhythmia, DM II, HTN, Seizures; Sister, L, DM II    Diagnostics:   Echo 1.26.23  The study quality is average.   The left ventricle is normal in size. Global left ventricular systolic function is normal. The left ventricular EF is 40%.   The left atrial diameter is mildly increased.   Dilatation of the aortic root is noted at 4 cms.  Mild to moderate (1-2+) mitral  regurgitation. Trace aortic regurgitation. Trace pulmonic regurgitation.   Inadequate TR to calculate RVSP.    Pulmonary angiogram/CardioMems 9.20.22  The estimated blood loss was <50 mL.  Normal left pulmonary angiogram  Successful placement of a CardioMEMS device in the left pulmonary artery    ECHO 4.5.22:  There is mid inferior and lateral hypokinesis with a moderately reduced LV systolic function. EF is estimated at 35-40%  Grade 2 diastolic dysfunction.  There is mild-moderate eccentric posterior jet of MR.  There is mild-moderate tricuspid regurgitation with RVSP estimated at 40-45mmHg    LHC 3.9.22:  Procedures performed:  1. IFR interrogation of left main to circumflex value 1.0, normal  2. Coronary angiography  3. Left ventricular dynamics  4. LIMA angiography  5. Ultrasound-guided right groin access  Findings:  1. Left main-distal 30% stenosis  2. Circumflex-diffuse 50% proximal stenosis, patent mid distal stents, IFR 1  3. RCA-proximal 20% stenosis, patent mid stents  4. LAD-chronic total occlusion, and stent  5. LIMA-jump graft to diagonal, LAD patent  6. LVEDP 26  Recommendations  1. Aggressive medical therapy for nonobstructive CAD and elevated EDP  2. Manual pressure for access site hemostasis  3. Outpatient intensive cardiac rehab referral for stable angina    ECHO 3.2.22  Technically limited exam due to poor acoustical windows and body habitus; Definity used.  LV Normal thickness and volume.  LV systolic function is moderately reduced .  Visually estimated EF% is 35-40% . Regional wall motion abnormality in the LAD territory.  Normal right ventricle size with preserved RV systolic function.  Imaging inadequate to measure pulmonary artery pressures.    ECHO 7.20.21  Dilated left atrium  Moderate LV systolic dysfunction EF 40-45%, with moderate concentric LVH  Moderate MR  Trace TR    C 5.13.21  1. CAD (findings similar to prior angiogram)  - Left main: Widely patent.  - LAD: Occluded mid vessel.  Competitive flow noted from LAD. Diagonal branches are patent but small in nature.  - Circumflex: Widely patent. Stents show minimal in-stent restenosis.  - LIMA to LAD is widely patent  2. Elevated left ventricular end-diastolic pressure of 26 mm Hg  3. Patent mid vessel abdominal aorta. Patent SMA. Widely patent celiac artery with minimal in-stent restenosis  4. Left lower extremity:  - Patent SFA with mild in-stent restenosis. Mid vessel 60-70% in-stent restenosis noted.  - Patent common femoral artery  - Left infrapopliteal: 2 vessel runoff  5. Right lower extremity:  - Patent common femoral and SFA with luminal irregularities. Distal SFA close to the adductor canal has 70% stenosis  prior to a recent placed stent. Distal SFA stent is widely patent. Popliteal artery is widely patent  - Right infrapopliteal: 2 vessel runoff    PET 8.17.20  This is an abnormal perfusion study. Study is consistent with mostly scar tissue with minimal ischemia.   This scan is suggestive of moderate risk for future cardiovascular events.   Small fixed perfusion abnormality of moderate intensity in the apical segment. Small fixed perfusion abnormality of moderate intensity in the anterior septal region. Small fixed perfusion abnormality of severe intensity in the inferior region. Small partially reversible perfusion abnormality of severe intensity in the inferior lateral region.   The left ventricular cavity is noted to be mildly enlarged on the stress studies. The stress left ventricular ejection fraction was calculated to be 47% and left ventricular global function is normal. The rest left ventricular cavity is noted to be normal. The rest left ventricular ejection fraction was calculated to be 39% and rest left ventricular global function is moderately reduced.   Persistent hypokinesis of the inferior region is noted in both rest and stress studies. When compared to the resting ejection fraction (39%), the stress ejection fraction  (47%) has increased.   The study quality is good.                Review of patient's allergies indicates:   Allergen Reactions    Pork derived (porcine)      Other reaction(s): Pork    Clopidogrel Hives and Rash       No current facility-administered medications on file prior to encounter.     Current Outpatient Medications on File Prior to Encounter   Medication Sig    aspirin (ECOTRIN) 81 MG EC tablet Take 81 mg by mouth once daily.    atorvastatin (LIPITOR) 80 MG tablet Take 80 mg by mouth every evening.    azelastine (ASTELIN) 137 mcg (0.1 %) nasal spray 1 spray by Nasal route 2 (two) times daily.    carvediloL (COREG) 25 MG tablet Take 25 mg by mouth 2 (two) times daily.    cyanocobalamin (VITAMIN B-12) 1000 MCG tablet Take 2,000 mcg by mouth once daily at 6am.    ezetimibe (ZETIA) 10 mg tablet Take 10 mg by mouth once daily at 6am.    fluticasone propionate (FLONASE) 50 mcg/actuation nasal spray Flonase Allergy Relief Take No date recorded No form recorded No frequency recorded No route recorded No set duration recorded No set duration amount recorded active No dosage strength recorded No dosage strength units of measure recorded    HYDROcodone-acetaminophen (NORCO) 7.5-325 mg per tablet Take 1 tablet by mouth every 8 (eight) hours as needed.    hydrOXYzine (ATARAX) 50 MG tablet Take 50 mg by mouth 3 (three) times daily.    isosorbide mononitrate (IMDUR) 60 MG 24 hr tablet Take 60 mg by mouth once daily.    lacosamide (VIMPAT) 50 mg Tab Take 1 tablet (50 mg total) by mouth once daily.    lactulose (CHRONULAC) 10 gram/15 mL solution TAKE 15 MLS BY MOUTH 2 TIMES DAILY AS NEEDED FOR CONSTIPATION    levETIRAcetam (KEPPRA) 500 MG Tab Take 1 tablet (500 mg total) by mouth 2 (two) times daily.    levocetirizine (XYZAL) 5 MG tablet Take 5 mg by mouth once daily.    levothyroxine (SYNTHROID) 25 MCG tablet Take 1 tablet (25 mcg total) by mouth before breakfast.    metoclopramide HCl (REGLAN) 10 MG tablet Take 10 mg by  mouth once daily.    montelukast (SINGULAIR) 10 mg tablet Take 1 tablet (10 mg total) by mouth once daily.    NOVOLIN 70/30 U-100 INSULIN 100 unit/mL (70-30) injection 80 units am , 92 units pm and 3 times a day before each meal per sliding scale    omeprazole (PRILOSEC) 40 MG capsule Take 1 capsule (40 mg total) by mouth every evening.    prasugreL (EFFIENT) 10 mg Tab Take 10 mg by mouth once daily.    QUEtiapine (SEROQUEL) 25 MG Tab Take 1 tablet (25 mg total) by mouth every evening.    ranolazine (RANEXA) 1,000 mg Tb12 Take 1,000 mg by mouth 2 (two) times daily.    spironolactone (ALDACTONE) 25 MG tablet Take 1 tablet (25 mg total) by mouth once daily.    tamsulosin (FLOMAX) 0.4 mg Cap TAKE 1 CAPSULE BY MOUTH ONCE DAILY BEFORE A MEAL    torsemide (DEMADEX) 20 MG Tab Take 20 mg by mouth 2 (two) times a day.    nitroGLYCERIN (NITROSTAT) 0.4 MG SL tablet Place 0.4 mg under the tongue as needed.    TRUE METRIX GLUCOSE METER Misc TEST 3 TIMES A DAY    TRUETRACK TEST Strp TEST 3 TIMES A DAY         Review of Systems   Constitutional: Negative.   HENT: Negative.     Eyes: Negative.    Cardiovascular:  Positive for chest pain, dyspnea on exertion and leg swelling.   Respiratory:  Positive for shortness of breath.    Endocrine: Negative.    Hematologic/Lymphatic: Negative.    Skin: Negative.    Musculoskeletal: Negative.    Gastrointestinal: Negative.    Genitourinary: Negative.    Neurological: Negative.    Psychiatric/Behavioral: Negative.     Allergic/Immunologic: Negative.    Objective:     Vital Signs (Most Recent):  Temp: 97.8 °F (36.6 °C) (02/04/23 0844)  Pulse: 72 (02/04/23 0844)  Resp: 16 (02/04/23 0844)  BP: 133/86 (02/04/23 0844)  SpO2: 95 % (02/04/23 0844) Vital Signs (24h Range):  Temp:  [97.8 °F (36.6 °C)-98.1 °F (36.7 °C)] 97.8 °F (36.6 °C)  Pulse:  [72-84] 72  Resp:  [14-20] 16  SpO2:  [95 %-100 %] 95 %  BP: (133-169)/(77-98) 133/86     Weight: 117.9 kg (260 lb)  Body mass index is 34.3 kg/m².    SpO2: 95  %       No intake or output data in the 24 hours ending 02/04/23 0857    Lines/Drains/Airways       Peripheral Intravenous Line  Duration                  Peripheral IV - Single Lumen 02/03/23 1740 20 G Right Antecubital <1 day                    Physical Exam  Constitutional:       Appearance: Normal appearance.   HENT:      Head: Normocephalic.   Eyes:      Extraocular Movements: Extraocular movements intact.      Conjunctiva/sclera: Conjunctivae normal.   Cardiovascular:      Rate and Rhythm: Normal rate and regular rhythm.      Pulses: Normal pulses.      Heart sounds: Normal heart sounds.   Pulmonary:      Effort: Pulmonary effort is normal.      Breath sounds: Normal breath sounds.   Abdominal:      General: Abdomen is flat.   Musculoskeletal:         General: Normal range of motion.      Cervical back: Normal range of motion and neck supple.   Skin:     General: Skin is warm and dry.   Neurological:      Mental Status: He is alert and oriented to person, place, and time.       Significant Labs: CMP   Recent Labs   Lab 02/03/23  1325 02/04/23  0328    140   K 4.2 4.6   CO2 25 25   BUN 21.2 17.6   CREATININE 1.96* 1.60*   CALCIUM 9.5 9.3   ALBUMIN 3.8  --    BILITOT 0.5  --    ALKPHOS 129  --    AST 21  --    ALT 19  --    , CBC   Recent Labs   Lab 02/03/23  1325   WBC 10.9   HGB 11.3*   HCT 35.2*      , and Troponin   Recent Labs   Lab 02/03/23  1325 02/04/23  0328   TROPONINI <0.010 <0.010       Significant Imaging:   Imaging Results              X-Ray Chest PA And Lateral (Final result)  Result time 02/03/23 13:44:21      Final result by Melisa Morales MD (02/03/23 13:44:21)                   Impression:      No acute abnormality of the chest.      Electronically signed by: Melisa Morales  Date:    02/03/2023  Time:    13:44               Narrative:    EXAMINATION:  XR CHEST PA AND LATERAL    CLINICAL HISTORY:  Shortness of Breath;    TECHNIQUE:  PA and lateral chest  radiographs    COMPARISON:  Chest x-ray dated 04/09/2022    FINDINGS:  The heart is normal in size.  The lungs are clear without focal consolidation.  There is no pleural effusion or visible pneumothorax.                                    EKG:      Assessment and Plan:   IMPRESSION:  Volume overload  NINFA on CKD  Chronic systolic HF   -EF 40%  CAD/CABG and PCI  HTN/HHD  ICMO  HLD  PAD  Venous insuffiency  IDDM  Anemia    Plan:  Continue home medications          Kike Corrales, [facility]  Cardiology  [facility] - [facility] Tele[facility]  02/04/2023 8:57 AM       I have seen and examined patient my self. I agree with Mr. Corrales NP. H and P, physical exam and assessment.  Patient is a 57-year-old male with past medical history of CAD status post bypass, hypertension, hyperlipidemia, peripheral artery disease, diabetes mellitus type 2, chronic pain, GERD, gastroparesis, hypertension and BPH admitted to hospital with several days of increased shortness of breath.  His physical exam is remarkable for obesity, no JVD noted, lungs are clear to auscultation.  Lower extremity edema is absent.  Blood work is unremarkable.  BNP is normal.  Chest x-ray also shows no evidence of significant congestion.  EKG is unremarkable.  Etiology of shortness of breath still unclear.  There is no evidence of significant fluid overload.  I am going to resume home medications, repeat BNP, obtain a CTA to rule out a PE, if negative we will consult pulmonology for evaluation of increased shortness of breath.  We will continue to follow him, please call us with any questions.

## 2023-02-04 NOTE — NURSING
Nurses Note -- 4 Eyes      2/4/2023   4:31 AM      Skin assessed during: Admit      [x] No Pressure Injuries Present    [x]Prevention Measures Documented      [] Yes- Altered Skin Integrity Present or Discovered   [] LDA Added if Not in Epic (Describe Wound)   [] New Altered Skin Integrity was Present on Admit and Documented in LDA   [] Wound Image Taken    Wound Care Consulted? No    Attending Nurse:  Lidya Adamson RN     Second RN/Staff Member:  PB Echavarria

## 2023-02-05 LAB
ALBUMIN SERPL-MCNC: 3.4 G/DL (ref 3.5–5)
ALBUMIN/GLOB SERPL: 0.8 RATIO (ref 1.1–2)
ALP SERPL-CCNC: 119 UNIT/L (ref 40–150)
ALT SERPL-CCNC: 17 UNIT/L (ref 0–55)
AST SERPL-CCNC: 13 UNIT/L (ref 5–34)
BASOPHILS # BLD AUTO: 0.04 X10(3)/MCL (ref 0–0.2)
BASOPHILS NFR BLD AUTO: 0.4 %
BILIRUBIN DIRECT+TOT PNL SERPL-MCNC: 0.7 MG/DL
BUN SERPL-MCNC: 26 MG/DL (ref 8.4–25.7)
CALCIUM SERPL-MCNC: 9.2 MG/DL (ref 8.4–10.2)
CHLORIDE SERPL-SCNC: 101 MMOL/L (ref 98–107)
CO2 SERPL-SCNC: 23 MMOL/L (ref 22–29)
CREAT SERPL-MCNC: 1.81 MG/DL (ref 0.73–1.18)
EOSINOPHIL # BLD AUTO: 0.28 X10(3)/MCL (ref 0–0.9)
EOSINOPHIL NFR BLD AUTO: 2.5 %
ERYTHROCYTE [DISTWIDTH] IN BLOOD BY AUTOMATED COUNT: 13 % (ref 11.5–17)
GFR SERPLBLD CREATININE-BSD FMLA CKD-EPI: 43 MLS/MIN/1.73/M2
GLOBULIN SER-MCNC: 4.1 GM/DL (ref 2.4–3.5)
GLUCOSE SERPL-MCNC: 329 MG/DL (ref 74–100)
HCT VFR BLD AUTO: 33.1 % (ref 42–52)
HGB BLD-MCNC: 10.8 GM/DL (ref 14–18)
IMM GRANULOCYTES # BLD AUTO: 0.06 X10(3)/MCL (ref 0–0.04)
IMM GRANULOCYTES NFR BLD AUTO: 0.5 %
LYMPHOCYTES # BLD AUTO: 1.97 X10(3)/MCL (ref 0.6–4.6)
LYMPHOCYTES NFR BLD AUTO: 17.9 %
MCH RBC QN AUTO: 27.8 PG
MCHC RBC AUTO-ENTMCNC: 32.6 MG/DL (ref 33–36)
MCV RBC AUTO: 85.3 FL (ref 80–94)
MONOCYTES # BLD AUTO: 0.7 X10(3)/MCL (ref 0.1–1.3)
MONOCYTES NFR BLD AUTO: 6.4 %
NEUTROPHILS # BLD AUTO: 7.94 X10(3)/MCL (ref 2.1–9.2)
NEUTROPHILS NFR BLD AUTO: 72.3 %
NRBC BLD AUTO-RTO: 0 %
PLATELET # BLD AUTO: 205 X10(3)/MCL (ref 130–400)
PMV BLD AUTO: 12.6 FL (ref 7.4–10.4)
POCT GLUCOSE: 299 MG/DL (ref 70–110)
POCT GLUCOSE: 324 MG/DL (ref 70–110)
POCT GLUCOSE: 377 MG/DL (ref 70–110)
POTASSIUM SERPL-SCNC: 4.6 MMOL/L (ref 3.5–5.1)
PROT SERPL-MCNC: 7.5 GM/DL (ref 6.4–8.3)
RBC # BLD AUTO: 3.88 X10(6)/MCL (ref 4.7–6.1)
SODIUM SERPL-SCNC: 134 MMOL/L (ref 136–145)
WBC # SPEC AUTO: 11 X10(3)/MCL (ref 4.5–11.5)

## 2023-02-05 PROCEDURE — 63600175 PHARM REV CODE 636 W HCPCS

## 2023-02-05 PROCEDURE — 80053 COMPREHEN METABOLIC PANEL: CPT | Performed by: NURSE PRACTITIONER

## 2023-02-05 PROCEDURE — 25000003 PHARM REV CODE 250: Performed by: NURSE PRACTITIONER

## 2023-02-05 PROCEDURE — 25000003 PHARM REV CODE 250: Performed by: EMERGENCY MEDICINE

## 2023-02-05 PROCEDURE — 85025 COMPLETE CBC W/AUTO DIFF WBC: CPT | Performed by: NURSE PRACTITIONER

## 2023-02-05 PROCEDURE — 96372 THER/PROPH/DIAG INJ SC/IM: CPT

## 2023-02-05 PROCEDURE — 25000242 PHARM REV CODE 250 ALT 637 W/ HCPCS: Performed by: NURSE PRACTITIONER

## 2023-02-05 PROCEDURE — G0378 HOSPITAL OBSERVATION PER HR: HCPCS

## 2023-02-05 RX ORDER — BUMETANIDE 0.25 MG/ML
2 INJECTION INTRAMUSCULAR; INTRAVENOUS ONCE
Status: COMPLETED | OUTPATIENT
Start: 2023-02-05 | End: 2023-02-05

## 2023-02-05 RX ADMIN — HYDROXYZINE HYDROCHLORIDE 50 MG: 25 TABLET ORAL at 02:02

## 2023-02-05 RX ADMIN — CARVEDILOL 25 MG: 12.5 TABLET, FILM COATED ORAL at 08:02

## 2023-02-05 RX ADMIN — ISOSORBIDE MONONITRATE 60 MG: 60 TABLET, EXTENDED RELEASE ORAL at 08:02

## 2023-02-05 RX ADMIN — LEVETIRACETAM 500 MG: 500 TABLET, FILM COATED ORAL at 08:02

## 2023-02-05 RX ADMIN — FLUTICASONE PROPIONATE 50 MCG: 50 SPRAY, METERED NASAL at 08:02

## 2023-02-05 RX ADMIN — INSULIN ASPART 8 UNITS: 100 INJECTION, SOLUTION INTRAVENOUS; SUBCUTANEOUS at 07:02

## 2023-02-05 RX ADMIN — LEVOTHYROXINE SODIUM 25 MCG: 25 TABLET ORAL at 05:02

## 2023-02-05 RX ADMIN — TORSEMIDE 20 MG: 20 TABLET ORAL at 08:02

## 2023-02-05 RX ADMIN — CYANOCOBALAMIN TAB 500 MCG 2000 MCG: 500 TAB at 08:02

## 2023-02-05 RX ADMIN — TAMSULOSIN HYDROCHLORIDE 0.4 MG: 0.4 CAPSULE ORAL at 08:02

## 2023-02-05 RX ADMIN — MONTELUKAST 10 MG: 10 TABLET, FILM COATED ORAL at 08:02

## 2023-02-05 RX ADMIN — CETIRIZINE HYDROCHLORIDE 10 MG: 10 TABLET, FILM COATED ORAL at 08:02

## 2023-02-05 RX ADMIN — RANOLAZINE 1000 MG: 500 TABLET, EXTENDED RELEASE ORAL at 08:02

## 2023-02-05 RX ADMIN — HYDROXYZINE HYDROCHLORIDE 50 MG: 25 TABLET ORAL at 08:02

## 2023-02-05 RX ADMIN — PRASUGREL 10 MG: 10 TABLET, FILM COATED ORAL at 08:02

## 2023-02-05 RX ADMIN — SPIRONOLACTONE 25 MG: 25 TABLET ORAL at 08:02

## 2023-02-05 RX ADMIN — EZETIMIBE 10 MG: 10 TABLET ORAL at 08:02

## 2023-02-05 RX ADMIN — ASPIRIN 81 MG: 81 TABLET, COATED ORAL at 08:02

## 2023-02-05 RX ADMIN — BUMETANIDE 2 MG: 0.25 INJECTION INTRAMUSCULAR; INTRAVENOUS at 01:02

## 2023-02-05 RX ADMIN — ATORVASTATIN CALCIUM 80 MG: 40 TABLET, FILM COATED ORAL at 08:02

## 2023-02-05 RX ADMIN — LACOSAMIDE 50 MG: 50 TABLET, FILM COATED ORAL at 08:02

## 2023-02-05 RX ADMIN — QUETIAPINE FUMARATE 25 MG: 25 TABLET ORAL at 08:02

## 2023-02-05 RX ADMIN — INSULIN ASPART 10 UNITS: 100 INJECTION, SOLUTION INTRAVENOUS; SUBCUTANEOUS at 02:02

## 2023-02-05 RX ADMIN — PANTOPRAZOLE SODIUM 40 MG: 40 TABLET, DELAYED RELEASE ORAL at 08:02

## 2023-02-05 NOTE — PROGRESS NOTES
[facility] - [facility] Telemetry  Cardiology  Progress Note    Patient Name: Luigi Gotti  MRN: [id number]  Admission Date: 2/3/2023  Hospital Length of Stay: 0 days  Code Status: Full Code   Attending Physician: Cory Oreilly MD   Primary Care Physician: RON Esquivel  Expected Discharge Date:   Principal Problem:<principal problem not specified>    Subjective:     Brief HPI:   Mr. Gotti is a 56 y/o male who is known to [facility], Dr. Oreilly. The patient presented to [facility] ER with c/o SOB after being seen in clinic by Dr. Oreilly.  Patient had been given multiple rounds of IV Lasix in the clinic without good response.  He remains significantly volume overloaded with increasing shortness of breath and increasing chest pain which was found to be volume related.  He was directed to the ER from clinic.  Patient was found to have NINFA on CKD.  BNP was within normal limits.  Chest x-ray was negative for acute cardiopulmonary process.  [facility] has admitted the patient for fluid volume overload.       Hospital Course:   2.5.23: NAD noted. VSS. SR on tele. Denies CP/Palps, still with some SOB.     PMH: CAD/CABG/PCI, HTN/HHD with Hx of HF, ICMO, HLD, PAD, VI, Celiac Artery Occlusion, DM II, Chronic Pain, GERD, Gastroparesis/Gastritis/Ischemic Colitis (Dr. Green), BPH  PSH: Tonsillectomy, Cholecystectomy, LHC/Stent, CABG x2 (LIMA to LAD and sequentially to Diagonal), Celiac Bypass, Visceral Arteriogram, Cataract Extraction  Social History: Denies ETOH, Tobacco, and Illicit Drug Use  Family History: Father, D, CVA; Mother, D, Breast Cancer, Clotting Disorder; Brother, D, Cardiac Arrhythmia, DM II, HTN, Seizures; Sister, L, DM II     Diagnostics:   Echo 1.26.23  The study quality is average.   The left ventricle is normal in size. Global left ventricular systolic function is normal. The left ventricular EF is 40%.   The left atrial diameter is mildly increased.   Dilatation of the aortic root is noted at 4  cms.  Mild to moderate (1-2+) mitral regurgitation. Trace aortic regurgitation. Trace pulmonic regurgitation.   Inadequate TR to calculate RVSP.     Pulmonary angiogram/CardioMems 9.20.22  The estimated blood loss was <50 mL.  Normal left pulmonary angiogram  Successful placement of a CardioMEMS device in the left pulmonary artery     ECHO 4.5.22:  There is mid inferior and lateral hypokinesis with a moderately reduced LV systolic function. EF is estimated at 35-40%  Grade 2 diastolic dysfunction.  There is mild-moderate eccentric posterior jet of MR.  There is mild-moderate tricuspid regurgitation with RVSP estimated at 40-45mmHg     LHC 3.9.22:  Procedures performed:  1. IFR interrogation of left main to circumflex value 1.0, normal  2. Coronary angiography  3. Left ventricular dynamics  4. LIMA angiography  5. Ultrasound-guided right groin access  Findings:  1. Left main-distal 30% stenosis  2. Circumflex-diffuse 50% proximal stenosis, patent mid distal stents, IFR 1  3. RCA-proximal 20% stenosis, patent mid stents  4. LAD-chronic total occlusion, and stent  5. LIMA-jump graft to diagonal, LAD patent  6. LVEDP 26  Recommendations  1. Aggressive medical therapy for nonobstructive CAD and elevated EDP  2. Manual pressure for access site hemostasis  3. Outpatient intensive cardiac rehab referral for stable angina     ECHO 3.2.22  Technically limited exam due to poor acoustical windows and body habitus; Definity used.  LV Normal thickness and volume.  LV systolic function is moderately reduced .  Visually estimated EF% is 35-40% . Regional wall motion abnormality in the LAD territory.  Normal right ventricle size with preserved RV systolic function.  Imaging inadequate to measure pulmonary artery pressures.     ECHO 7.20.21  Dilated left atrium  Moderate LV systolic dysfunction EF 40-45%, with moderate concentric LVH  Moderate MR  Trace TR     LHC 5.13.21  1. CAD (findings similar to prior angiogram)  - Left main:  Widely patent.  - LAD: Occluded mid vessel. Competitive flow noted from LAD. Diagonal branches are patent but small in nature.  - Circumflex: Widely patent. Stents show minimal in-stent restenosis.  - LIMA to LAD is widely patent  2. Elevated left ventricular end-diastolic pressure of 26 mm Hg  3. Patent mid vessel abdominal aorta. Patent SMA. Widely patent celiac artery with minimal in-stent restenosis  4. Left lower extremity:  - Patent SFA with mild in-stent restenosis. Mid vessel 60-70% in-stent restenosis noted.  - Patent common femoral artery  - Left infrapopliteal: 2 vessel runoff  5. Right lower extremity:  - Patent common femoral and SFA with luminal irregularities. Distal SFA close to the adductor canal has 70% stenosis  prior to a recent placed stent. Distal SFA stent is widely patent. Popliteal artery is widely patent  - Right infrapopliteal: 2 vessel runoff     PET 8.17.20  This is an abnormal perfusion study. Study is consistent with mostly scar tissue with minimal ischemia.   This scan is suggestive of moderate risk for future cardiovascular events.   Small fixed perfusion abnormality of moderate intensity in the apical segment. Small fixed perfusion abnormality of moderate intensity in the anterior septal region. Small fixed perfusion abnormality of severe intensity in the inferior region. Small partially reversible perfusion abnormality of severe intensity in the inferior lateral region.   The left ventricular cavity is noted to be mildly enlarged on the stress studies. The stress left ventricular ejection fraction was calculated to be 47% and left ventricular global function is normal. The rest left ventricular cavity is noted to be normal. The rest left ventricular ejection fraction was calculated to be 39% and rest left ventricular global function is moderately reduced.   Persistent hypokinesis of the inferior region is noted in both rest and stress studies. When compared to the resting ejection  fraction (39%), the stress ejection fraction (47%) has increased.   The study quality is good.          Review of Systems   Constitutional: Negative for chills and fever.   Cardiovascular:  Positive for dyspnea on exertion. Negative for chest pain and palpitations.   Respiratory:  Positive for shortness of breath.    Psychiatric/Behavioral:  Negative for altered mental status.          Objective:     Vital Signs (Most Recent):  Temp: 98.2 °F (36.8 °C) (02/05/23 0825)  Pulse: 81 (02/05/23 0825)  Resp: 19 (02/05/23 0514)  BP: 121/83 (02/05/23 0828)  SpO2: 98 % (02/05/23 0825) Vital Signs (24h Range):  Temp:  [97.6 °F (36.4 °C)-98.5 °F (36.9 °C)] 98.2 °F (36.8 °C)  Pulse:  [71-81] 81  Resp:  [16-19] 19  SpO2:  [95 %-98 %] 98 %  BP: (113-142)/(66-86) 121/83     Weight: 117.9 kg (260 lb)  Body mass index is 34.3 kg/m².    SpO2: 98 %         Intake/Output Summary (Last 24 hours) at 2/5/2023 0830  Last data filed at 2/4/2023 1514  Gross per 24 hour   Intake 480 ml   Output --   Net 480 ml       Lines/Drains/Airways       Peripheral Intravenous Line  Duration                  Peripheral IV - Single Lumen 02/03/23 1740 20 G Right Antecubital 1 day                    Significant Labs: CMP   Recent Labs   Lab 02/03/23  1325 02/04/23  0328 02/05/23  0733    140 134*   K 4.2 4.6 4.6   CO2 25 25 23   BUN 21.2 17.6 26.0*   CREATININE 1.96* 1.60* 1.81*   CALCIUM 9.5 9.3 9.2   ALBUMIN 3.8  --  3.4*   BILITOT 0.5  --  0.7   ALKPHOS 129  --  119   AST 21  --  13   ALT 19  --  17    and CBC   Recent Labs   Lab 02/03/23  1325 02/05/23  0733   WBC 10.9 11.0   HGB 11.3* 10.8*   HCT 35.2* 33.1*    205       Telemetry:  SR    Physical Exam:  Physical Exam  Constitutional:       Appearance: Normal appearance. He is obese.   HENT:      Head: Normocephalic.   Eyes:      Extraocular Movements: Extraocular movements intact.      Conjunctiva/sclera: Conjunctivae normal.   Cardiovascular:      Rate and Rhythm: Normal rate and regular  rhythm.      Pulses: Normal pulses.      Heart sounds: Normal heart sounds.   Pulmonary:      Effort: Pulmonary effort is normal.      Breath sounds: Normal breath sounds.   Abdominal:      Palpations: Abdomen is soft.   Musculoskeletal:         General: Normal range of motion.      Cervical back: Neck supple.   Skin:     General: Skin is warm and dry.   Neurological:      Mental Status: He is alert and oriented to person, place, and time. Mental status is at baseline.   Psychiatric:         Mood and Affect: Mood normal.         Behavior: Behavior normal.       Current Inpatient Medications:    Current Facility-Administered Medications:     acetaminophen tablet 650 mg, 650 mg, Oral, Q8H PRN, Chirag Calix MD    aspirin EC tablet 81 mg, 81 mg, Oral, Daily, CORRIE Orozco-BC, 81 mg at 02/05/23 0827    atorvastatin tablet 80 mg, 80 mg, Oral, QHS, CORRIE Orozco-BC, 80 mg at 02/04/23 2054    azelastine 137 mcg (0.1 %) nasal spray 137 mcg, 1 spray, Nasal, BID, CORRIE Orozco-BC    carvediloL tablet 25 mg, 25 mg, Oral, BID, NUNU OrozcoP-BC, 25 mg at 02/05/23 0827    cetirizine tablet 10 mg, 10 mg, Oral, Daily, CORRIE Orozco-BC, 10 mg at 02/05/23 0827    cyanocobalamin tablet 2,000 mcg, 2,000 mcg, Oral, Daily, CORRIE Orozco-BC, 2,000 mcg at 02/05/23 0827    dextrose 10% bolus 125 mL 125 mL, 12.5 g, Intravenous, PRN, Chirag Calix MD    dextrose 10% bolus 125 mL 125 mL, 12.5 g, Intravenous, PRN, PENNY PorterP    dextrose 10% bolus 125 mL 125 mL, 12.5 g, Intravenous, PRNLaura, FNP    dextrose 10% bolus 250 mL 250 mL, 25 g, Intravenous, PRN, Chirag Calix MD    dextrose 10% bolus 250 mL 250 mL, 25 g, Intravenous, PRNLaura, FNP    dextrose 10% bolus 250 mL 250 mL, 25 g, Intravenous, PRN, Laura Garcia, RON    ezetimibe tablet 10 mg, 10 mg, Oral, Daily, Kike Corrales, [facility]-BC, 10 mg at 02/05/23 0872     fluticasone propionate 50 mcg/actuation nasal spray 50 mcg, 1 spray, Each Nostril, Daily, CORRIE Orozco-BC, 50 mcg at 02/05/23 0826    glucagon (human recombinant) injection 1 mg, 1 mg, Intramuscular, PRN, Chirag Calix MD    glucagon (human recombinant) injection 1 mg, 1 mg, Intramuscular, PRN, RON Porter    glucose chewable tablet 16 g, 16 g, Oral, PRN, Chirag Calix MD    glucose chewable tablet 16 g, 16 g, Oral, PRN, RON Porter    glucose chewable tablet 24 g, 24 g, Oral, PRN, Chirag Calix MD, 24 g at 02/03/23 2020    glucose chewable tablet 24 g, 24 g, Oral, PRN, RON Porter    HYDROcodone-acetaminophen 7.5-325 mg per tablet 1 tablet, 1 tablet, Oral, Q8H PRN, ROB Orozco    hydrOXYzine HCL tablet 50 mg, 50 mg, Oral, TID, CORRIE Orozco-BC, 50 mg at 02/05/23 0827    insulin aspart U-100 injection 1-10 Units, 1-10 Units, Subcutaneous, QID (AC + HS) PRN, RON Porter, 4 Units at 02/04/23 1802    isosorbide mononitrate 24 hr tablet 60 mg, 60 mg, Oral, Daily, CORRIE Orozco-BC, 60 mg at 02/05/23 0827    labetaloL injection 10 mg, 10 mg, Intravenous, Q4H PRN, Chirag Calix MD    lacosamide tablet 50 mg, 50 mg, Oral, Daily, ROB Orozco, 50 mg at 02/05/23 0827    lactulose 10 gram/15 ml solution 10 g, 10 g, Oral, Q6H PRN, ROB Orozco    levETIRAcetam tablet 500 mg, 500 mg, Oral, BID, Chirag Calix MD, 500 mg at 02/05/23 0827    levothyroxine tablet 25 mcg, 25 mcg, Oral, Before breakfast, CORRIE Orozco-BC, 25 mcg at 02/05/23 0513    melatonin tablet 6 mg, 6 mg, Oral, Nightly PRN, Chirag Calix MD    montelukast tablet 10 mg, 10 mg, Oral, Daily, ROB Orozco, 10 mg at 02/05/23 0828    nitroGLYCERIN SL tablet 0.4 mg, 0.4 mg, Sublingual, Q5 Min PRN, ROB Orozco    ondansetron injection 4 mg, 4 mg, Intravenous, Q8H PRN, Chirag CROWE  MD Fifi    pantoprazole EC tablet 40 mg, 40 mg, Oral, Daily, ROB Orozco, 40 mg at 02/05/23 0827    prasugreL tablet 10 mg, 10 mg, Oral, Daily, CORRIE Orozco-BC, 10 mg at 02/05/23 0827    QUEtiapine tablet 25 mg, 25 mg, Oral, QHS, ROB Orozco, 25 mg at 02/04/23 2054    ranolazine 12 hr tablet 1,000 mg, 1,000 mg, Oral, BID, ROB Orozco, 1,000 mg at 02/05/23 0827    sodium chloride 0.9% flush 3 mL, 3 mL, Intravenous, Q6H PRN, Chirag Calix MD    spironolactone tablet 25 mg, 25 mg, Oral, Daily, ROB Orozco, 25 mg at 02/05/23 0827    tamsulosin 24 hr capsule 0.4 mg, 0.4 mg, Oral, Daily, ROB Orozco, 0.4 mg at 02/05/23 0827    torsemide tablet 20 mg, 20 mg, Oral, BID, ROB Orozco, 20 mg at 02/05/23 0828        Assessment:     IMPRESSION:  Multifactorial SOB  NINFA on CKD  Chronic systolic HF                   -EF 40%  CAD/CABG and PCI  HTN/HHD  ICMO  HLD  PAD  Venous insuffiency  IDDM  Anemia      Plan:     PLAN:  Continue home medications  Continue Torsemide   Strict I&O/daily weight  Low sodium diet    ROB Orozco  Cardiology  [facility] - [facility]  02/05/2023

## 2023-02-06 LAB
ALBUMIN SERPL-MCNC: 3.6 G/DL (ref 3.5–5)
ALBUMIN/GLOB SERPL: 1 RATIO (ref 1.1–2)
ALP SERPL-CCNC: 117 UNIT/L (ref 40–150)
ALT SERPL-CCNC: 14 UNIT/L (ref 0–55)
AST SERPL-CCNC: 12 UNIT/L (ref 5–34)
BASOPHILS # BLD AUTO: 0.04 X10(3)/MCL (ref 0–0.2)
BASOPHILS NFR BLD AUTO: 0.3 %
BILIRUBIN DIRECT+TOT PNL SERPL-MCNC: 0.6 MG/DL
BUN SERPL-MCNC: 32.9 MG/DL (ref 8.4–25.7)
CALCIUM SERPL-MCNC: 9.7 MG/DL (ref 8.4–10.2)
CHLORIDE SERPL-SCNC: 99 MMOL/L (ref 98–107)
CO2 SERPL-SCNC: 22 MMOL/L (ref 22–29)
CREAT SERPL-MCNC: 1.98 MG/DL (ref 0.73–1.18)
EOSINOPHIL # BLD AUTO: 0.34 X10(3)/MCL (ref 0–0.9)
EOSINOPHIL NFR BLD AUTO: 2.8 %
ERYTHROCYTE [DISTWIDTH] IN BLOOD BY AUTOMATED COUNT: 12.6 % (ref 11.5–17)
GFR SERPLBLD CREATININE-BSD FMLA CKD-EPI: 39 MLS/MIN/1.73/M2
GLOBULIN SER-MCNC: 3.7 GM/DL (ref 2.4–3.5)
GLUCOSE SERPL-MCNC: 319 MG/DL (ref 74–100)
HCT VFR BLD AUTO: 33.9 % (ref 42–52)
HGB BLD-MCNC: 11.2 GM/DL (ref 14–18)
IMM GRANULOCYTES # BLD AUTO: 0.07 X10(3)/MCL (ref 0–0.04)
IMM GRANULOCYTES NFR BLD AUTO: 0.6 %
LYMPHOCYTES # BLD AUTO: 2.31 X10(3)/MCL (ref 0.6–4.6)
LYMPHOCYTES NFR BLD AUTO: 19.3 %
MCH RBC QN AUTO: 27.7 PG
MCHC RBC AUTO-ENTMCNC: 33 MG/DL (ref 33–36)
MCV RBC AUTO: 83.7 FL (ref 80–94)
MONOCYTES # BLD AUTO: 0.87 X10(3)/MCL (ref 0.1–1.3)
MONOCYTES NFR BLD AUTO: 7.3 %
NEUTROPHILS # BLD AUTO: 8.36 X10(3)/MCL (ref 2.1–9.2)
NEUTROPHILS NFR BLD AUTO: 69.7 %
NRBC BLD AUTO-RTO: 0 %
PLATELET # BLD AUTO: 203 X10(3)/MCL (ref 130–400)
PMV BLD AUTO: 13.1 FL (ref 7.4–10.4)
POCT GLUCOSE: 297 MG/DL (ref 70–110)
POTASSIUM SERPL-SCNC: 4.7 MMOL/L (ref 3.5–5.1)
PROT SERPL-MCNC: 7.3 GM/DL (ref 6.4–8.3)
RBC # BLD AUTO: 4.05 X10(6)/MCL (ref 4.7–6.1)
SODIUM SERPL-SCNC: 134 MMOL/L (ref 136–145)
WBC # SPEC AUTO: 12 X10(3)/MCL (ref 4.5–11.5)

## 2023-02-06 PROCEDURE — 85025 COMPLETE CBC W/AUTO DIFF WBC: CPT | Performed by: NURSE PRACTITIONER

## 2023-02-06 PROCEDURE — 63600175 PHARM REV CODE 636 W HCPCS

## 2023-02-06 PROCEDURE — 96372 THER/PROPH/DIAG INJ SC/IM: CPT

## 2023-02-06 PROCEDURE — 25000003 PHARM REV CODE 250: Performed by: EMERGENCY MEDICINE

## 2023-02-06 PROCEDURE — 25000003 PHARM REV CODE 250: Performed by: NURSE PRACTITIONER

## 2023-02-06 PROCEDURE — 80053 COMPREHEN METABOLIC PANEL: CPT | Performed by: NURSE PRACTITIONER

## 2023-02-06 PROCEDURE — 21400001 HC TELEMETRY ROOM

## 2023-02-06 RX ORDER — SODIUM CHLORIDE 9 MG/ML
INJECTION, SOLUTION INTRAVENOUS CONTINUOUS
Status: ACTIVE | OUTPATIENT
Start: 2023-02-06 | End: 2023-02-06

## 2023-02-06 RX ADMIN — HYDROXYZINE HYDROCHLORIDE 50 MG: 25 TABLET ORAL at 02:02

## 2023-02-06 RX ADMIN — CYANOCOBALAMIN TAB 500 MCG 2000 MCG: 500 TAB at 10:02

## 2023-02-06 RX ADMIN — CARVEDILOL 25 MG: 12.5 TABLET, FILM COATED ORAL at 08:02

## 2023-02-06 RX ADMIN — FLUTICASONE PROPIONATE 50 MCG: 50 SPRAY, METERED NASAL at 10:02

## 2023-02-06 RX ADMIN — HYDROXYZINE HYDROCHLORIDE 50 MG: 25 TABLET ORAL at 08:02

## 2023-02-06 RX ADMIN — ISOSORBIDE MONONITRATE 60 MG: 60 TABLET, EXTENDED RELEASE ORAL at 10:02

## 2023-02-06 RX ADMIN — SODIUM CHLORIDE: 9 INJECTION, SOLUTION INTRAVENOUS at 10:02

## 2023-02-06 RX ADMIN — LEVETIRACETAM 500 MG: 500 TABLET, FILM COATED ORAL at 08:02

## 2023-02-06 RX ADMIN — LEVETIRACETAM 500 MG: 500 TABLET, FILM COATED ORAL at 10:02

## 2023-02-06 RX ADMIN — RANOLAZINE 1000 MG: 500 TABLET, EXTENDED RELEASE ORAL at 08:02

## 2023-02-06 RX ADMIN — LEVOTHYROXINE SODIUM 25 MCG: 25 TABLET ORAL at 05:02

## 2023-02-06 RX ADMIN — PANTOPRAZOLE SODIUM 40 MG: 40 TABLET, DELAYED RELEASE ORAL at 10:02

## 2023-02-06 RX ADMIN — QUETIAPINE FUMARATE 25 MG: 25 TABLET ORAL at 08:02

## 2023-02-06 RX ADMIN — MONTELUKAST 10 MG: 10 TABLET, FILM COATED ORAL at 10:02

## 2023-02-06 RX ADMIN — ATORVASTATIN CALCIUM 80 MG: 40 TABLET, FILM COATED ORAL at 08:02

## 2023-02-06 RX ADMIN — PRASUGREL 10 MG: 10 TABLET, FILM COATED ORAL at 10:02

## 2023-02-06 RX ADMIN — RANOLAZINE 1000 MG: 500 TABLET, EXTENDED RELEASE ORAL at 10:02

## 2023-02-06 RX ADMIN — HYDROXYZINE HYDROCHLORIDE 50 MG: 25 TABLET ORAL at 10:02

## 2023-02-06 RX ADMIN — CETIRIZINE HYDROCHLORIDE 10 MG: 10 TABLET, FILM COATED ORAL at 10:02

## 2023-02-06 RX ADMIN — EZETIMIBE 10 MG: 10 TABLET ORAL at 10:02

## 2023-02-06 RX ADMIN — LACOSAMIDE 50 MG: 50 TABLET, FILM COATED ORAL at 10:02

## 2023-02-06 RX ADMIN — INSULIN ASPART 6 UNITS: 100 INJECTION, SOLUTION INTRAVENOUS; SUBCUTANEOUS at 05:02

## 2023-02-06 RX ADMIN — TAMSULOSIN HYDROCHLORIDE 0.4 MG: 0.4 CAPSULE ORAL at 10:02

## 2023-02-06 RX ADMIN — ASPIRIN 81 MG: 81 TABLET, COATED ORAL at 10:02

## 2023-02-06 RX ADMIN — CARVEDILOL 25 MG: 12.5 TABLET, FILM COATED ORAL at 10:02

## 2023-02-06 NOTE — PROGRESS NOTES
[facility] - [facility] Medical Telemetry  Cardiology  Progress Note    Patient Name: Luigi Gotti  MRN: [id number]  Admission Date: 2/3/2023  Hospital Length of Stay: 0 days  Code Status: Full Code   Attending Physician: Cory Oreilly MD   Primary Care Physician: RON Esquivel  Expected Discharge Date: 2/6/2023  Principal Problem:<principal problem not specified>    Subjective:     Brief HPI:   Mr. Gotti is a 56 y/o male who is known to [facility], Dr. Oreilly. The patient presented to [facility] ER with c/o SOB after being seen in clinic by Dr. Oreilly.  Patient had been given multiple rounds of IV Lasix in the clinic without good response.  He remains significantly volume overloaded with increasing shortness of breath and increasing chest pain which was found to be volume related.  He was directed to the ER from clinic.  Patient was found to have NINFA on CKD.  BNP was within normal limits.  Chest x-ray was negative for acute cardiopulmonary process.  [facility] has admitted the patient for fluid volume overload.       Hospital Course:   2.5.23: NAD noted. VSS. SR on tele. Denies CP/Palps, still with some SOB.  2.6.23: NAD noted. VSS. SR on tele. Still having intermittent Cp and SOB, Denies Palps.     PMH: CAD/CABG/PCI, HTN/HHD with Hx of HF, ICMO, HLD, PAD, VI, Celiac Artery Occlusion, DM II, Chronic Pain, GERD, Gastroparesis/Gastritis/Ischemic Colitis (Dr. Green), BPH  PSH: Tonsillectomy, Cholecystectomy, LHC/Stent, CABG x2 (LIMA to LAD and sequentially to Diagonal), Celiac Bypass, Visceral Arteriogram, Cataract Extraction  Social History: Denies ETOH, Tobacco, and Illicit Drug Use  Family History: Father, D, CVA; Mother, D, Breast Cancer, Clotting Disorder; Brother, D, Cardiac Arrhythmia, DM II, HTN, Seizures; Sister, L, DM II     Diagnostics:   Echo 1.26.23  The study quality is average.   The left ventricle is normal in size. Global left ventricular systolic function is normal. The left ventricular EF is 40%.    The left atrial diameter is mildly increased.   Dilatation of the aortic root is noted at 4 cms.  Mild to moderate (1-2+) mitral regurgitation. Trace aortic regurgitation. Trace pulmonic regurgitation.   Inadequate TR to calculate RVSP.     Pulmonary angiogram/[facility]io[facility] 9.20.22  The estimated blood loss was <50 mL.  Normal left pulmonary angiogram  Successful placement of a CardioMEMS device in the left pulmonary artery     ECHO 4.5.22:  There is mid inferior and lateral hypokinesis with a moderately reduced LV systolic function. EF is estimated at 35-40%  Grade 2 diastolic dysfunction.  There is mild-moderate eccentric posterior jet of MR.  There is mild-moderate tricuspid regurgitation with RVSP estimated at 40-45mmHg     [facility] 3.9.22:  Procedures performed:  1. IFR interrogation of left main to circumflex value 1.0, normal  2. Coronary angiography  3. Left ventricular dynamics  4. LIMA angiography  5. Ultrasound-guided right groin access  Findings:  1. Left main-distal 30% stenosis  2. Circumflex-diffuse 50% proximal stenosis, patent mid distal stents, IFR 1  3. RCA-proximal 20% stenosis, patent mid stents  4. LAD-chronic total occlusion, and stent  5. LIMA-jump graft to diagonal, LAD patent  6. LVEDP 26  Recommendations  1. Aggressive medical therapy for nonobstructive CAD and elevated EDP  2. Manual pressure for access site hemostasis  3. Outpatient intensive cardiac rehab referral for stable angina     ECHO 3.2.22  Technically limited exam due to poor acoustical windows and body habitus; Definity used.  LV Normal thickness and volume.  LV systolic function is moderately reduced .  Visually estimated EF% is 35-40% . Regional wall motion abnormality in the LAD territory.  Normal right ventricle size with preserved RV systolic function.  Imaging inadequate to measure pulmonary artery pressures.     ECHO 7.20.21  Dilated left atrium  Moderate LV systolic dysfunction EF 40-45%, with moderate concentric  LVH  Moderate MR  Trace TR     [facility] 5.13.21  1. CAD (findings similar to prior angiogram)  - Left main: Widely patent.  - LAD: Occluded mid vessel. Competitive flow noted from LAD. Diagonal branches are patent but small in nature.  - Circumflex: Widely patent. Stents show minimal in-stent restenosis.  - LIMA to LAD is widely patent  2. Elevated left ventricular end-diastolic pressure of 26 mm Hg  3. Patent mid vessel abdominal aorta. Patent SMA. Widely patent celiac artery with minimal in-stent restenosis  4. Left lower extremity:  - Patent SFA with mild in-stent restenosis. Mid vessel 60-70% in-stent restenosis noted.  - Patent common femoral artery  - Left infrapopliteal: 2 vessel runoff  5. Right lower extremity:  - Patent common femoral and SFA with luminal irregularities. Distal SFA close to the adductor canal has 70% stenosis  prior to a recent placed stent. Distal SFA stent is widely patent. Popliteal artery is widely patent  - Right infrapopliteal: 2 vessel runoff     PET 8.17.20  This is an abnormal perfusion study. Study is consistent with mostly scar tissue with minimal ischemia.   This scan is suggestive of moderate risk for future cardiovascular events.   Small fixed perfusion abnormality of moderate intensity in the apical segment. Small fixed perfusion abnormality of moderate intensity in the anterior septal region. Small fixed perfusion abnormality of severe intensity in the inferior region. Small partially reversible perfusion abnormality of severe intensity in the inferior lateral region.   The left ventricular cavity is noted to be mildly enlarged on the stress studies. The stress left ventricular ejection fraction was calculated to be 47% and left ventricular global function is normal. The rest left ventricular cavity is noted to be normal. The rest left ventricular ejection fraction was calculated to be 39% and rest left ventricular global function is moderately reduced.   Persistent hypokinesis  of the inferior region is noted in both rest and stress studies. When compared to the resting ejection fraction (39%), the stress ejection fraction (47%) has increased.   The study quality is good.          Review of Systems   Constitutional: Negative for chills and fever.   Cardiovascular:  Positive for dyspnea on exertion. Negative for chest pain and palpitations.   Respiratory:  Positive for shortness of breath.    Psychiatric/Behavioral:  Negative for altered mental status.          Objective:     Vital Signs (Most Recent):  Temp: 98.3 °F (36.8 °C) (02/06/23 0722)  Pulse: 73 (02/06/23 0722)  Resp: 20 (02/06/23 0400)  BP: 125/79 (02/06/23 0722)  SpO2: 95 % (02/06/23 0722) Vital Signs (24h Range):  Temp:  [97.4 °F (36.3 °C)-98.3 °F (36.8 °C)] 98.3 °F (36.8 °C)  Pulse:  [67-75] 73  Resp:  [15-20] 20  SpO2:  [94 %-97 %] 95 %  BP: ()/(58-80) 125/79     Weight: 117.9 kg (260 lb)  Body mass index is 34.3 kg/m².    SpO2: 95 %         Intake/Output Summary (Last 24 hours) at 2/6/2023 0851  Last data filed at 2/5/2023 1446  Gross per 24 hour   Intake 600 ml   Output 350 ml   Net 250 ml         Lines/Drains/Airways       Peripheral Intravenous Line  Duration                  Peripheral IV - Single Lumen 02/03/23 1740 20 G Right Antecubital 2 days                    Significant Labs: CMP   Recent Labs   Lab 02/05/23  0733 02/06/23  0449   * 134*   K 4.6 4.7   CO2 23 22   BUN 26.0* 32.9*   CREATININE 1.81* 1.98*   CALCIUM 9.2 9.7   ALBUMIN 3.4* 3.6   BILITOT 0.7 0.6   ALKPHOS 119 117   AST 13 12   ALT 17 14      and CBC   Recent Labs   Lab 02/05/23  0733 02/06/23  0449   WBC 11.0 12.0*   HGB 10.8* 11.2*   HCT 33.1* 33.9*    203         Telemetry:  SR    Physical Exam:  Physical Exam  Constitutional:       Appearance: Normal appearance. He is obese.   HENT:      Head: Normocephalic.   Eyes:      Extraocular Movements: Extraocular movements intact.      Conjunctiva/sclera: Conjunctivae normal.    Cardiovascular:      Rate and Rhythm: Normal rate and regular rhythm.      Pulses: Normal pulses.      Heart sounds: Normal heart sounds.   Pulmonary:      Effort: Pulmonary effort is normal.      Breath sounds: Normal breath sounds.   Abdominal:      Palpations: Abdomen is soft.   Musculoskeletal:         General: Normal range of motion.      Cervical back: Neck supple.   Skin:     General: Skin is warm and dry.   Neurological:      Mental Status: He is alert and oriented to person, place, and time. Mental status is at baseline.   Psychiatric:         Mood and Affect: Mood normal.         Behavior: Behavior normal.       Current Inpatient Medications:    Current Facility-Administered Medications:     acetaminophen tablet 650 mg, 650 mg, Oral, Q8H PRN, Chirag Calix MD    aspirin EC tablet 81 mg, 81 mg, Oral, Daily, CORRIE Orozco-BC, 81 mg at 02/05/23 0827    atorvastatin tablet 80 mg, 80 mg, Oral, QHS, CORRIE Orozco-BC, 80 mg at 02/05/23 2019    azelastine 137 mcg (0.1 %) nasal spray 137 mcg, 1 spray, Nasal, BID, CORRIE Orozco-BC    carvediloL tablet 25 mg, 25 mg, Oral, BID, CORRIE Orozco-BC, 25 mg at 02/05/23 2018    cetirizine tablet 10 mg, 10 mg, Oral, Daily, CORRIE Orozco-BC, 10 mg at 02/05/23 0827    cyanocobalamin tablet 2,000 mcg, 2,000 mcg, Oral, Daily, CORRIE Orozco-BC, 2,000 mcg at 02/05/23 0827    dextrose 10% bolus 125 mL 125 mL, 12.5 g, Intravenous, PRN, Chirga Calix MD    dextrose 10% bolus 125 mL 125 mL, 12.5 g, Intravenous, PRN, Laura Garcia, FNP    dextrose 10% bolus 125 mL 125 mL, 12.5 g, Intravenous, PRN, Laura Garcia, FNP    dextrose 10% bolus 250 mL 250 mL, 25 g, Intravenous, PRN, Chirag Calix MD    dextrose 10% bolus 250 mL 250 mL, 25 g, Intravenous, PRN, Laura Garcia, FNP    dextrose 10% bolus 250 mL 250 mL, 25 g, Intravenous, PRN, Laura Garcia, RON    ezetimibe tablet 10 mg, 10 mg, Oral,  Daily, CORRIE Orozco-BC, 10 mg at 02/05/23 0827    fluticasone propionate 50 mcg/actuation nasal spray 50 mcg, 1 spray, Each Nostril, Daily, CORRIE Orozco-BC, 50 mcg at 02/05/23 0826    glucagon (human recombinant) injection 1 mg, 1 mg, Intramuscular, PRN, Chirag Calix MD    glucagon (human recombinant) injection 1 mg, 1 mg, Intramuscular, PRN, RON Porter    glucose chewable tablet 16 g, 16 g, Oral, PRN, Chirag Calix MD    glucose chewable tablet 16 g, 16 g, Oral, PRN, RON Porter    glucose chewable tablet 24 g, 24 g, Oral, PRN, Chirag Calix MD, 24 g at 02/03/23 2020    glucose chewable tablet 24 g, 24 g, Oral, PRN, RON Porter    HYDROcodone-acetaminophen 7.5-325 mg per tablet 1 tablet, 1 tablet, Oral, Q8H PRN, ROB Orozco    hydrOXYzine HCL tablet 50 mg, 50 mg, Oral, TID, CORRIE Orozco-BC, 50 mg at 02/05/23 2018    insulin aspart U-100 injection 1-10 Units, 1-10 Units, Subcutaneous, QID (AC + HS) PRN, RON Porter, 6 Units at 02/06/23 0549    isosorbide mononitrate 24 hr tablet 60 mg, 60 mg, Oral, Daily, CORRIE Orozco-BC, 60 mg at 02/05/23 0827    labetaloL injection 10 mg, 10 mg, Intravenous, Q4H PRN, Chirag Calix MD    lacosamide tablet 50 mg, 50 mg, Oral, Daily, CORRIE Orozco-BC, 50 mg at 02/05/23 0827    lactulose 10 gram/15 ml solution 10 g, 10 g, Oral, Q6H PRN, ROB Orozco    levETIRAcetam tablet 500 mg, 500 mg, Oral, BID, Chirag Calix MD, 500 mg at 02/05/23 2019    levothyroxine tablet 25 mcg, 25 mcg, Oral, Before breakfast, ROB Orozco, 25 mcg at 02/06/23 0539    melatonin tablet 6 mg, 6 mg, Oral, Nightly PRN, Chirag Calix MD    montelukast tablet 10 mg, 10 mg, Oral, Daily, ROB Orozco, 10 mg at 02/05/23 0828    nitroGLYCERIN SL tablet 0.4 mg, 0.4 mg, Sublingual, Q5 Min PRN, ROB Orozco     ondansetron injection 4 mg, 4 mg, Intravenous, Q8H PRN, Chirag Calix MD    pantoprazole EC tablet 40 mg, 40 mg, Oral, Daily, ROB Orozco, 40 mg at 02/05/23 0827    prasugreL tablet 10 mg, 10 mg, Oral, Daily, ROB Orozco, 10 mg at 02/05/23 0827    QUEtiapine tablet 25 mg, 25 mg, Oral, QHS, ROB Orozco, 25 mg at 02/05/23 2018    ranolazine 12 hr tablet 1,000 mg, 1,000 mg, Oral, BID, ROB Orozco, 1,000 mg at 02/05/23 2018    sodium chloride 0.9% flush 3 mL, 3 mL, Intravenous, Q6H PRN, Chirag Calix MD    tamsulosin 24 hr capsule 0.4 mg, 0.4 mg, Oral, Daily, ROB Orozco, 0.4 mg at 02/05/23 0827        Assessment:     IMPRESSION:  Multifactorial SOB  NINFA on CKD  Chronic systolic HF                   -EF 40%  CAD/CABG and PCI  HTN/HHD  ICMO  HLD  PAD  Venous insuffiency  IDDM  Anemia      Plan:     PLAN:  Continue home medications  Hold Torsemide and Aldactone  NPO p MN  Plan for LHC in AM  Risk, Benefits and Alternatives Reviewed and Discussed with the PT and their Family and they wish to proceed with above Procedure.  Strict I&O/daily weight  Low sodium diet  AM labs    ROB Orozco  Cardiology  [facility] - [facility] Tele[facility]  02/06/2023

## 2023-02-07 LAB
ALBUMIN SERPL-MCNC: 3.3 G/DL (ref 3.5–5)
ALBUMIN/GLOB SERPL: 0.8 RATIO (ref 1.1–2)
ALP SERPL-CCNC: 114 UNIT/L (ref 40–150)
ALT SERPL-CCNC: 15 UNIT/L (ref 0–55)
AST SERPL-CCNC: 10 UNIT/L (ref 5–34)
BASOPHILS # BLD AUTO: 0.04 X10(3)/MCL (ref 0–0.2)
BASOPHILS NFR BLD AUTO: 0.3 %
BILIRUBIN DIRECT+TOT PNL SERPL-MCNC: 0.5 MG/DL
BUN SERPL-MCNC: 30.3 MG/DL (ref 8.4–25.7)
CALCIUM SERPL-MCNC: 9 MG/DL (ref 8.4–10.2)
CHLORIDE SERPL-SCNC: 101 MMOL/L (ref 98–107)
CO2 SERPL-SCNC: 22 MMOL/L (ref 22–29)
CREAT SERPL-MCNC: 1.83 MG/DL (ref 0.73–1.18)
EOSINOPHIL # BLD AUTO: 0.27 X10(3)/MCL (ref 0–0.9)
EOSINOPHIL NFR BLD AUTO: 2.2 %
ERYTHROCYTE [DISTWIDTH] IN BLOOD BY AUTOMATED COUNT: 12.6 % (ref 11.5–17)
GFR SERPLBLD CREATININE-BSD FMLA CKD-EPI: 43 MLS/MIN/1.73/M2
GLOBULIN SER-MCNC: 4.1 GM/DL (ref 2.4–3.5)
GLUCOSE SERPL-MCNC: 413 MG/DL (ref 74–100)
HCT VFR BLD AUTO: 31.7 % (ref 42–52)
HGB BLD-MCNC: 10.6 GM/DL (ref 14–18)
IMM GRANULOCYTES # BLD AUTO: 0.07 X10(3)/MCL (ref 0–0.04)
IMM GRANULOCYTES NFR BLD AUTO: 0.6 %
LYMPHOCYTES # BLD AUTO: 2.26 X10(3)/MCL (ref 0.6–4.6)
LYMPHOCYTES NFR BLD AUTO: 18.7 %
MCH RBC QN AUTO: 28 PG
MCHC RBC AUTO-ENTMCNC: 33.4 MG/DL (ref 33–36)
MCV RBC AUTO: 83.9 FL (ref 80–94)
MONOCYTES # BLD AUTO: 0.94 X10(3)/MCL (ref 0.1–1.3)
MONOCYTES NFR BLD AUTO: 7.8 %
NEUTROPHILS # BLD AUTO: 8.53 X10(3)/MCL (ref 2.1–9.2)
NEUTROPHILS NFR BLD AUTO: 70.4 %
NRBC BLD AUTO-RTO: 0 %
PLATELET # BLD AUTO: 206 X10(3)/MCL (ref 130–400)
PMV BLD AUTO: 13.3 FL (ref 7.4–10.4)
POCT GLUCOSE: 279 MG/DL (ref 70–110)
POCT GLUCOSE: 285 MG/DL (ref 70–110)
POTASSIUM SERPL-SCNC: 4.5 MMOL/L (ref 3.5–5.1)
PROT SERPL-MCNC: 7.4 GM/DL (ref 6.4–8.3)
RBC # BLD AUTO: 3.78 X10(6)/MCL (ref 4.7–6.1)
SODIUM SERPL-SCNC: 133 MMOL/L (ref 136–145)
WBC # SPEC AUTO: 12.1 X10(3)/MCL (ref 4.5–11.5)

## 2023-02-07 PROCEDURE — 25000003 PHARM REV CODE 250: Performed by: EMERGENCY MEDICINE

## 2023-02-07 PROCEDURE — C1894 INTRO/SHEATH, NON-LASER: HCPCS | Performed by: INTERNAL MEDICINE

## 2023-02-07 PROCEDURE — 85025 COMPLETE CBC W/AUTO DIFF WBC: CPT | Performed by: NURSE PRACTITIONER

## 2023-02-07 PROCEDURE — 63600175 PHARM REV CODE 636 W HCPCS: Performed by: INTERNAL MEDICINE

## 2023-02-07 PROCEDURE — 63600175 PHARM REV CODE 636 W HCPCS

## 2023-02-07 PROCEDURE — 25000003 PHARM REV CODE 250: Performed by: INTERNAL MEDICINE

## 2023-02-07 PROCEDURE — 27000221 HC OXYGEN, UP TO 24 HOURS

## 2023-02-07 PROCEDURE — 25500020 PHARM REV CODE 255: Performed by: INTERNAL MEDICINE

## 2023-02-07 PROCEDURE — 21400001 HC TELEMETRY ROOM

## 2023-02-07 PROCEDURE — 99152 MOD SED SAME PHYS/QHP 5/>YRS: CPT | Performed by: INTERNAL MEDICINE

## 2023-02-07 PROCEDURE — 25000003 PHARM REV CODE 250: Performed by: NURSE PRACTITIONER

## 2023-02-07 PROCEDURE — 80053 COMPREHEN METABOLIC PANEL: CPT | Performed by: NURSE PRACTITIONER

## 2023-02-07 PROCEDURE — 93459 L HRT ART/GRFT ANGIO: CPT | Performed by: INTERNAL MEDICINE

## 2023-02-07 PROCEDURE — C1769 GUIDE WIRE: HCPCS | Performed by: INTERNAL MEDICINE

## 2023-02-07 RX ORDER — FENTANYL CITRATE 50 UG/ML
INJECTION, SOLUTION INTRAMUSCULAR; INTRAVENOUS
Status: DISCONTINUED | OUTPATIENT
Start: 2023-02-07 | End: 2023-02-09 | Stop reason: HOSPADM

## 2023-02-07 RX ORDER — SODIUM CHLORIDE 9 MG/ML
100 INJECTION, SOLUTION INTRAVENOUS CONTINUOUS
Status: ACTIVE | OUTPATIENT
Start: 2023-02-07 | End: 2023-02-07

## 2023-02-07 RX ORDER — MIDAZOLAM HYDROCHLORIDE 1 MG/ML
INJECTION INTRAMUSCULAR; INTRAVENOUS
Status: DISCONTINUED | OUTPATIENT
Start: 2023-02-07 | End: 2023-02-09 | Stop reason: HOSPADM

## 2023-02-07 RX ORDER — MORPHINE SULFATE 4 MG/ML
2 INJECTION, SOLUTION INTRAMUSCULAR; INTRAVENOUS EVERY 4 HOURS PRN
Status: DISCONTINUED | OUTPATIENT
Start: 2023-02-07 | End: 2023-02-09 | Stop reason: HOSPADM

## 2023-02-07 RX ORDER — LIDOCAINE HYDROCHLORIDE 10 MG/ML
INJECTION INFILTRATION; PERINEURAL
Status: DISCONTINUED | OUTPATIENT
Start: 2023-02-07 | End: 2023-02-09 | Stop reason: HOSPADM

## 2023-02-07 RX ORDER — HYDRALAZINE HYDROCHLORIDE 20 MG/ML
10 INJECTION INTRAMUSCULAR; INTRAVENOUS EVERY 4 HOURS PRN
Status: DISCONTINUED | OUTPATIENT
Start: 2023-02-07 | End: 2023-02-09 | Stop reason: HOSPADM

## 2023-02-07 RX ADMIN — CYANOCOBALAMIN TAB 500 MCG 2000 MCG: 500 TAB at 03:02

## 2023-02-07 RX ADMIN — CETIRIZINE HYDROCHLORIDE 10 MG: 10 TABLET, FILM COATED ORAL at 03:02

## 2023-02-07 RX ADMIN — FLUTICASONE PROPIONATE 50 MCG: 50 SPRAY, METERED NASAL at 04:02

## 2023-02-07 RX ADMIN — RANOLAZINE 1000 MG: 500 TABLET, EXTENDED RELEASE ORAL at 09:02

## 2023-02-07 RX ADMIN — PRASUGREL 10 MG: 10 TABLET, FILM COATED ORAL at 11:02

## 2023-02-07 RX ADMIN — RANOLAZINE 1000 MG: 500 TABLET, EXTENDED RELEASE ORAL at 11:02

## 2023-02-07 RX ADMIN — PANTOPRAZOLE SODIUM 40 MG: 40 TABLET, DELAYED RELEASE ORAL at 03:02

## 2023-02-07 RX ADMIN — LEVETIRACETAM 500 MG: 500 TABLET, FILM COATED ORAL at 11:02

## 2023-02-07 RX ADMIN — LACOSAMIDE 50 MG: 50 TABLET, FILM COATED ORAL at 11:02

## 2023-02-07 RX ADMIN — CARVEDILOL 25 MG: 12.5 TABLET, FILM COATED ORAL at 09:02

## 2023-02-07 RX ADMIN — INSULIN ASPART 6 UNITS: 100 INJECTION, SOLUTION INTRAVENOUS; SUBCUTANEOUS at 05:02

## 2023-02-07 RX ADMIN — CARVEDILOL 25 MG: 12.5 TABLET, FILM COATED ORAL at 11:02

## 2023-02-07 RX ADMIN — TAMSULOSIN HYDROCHLORIDE 0.4 MG: 0.4 CAPSULE ORAL at 03:02

## 2023-02-07 RX ADMIN — QUETIAPINE FUMARATE 25 MG: 25 TABLET ORAL at 09:02

## 2023-02-07 RX ADMIN — ATORVASTATIN CALCIUM 80 MG: 40 TABLET, FILM COATED ORAL at 09:02

## 2023-02-07 RX ADMIN — ASPIRIN 81 MG: 81 TABLET, COATED ORAL at 11:02

## 2023-02-07 RX ADMIN — INSULIN ASPART 10 UNITS: 100 INJECTION, SOLUTION INTRAVENOUS; SUBCUTANEOUS at 05:02

## 2023-02-07 RX ADMIN — HYDROXYZINE HYDROCHLORIDE 50 MG: 25 TABLET ORAL at 03:02

## 2023-02-07 RX ADMIN — LEVETIRACETAM 500 MG: 500 TABLET, FILM COATED ORAL at 09:02

## 2023-02-07 RX ADMIN — HYDROXYZINE HYDROCHLORIDE 50 MG: 25 TABLET ORAL at 09:02

## 2023-02-07 RX ADMIN — ISOSORBIDE MONONITRATE 60 MG: 60 TABLET, EXTENDED RELEASE ORAL at 03:02

## 2023-02-07 RX ADMIN — SODIUM CHLORIDE 100 ML: 9 INJECTION, SOLUTION INTRAVENOUS at 04:02

## 2023-02-07 RX ADMIN — EZETIMIBE 10 MG: 10 TABLET ORAL at 03:02

## 2023-02-07 RX ADMIN — MONTELUKAST 10 MG: 10 TABLET, FILM COATED ORAL at 03:02

## 2023-02-07 NOTE — PROGRESS NOTES
[facility] - [facility] Telemetry  Cardiology  Progress Note    Patient Name: Luigi Gotti  MRN: [id number]  Admission Date: 2/3/2023  Hospital Length of Stay: 1 days  Code Status: Full Code   Attending Physician: Cory Oreilly MD   Primary Care Physician: RON Esquivel  Expected Discharge Date: 2/8/2023  Principal Problem:<principal problem not specified>    Subjective:     Brief HPI:   Mr. Gotti is a 56 y/o male who is known to [facility], Dr. Oreilly. The patient presented to [facility] ER with c/o SOB after being seen in clinic by Dr. Oreilly.  Patient had been given multiple rounds of IV Lasix in the clinic without good response.  He remains significantly volume overloaded with increasing shortness of breath and increasing chest pain which was found to be volume related.  He was directed to the ER from clinic.  Patient was found to have NINFA on CKD.  BNP was within normal limits.  Chest x-ray was negative for acute cardiopulmonary process.  [facility] has admitted the patient for fluid volume overload.       Hospital Course:   2.5.23: NAD noted. VSS. SR on tele. Denies CP/Palps, still with some SOB.  2.6.23: NAD noted. VSS. SR on tele. Still having intermittent Cp and SOB, Denies Palps.  2.7.23: NAD noted. VSS. SR on tele. Still with intermittent CP and SOB. NPO for LHC today     PMH: CAD/CABG/PCI, HTN/HHD with Hx of HF, ICMO, HLD, PAD, VI, Celiac Artery Occlusion, DM II, Chronic Pain, GERD, Gastroparesis/Gastritis/Ischemic Colitis (Dr. Green), BPH  PSH: Tonsillectomy, Cholecystectomy, LHC/Stent, CABG x2 (LIMA to LAD and sequentially to Diagonal), Celiac Bypass, Visceral Arteriogram, Cataract Extraction  Social History: Denies ETOH, Tobacco, and Illicit Drug Use  Family History: Father, D, CVA; Mother, D, Breast Cancer, Clotting Disorder; Brother, D, Cardiac Arrhythmia, DM II, HTN, Seizures; Sister, L, DM II     Diagnostics:   Echo 1.26.23  The study quality is average.   The left ventricle is normal in  size. Global left ventricular systolic function is normal. The left ventricular EF is 40%.   The left atrial diameter is mildly increased.   Dilatation of the aortic root is noted at 4 cms.  Mild to moderate (1-2+) mitral regurgitation. Trace aortic regurgitation. Trace pulmonic regurgitation.   Inadequate TR to calculate RVSP.     Pulmonary angiogram/CardioMems 9.20.22  The estimated blood loss was <50 mL.  Normal left pulmonary angiogram  Successful placement of a CardioMEMS device in the left pulmonary artery     ECHO 4.5.22:  There is mid inferior and lateral hypokinesis with a moderately reduced LV systolic function. EF is estimated at 35-40%  Grade 2 diastolic dysfunction.  There is mild-moderate eccentric posterior jet of MR.  There is mild-moderate tricuspid regurgitation with RVSP estimated at 40-45mmHg     [facility] 3.9.22:  Procedures performed:  1. IFR interrogation of left main to circumflex value 1.0, normal  2. Coronary angiography  3. Left ventricular dynamics  4. LIMA angiography  5. Ultrasound-guided right groin access  Findings:  1. Left main-distal 30% stenosis  2. Circumflex-diffuse 50% proximal stenosis, patent mid distal stents, IFR 1  3. RCA-proximal 20% stenosis, patent mid stents  4. LAD-chronic total occlusion, and stent  5. LIMA-jump graft to diagonal, LAD patent  6. LVEDP 26  Recommendations  1. Aggressive medical therapy for nonobstructive CAD and elevated EDP  2. Manual pressure for access site hemostasis  3. Outpatient intensive cardiac rehab referral for stable angina     ECHO 3.2.22  Technically limited exam due to poor acoustical windows and body habitus; Definity used.  LV Normal thickness and volume.  LV systolic function is moderately reduced .  Visually estimated EF% is 35-40% . Regional wall motion abnormality in the LAD territory.  Normal right ventricle size with preserved RV systolic function.  Imaging inadequate to measure pulmonary artery pressures.     ECHO 7.20.21  Dilated  left atrium  Moderate LV systolic dysfunction EF 40-45%, with moderate concentric LVH  Moderate MR  Trace TR     LHC 5.13.21  1. CAD (findings similar to prior angiogram)  - Left main: Widely patent.  - LAD: Occluded mid vessel. Competitive flow noted from LAD. Diagonal branches are patent but small in nature.  - Circumflex: Widely patent. Stents show minimal in-stent restenosis.  - LIMA to LAD is widely patent  2. Elevated left ventricular end-diastolic pressure of 26 mm Hg  3. Patent mid vessel abdominal aorta. Patent SMA. Widely patent celiac artery with minimal in-stent restenosis  4. Left lower extremity:  - Patent SFA with mild in-stent restenosis. Mid vessel 60-70% in-stent restenosis noted.  - Patent common femoral artery  - Left infrapopliteal: 2 vessel runoff  5. Right lower extremity:  - Patent common femoral and SFA with luminal irregularities. Distal SFA close to the adductor canal has 70% stenosis  prior to a recent placed stent. Distal SFA stent is widely patent. Popliteal artery is widely patent  - Right infrapopliteal: 2 vessel runoff     PET 8.17.20  This is an abnormal perfusion study. Study is consistent with mostly scar tissue with minimal ischemia.   This scan is suggestive of moderate risk for future cardiovascular events.   Small fixed perfusion abnormality of moderate intensity in the apical segment. Small fixed perfusion abnormality of moderate intensity in the anterior septal region. Small fixed perfusion abnormality of severe intensity in the inferior region. Small partially reversible perfusion abnormality of severe intensity in the inferior lateral region.   The left ventricular cavity is noted to be mildly enlarged on the stress studies. The stress left ventricular ejection fraction was calculated to be 47% and left ventricular global function is normal. The rest left ventricular cavity is noted to be normal. The rest left ventricular ejection fraction was calculated to be 39% and rest  left ventricular global function is moderately reduced.   Persistent hypokinesis of the inferior region is noted in both rest and stress studies. When compared to the resting ejection fraction (39%), the stress ejection fraction (47%) has increased.   The study quality is good.          Review of Systems   Constitutional: Negative for chills and fever.   Cardiovascular:  Positive for dyspnea on exertion. Negative for chest pain and palpitations.   Respiratory:  Positive for shortness of breath.    Psychiatric/Behavioral:  Negative for altered mental status.          Objective:     Vital Signs (Most Recent):  Temp: 98 °F (36.7 °C) (02/07/23 0700)  Pulse: 70 (02/07/23 0700)  Resp: 20 (02/07/23 0400)  BP: 117/73 (02/07/23 0700)  SpO2: 97 % (02/07/23 0700) Vital Signs (24h Range):  Temp:  [97.8 °F (36.6 °C)-98.3 °F (36.8 °C)] 98 °F (36.7 °C)  Pulse:  [65-74] 70  Resp:  [20] 20  SpO2:  [94 %-99 %] 97 %  BP: (101-130)/(57-79) 117/73     Weight: 113.5 kg (250 lb 3.2 oz)  Body mass index is 33.01 kg/m².    SpO2: 97 %         Intake/Output Summary (Last 24 hours) at 2/7/2023 1004  Last data filed at 2/6/2023 2100  Gross per 24 hour   Intake 2640 ml   Output --   Net 2640 ml         Lines/Drains/Airways       Peripheral Intravenous Line  Duration                  Peripheral IV - Single Lumen 02/03/23 1740 20 G Right Antecubital 3 days                    Significant Labs: CMP   Recent Labs   Lab 02/06/23  0449 02/07/23  0402   * 133*   K 4.7 4.5   CO2 22 22   BUN 32.9* 30.3*   CREATININE 1.98* 1.83*   CALCIUM 9.7 9.0   ALBUMIN 3.6 3.3*   BILITOT 0.6 0.5   ALKPHOS 117 114   AST 12 10   ALT 14 15      and CBC   Recent Labs   Lab 02/06/23  0449 02/07/23  0402   WBC 12.0* 12.1*   HGB 11.2* 10.6*   HCT 33.9* 31.7*    206         Telemetry:  SR    Physical Exam:  Physical Exam  Constitutional:       Appearance: Normal appearance. He is obese.   HENT:      Head: Normocephalic.   Eyes:      Extraocular Movements:  Extraocular movements intact.      Conjunctiva/sclera: Conjunctivae normal.   Cardiovascular:      Rate and Rhythm: Normal rate and regular rhythm.      Pulses: Normal pulses.      Heart sounds: Normal heart sounds.   Pulmonary:      Effort: Pulmonary effort is normal.      Breath sounds: Normal breath sounds.   Abdominal:      Palpations: Abdomen is soft.   Musculoskeletal:         General: Normal range of motion.      Cervical back: Neck supple.   Skin:     General: Skin is warm and dry.   Neurological:      Mental Status: He is alert and oriented to person, place, and time. Mental status is at baseline.   Psychiatric:         Mood and Affect: Mood normal.         Behavior: Behavior normal.       Current Inpatient Medications:    Current Facility-Administered Medications:     acetaminophen tablet 650 mg, 650 mg, Oral, Q8H PRN, Chirag Calix MD    aspirin EC tablet 81 mg, 81 mg, Oral, Daily, CORRIE Orozco-BC, 81 mg at 02/06/23 1030    atorvastatin tablet 80 mg, 80 mg, Oral, QHS, CORRIE Orozco-BC, 80 mg at 02/06/23 2012    azelastine 137 mcg (0.1 %) nasal spray 137 mcg, 1 spray, Nasal, BID, NUNU OrozcoP-BC    carvediloL tablet 25 mg, 25 mg, Oral, BID, CORRIE Orozco-BC, 25 mg at 02/06/23 2012    cetirizine tablet 10 mg, 10 mg, Oral, Daily, CORRIE Orozco-BC, 10 mg at 02/06/23 1031    cyanocobalamin tablet 2,000 mcg, 2,000 mcg, Oral, Daily, CORRIE Orozco-BC, 2,000 mcg at 02/06/23 1030    dextrose 10% bolus 125 mL 125 mL, 12.5 g, Intravenous, PRN, Chirag Calix MD    dextrose 10% bolus 125 mL 125 mL, 12.5 g, Intravenous, PRN, Laura Garcia, PENNYP    dextrose 10% bolus 125 mL 125 mL, 12.5 g, Intravenous, PRN, Laura Garcia, FNP    dextrose 10% bolus 250 mL 250 mL, 25 g, Intravenous, PRN, Chirag Calix MD    dextrose 10% bolus 250 mL 250 mL, 25 g, Intravenous, PRN, Laura Garcia, FNP    dextrose 10% bolus 250 mL 250 mL, 25 g,  Intravenous, PRN, RON Porter    ezetimibe tablet 10 mg, 10 mg, Oral, Daily, CORRIE Orozco-BC, 10 mg at 02/06/23 1031    fluticasone propionate 50 mcg/actuation nasal spray 50 mcg, 1 spray, Each Nostril, Daily, CORRIE Orozco-BC, 50 mcg at 02/06/23 1033    glucagon (human recombinant) injection 1 mg, 1 mg, Intramuscular, PRN, Chirag Calix MD    glucagon (human recombinant) injection 1 mg, 1 mg, Intramuscular, PRN, RON Porter    glucose chewable tablet 16 g, 16 g, Oral, PRN, Chirag Calix MD    glucose chewable tablet 16 g, 16 g, Oral, PRN, RON Porter    glucose chewable tablet 24 g, 24 g, Oral, PRN, Chirag Calix MD, 24 g at 02/03/23 2020    glucose chewable tablet 24 g, 24 g, Oral, PRN, RON oPrter    HYDROcodone-acetaminophen 7.5-325 mg per tablet 1 tablet, 1 tablet, Oral, Q8H PRN, ROB Orozco    hydrOXYzine HCL tablet 50 mg, 50 mg, Oral, TID, CORRIE Orozco-BC, 50 mg at 02/06/23 2012    insulin aspart U-100 injection 1-10 Units, 1-10 Units, Subcutaneous, QID (AC + HS) PRN, RON Porter, 10 Units at 02/07/23 0555    isosorbide mononitrate 24 hr tablet 60 mg, 60 mg, Oral, Daily, CORRIE Orozco-BC, 60 mg at 02/06/23 1032    labetaloL injection 10 mg, 10 mg, Intravenous, Q4H PRN, Chirag Calix MD    lacosamide tablet 50 mg, 50 mg, Oral, Daily, CORRIE Orozco-BC, 50 mg at 02/06/23 1032    lactulose 10 gram/15 ml solution 10 g, 10 g, Oral, Q6H PRN, CORRIE Orozco-BC    levETIRAcetam tablet 500 mg, 500 mg, Oral, BID, Chirag Calix MD, 500 mg at 02/06/23 2011    levothyroxine tablet 25 mcg, 25 mcg, Oral, Before breakfast, MARY OrozcoCNP-BC, 25 mcg at 02/06/23 0539    melatonin tablet 6 mg, 6 mg, Oral, Nightly PRN, Chirag Calix MD    montelukast tablet 10 mg, 10 mg, Oral, Daily, MARY OrozcoCNP-BC, 10 mg at 02/06/23 1032    nitroGLYCERIN SL  tablet 0.4 mg, 0.4 mg, Sublingual, Q5 Min PRN, ROB Orozco    ondansetron injection 4 mg, 4 mg, Intravenous, Q8H PRN, Chirag Calix MD    pantoprazole EC tablet 40 mg, 40 mg, Oral, Daily, CORRIE Orozco-BC, 40 mg at 02/06/23 1032    prasugreL tablet 10 mg, 10 mg, Oral, Daily, ROB Orozco, 10 mg at 02/06/23 1030    QUEtiapine tablet 25 mg, 25 mg, Oral, QHS, ROB Orozco, 25 mg at 02/06/23 2011    ranolazine 12 hr tablet 1,000 mg, 1,000 mg, Oral, BID, ROB Orozco, 1,000 mg at 02/06/23 2011    sodium chloride 0.9% flush 3 mL, 3 mL, Intravenous, Q6H PRN, Chirag Calix MD    tamsulosin 24 hr capsule 0.4 mg, 0.4 mg, Oral, Daily, ROB Orozco, 0.4 mg at 02/06/23 1032        Assessment:     IMPRESSION:  Multifactorial SOB  NINFA on CKD, at baseline  Chronic systolic HF                   -EF 40%  CAD/CABG and PCI  HTN/HHD  ICMO  HLD  PAD  Venous insuffiency  IDDM  Anemia      Plan:     PLAN:  Continue home medications  Hold Torsemide and Aldactone  NPO  Plan for C today  Risk, Benefits and Alternatives Reviewed and Discussed with the PT and their Family and they wish to proceed with above Procedure.  Strict I&O/daily weight  Low sodium diet  AM labs    CORRIE Orozco-BC  Cardiology  [facility] Tele[facility]  02/07/2023

## 2023-02-08 LAB
ALBUMIN SERPL-MCNC: 3.7 G/DL (ref 3.5–5)
ALBUMIN/GLOB SERPL: 0.9 RATIO (ref 1.1–2)
ALP SERPL-CCNC: 135 UNIT/L (ref 40–150)
ALT SERPL-CCNC: 14 UNIT/L (ref 0–55)
AST SERPL-CCNC: 10 UNIT/L (ref 5–34)
BASOPHILS # BLD AUTO: 0.03 X10(3)/MCL (ref 0–0.2)
BASOPHILS NFR BLD AUTO: 0.3 %
BILIRUBIN DIRECT+TOT PNL SERPL-MCNC: 0.6 MG/DL
BUN SERPL-MCNC: 27.3 MG/DL (ref 8.4–25.7)
CALCIUM SERPL-MCNC: 9.9 MG/DL (ref 8.4–10.2)
CHLORIDE SERPL-SCNC: 102 MMOL/L (ref 98–107)
CO2 SERPL-SCNC: 23 MMOL/L (ref 22–29)
CREAT SERPL-MCNC: 1.99 MG/DL (ref 0.73–1.18)
EOSINOPHIL # BLD AUTO: 0.17 X10(3)/MCL (ref 0–0.9)
EOSINOPHIL NFR BLD AUTO: 1.4 %
ERYTHROCYTE [DISTWIDTH] IN BLOOD BY AUTOMATED COUNT: 12.6 % (ref 11.5–17)
GFR SERPLBLD CREATININE-BSD FMLA CKD-EPI: 38 MLS/MIN/1.73/M2
GLOBULIN SER-MCNC: 4.2 GM/DL (ref 2.4–3.5)
GLUCOSE SERPL-MCNC: 345 MG/DL (ref 74–100)
HCT VFR BLD AUTO: 36.7 % (ref 42–52)
HGB BLD-MCNC: 12 GM/DL (ref 14–18)
IMM GRANULOCYTES # BLD AUTO: 0.08 X10(3)/MCL (ref 0–0.04)
IMM GRANULOCYTES NFR BLD AUTO: 0.7 %
LYMPHOCYTES # BLD AUTO: 1.73 X10(3)/MCL (ref 0.6–4.6)
LYMPHOCYTES NFR BLD AUTO: 14.7 %
MCH RBC QN AUTO: 27.5 PG
MCHC RBC AUTO-ENTMCNC: 32.7 MG/DL (ref 33–36)
MCV RBC AUTO: 84 FL (ref 80–94)
MONOCYTES # BLD AUTO: 0.65 X10(3)/MCL (ref 0.1–1.3)
MONOCYTES NFR BLD AUTO: 5.5 %
NEUTROPHILS # BLD AUTO: 9.14 X10(3)/MCL (ref 2.1–9.2)
NEUTROPHILS NFR BLD AUTO: 77.4 %
NRBC BLD AUTO-RTO: 0 %
PLATELET # BLD AUTO: 241 X10(3)/MCL (ref 130–400)
PMV BLD AUTO: 13 FL (ref 7.4–10.4)
POCT GLUCOSE: 271 MG/DL (ref 70–110)
POCT GLUCOSE: 349 MG/DL (ref 70–110)
POCT GLUCOSE: 387 MG/DL (ref 70–110)
POCT GLUCOSE: 399 MG/DL (ref 70–110)
POCT GLUCOSE: 410 MG/DL (ref 70–110)
POTASSIUM SERPL-SCNC: 4.5 MMOL/L (ref 3.5–5.1)
PROT SERPL-MCNC: 7.9 GM/DL (ref 6.4–8.3)
RBC # BLD AUTO: 4.37 X10(6)/MCL (ref 4.7–6.1)
SODIUM SERPL-SCNC: 136 MMOL/L (ref 136–145)
WBC # SPEC AUTO: 11.8 X10(3)/MCL (ref 4.5–11.5)

## 2023-02-08 PROCEDURE — 25000003 PHARM REV CODE 250: Performed by: EMERGENCY MEDICINE

## 2023-02-08 PROCEDURE — 85025 COMPLETE CBC W/AUTO DIFF WBC: CPT | Performed by: NURSE PRACTITIONER

## 2023-02-08 PROCEDURE — 80053 COMPREHEN METABOLIC PANEL: CPT | Performed by: NURSE PRACTITIONER

## 2023-02-08 PROCEDURE — 27000221 HC OXYGEN, UP TO 24 HOURS

## 2023-02-08 PROCEDURE — 21400001 HC TELEMETRY ROOM

## 2023-02-08 PROCEDURE — 63600175 PHARM REV CODE 636 W HCPCS

## 2023-02-08 PROCEDURE — 25000003 PHARM REV CODE 250: Performed by: NURSE PRACTITIONER

## 2023-02-08 RX ORDER — BISACODYL 10 MG
10 SUPPOSITORY, RECTAL RECTAL DAILY PRN
Status: DISCONTINUED | OUTPATIENT
Start: 2023-02-08 | End: 2023-02-09 | Stop reason: HOSPADM

## 2023-02-08 RX ORDER — LACTULOSE 10 G/15ML
15 SOLUTION ORAL EVERY 6 HOURS PRN
Status: DISCONTINUED | OUTPATIENT
Start: 2023-02-08 | End: 2023-02-09 | Stop reason: HOSPADM

## 2023-02-08 RX ADMIN — HYDROXYZINE HYDROCHLORIDE 50 MG: 25 TABLET ORAL at 08:02

## 2023-02-08 RX ADMIN — BISACODYL 10 MG: 10 SUPPOSITORY RECTAL at 02:02

## 2023-02-08 RX ADMIN — EZETIMIBE 10 MG: 10 TABLET ORAL at 09:02

## 2023-02-08 RX ADMIN — PANTOPRAZOLE SODIUM 40 MG: 40 TABLET, DELAYED RELEASE ORAL at 09:02

## 2023-02-08 RX ADMIN — LACOSAMIDE 50 MG: 50 TABLET, FILM COATED ORAL at 09:02

## 2023-02-08 RX ADMIN — LEVETIRACETAM 500 MG: 500 TABLET, FILM COATED ORAL at 08:02

## 2023-02-08 RX ADMIN — INSULIN ASPART 10 UNITS: 100 INJECTION, SOLUTION INTRAVENOUS; SUBCUTANEOUS at 12:02

## 2023-02-08 RX ADMIN — HYDROXYZINE HYDROCHLORIDE 50 MG: 25 TABLET ORAL at 09:02

## 2023-02-08 RX ADMIN — CETIRIZINE HYDROCHLORIDE 10 MG: 10 TABLET, FILM COATED ORAL at 09:02

## 2023-02-08 RX ADMIN — LEVETIRACETAM 500 MG: 500 TABLET, FILM COATED ORAL at 09:02

## 2023-02-08 RX ADMIN — CARVEDILOL 25 MG: 12.5 TABLET, FILM COATED ORAL at 08:02

## 2023-02-08 RX ADMIN — HYDROXYZINE HYDROCHLORIDE 50 MG: 25 TABLET ORAL at 02:02

## 2023-02-08 RX ADMIN — INSULIN ASPART 3 UNITS: 100 INJECTION, SOLUTION INTRAVENOUS; SUBCUTANEOUS at 09:02

## 2023-02-08 RX ADMIN — ASPIRIN 81 MG: 81 TABLET, COATED ORAL at 09:02

## 2023-02-08 RX ADMIN — QUETIAPINE FUMARATE 25 MG: 25 TABLET ORAL at 08:02

## 2023-02-08 RX ADMIN — ATORVASTATIN CALCIUM 80 MG: 40 TABLET, FILM COATED ORAL at 08:02

## 2023-02-08 RX ADMIN — INSULIN ASPART 10 UNITS: 100 INJECTION, SOLUTION INTRAVENOUS; SUBCUTANEOUS at 06:02

## 2023-02-08 RX ADMIN — LACTULOSE 15 G: 10 SOLUTION ORAL at 05:02

## 2023-02-08 RX ADMIN — ISOSORBIDE MONONITRATE 60 MG: 60 TABLET, EXTENDED RELEASE ORAL at 09:02

## 2023-02-08 RX ADMIN — TAMSULOSIN HYDROCHLORIDE 0.4 MG: 0.4 CAPSULE ORAL at 09:02

## 2023-02-08 RX ADMIN — LACTULOSE 15 G: 10 SOLUTION ORAL at 10:02

## 2023-02-08 RX ADMIN — CARVEDILOL 25 MG: 12.5 TABLET, FILM COATED ORAL at 09:02

## 2023-02-08 RX ADMIN — RANOLAZINE 1000 MG: 500 TABLET, EXTENDED RELEASE ORAL at 09:02

## 2023-02-08 RX ADMIN — LEVOTHYROXINE SODIUM 25 MCG: 25 TABLET ORAL at 06:02

## 2023-02-08 RX ADMIN — MONTELUKAST 10 MG: 10 TABLET, FILM COATED ORAL at 09:02

## 2023-02-08 RX ADMIN — INSULIN ASPART 10 UNITS: 100 INJECTION, SOLUTION INTRAVENOUS; SUBCUTANEOUS at 05:02

## 2023-02-08 RX ADMIN — FLUTICASONE PROPIONATE 50 MCG: 50 SPRAY, METERED NASAL at 09:02

## 2023-02-08 RX ADMIN — CYANOCOBALAMIN TAB 500 MCG 2000 MCG: 500 TAB at 09:02

## 2023-02-08 RX ADMIN — PRASUGREL 10 MG: 10 TABLET, FILM COATED ORAL at 09:02

## 2023-02-08 RX ADMIN — RANOLAZINE 1000 MG: 500 TABLET, EXTENDED RELEASE ORAL at 08:02

## 2023-02-08 NOTE — PROGRESS NOTES
[facility] - [facility] Medical Telemetry  Cardiology  Progress Note    Patient Name: Luigi Gotti  MRN: [id number]  Admission Date: 2/3/2023  Hospital Length of Stay: 2 days  Code Status: Full Code   Attending Physician: Cory Oreilly MD   Primary Care Physician: RON Esquivel  Expected Discharge Date: 2/8/2023  Principal Problem:<principal problem not specified>    Subjective:     Brief HPI:   Mr. Gotti is a 56 y/o male who is known to [facility], Dr. Oreilly. The patient presented to [facility] ER with c/o SOB after being seen in clinic by Dr. Oreilly.  Patient had been given multiple rounds of IV Lasix in the clinic without good response.  He remains significantly volume overloaded with increasing shortness of breath and increasing chest pain which was found to be volume related.  He was directed to the ER from clinic.  Patient was found to have NINFA on CKD.  BNP was within normal limits.  Chest x-ray was negative for acute cardiopulmonary process.  [facility] has admitted the patient for fluid volume overload.       Hospital Course:   2.5.23: NAD noted. VSS. SR on tele. Denies CP/Palps, still with some SOB.  2.6.23: NAD noted. VSS. SR on tele. Still having intermittent Cp and SOB, Denies Palps.  2.7.23: NAD noted. VSS. SR on tele. Still with intermittent CP and SOB. NPO for LHC today  2.8.23: NAD noted. VSS. SR on tele. Still with Chronic CP and SOB. Denies Palps, complaining of Constipation. Right groin benign.     PMH: CAD/CABG/PCI, HTN/HHD with Hx of HF, ICMO, HLD, PAD, VI, Celiac Artery Occlusion, DM II, Chronic Pain, GERD, Gastroparesis/Gastritis/Ischemic Colitis (Dr. Green), BPH  PSH: Tonsillectomy, Cholecystectomy, LHC/Stent, CABG x2 (LIMA to LAD and sequentially to Diagonal), Celiac Bypass, Visceral Arteriogram, Cataract Extraction  Social History: Denies ETOH, Tobacco, and Illicit Drug Use  Family History: Father, D, CVA; Mother, D, Breast Cancer, Clotting Disorder; Brother, D, Cardiac Arrhythmia, DM  II, HTN, Seizures; Sister, L, DM II     Diagnostics:   [facility] 2.7.23  Left main-patent  Lad-mid InStent chronic total occlusion, competitive flow noted at diagonal  LCX-40% proximal InStent restenosis unchanged from previous cath.  Distal circumflex small vessel just beyond stent edge, appears to have 80% stenosis vessel is 1 mm in size  RCA-patent dominant vessel  Lima to Lad-diagonal jump graft- widely patent    Echo 1.26.23  The study quality is average.   The left ventricle is normal in size. Global left ventricular systolic function is normal. The left ventricular EF is 40%.   The left atrial diameter is mildly increased.   Dilatation of the aortic root is noted at 4 cms.  Mild to moderate (1-2+) mitral regurgitation. Trace aortic regurgitation. Trace pulmonic regurgitation.   Inadequate TR to calculate RVSP.     Pulmonary angiogram/CardioMems 9.20.22  The estimated blood loss was <50 mL.  Normal left pulmonary angiogram  Successful placement of a CardioMEMS device in the left pulmonary artery     ECHO 4.5.22:  There is mid inferior and lateral hypokinesis with a moderately reduced LV systolic function. EF is estimated at 35-40%  Grade 2 diastolic dysfunction.  There is mild-moderate eccentric posterior jet of MR.  There is mild-moderate tricuspid regurgitation with RVSP estimated at 40-45mmHg     [facility] 3.9.22:  Procedures performed:  1. IFR interrogation of left main to circumflex value 1.0, normal  2. Coronary angiography  3. Left ventricular dynamics  4. LIMA angiography  5. Ultrasound-guided right groin access  Findings:  1. Left main-distal 30% stenosis  2. Circumflex-diffuse 50% proximal stenosis, patent mid distal stents, IFR 1  3. RCA-proximal 20% stenosis, patent mid stents  4. LAD-chronic total occlusion, and stent  5. LIMA-jump graft to diagonal, LAD patent  6. LVEDP 26  Recommendations  1. Aggressive medical therapy for nonobstructive CAD and elevated EDP  2. Manual pressure for access site  hemostasis  3. Outpatient intensive cardiac rehab referral for stable angina     ECHO 3.2.22  Technically limited exam due to poor acoustical windows and body habitus; Definity used.  LV Normal thickness and volume.  LV systolic function is moderately reduced .  Visually estimated EF% is 35-40% . Regional wall motion abnormality in the LAD territory.  Normal right ventricle size with preserved RV systolic function.  Imaging inadequate to measure pulmonary artery pressures.     ECHO 7.20.21  Dilated left atrium  Moderate LV systolic dysfunction EF 40-45%, with moderate concentric LVH  Moderate MR  Trace TR     C 5.13.21  1. CAD (findings similar to prior angiogram)  - Left main: Widely patent.  - LAD: Occluded mid vessel. Competitive flow noted from LAD. Diagonal branches are patent but small in nature.  - Circumflex: Widely patent. Stents show minimal in-stent restenosis.  - LIMA to LAD is widely patent  2. Elevated left ventricular end-diastolic pressure of 26 mm Hg  3. Patent mid vessel abdominal aorta. Patent SMA. Widely patent celiac artery with minimal in-stent restenosis  4. Left lower extremity:  - Patent SFA with mild in-stent restenosis. Mid vessel 60-70% in-stent restenosis noted.  - Patent common femoral artery  - Left infrapopliteal: 2 vessel runoff  5. Right lower extremity:  - Patent common femoral and SFA with luminal irregularities. Distal SFA close to the adductor canal has 70% stenosis  prior to a recent placed stent. Distal SFA stent is widely patent. Popliteal artery is widely patent  - Right infrapopliteal: 2 vessel runoff     PET 8.17.20  This is an abnormal perfusion study. Study is consistent with mostly scar tissue with minimal ischemia.   This scan is suggestive of moderate risk for future cardiovascular events.   Small fixed perfusion abnormality of moderate intensity in the apical segment. Small fixed perfusion abnormality of moderate intensity in the anterior septal region. Small fixed  perfusion abnormality of severe intensity in the inferior region. Small partially reversible perfusion abnormality of severe intensity in the inferior lateral region.   The left ventricular cavity is noted to be mildly enlarged on the stress studies. The stress left ventricular ejection fraction was calculated to be 47% and left ventricular global function is normal. The rest left ventricular cavity is noted to be normal. The rest left ventricular ejection fraction was calculated to be 39% and rest left ventricular global function is moderately reduced.   Persistent hypokinesis of the inferior region is noted in both rest and stress studies. When compared to the resting ejection fraction (39%), the stress ejection fraction (47%) has increased.   The study quality is good.          Review of Systems   Constitutional: Negative for chills and fever.   Cardiovascular:  Positive for dyspnea on exertion. Negative for chest pain and palpitations.   Respiratory:  Positive for shortness of breath.    Psychiatric/Behavioral:  Negative for altered mental status.          Objective:     Vital Signs (Most Recent):  Temp: 97.9 °F (36.6 °C) (02/08/23 1129)  Pulse: 71 (02/08/23 1129)  Resp: 20 (02/08/23 0346)  BP: 120/79 (02/08/23 1129)  SpO2: 95 % (02/08/23 1129) Vital Signs (24h Range):  Temp:  [97.6 °F (36.4 °C)-98.4 °F (36.9 °C)] 97.9 °F (36.6 °C)  Pulse:  [65-75] 71  Resp:  [20] 20  SpO2:  [94 %-98 %] 95 %  BP: (104-134)/(64-83) 120/79     Weight: 113.5 kg (250 lb 3.2 oz)  Body mass index is 33.01 kg/m².    SpO2: 95 %         Intake/Output Summary (Last 24 hours) at 2/8/2023 1152  Last data filed at 2/7/2023 2223  Gross per 24 hour   Intake 480 ml   Output --   Net 480 ml         Lines/Drains/Airways       Peripheral Intravenous Line  Duration                  Peripheral IV - Single Lumen 02/03/23 1740 20 G Right Antecubital 4 days                    Significant Labs: CMP   Recent Labs   Lab 02/07/23  0402 02/08/23  0848   NA  133* 136   K 4.5 4.5   CO2 22 23   BUN 30.3* 27.3*   CREATININE 1.83* 1.99*   CALCIUM 9.0 9.9   ALBUMIN 3.3* 3.7   BILITOT 0.5 0.6   ALKPHOS 114 135   AST 10 10   ALT 15 14      and CBC   Recent Labs   Lab 02/07/23  0402 02/08/23  0848   WBC 12.1* 11.8*   HGB 10.6* 12.0*   HCT 31.7* 36.7*    241         Telemetry:  SR    Physical Exam:  Physical Exam  Constitutional:       Appearance: Normal appearance. He is obese.   HENT:      Head: Normocephalic.   Eyes:      Extraocular Movements: Extraocular movements intact.      Conjunctiva/sclera: Conjunctivae normal.   Cardiovascular:      Rate and Rhythm: Normal rate and regular rhythm.      Pulses: Normal pulses.      Heart sounds: Normal heart sounds.      Comments: R Groin Soft/Flat, Non-Tender, No Sign of Bleed/Infection. +2 BLE Palpable Pedal Pulses    Pulmonary:      Effort: Pulmonary effort is normal.      Breath sounds: Normal breath sounds.   Abdominal:      Palpations: Abdomen is soft.   Musculoskeletal:         General: Normal range of motion.      Cervical back: Neck supple.   Skin:     General: Skin is warm and dry.   Neurological:      Mental Status: He is alert and oriented to person, place, and time. Mental status is at baseline.   Psychiatric:         Mood and Affect: Mood normal.         Behavior: Behavior normal.       Current Inpatient Medications:    Current Facility-Administered Medications:     acetaminophen tablet 650 mg, 650 mg, Oral, Q8H PRN, Chirag Calix MD    aspirin EC tablet 81 mg, 81 mg, Oral, Daily, CORRIE Orozco-BC, 81 mg at 02/08/23 0931    atorvastatin tablet 80 mg, 80 mg, Oral, QHS, CORRIE Orozco-BC, 80 mg at 02/07/23 2120    azelastine 137 mcg (0.1 %) nasal spray 137 mcg, 1 spray, Nasal, BID, ROB Orozco    bisacodyL suppository 10 mg, 10 mg, Rectal, Daily PRN, ROB Orozco    carvediloL tablet 25 mg, 25 mg, Oral, BID, CORRIE Orozco-BC, 25 mg at 02/08/23 0985     cetirizine tablet 10 mg, 10 mg, Oral, Daily, Kike Corrales MARYCNP-BC, 10 mg at 02/08/23 0934    cyanocobalamin tablet 2,000 mcg, 2,000 mcg, Oral, Daily, Kike Corrales MARYCNP-BC, 2,000 mcg at 02/08/23 0931    dextrose 10% bolus 125 mL 125 mL, 12.5 g, Intravenous, PRN, Chirag Calix MD    dextrose 10% bolus 125 mL 125 mL, 12.5 g, Intravenous, PRN, Laura Alvarezkin, FNP    dextrose 10% bolus 125 mL 125 mL, 12.5 g, Intravenous, PRN, Laura Alvarezkin, FNP    dextrose 10% bolus 250 mL 250 mL, 25 g, Intravenous, PRN, Chirag Calix MD    dextrose 10% bolus 250 mL 250 mL, 25 g, Intravenous, PRN, Laura WHEAT Mankin, FNP    dextrose 10% bolus 250 mL 250 mL, 25 g, Intravenous, PRN, Laura Garcia, FNP    ezetimibe tablet 10 mg, 10 mg, Oral, Daily, Stokes MARGE Corrales NUNUBALDO-BC, 10 mg at 02/08/23 0932    fentaNYL injection, , , PRN, Jamin Melara MD, 50 mcg at 02/07/23 1343    fluticasone propionate 50 mcg/actuation nasal spray 50 mcg, 1 spray, Each Nostril, Daily, Kike Corrales MARYCNP-BC, 50 mcg at 02/08/23 0935    glucagon (human recombinant) injection 1 mg, 1 mg, Intramuscular, PRN, Chirag Calix MD    glucagon (human recombinant) injection 1 mg, 1 mg, Intramuscular, PRN, Laura Garcia, PENNYP    glucose chewable tablet 16 g, 16 g, Oral, PRN, Chirag Calix MD    glucose chewable tablet 16 g, 16 g, Oral, PRN, Laura Garcia, PENNYP    glucose chewable tablet 24 g, 24 g, Oral, PRN, Chirag Calix MD, 24 g at 02/03/23 2020    glucose chewable tablet 24 g, 24 g, Oral, PRN, Laura Garcia, FNP    hydrALAZINE injection 10 mg, 10 mg, Intravenous, Q4H PRN, Jamin Melara MD    HYDROcodone-acetaminophen 7.5-325 mg per tablet 1 tablet, 1 tablet, Oral, Q8H PRN, CORRIE Orozco-BC    hydrOXYzine HCL tablet 50 mg, 50 mg, Oral, TID, SCOTT OrozcoBC, 50 mg at 02/08/23 0931    insulin aspart U-100 injection 1-10 Units, 1-10 Units, Subcutaneous, QID (AC + HS) PRN,  Laura Garcia, FNP, 10 Units at 02/08/23 0619    iopamidoL (ISOVUE-370) injection, , , PRN, Jamin Melara MD, 20 mL at 02/07/23 1357    isosorbide mononitrate 24 hr tablet 60 mg, 60 mg, Oral, Daily, CORRIE Orozco-BC, 60 mg at 02/08/23 0933    labetaloL injection 10 mg, 10 mg, Intravenous, Q4H PRN, Chirag Calix MD    lacosamide tablet 50 mg, 50 mg, Oral, Daily, CORRIE Orozco-BC, 50 mg at 02/08/23 0932    lactulose 10 gram/15 ml solution 15 g, 15 g, Oral, Q6H PRN, CORRIE Orozco-BC, 15 g at 02/08/23 1031    levETIRAcetam tablet 500 mg, 500 mg, Oral, BID, Chirag Calix MD, 500 mg at 02/08/23 0931    levothyroxine tablet 25 mcg, 25 mcg, Oral, Before breakfast, CORRIE Orozco-BC, 25 mcg at 02/08/23 0613    LIDOcaine HCL 10 mg/ml (1%) injection, , , PRN, Jamin Melara MD, 10 mL at 02/07/23 1344    melatonin tablet 6 mg, 6 mg, Oral, Nightly PRN, Chirag Calix MD    midazolam (VERSED) 1 mg/mL injection, , , PRN, Jamin Melara MD, 1 mg at 02/07/23 1343    montelukast tablet 10 mg, 10 mg, Oral, Daily, CORRIE Orozco-BC, 10 mg at 02/08/23 0930    morphine injection 2 mg, 2 mg, Intravenous, Q4H PRN, Jamin Melara MD    nitroGLYCERIN SL tablet 0.4 mg, 0.4 mg, Sublingual, Q5 Min PRN, SCOTT OrozcoBC    ondansetron injection 4 mg, 4 mg, Intravenous, Q8H PRN, Chirag Calix MD    pantoprazole EC tablet 40 mg, 40 mg, Oral, Daily, CORRIE Orozco-BC, 40 mg at 02/08/23 0933    prasugreL tablet 10 mg, 10 mg, Oral, Daily, CORRIE Orozco-BC, 10 mg at 02/08/23 0931    QUEtiapine tablet 25 mg, 25 mg, Oral, QHS, CORRIE Orozco-BC, 25 mg at 02/07/23 2120    ranolazine 12 hr tablet 1,000 mg, 1,000 mg, Oral, BID, CORRIE Orozco-BC, 1,000 mg at 02/08/23 0933    sodium chloride 0.9% flush 3 mL, 3 mL, Intravenous, Q6H PRN, Chirag Calix MD    tamsulosin 24 hr capsule 0.4 mg, 0.4 mg, Oral, Daily,  Kike Corrales [facility], 0.4 mg at 02/08/23 0933        Assessment:     IMPRESSION:  Multifactorial SOB  NINFA on CKD, at baseline  Chronic systolic HF                   -EF 40%  CAD/CABG and PCI  HTN/HHD  ICMO  HLD  PAD  Venous insuffiency  IDDM  Anemia      Plan:     PLAN:  DC home once able to have BM.  Continue home medications  Hold Torsemide and Aldactone  Lactulose 15gm q6h PRN   Dulcolax 10mg qday PRN  Strict I&O/daily weight  Low sodium diet  Would benefit from Outpatient advance HF referral.    Kike Corrales [facility]  Cardiology  [facility] - [facility] Tele[facility]  02/08/2023      I have seen the patient, reviewed the Nurse Practitioner's note, assessment and plan. I have personally interviewed and examined the patient at bedside and agree with the findings. Medical decision making listed above were done under my guidance.

## 2023-02-08 NOTE — PLAN OF CARE
02/08/23 1430   Discharge Assessment   Assessment Type Discharge Planning Assessment   Source of Information patient   Communicated SABINE with patient/caregiver Date not available/Unable to determine   People in Home alone   Do you expect to return to your current living situation? Yes   Do you have help at home or someone to help you manage your care at home? Yes   Current cognitive status: Alert/Oriented   Readmission within 30 days? No   Patient currently being followed by outpatient case management? No   Do you currently have service(s) that help you manage your care at home? Yes   Name and Contact number of agency [facility]   Is the pt/caregiver preference to resume services with current agency Yes   Do you take prescription medications? Yes   Do you have prescription coverage? Yes   Do you have any problems affording any of your prescribed medications? No   Is the patient taking medications as prescribed? yes   Are you on dialysis? No   Do you take coumadin? No   Discharge Plan A Home Health   Discharge Plan B Home Health   DME Needed Upon Discharge  none   Discharge Plan discussed with: Patient   Discharge Barriers Identified None

## 2023-02-09 VITALS
DIASTOLIC BLOOD PRESSURE: 75 MMHG | HEART RATE: 72 BPM | TEMPERATURE: 98 F | OXYGEN SATURATION: 98 % | SYSTOLIC BLOOD PRESSURE: 126 MMHG | RESPIRATION RATE: 16 BRPM | HEIGHT: 73 IN | BODY MASS INDEX: 33.16 KG/M2 | WEIGHT: 250.19 LBS

## 2023-02-09 PROBLEM — Z86.79 H/O CHF: Status: ACTIVE | Noted: 2023-02-09

## 2023-02-09 LAB
POCT GLUCOSE: 258 MG/DL (ref 70–110)
POCT GLUCOSE: 446 MG/DL (ref 70–110)

## 2023-02-09 PROCEDURE — 25000003 PHARM REV CODE 250: Performed by: EMERGENCY MEDICINE

## 2023-02-09 PROCEDURE — 63600175 PHARM REV CODE 636 W HCPCS

## 2023-02-09 PROCEDURE — 25000003 PHARM REV CODE 250: Performed by: NURSE PRACTITIONER

## 2023-02-09 PROCEDURE — 27000221 HC OXYGEN, UP TO 24 HOURS

## 2023-02-09 RX ADMIN — FLUTICASONE PROPIONATE 50 MCG: 50 SPRAY, METERED NASAL at 09:02

## 2023-02-09 RX ADMIN — ISOSORBIDE MONONITRATE 60 MG: 60 TABLET, EXTENDED RELEASE ORAL at 09:02

## 2023-02-09 RX ADMIN — CARVEDILOL 25 MG: 12.5 TABLET, FILM COATED ORAL at 09:02

## 2023-02-09 RX ADMIN — LEVOTHYROXINE SODIUM 25 MCG: 25 TABLET ORAL at 05:02

## 2023-02-09 RX ADMIN — ASPIRIN 81 MG: 81 TABLET, COATED ORAL at 09:02

## 2023-02-09 RX ADMIN — MONTELUKAST 10 MG: 10 TABLET, FILM COATED ORAL at 09:02

## 2023-02-09 RX ADMIN — EZETIMIBE 10 MG: 10 TABLET ORAL at 09:02

## 2023-02-09 RX ADMIN — RANOLAZINE 1000 MG: 500 TABLET, EXTENDED RELEASE ORAL at 09:02

## 2023-02-09 RX ADMIN — PANTOPRAZOLE SODIUM 40 MG: 40 TABLET, DELAYED RELEASE ORAL at 09:02

## 2023-02-09 RX ADMIN — INSULIN ASPART 6 UNITS: 100 INJECTION, SOLUTION INTRAVENOUS; SUBCUTANEOUS at 06:02

## 2023-02-09 RX ADMIN — CETIRIZINE HYDROCHLORIDE 10 MG: 10 TABLET, FILM COATED ORAL at 09:02

## 2023-02-09 RX ADMIN — CYANOCOBALAMIN TAB 500 MCG 2000 MCG: 500 TAB at 09:02

## 2023-02-09 RX ADMIN — PRASUGREL 10 MG: 10 TABLET, FILM COATED ORAL at 09:02

## 2023-02-09 RX ADMIN — LEVETIRACETAM 500 MG: 500 TABLET, FILM COATED ORAL at 09:02

## 2023-02-09 RX ADMIN — LACOSAMIDE 50 MG: 50 TABLET, FILM COATED ORAL at 09:02

## 2023-02-09 RX ADMIN — TAMSULOSIN HYDROCHLORIDE 0.4 MG: 0.4 CAPSULE ORAL at 09:02

## 2023-02-09 RX ADMIN — HYDROXYZINE HYDROCHLORIDE 50 MG: 25 TABLET ORAL at 09:02

## 2023-02-09 NOTE — PLAN OF CARE
02/09/23 1018   Final Note   Assessment Type Final Discharge Note   Anticipated Discharge Disposition Home-Health   Hospital Resources/Appts/Education Provided Post-Acute resouces added to AVS   Post-Acute Status   Post-Acute Authorization Home Health   Home Health Status Referrals Sent  (Resume with [facility].)

## 2023-02-09 NOTE — DISCHARGE SUMMARY
[facility] - [facility] Medical Telemetry  Cardiology  Discharge Summary    Patient Name: Luigi Gotti  MRN: [id number]  Admission Date: 2/3/2023  Hospital Length of Stay: 3 days  Code Status: Full Code   Attending Physician: Cory Oreilly MD   Primary Care Physician: RON Esquivel  Expected Discharge Date: 2/9/2023  Principal Problem:<principal problem not specified>    Subjective:   Hospital course   Mr. Gotti is a 58 y/o male who is known to [facility], Dr. Oreilly. The patient presented to [facility] ER with c/o SOB after being seen in clinic by Dr. Oreilly.  Patient had been given multiple rounds of IV Lasix in the clinic without good response.  He remains significantly volume overloaded with increasing shortness of breath and increasing chest pain which was found to be volume related.  He was directed to the ER from clinic.  Patient was found to have NINFA on CKD.  BNP was within normal limits.  Chest x-ray was negative for acute cardiopulmonary process.  Patient was diuresed until euvolemic and then underwent LHC which revealed stable CAD.  Right groin benign.  Follow-up with Dr. Oreilly in 7-10 days.  Patient is stable for discharge.     2.5.23: NAD noted. VSS. SR on tele. Denies CP/Palps, still with some SOB.  2.6.23: NAD noted. VSS. SR on tele. Still having intermittent Cp and SOB, Denies Palps.  2.7.23: NAD noted. VSS. SR on tele. Still with intermittent CP and SOB. NPO for LHC today  2.8.23: NAD noted. VSS. SR on tele. Still with Chronic CP and SOB. Denies Palps, complaining of Constipation. Right groin benign.  2.9.23: NAD noted. VSS. SR on tele. Denies acute CP or SOB. Had BM overnight.      PMH: CAD/CABG/PCI, HTN/HHD with Hx of HF, ICMO, HLD, PAD, VI, Celiac Artery Occlusion, DM II, Chronic Pain, GERD, Gastroparesis/Gastritis/Ischemic Colitis (Dr. Green), BPH  PSH: Tonsillectomy, Cholecystectomy, LHC/Stent, CABG x2 (LIMA to LAD and sequentially to Diagonal), Celiac Bypass, Visceral Arteriogram,  Cataract Extraction  Social History: Denies ETOH, Tobacco, and Illicit Drug Use  Family History: Father, D, CVA; Mother, D, Breast Cancer, Clotting Disorder; Brother, D, Cardiac Arrhythmia, DM II, HTN, Seizures; Sister, L, DM II     Diagnostics:   [facility] 2.7.23  Left main-patent  Lad-mid InStent chronic total occlusion, competitive flow noted at diagonal  LCX-40% proximal InStent restenosis unchanged from previous cath.  Distal circumflex small vessel just beyond stent edge, appears to have 80% stenosis vessel is 1 mm in size  RCA-patent dominant vessel  Lima to Lad-diagonal jump graft- widely patent    Echo 1.26.23  The study quality is average.   The left ventricle is normal in size. Global left ventricular systolic function is normal. The left ventricular EF is 40%.   The left atrial diameter is mildly increased.   Dilatation of the aortic root is noted at 4 cms.  Mild to moderate (1-2+) mitral regurgitation. Trace aortic regurgitation. Trace pulmonic regurgitation.   Inadequate TR to calculate RVSP.     Pulmonary angiogram/[facility] 9.20.22  The estimated blood loss was <50 mL.  Normal left pulmonary angiogram  Successful placement of a CardioMEMS device in the left pulmonary artery     ECHO 4.5.22:  There is mid inferior and lateral hypokinesis with a moderately reduced LV systolic function. EF is estimated at 35-40%  Grade 2 diastolic dysfunction.  There is mild-moderate eccentric posterior jet of MR.  There is mild-moderate tricuspid regurgitation with RVSP estimated at 40-45mmHg     [facility] 3.9.22:  Procedures performed:  1. IFR interrogation of left main to circumflex value 1.0, normal  2. Coronary angiography  3. Left ventricular dynamics  4. LIMA angiography  5. Ultrasound-guided right groin access  Findings:  1. Left main-distal 30% stenosis  2. Circumflex-diffuse 50% proximal stenosis, patent mid distal stents, IFR 1  3. RCA-proximal 20% stenosis, patent mid stents  4. LAD-chronic total occlusion, and  stent  5. LIMA-jump graft to diagonal, LAD patent  6. LVEDP 26  Recommendations  1. Aggressive medical therapy for nonobstructive CAD and elevated EDP  2. Manual pressure for access site hemostasis  3. Outpatient intensive cardiac rehab referral for stable angina     ECHO 3.2.22  Technically limited exam due to poor acoustical windows and body habitus; Definity used.  LV Normal thickness and volume.  LV systolic function is moderately reduced .  Visually estimated EF% is 35-40% . Regional wall motion abnormality in the LAD territory.  Normal right ventricle size with preserved RV systolic function.  Imaging inadequate to measure pulmonary artery pressures.     ECHO 7.20.21  Dilated left atrium  Moderate LV systolic dysfunction EF 40-45%, with moderate concentric LVH  Moderate MR  Trace TR     [facility] 5.13.21  1. CAD (findings similar to prior angiogram)  - Left main: Widely patent.  - LAD: Occluded mid vessel. Competitive flow noted from LAD. Diagonal branches are patent but small in nature.  - Circumflex: Widely patent. Stents show minimal in-stent restenosis.  - LIMA to LAD is widely patent  2. Elevated left ventricular end-diastolic pressure of 26 mm Hg  3. Patent mid vessel abdominal aorta. Patent SMA. Widely patent celiac artery with minimal in-stent restenosis  4. Left lower extremity:  - Patent SFA with mild in-stent restenosis. Mid vessel 60-70% in-stent restenosis noted.  - Patent common femoral artery  - Left infrapopliteal: 2 vessel runoff  5. Right lower extremity:  - Patent common femoral and SFA with luminal irregularities. Distal SFA close to the adductor canal has 70% stenosis  prior to a recent placed stent. Distal SFA stent is widely patent. Popliteal artery is widely patent  - Right infrapopliteal: 2 vessel runoff     PET 8.17.20  This is an abnormal perfusion study. Study is consistent with mostly scar tissue with minimal ischemia.   This scan is suggestive of moderate risk for future cardiovascular  events.   Small fixed perfusion abnormality of moderate intensity in the apical segment. Small fixed perfusion abnormality of moderate intensity in the anterior septal region. Small fixed perfusion abnormality of severe intensity in the inferior region. Small partially reversible perfusion abnormality of severe intensity in the inferior lateral region.   The left ventricular cavity is noted to be mildly enlarged on the stress studies. The stress left ventricular ejection fraction was calculated to be 47% and left ventricular global function is normal. The rest left ventricular cavity is noted to be normal. The rest left ventricular ejection fraction was calculated to be 39% and rest left ventricular global function is moderately reduced.   Persistent hypokinesis of the inferior region is noted in both rest and stress studies. When compared to the resting ejection fraction (39%), the stress ejection fraction (47%) has increased.   The study quality is good.          Review of Systems   Constitutional: Negative for chills and fever.   Cardiovascular:  Positive for dyspnea on exertion. Negative for chest pain and palpitations.   Respiratory:  Positive for shortness of breath.    Psychiatric/Behavioral:  Negative for altered mental status.          Objective:     Vital Signs (Most Recent):  Temp: 97.6 °F (36.4 °C) (02/09/23 0757)  Pulse: 73 (02/09/23 0757)  Resp: 18 (02/09/23 0447)  BP: 139/85 (02/09/23 0757)  SpO2: 95 % (02/09/23 0757) Vital Signs (24h Range):  Temp:  [97.5 °F (36.4 °C)-98.2 °F (36.8 °C)] 97.6 °F (36.4 °C)  Pulse:  [66-73] 73  Resp:  [18-20] 18  SpO2:  [95 %-98 %] 95 %  BP: (107-139)/(64-85) 139/85     Weight: 113.5 kg (250 lb 3.2 oz)  Body mass index is 33.01 kg/m².    SpO2: 95 %         Intake/Output Summary (Last 24 hours) at 2/9/2023 0900  Last data filed at 2/8/2023 1400  Gross per 24 hour   Intake 960 ml   Output --   Net 960 ml         Lines/Drains/Airways       Peripheral Intravenous Line   Duration                  Peripheral IV - Single Lumen 02/03/23 1740 20 G Right Antecubital 5 days                    Significant Labs: CMP   Recent Labs   Lab 02/08/23  0848      K 4.5   CO2 23   BUN 27.3*   CREATININE 1.99*   CALCIUM 9.9   ALBUMIN 3.7   BILITOT 0.6   ALKPHOS 135   AST 10   ALT 14      and CBC   Recent Labs   Lab 02/08/23  0848   WBC 11.8*   HGB 12.0*   HCT 36.7*            Telemetry:  SR    Physical Exam:  Physical Exam  Constitutional:       Appearance: Normal appearance. He is obese.   HENT:      Head: Normocephalic.   Eyes:      Extraocular Movements: Extraocular movements intact.      Conjunctiva/sclera: Conjunctivae normal.   Cardiovascular:      Rate and Rhythm: Normal rate and regular rhythm.      Pulses: Normal pulses.      Heart sounds: Normal heart sounds.      Comments: R Groin Soft/Flat, Non-Tender, No Sign of Bleed/Infection. +2 BLE Palpable Pedal Pulses    Pulmonary:      Effort: Pulmonary effort is normal.      Breath sounds: Normal breath sounds.   Abdominal:      Palpations: Abdomen is soft.   Musculoskeletal:         General: Normal range of motion.      Cervical back: Neck supple.   Skin:     General: Skin is warm and dry.   Neurological:      Mental Status: He is alert and oriented to person, place, and time. Mental status is at baseline.   Psychiatric:         Mood and Affect: Mood normal.         Behavior: Behavior normal.       Current Inpatient Medications:    Current Facility-Administered Medications:     acetaminophen tablet 650 mg, 650 mg, Oral, Q8H PRN, Chirag Calix MD    aspirin EC tablet 81 mg, 81 mg, Oral, Daily, ROB Orozco, 81 mg at 02/08/23 0931    atorvastatin tablet 80 mg, 80 mg, Oral, QHS, ROB Orozco, 80 mg at 02/08/23 2058    azelastine 137 mcg (0.1 %) nasal spray 137 mcg, 1 spray, Nasal, BID, ROB Orozco    bisacodyL suppository 10 mg, 10 mg, Rectal, Daily PRN, ROB Orozco, 10  mg at 02/08/23 1458    carvediloL tablet 25 mg, 25 mg, Oral, BID, Stokes MARGE Corrales MARYCNP-BC, 25 mg at 02/08/23 2058    cetirizine tablet 10 mg, 10 mg, Oral, Daily, Kike Corrales MARYCNP-BC, 10 mg at 02/08/23 0934    cyanocobalamin tablet 2,000 mcg, 2,000 mcg, Oral, Daily, Stokes MARGE Antoni MARYCNP-BC, 2,000 mcg at 02/08/23 0931    dextrose 10% bolus 125 mL 125 mL, 12.5 g, Intravenous, PRN, Chirag Calix MD    dextrose 10% bolus 125 mL 125 mL, 12.5 g, Intravenous, PRN, Laura Alvarezkin, FNP    dextrose 10% bolus 125 mL 125 mL, 12.5 g, Intravenous, PRN, Laura WHEAT Mankin, FNP    dextrose 10% bolus 250 mL 250 mL, 25 g, Intravenous, PRN, Chirag Calix MD    dextrose 10% bolus 250 mL 250 mL, 25 g, Intravenous, PRN, Laura Alvarezkin, FNP    dextrose 10% bolus 250 mL 250 mL, 25 g, Intravenous, PRN, Laura WHEAT Mankin, FNP    ezetimibe tablet 10 mg, 10 mg, Oral, Daily, Kike Corrales MARYCNP-BC, 10 mg at 02/08/23 0932    fentaNYL injection, , , PRN, Jamin Melara MD, 50 mcg at 02/07/23 1343    fluticasone propionate 50 mcg/actuation nasal spray 50 mcg, 1 spray, Each Nostril, Daily, Kike Corrales NUNUBALDO-BC, 50 mcg at 02/08/23 0935    glucagon (human recombinant) injection 1 mg, 1 mg, Intramuscular, PRN, Chirag Calix MD    glucagon (human recombinant) injection 1 mg, 1 mg, Intramuscular, PRN, Laura Garcia, FNP    glucose chewable tablet 16 g, 16 g, Oral, PRN, Chirag Calix MD    glucose chewable tablet 16 g, 16 g, Oral, PRN, Laura Garcia, PENNYP    glucose chewable tablet 24 g, 24 g, Oral, PRN, Chirag Calix MD, 24 g at 02/03/23 2020    glucose chewable tablet 24 g, 24 g, Oral, PRN, RON Porter    hydrALAZINE injection 10 mg, 10 mg, Intravenous, Q4H PRN, Jamin Melara MD    HYDROcodone-acetaminophen 7.5-325 mg per tablet 1 tablet, 1 tablet, Oral, Q8H PRN, ROB Orozco    hydrOXYzine HCL tablet 50 mg, 50 mg, Oral, TID, ROB Orozco,  50 mg at 02/08/23 2058    insulin aspart U-100 injection 1-10 Units, 1-10 Units, Subcutaneous, QID (AC + HS) PRN, Laura Garcia, FNP, 6 Units at 02/09/23 0606    iopamidoL (ISOVUE-370) injection, , , PRN, Jamin Melara MD, 20 mL at 02/07/23 1357    isosorbide mononitrate 24 hr tablet 60 mg, 60 mg, Oral, Daily, ROB Orozco, 60 mg at 02/08/23 0933    labetaloL injection 10 mg, 10 mg, Intravenous, Q4H PRN, Chirag Calix MD    lacosamide tablet 50 mg, 50 mg, Oral, Daily, ROB Orozco, 50 mg at 02/08/23 0932    lactulose 10 gram/15 ml solution 15 g, 15 g, Oral, Q6H PRN, ROB Orozco, 15 g at 02/08/23 1753    levETIRAcetam tablet 500 mg, 500 mg, Oral, BID, Chirag Calix MD, 500 mg at 02/08/23 2058    levothyroxine tablet 25 mcg, 25 mcg, Oral, Before breakfast, ROB Orozco, 25 mcg at 02/09/23 0548    LIDOcaine HCL 10 mg/ml (1%) injection, , , PRN, Jamin Melara MD, 10 mL at 02/07/23 1344    melatonin tablet 6 mg, 6 mg, Oral, Nightly PRN, Chirag Calix MD    midazolam (VERSED) 1 mg/mL injection, , , PRN, Jamin Melara MD, 1 mg at 02/07/23 1343    montelukast tablet 10 mg, 10 mg, Oral, Daily, ROB Orozco, 10 mg at 02/08/23 0930    morphine injection 2 mg, 2 mg, Intravenous, Q4H PRN, Jamin Melara MD    nitroGLYCERIN SL tablet 0.4 mg, 0.4 mg, Sublingual, Q5 Min PRN, ROB Orozco    ondansetron injection 4 mg, 4 mg, Intravenous, Q8H PRN, Chirag Calix MD    pantoprazole EC tablet 40 mg, 40 mg, Oral, Daily, ROB Orozco, 40 mg at 02/08/23 0933    prasugreL tablet 10 mg, 10 mg, Oral, Daily, ROB Orozco, 10 mg at 02/08/23 0931    QUEtiapine tablet 25 mg, 25 mg, Oral, QHS, CORRIE Orozco-BC, 25 mg at 02/08/23 2058    ranolazine 12 hr tablet 1,000 mg, 1,000 mg, Oral, BID, ROB Orozco, 1,000 mg at 02/08/23 2058    sodium chloride 0.9% flush 3  mL, 3 mL, Intravenous, Q6H PRN, Chirag Calix MD    tamsulosin 24 hr capsule 0.4 mg, 0.4 mg, Oral, Daily, Kike Corrales, AGACNP-BC, 0.4 mg at 02/08/23 0933        Assessment:     IMPRESSION:  Multifactorial SOB  NINFA on CKD, at baseline  Chronic systolic HF                   -EF 40%  CAD/CABG and PCI  HTN/HHD  ICMO  HLD  PAD  Venous insuffiency  IDDM  Anemia      Plan:     PLAN:  DC home   Continue home medications  Resume Torsemide and Aldactone  Strict I&O/daily weight  Low sodium diet  Would benefit from Outpatient advance HF referral.    DISCHARGE PLAN:  Discharge: Home  Discharge Diet: Cardiac, Low Sodium  Discharge Condition: Stable  Discharge Activity:  As Tolerated, No Heavy Lifting > 5 lbs., Notify MD with Symptoms of Bleed/Infection  Discharge Time: 38 minutes    Discharge Medications:     Medication List        CONTINUE taking these medications      aspirin 81 MG EC tablet  Commonly known as: ECOTRIN     atorvastatin 80 MG tablet  Commonly known as: LIPITOR     azelastine 137 mcg (0.1 %) nasal spray  Commonly known as: ASTELIN     carvediloL 25 MG tablet  Commonly known as: COREG     cyanocobalamin 1000 MCG tablet  Commonly known as: VITAMIN B-12     ezetimibe 10 mg tablet  Commonly known as: ZETIA     fluticasone propionate 50 mcg/actuation nasal spray  Commonly known as: FLONASE     HYDROcodone-acetaminophen 7.5-325 mg per tablet  Commonly known as: NORCO     hydrOXYzine 50 MG tablet  Commonly known as: ATARAX     isosorbide mononitrate 60 MG 24 hr tablet  Commonly known as: IMDUR     lacosamide 50 mg Tab  Commonly known as: VIMPAT  Take 1 tablet (50 mg total) by mouth once daily.     lactulose 10 gram/15 mL solution  Commonly known as: CHRONULAC  TAKE 15 MLS BY MOUTH 2 TIMES DAILY AS NEEDED FOR CONSTIPATION     levETIRAcetam 500 MG Tab  Commonly known as: KEPPRA  Take 1 tablet (500 mg total) by mouth 2 (two) times daily.     levocetirizine 5 MG tablet  Commonly known as: XYZAL     levothyroxine  25 MCG tablet  Commonly known as: SYNTHROID  Take 1 tablet (25 mcg total) by mouth before breakfast.     metoclopramide HCl 10 MG tablet  Commonly known as: REGLAN     montelukast 10 mg tablet  Commonly known as: SINGULAIR  Take 1 tablet (10 mg total) by mouth once daily.     nitroGLYCERIN 0.4 MG SL tablet  Commonly known as: NITROSTAT     NovoLIN 70/30 U-100 Insulin 100 unit/mL (70-30) injection  Generic drug: insulin NPH-insulin regular (70/30)     omeprazole 40 MG capsule  Commonly known as: PRILOSEC  Take 1 capsule (40 mg total) by mouth every evening.     prasugreL 10 mg Tab  Commonly known as: EFFIENT     QUEtiapine 25 MG Tab  Commonly known as: SEROQUEL  Take 1 tablet (25 mg total) by mouth every evening.     ranolazine 1,000 mg Tb12  Commonly known as: RANEXA     spironolactone 25 MG tablet  Commonly known as: ALDACTONE  Take 1 tablet (25 mg total) by mouth once daily.     tamsulosin 0.4 mg Cap  Commonly known as: FLOMAX  TAKE 1 CAPSULE BY MOUTH ONCE DAILY BEFORE A MEAL     torsemide 20 MG Tab  Commonly known as: DEMADEX     TRUE METRIX GLUCOSE METER Misc  Generic drug: blood-glucose meter     TRUETRACK TEST Strp  Generic drug: blood sugar diagnostic                CORRIE Orozco-BC  Cardiology  [facility] - [facility] Medical Telemetry  02/09/2023

## 2023-02-10 ENCOUNTER — PATIENT OUTREACH (OUTPATIENT)
Dept: ADMINISTRATIVE | Facility: CLINIC | Age: 58
End: 2023-02-10
Payer: MEDICARE

## 2023-02-10 NOTE — PROGRESS NOTES
C3 nurse attempted to contact Luigi EVANGELINA Shen for a TCC post hospital discharge follow up call. No answer. Left voicemail with callback information. The patient has a scheduled HOSFU appointment with Cory Oreilly MD (Cardiology) 2/17/23 @ 10:50am.

## 2023-02-13 ENCOUNTER — LAB VISIT (OUTPATIENT)
Dept: LAB | Facility: HOSPITAL | Age: 58
End: 2023-02-13
Attending: NURSE PRACTITIONER
Payer: MEDICARE

## 2023-02-13 DIAGNOSIS — E11.42 TYPE 2 DIABETES MELLITUS WITH DIABETIC POLYNEUROPATHY, WITH LONG-TERM CURRENT USE OF INSULIN: ICD-10-CM

## 2023-02-13 DIAGNOSIS — N18.32 STAGE 3B CHRONIC KIDNEY DISEASE: ICD-10-CM

## 2023-02-13 DIAGNOSIS — Z79.4 TYPE 2 DIABETES MELLITUS WITH DIABETIC POLYNEUROPATHY, WITH LONG-TERM CURRENT USE OF INSULIN: ICD-10-CM

## 2023-02-13 LAB
ALBUMIN SERPL-MCNC: 4.1 G/DL (ref 3.5–5)
ALBUMIN/GLOB SERPL: 0.9 RATIO (ref 1.1–2)
ALP SERPL-CCNC: 140 UNIT/L (ref 40–150)
ALT SERPL-CCNC: 20 UNIT/L (ref 0–55)
AST SERPL-CCNC: 19 UNIT/L (ref 5–34)
BASOPHILS # BLD AUTO: 0.05 X10(3)/MCL (ref 0–0.2)
BASOPHILS NFR BLD AUTO: 0.4 %
BILIRUBIN DIRECT+TOT PNL SERPL-MCNC: 0.6 MG/DL
BUN SERPL-MCNC: 23 MG/DL (ref 8.4–25.7)
CALCIUM SERPL-MCNC: 9.8 MG/DL (ref 8.4–10.2)
CHLORIDE SERPL-SCNC: 104 MMOL/L (ref 98–107)
CHOLEST SERPL-MCNC: 121 MG/DL
CHOLEST/HDLC SERPL: 3 {RATIO} (ref 0–5)
CO2 SERPL-SCNC: 27 MMOL/L (ref 22–29)
CREAT SERPL-MCNC: 1.91 MG/DL (ref 0.73–1.18)
EOSINOPHIL # BLD AUTO: 0.27 X10(3)/MCL (ref 0–0.9)
EOSINOPHIL NFR BLD AUTO: 2.2 %
ERYTHROCYTE [DISTWIDTH] IN BLOOD BY AUTOMATED COUNT: 12.9 % (ref 11.5–17)
EST. AVERAGE GLUCOSE BLD GHB EST-MCNC: 154.2 MG/DL
GFR SERPLBLD CREATININE-BSD FMLA CKD-EPI: 40 MLS/MIN/1.73/M2
GLOBULIN SER-MCNC: 4.8 GM/DL (ref 2.4–3.5)
GLUCOSE SERPL-MCNC: 72 MG/DL (ref 74–100)
HBA1C MFR BLD: 7 %
HCT VFR BLD AUTO: 38.5 % (ref 42–52)
HDLC SERPL-MCNC: 37 MG/DL (ref 35–60)
HGB BLD-MCNC: 12.3 GM/DL (ref 14–18)
IMM GRANULOCYTES # BLD AUTO: 0.1 X10(3)/MCL (ref 0–0.04)
IMM GRANULOCYTES NFR BLD AUTO: 0.8 %
LDLC SERPL CALC-MCNC: 62 MG/DL (ref 50–140)
LYMPHOCYTES # BLD AUTO: 2.57 X10(3)/MCL (ref 0.6–4.6)
LYMPHOCYTES NFR BLD AUTO: 20.6 %
MCH RBC QN AUTO: 27.3 PG
MCHC RBC AUTO-ENTMCNC: 31.9 MG/DL (ref 33–36)
MCV RBC AUTO: 85.6 FL (ref 80–94)
MONOCYTES # BLD AUTO: 0.71 X10(3)/MCL (ref 0.1–1.3)
MONOCYTES NFR BLD AUTO: 5.7 %
NEUTROPHILS # BLD AUTO: 8.77 X10(3)/MCL (ref 2.1–9.2)
NEUTROPHILS NFR BLD AUTO: 70.3 %
NRBC BLD AUTO-RTO: 0 %
PHOSPHATE SERPL-MCNC: 3.1 MG/DL (ref 2.3–4.7)
PLATELET # BLD AUTO: 243 X10(3)/MCL (ref 130–400)
PLATELETS.RETICULATED NFR BLD AUTO: 19.3 % (ref 0.9–11.2)
PMV BLD AUTO: 13.6 FL (ref 7.4–10.4)
POTASSIUM SERPL-SCNC: 3.9 MMOL/L (ref 3.5–5.1)
PROT SERPL-MCNC: 8.9 GM/DL (ref 6.4–8.3)
RBC # BLD AUTO: 4.5 X10(6)/MCL (ref 4.7–6.1)
SODIUM SERPL-SCNC: 142 MMOL/L (ref 136–145)
TRIGL SERPL-MCNC: 110 MG/DL (ref 34–140)
VLDLC SERPL CALC-MCNC: 22 MG/DL
WBC # SPEC AUTO: 12.5 X10(3)/MCL (ref 4.5–11.5)

## 2023-02-13 PROCEDURE — 83036 HEMOGLOBIN GLYCOSYLATED A1C: CPT

## 2023-02-13 PROCEDURE — 84100 ASSAY OF PHOSPHORUS: CPT

## 2023-02-13 PROCEDURE — 80061 LIPID PANEL: CPT

## 2023-02-13 PROCEDURE — 85025 COMPLETE CBC W/AUTO DIFF WBC: CPT

## 2023-02-13 PROCEDURE — 80053 COMPREHEN METABOLIC PANEL: CPT

## 2023-02-13 PROCEDURE — 36415 COLL VENOUS BLD VENIPUNCTURE: CPT

## 2023-02-13 RX ORDER — AMLODIPINE BESYLATE 5 MG/1
5 TABLET ORAL DAILY
COMMUNITY
Start: 2023-01-02 | End: 2023-02-20 | Stop reason: ALTCHOICE

## 2023-02-14 ENCOUNTER — OFFICE VISIT (OUTPATIENT)
Dept: INTERNAL MEDICINE | Facility: CLINIC | Age: 58
End: 2023-02-14
Payer: MEDICARE

## 2023-02-14 VITALS
SYSTOLIC BLOOD PRESSURE: 106 MMHG | TEMPERATURE: 98 F | RESPIRATION RATE: 20 BRPM | BODY MASS INDEX: 33.75 KG/M2 | HEART RATE: 83 BPM | DIASTOLIC BLOOD PRESSURE: 70 MMHG | HEIGHT: 73 IN | WEIGHT: 254.63 LBS

## 2023-02-14 DIAGNOSIS — Z00.00 WELLNESS EXAMINATION: ICD-10-CM

## 2023-02-14 DIAGNOSIS — I50.23 ACUTE ON CHRONIC SYSTOLIC HEART FAILURE: ICD-10-CM

## 2023-02-14 DIAGNOSIS — F41.9 ANXIETY: ICD-10-CM

## 2023-02-14 DIAGNOSIS — I10 ESSENTIAL HYPERTENSION: ICD-10-CM

## 2023-02-14 DIAGNOSIS — K21.9 GASTROESOPHAGEAL REFLUX DISEASE, UNSPECIFIED WHETHER ESOPHAGITIS PRESENT: ICD-10-CM

## 2023-02-14 DIAGNOSIS — E11.42 TYPE 2 DIABETES MELLITUS WITH DIABETIC POLYNEUROPATHY, WITH LONG-TERM CURRENT USE OF INSULIN: ICD-10-CM

## 2023-02-14 DIAGNOSIS — Z79.4 TYPE 2 DIABETES MELLITUS WITH DIABETIC POLYNEUROPATHY, WITH LONG-TERM CURRENT USE OF INSULIN: ICD-10-CM

## 2023-02-14 DIAGNOSIS — I70.90 ARTERIOSCLEROTIC VASCULAR DISEASE: ICD-10-CM

## 2023-02-14 DIAGNOSIS — I50.22 CHRONIC SYSTOLIC HEART FAILURE: Primary | ICD-10-CM

## 2023-02-14 DIAGNOSIS — E03.9 HYPOTHYROIDISM, UNSPECIFIED TYPE: ICD-10-CM

## 2023-02-14 DIAGNOSIS — E78.5 HYPERLIPIDEMIA, UNSPECIFIED HYPERLIPIDEMIA TYPE: ICD-10-CM

## 2023-02-14 DIAGNOSIS — Z12.5 SCREENING FOR PROSTATE CANCER: ICD-10-CM

## 2023-02-14 DIAGNOSIS — D51.8 OTHER VITAMIN B12 DEFICIENCY ANEMIA: ICD-10-CM

## 2023-02-14 DIAGNOSIS — N18.32 STAGE 3B CHRONIC KIDNEY DISEASE: ICD-10-CM

## 2023-02-14 PROBLEM — I21.9 MYOCARDIAL INFARCTION: Status: RESOLVED | Noted: 2022-03-23 | Resolved: 2023-02-14

## 2023-02-14 PROBLEM — Z86.79 H/O CHF: Status: RESOLVED | Noted: 2023-02-09 | Resolved: 2023-02-14

## 2023-02-14 PROCEDURE — 99214 OFFICE O/P EST MOD 30 MIN: CPT | Mod: S$PBB,,, | Performed by: NURSE PRACTITIONER

## 2023-02-14 PROCEDURE — 99215 OFFICE O/P EST HI 40 MIN: CPT | Mod: PBBFAC | Performed by: NURSE PRACTITIONER

## 2023-02-14 PROCEDURE — 99214 PR OFFICE/OUTPT VISIT, EST, LEVL IV, 30-39 MIN: ICD-10-PCS | Mod: S$PBB,,, | Performed by: NURSE PRACTITIONER

## 2023-02-14 RX ORDER — OMEPRAZOLE 40 MG/1
40 CAPSULE, DELAYED RELEASE ORAL NIGHTLY
Qty: 90 CAPSULE | Refills: 1 | Status: SHIPPED | OUTPATIENT
Start: 2023-02-14 | End: 2023-08-17 | Stop reason: SDUPTHER

## 2023-02-14 RX ORDER — QUETIAPINE FUMARATE 25 MG/1
25 TABLET, FILM COATED ORAL NIGHTLY
Qty: 90 TABLET | Refills: 1 | Status: SHIPPED | OUTPATIENT
Start: 2023-02-14 | End: 2023-08-17 | Stop reason: SDUPTHER

## 2023-02-14 RX ORDER — SPIRONOLACTONE 25 MG/1
25 TABLET ORAL DAILY
Qty: 90 TABLET | Refills: 1 | Status: SHIPPED | OUTPATIENT
Start: 2023-02-14 | End: 2023-05-15

## 2023-02-14 NOTE — ASSESSMENT & PLAN NOTE
LDL at goal, 62  Continue statin and Zetia  Educated on a low-fat, low-cholesterol diet and aerobic exercise (20-30 mins/day x 5 days a week). Encouraged healthy fruits and veggie intake. Increase water intake. Avoid excess ETOH intake and smoking

## 2023-02-14 NOTE — PROGRESS NOTES
RON Esquivel   [facility]  [address] [address]      PATIENT NAME: Luigi Gotti  : 1965  DATE: 23  MRN: [id number]      Billing Provider: RON Esquivel  Level of Service:   Patient PCP Information       Provider PCP Type    RON Esquivel General            Reason for Visit / Chief Complaint: Follow-up (Post Rajan follow up/Lab review)       History of Present Illness / Problem Focused Workflow     Luigi Gotti presents to the clinic with Follow-up (Post Rajan follow up/Lab review)       Initial Visit (2020): 54 y.o. [place] male presenting to [facility] to establish primary care.   Previous PCP: Dr. CHA Fernandez, last visit 19   PmHx: Type II DM controlled with insulin, Chronic GERD, Hx of Gastroparesis/Gastritis/Ischemic colitis (following GI, Dr. Green), CAD (following Dr. Oreilly, [facility]), PVD (following an interventional cardiologist, Dr. Cespedes), CHF, HLD, and BPH   SHx: EGD, Celiac Bypass, Colonoscopy, Visceral Arteriogram, coronary artery graft angiography, cataract extraction, CABG x 2, Tonsillectomy, Billie, mx vascular procedures (see hx)   FHx: HTN, T2DM, Clotting D/o (mother), Seizure, CVA (father), Breast Ca (mother)   Complaints today: Medication refills     Bp at goal at present.     HgA1c: 7.9%, slightly above  goal   Medications: Novolin 70/30 80 units in the morning and 90 units SQ at night  CBG log: None provided. But pt did express CBGs run between 140s-250s   S/S of hyperglycemia, no or hypoglycemia, no     Chronic GERD managed with Nexium.   He's following Cardio for hx of CAD, PVD, and CHF. He's currently taking ASA, Atorvastatin 80 mg daily, Coreg 25 mg po BID, Digoxin 125 mcg, Furosemide 40 mg po daily, Imdur 60 mg po daily, Ranexa 1,000 mg po BID, and Effient 10 mg po daily. Also has Nitro for use as needed.   Triglycerides 317, LDL 86. Taking Atorvastatin 80 mg po  "daily.     No acute concerns today.    (2/18/20): Pt presenting for f/u.   Bp at goal at present.   HgA1c: 8.2%, slightly above  goal and increased from previous   Medications: Novolin 70/30 80 units in the morning and 90 units SQ at night  CBG log: None provided. But pt did express CBGs run between 140s-180s; reports lowest in one-teens; fasting CBG was 152 on yesterday   Denies over S/S of hyperglycemia or hypoglycemia.     eGFR < 60 mL/min. Creatinine 1.40; slight improvement from previous.     Total WBC count elevated. Has been elevated since last year. Total RBC count, H/H slightly decreased. He does report being seen at [facility] clinic on [address] ~ 6  days ago due to cough. He states that he was diagnosed with "bronchitis with wheezing." He reports that a CXR was performed. He was prescribed a Z-pack and cough syrup, both of which he's completed. He continues to c/o dry cough. Denies fever, chills, HA, or SOB.    (8/7/2020): 54 y.o. [place] male with a PmHx of Type II DM controlled with insulin, Chronic GERD, h/o dysphagia, Hx of Gastroparesis/Gastritis/Ischemic colitis (following GI, Dr. Green), CAD (following Dr. Oreilly, [facility]), PVD (following an interventional cardiologist, Dr. Cespedes), CHF, HLD, and BPH, presenting for routine f/u. HgA1c 7.4%. He is taking Novolin 70/30 80 units in the morning and 92 units SQ at night. He denies any s/s of hypoglycemia or hyperglycemia. Total WBC count elevated. Has been elevated since last year. Total RBC count, H/H slightly decreased as well. He reports cough from last OV is improved and doesn't occur all of the time. Declines any work-up at present after being offered to do a CXR. He continues to follow Cards, Dr. Oreilly, and is following Dr. Georges for foot care. He does have a h/o BPH but hasn't followed up with Uro in some time. Reports feelings of incomplete ejaculation and was told it was probably from prostate issues by another provider. No other " "problems stated.    (11/9/2020): 55 y.o. Vietnamese male with a PmHx of Type II DM controlled with insulin, Chronic GERD, h/o dysphagia, Hx of Gastroparesis/Gastritis/Ischemic colitis (following GI, Dr. Green), CAD (following [facility]), PVD (following an interventional cardiologist, Dr. Cespedes), CHF, HLD, and BPH, presenting for routine f/u. HgA1c 6.7%. He is taking Novolin 70/30 80 units in the morning and 92 units SQ at night. He denies any s/s of hypoglycemia or hyperglycemia. Total WBC count elevated. Has been elevated since last year. Renal indices slightly decreased w/ small rise in creatinine. He admits that he's  not been drinking a lot of water. Previously referred to Uro but reports no appt made. He is requesting the second Shingrix today. He continues to follow Cards, CV surgeon, GI, and podiatry. States had a colonoscopy about 1 week ago and noted to have polyps. Reports GI prescribed ferrous gluconate. Will request results. He reports chronic dyspnea x many years. Reports no improvement after cardiac sx but denies any worsening dyspnea. No other problems stated.    Telemedicine Visit (12/21/2020): 55 y.o. Vietnamese male with a PmHx of Type II DM controlled with insulin, Chronic GERD, h/o dysphagia, Hx of Gastroparesis/Gastritis/Ischemic colitis (following GI, Dr. Green), CAD (following [facility]), PVD (following an interventional cardiologist, Dr. Cespedes), CHF, HLD, and BPH, presenting for ED f/u via telemedicine. Apparently pt presented to [facility] last week due to issues with blood sugars but states he was diagnosed with influenza and hospitalized for suspected COVID. He is stating that sugar was in the "20s," but also stated that during the hospitalization "they checked it six times a day until it came down to 100." So unknown if pt had issues with hyperglycemia or hypoglycemia. States "They gave me something [insulin] different in there and it messed my numbers up!" At present, " "he remains on his routine dose of Novolin 70/30 and reports CBGs have been in the 120s-140s. He reports, "I'm doing good now." At time of visit, ED records have not been received but have been requested. Reports has a cough but it is improved. Denies CP or SOB. He has no other concerns. F/u scheduled 2/2021.    (2/10/2021): 55 y.o. British male with a PmHx of Type II DM controlled with insulin, Chronic GERD, h/o dysphagia, Hx of Gastroparesis/Gastritis/Ischemic colitis (following GI, Dr. Green), Celiac Dz, CAD (following Dr. Oreilly, [facility]), PVD (following an interventional cardiologist, Dr. Cespedes), CHF, HLD, and BPH, presenting for routine f/u. Conducted a phone visit 12/2020 for ED f/u. At the time, pt reported that he was diagnosed and treated for the flu while hospitalized at [facility]. Records received later revealed that that pt was being treated for COVID pneumonia after testing + for COVID ~9 days prior to hospital admit on 12/10/20. However, pt denies this and states he was never told he had COVID despite prescription medications being prescribed on 12/1/20 by a physician who's noted to have worked in the ED at [facility]. During his hospitalization from 12/10/20 to 12/17/20, he was treated for COVID pneumonia. D-dimer was elevated but CTA was negative for PE. He is taking Eliquis. Plans to speak to Cards tomorrow about continued use of this as he's on mx antiplatelets. Chemistry panel improved. Glucose low 100s. He denies any s/s of hypoglycemia. Total WBC count improved. He was previously referred to [facility] clinic for unexplained leukocytosis. He has an appt 3/23/21. He will see his Cardiologist, Dr. Oreilly, on tomorrow. He has a chronic h/o dyspnea (a CT thorax was ordered in November but pt never completed) and intermittent angina that is no different from baseline. He denies any CP or SOB at this time. Last used Nitro a few days ago. He has no other complaints.     During hospitalization, pt had a CTA chest " that revealed:  1.2 cm nodule in right lobe of thyroid gland, no evidence of PE, and consolidative changes throughout lungs    (5/11/2021): 55 y.o. Citizen of [place] male with a PmHx of Type II DM controlled with insulin, Chronic GERD, h/o dysphagia, Hx of Gastroparesis/Gastritis/Ischemic colitis (following GI, Dr. Green), Celiac Dz, CAD (following [facility]), PVD (following an interventional cardiologist, Dr. Cespedes), CHF, HLD, and BPH, presenting for routine f/u. He was previously referred to Heme clinic for unexplained leukocytosis. He had an appt 3/23/21 but was a No Show. A letter was mailed for pt to call and re-schedule. He will undergo a thyroid US to monitor a 1.2 cm nodule on 6/3/21. Reviewed colonoscopy done by Dr. Emmanuel Green on 9/30/2020. Dx: two polyps removed, tubular adenomas; diverticula disease.     Lab review:   HgA1c 7.1. He continues with Novolin N as prescribed. He does not check CBGs. Denies overt s/s of hypoglycemia or hyperglycemia.   Renal indices stable    He reports that he follows Dr. Alex Duckworth for a retinal d/o. Scheduled to f/u 5/18/21.   Scheduled for LHC 5/13/21 with Dr. Oreilly.   Scheduled with Uro 7/8/21.     No other concerns.    (9/14/2021): 56 y.o. Citizen of [place] male with a PmHx of Type II DM controlled with insulin, Chronic GERD, h/o dysphagia, Hx of Gastroparesis/Gastritis/Ischemic colitis (following GI, Dr. Green), Celiac Dz, CAD (following [facility]), PVD (following an interventional cardiologist, Dr. Cespedes), CHF, HLD, and BPH, presenting for routine f/u. Pt underwent a thyroid US in May that did not reveal any acute abnormalities. He was seen by Uro in July for abnl ejaculation. Told likely d/t Flomax. He wished to remain on Flomax. Can f/u PRN. HgA1c 7.5%. Reports compliance with insulin. No log provided. CBC stable compared to baseline. Previously referred to Heme/Onc. Triglycerides 309. Prescribed Atorvastatin 80 mg po daily and Zetia 10 mg po  daily. Does not take Zetia daily. Pt will f/u with Dr. Oreilly next month. No other problems stated.    (12/16/2021): 56 y.o. Icelandic male with a PmHx of Type II DM controlled with insulin, Chronic GERD, h/o dysphagia, Hx of Gastroparesis/Gastritis/Ischemic colitis (following GI, Dr. Green), Celiac Dz, CAD (following [facility]), PVD (following an interventional cardiologist, Dr. Cespedes), CHF, HLD, CKD, and BPH, presenting for routine f/u. Pt previously referred to Heme/Onc for unexplained leukocytosis and anemia. So far, work-up unrevealing aside from dx of B12 deficiency. He was last seen 12/10 to projected 3 month f/u. HgA1c 7.5%. Reports compliance with insulin. Denies overt s/s of hypoglycemia. No log provided. PSA 1.03. Renal indices stable. Pt reports undergoing a vein procedure to LLE per Dr. Oreilly yesterday. F/u 1/5/22. He reports hitting right great toe ~2 weeks ago. Podiatrist removed nail. Area doing well. He is UTD on influenza vaccine. No other concerns.    (7/14/2022): 57 y.o. Icelandic male with a PmHx of Type II DM controlled with insulin, Chronic GERD, h/o dysphagia, Hx of Gastroparesis/Gastritis/Ischemic colitis (following GI, Dr. Green), Celiac Dz, CAD (following [facility]), PVD (following an interventional cardiologist, Dr. Cespedes), CHF, HLD, CKD, and BPH, presenting for routine f/u. Since last visit, patient was hospitalized on 4/4/22 until 4/13/22 (to [facility]) after experiencing PEA while en route to the ED via EMS from home. He achieved ROSC after about 12 min of CPD, epi, and bicarb. He was intubated and admitted to the ICU upon arrival the the ED. He was treated for possible PNA while hospitalized. Cards did see him but signed off, recommending outpatient f/u. He'd had a LHC 3/9/22 that revealed nonobstructive CAD. In hospital, an Echo done 4/5/22 showed LVEF 35-40% with grade 2 diastolic dysfunction. He required 2 units of PRBCs in hospital. He also  "experienced a fall during admission. CT head was negative for acute bleeds. He was ultimately discharged to inpatient rehab at [facility] where he reports he stayed for 2 weeks. Then, he presented to [facility] ED on 5/2/22 with c/o shortness of breath and bilateral lower extremity swelling. EMS reported patient with respiratory distress and pulse ox in the mid 70s upon their arrival to his home. He was placed on CPAP and has some improvement in oxygenation and work of breathing. There is mention of seizures occurring while hospitalized at [facility] (apparently MRI brain and EEG done--unable to see results at this time). He developed a wound to right ankle/heel during last hospitalization as well. He was discharged from [facility] on 5/24/22 and transferred to [facility]. States discharged from [facility] approximately one week ago with [facility]. He is receiving wound care to ankle/heel wound and P.T. at home. Admits some decompensation in overall physical status due to an approximate 3-month long hospitalization. He still mentions chronic dizziness, which was present prior to April hospitalization, but overall stable. Will see Dr. Oreilly on 7/22/22. This will be his first visit post-hospitalization. No other concerns.    (8/24/2022): 57 y.o. Haitian male with a PmHx of Type II DM controlled with insulin, Chronic GERD, h/o dysphagia, Hx of Gastroparesis/Gastritis/Ischemic colitis (following GI, Dr. Green), Celiac Dz, CAD (following Dr. Oreilly, [facility]), PVD (following an interventional cardiologist, Dr. Cespedes), CHF, HLD, CKD, and BPH, presenting for routine f/u. Lab review significant for: H/H 11.3/34.9, BUN 14, creatinine 1.62, eGFR 49, HgA1c 8.1, FBG 91. Patient reports recently seeing Dr. Oreilly and having "fluid pill" decreased by 50% 2/2 "dehydration." He will f/u at the end of September. Does report 1-lb weight gain since yesterday but no LE swelling at present, SOB, or CP. HgA1c did increase from previous, however, " "patient reports glucose was as high as 400s during hospitalization. States during last week, glucose 100s-200. Alleges glucose dropped to "15" last night. He did not lose consciousness, but was dizzy and weak. Drank orange juice with complete resolution in symptoms. Admits last ate [employer] around 4 pm yesterday and did not eat when he took insulin (Novolin 70/30) last night. Right ankle wound is improved per report. C/w home health care. Scheduled in Neuro in November. Will need refill on Keppra and Vimpat at this time. No seizure activity reported since last visit.    (10/14/2022): 57 y.o. Lithuanian male with a PmHx of Type II DM controlled with insulin, Chronic GERD, h/o dysphagia, Hx of Gastroparesis/Gastritis/Ischemic colitis (following GI, Dr. Green), Celiac Dz, CAD (following Dr. Oreilly, [facility]), PVD (following an interventional cardiologist, Dr. Cespedes), CHF, HLD, CKD, and BPH, presenting for routine f/u. Lab review significant for: creatinine 1.78, eGFR 44, HgA1c 7.2, , alk phos 155. Patient reports recently seeing Dr. Oreilly and having torsemide increased back to BID. Reports seen by Dr. Barrientos last month for eye exam. Right ankle wound continues to improve per report. C/w home health care. C/o hard, infrequent BM. Last BM 2 days ago. He was taking Lactulose with relief, but is out. Amenable to receiving the influenza vaccine. No other concerns.    Today's Visit (2/14/2023): 57 y.o. Lithuanian male with a PmHx of Type II DM controlled with insulin, Chronic GERD, h/o dysphagia, Hx of Gastroparesis/Gastritis/Ischemic colitis (following GI, Dr. Green), Celiac Dz, CAD (following Dr. Oreilly, [facility]), PVD (following an interventional cardiologist, Dr. Cespedes), CHF, HLD, CKD, and BPH, presenting for routine f/u. Patient with recent hospitalization from 2/3/2023 to 2/9/2023 with SOB noted at clinic visit with Dr. Oreilly. In ED, he was found to have NINFA on CKD. He was diruresed until euvolemic. He " underwent a C 2/7/23. Results below. Recent labs significant for:  Creatinine 1.91 (trending down)  eGFR 40 (slightly improved)  BUN 23  LDL 62  A1c 7.0% (patient relates CBGs were 300s-400s during hospitalization but improved since he's back home; glucose 70s on yesterday's labs. Denies any overt s/s of hypoglycemia)    He is scheduled with Renal clinic 2/20/23 and will f/u with Dr. Oreilly in clinic on Friday.    [facility] 2.7.23  Left main-patent  Lad-mid InStent chronic total occlusion, competitive flow noted at diagonal  LCX-40% proximal InStent restenosis unchanged from previous cath.  Distal circumflex small vessel just beyond stent edge, appears to have 80% stenosis vessel is 1 mm in size  RCA-patent dominant vessel  Lima to Lad-diagonal jump graft- widely patent         Review of Systems     Review of Systems   Constitutional: Negative.    HENT: Negative.     Eyes: Negative.    Respiratory: Negative.  Negative for shortness of breath.    Cardiovascular: Negative.  Negative for chest pain and leg swelling.   Gastrointestinal: Negative.  Negative for abdominal distention, anal bleeding and blood in stool.   Endocrine: Negative.    Genitourinary: Negative.    Musculoskeletal: Negative.    Skin: Negative.    Allergic/Immunologic: Negative.    Neurological: Negative.    Hematological: Negative.    Psychiatric/Behavioral: Negative.     All other systems reviewed and are negative.    Medical / Social / Family History     Past Medical History:   Diagnosis Date    BPH (benign prostatic hyperplasia)     CAD (coronary artery disease)     CHF (congestive heart failure)     GERD (gastroesophageal reflux disease)     Other hyperlipidemia     PVD (peripheral vascular disease)     Type 2 diabetes mellitus without complications        Past Surgical History:   Procedure Laterality Date    CHOLECYSTECTOMY      CORONARY ARTERY BYPASS GRAFT      LEFT HEART CATHETERIZATION N/A 2/7/2023    Procedure: Left heart cath;  Surgeon: Jamin SAM  MD Saritha;  Location: [facility] CATH LAB;  Service: Cardiology;  Laterality: N/A;    RIGHT HEART CATHETERIZATION Right 9/20/2022    Procedure: INSERTION, CATHETER, RIGHT HEART;  Surgeon: Cory Oreilly MD;  Location: [facility] CATH LAB;  Service: Cardiology;  Laterality: Right;  CARDIOMEMS RJ ACCESS    TONSILLECTOMY         Social History    reports that he has never smoked. He has never used smokeless tobacco. He reports that he does not currently use drugs. He reports that he does not drink alcohol.    Family History  's family history includes Cancer in his mother; Diabetes in his brother, mother, and sister; Hypertension in his father and mother; Kidney disease in his brother; Stroke in his father.    Medications and Allergies     Medications  Medication List with Changes/Refills   Current Medications    AMLODIPINE (NORVASC) 5 MG TABLET    Take 5 mg by mouth Daily.    ASPIRIN (ECOTRIN) 81 MG EC TABLET    Take 81 mg by mouth once daily.    ATORVASTATIN (LIPITOR) 80 MG TABLET    Take 80 mg by mouth every evening.    AZELASTINE (ASTELIN) 137 MCG (0.1 %) NASAL SPRAY    1 spray by Nasal route 2 (two) times daily.    CARVEDILOL (COREG) 25 MG TABLET    Take 25 mg by mouth 2 (two) times daily.    CYANOCOBALAMIN (VITAMIN B-12) 1000 MCG TABLET    Take 2,000 mcg by mouth once daily at 6am.    EZETIMIBE (ZETIA) 10 MG TABLET    Take 10 mg by mouth once daily at 6am.    FLUTICASONE PROPIONATE (FLONASE) 50 MCG/ACTUATION NASAL SPRAY    Flonase Allergy Relief Take No date recorded No form recorded No frequency recorded No route recorded No set duration recorded No set duration amount recorded active No dosage strength recorded No dosage strength units of measure recorded    HYDROCODONE-ACETAMINOPHEN (NORCO) 7.5-325 MG PER TABLET    Take 1 tablet by mouth every 8 (eight) hours as needed.    HYDROXYZINE (ATARAX) 50 MG TABLET    Take 50 mg by mouth 3 (three) times daily.    ISOSORBIDE MONONITRATE (IMDUR) 60 MG 24 HR TABLET    Take 60  mg by mouth once daily.    LACOSAMIDE (VIMPAT) 50 MG TAB    Take 1 tablet (50 mg total) by mouth once daily.    LACTULOSE (CHRONULAC) 10 GRAM/15 ML SOLUTION    TAKE 15 MLS BY MOUTH 2 TIMES DAILY AS NEEDED FOR CONSTIPATION    LEVETIRACETAM (KEPPRA) 500 MG TAB    Take 1 tablet (500 mg total) by mouth 2 (two) times daily.    LEVOCETIRIZINE (XYZAL) 5 MG TABLET    Take 5 mg by mouth once daily.    LEVOTHYROXINE (SYNTHROID) 25 MCG TABLET    Take 1 tablet (25 mcg total) by mouth before breakfast.    METOCLOPRAMIDE HCL (REGLAN) 10 MG TABLET    Take 10 mg by mouth once daily.    MONTELUKAST (SINGULAIR) 10 MG TABLET    Take 1 tablet (10 mg total) by mouth once daily.    NITROGLYCERIN (NITROSTAT) 0.4 MG SL TABLET    Place 0.4 mg under the tongue as needed.    NOVOLIN 70/30 U-100 INSULIN 100 UNIT/ML (70-30) INJECTION    Inject 45 Units into the skin 2 (two) times daily with meals.    PRASUGREL (EFFIENT) 10 MG TAB    Take 10 mg by mouth once daily.    RANOLAZINE (RANEXA) 1,000 MG TB12    Take 1,000 mg by mouth 2 (two) times daily.    TAMSULOSIN (FLOMAX) 0.4 MG CAP    TAKE 1 CAPSULE BY MOUTH ONCE DAILY BEFORE A MEAL    TORSEMIDE (DEMADEX) 20 MG TAB    Take 20 mg by mouth 2 (two) times a day.    TRUE METRIX GLUCOSE METER MISC    TEST 3 TIMES A DAY    TRUETRACK TEST STRP    TEST 3 TIMES A DAY   Changed and/or Refilled Medications    Modified Medication Previous Medication    OMEPRAZOLE (PRILOSEC) 40 MG CAPSULE omeprazole (PRILOSEC) 40 MG capsule       Take 1 capsule (40 mg total) by mouth every evening.    Take 1 capsule (40 mg total) by mouth every evening.    QUETIAPINE (SEROQUEL) 25 MG TAB QUEtiapine (SEROQUEL) 25 MG Tab       Take 1 tablet (25 mg total) by mouth every evening.    Take 1 tablet (25 mg total) by mouth every evening.    SPIRONOLACTONE (ALDACTONE) 25 MG TABLET spironolactone (ALDACTONE) 25 MG tablet       Take 1 tablet (25 mg total) by mouth once daily.    Take 1 tablet (25 mg total) by mouth once daily.        Allergies  Review of patient's allergies indicates:   Allergen Reactions    Pork derived (porcine)      Other reaction(s): Pork    Clopidogrel Hives and Rash       Physical Examination     Vitals:    02/14/23 0721   BP: 106/70   Pulse: 83   Resp: 20   Temp: 97.5 °F (36.4 °C)       Physical Exam  Constitutional:       Appearance: Normal appearance.   HENT:      Head: Normocephalic and atraumatic.      Mouth/Throat:      Mouth: Mucous membranes are moist.      Dentition: Abnormal dentition. Dental caries present.   Eyes:      Extraocular Movements: Extraocular movements intact.      Conjunctiva/sclera: Conjunctivae normal.      Pupils: Pupils are equal, round, and reactive to light.   Cardiovascular:      Rate and Rhythm: Regular rhythm.      Pulses: Normal pulses.      Heart sounds: Normal heart sounds.   Pulmonary:      Effort: Pulmonary effort is normal.      Breath sounds: Normal breath sounds.   Abdominal:      General: Bowel sounds are normal. There is no distension.      Palpations: Abdomen is soft. There is no mass.      Tenderness: There is no abdominal tenderness.   Musculoskeletal:         General: Normal range of motion.      Cervical back: Normal range of motion.      Right lower leg: No edema.      Left lower leg: No edema.   Skin:     General: Skin is warm and dry.   Neurological:      General: No focal deficit present.      Mental Status: He is alert and oriented to person, place, and time.   Psychiatric:         Mood and Affect: Mood normal.         Behavior: Behavior normal.         Thought Content: Thought content normal.         Judgment: Judgment normal.         Results     Lab Results   Component Value Date    WBC 12.5 (H) 02/13/2023    RBC 4.50 (L) 02/13/2023    HGB 12.3 (L) 02/13/2023    HCT 38.5 (L) 02/13/2023    MCV 85.6 02/13/2023    MCH 27.3 02/13/2023    MCHC 31.9 (L) 02/13/2023    RDW 12.9 02/13/2023     02/13/2023    MPV 13.6 (H) 02/13/2023     CMP  Sodium Level   Date Value  Ref Range Status   02/13/2023 142 136 - 145 mmol/L Final     Potassium Level   Date Value Ref Range Status   02/13/2023 3.9 3.5 - 5.1 mmol/L Final     Carbon Dioxide   Date Value Ref Range Status   02/13/2023 27 22 - 29 mmol/L Final     Blood Urea Nitrogen   Date Value Ref Range Status   02/13/2023 23.0 8.4 - 25.7 mg/dL Final     Creatinine   Date Value Ref Range Status   02/13/2023 1.91 (H) 0.73 - 1.18 mg/dL Final     Calcium Level Total   Date Value Ref Range Status   02/13/2023 9.8 8.4 - 10.2 mg/dL Final     Albumin Level   Date Value Ref Range Status   02/13/2023 4.1 3.5 - 5.0 g/dL Final     Bilirubin Total   Date Value Ref Range Status   02/13/2023 0.6 <=1.5 mg/dL Final     Alkaline Phosphatase   Date Value Ref Range Status   02/13/2023 140 40 - 150 unit/L Final     Aspartate Aminotransferase   Date Value Ref Range Status   02/13/2023 19 5 - 34 unit/L Final     Alanine Aminotransferase   Date Value Ref Range Status   02/13/2023 20 0 - 55 unit/L Final     Estimated GFR-Non    Date Value Ref Range Status   04/13/2022 >60       Lab Results   Component Value Date    CHOL 121 02/13/2023     Lab Results   Component Value Date    HDL 37 02/13/2023     No results found for: LDLCALC  Lab Results   Component Value Date    TRIG 110 02/13/2023     No results found for: CHOLHDL  Lab Results   Component Value Date    TSH 2.5189 08/22/2022     Lab Results   Component Value Date    PHUR 5.5 04/09/2022    PROTEINUA Negative 02/13/2023    GLUCUA 3+ 04/09/2022    KETONESU Negative 04/09/2022    OCCULTUA 1+ 04/09/2022    NITRITE Negative 04/09/2022    LEUKOCYTESUR 25 (A) 02/13/2023           Assessment and Plan (including Health Maintenance)     Plan:         Health Maintenance Due   Topic Date Due    COVID-19 Vaccine (4 - Booster for Pfizer series) 04/08/2022       Problem List Items Addressed This Visit          Cardiac/Vascular    Chronic systolic heart failure - Primary    Current Assessment & Plan     Take  your medicines, even if you feel well   ?Watch for changes in your symptoms- notify the office with any concerns!  ?Call the office if you gain weight suddenly - Weigh yourself every morning after you urinate, but before you eat breakfast. Wear roughly the same amount of clothing every time. And make sure to write down your weight every day on a calendar. Call if your weight goes up by 2 or more pounds (1 kilogram) in 1 day, or 4 or more pounds (2 kilograms) in 1 week. When you have heart failure, sudden weight gain is a sign that your body could be holding on to too much fluid. You might need a change in your medicines.  ?Cut down on salt - Try not to add salt at the table or when you cook. Also, avoid foods that come in boxes and cans, unless their labels say they are low in sodium. The best choices for food are fresh or fresh frozen foods, and foods you prepare yourself  ?Lose weight, if you are overweight - If you are overweight, your heart has to work extra hard to keep up with your body's needs.  ?Stop smoking - Smoking worsens heart failure and increases the chance that you will have a heart attack or die.  ?Limit alcohol - If you are a woman, do not have more than 1 drink a day. If you are a man, do not have more than 2.  ?Be active     F/u with Dr. Oreilly Friday         Relevant Medications    spironolactone (ALDACTONE) 25 MG tablet    Arteriosclerotic vascular disease    Overview     [facility] 2/7/23: Stable         Hyperlipidemia    Current Assessment & Plan     LDL at goal, 62  Continue statin and Zetia  Educated on a low-fat, low-cholesterol diet and aerobic exercise (20-30 mins/day x 5 days a week). Encouraged healthy fruits and veggie intake. Increase water intake. Avoid excess ETOH intake and smoking           Essential hypertension    Current Assessment & Plan     Bp at goal  Continue current regimen  Educated on aerobic exercise (3-5 days/week) and a low-fat, low-sodium diet  Avoid excess ETOH consumption.  Smoking cessation if applicable  ED precautions (s/s of CVA, etc)                  Renal/    Stage 3b chronic kidney disease    Current Assessment & Plan     Renal indices somewhat improved from hospitalization  F/u Renal 2/20/23  Avoid NSAIDs (i.e., Advil, Aleve, Ibuprofen, Motrin, Naproxen, Diclofenac, Indocin, Celebrex, Mobic, Voltaren). Avoid the following GI meds: Milk of Mag, Mylanta, Fleets Enema, and Pepto-Bismol. Okay to take Tylenol (acetaminophen) (if no end-stage liver disease), low dose ASA (81 mg), Miralax, Tums, and Colace. Increase clear fluid intake. Avoid dark sodas.              Oncology    Anemia    Current Assessment & Plan     CBC stable; likely r/t chronic dz  Will monitor            Endocrine    Type 2 diabetes mellitus with diabetic polyneuropathy, with long-term current use of insulin    Overview     Current medications: Novolin 70/30 45 units BID  A1c level: 7.0%; goal <8% for pt due to risk of hypoglycemia  CBG trends: None provided  Educated on diabetic diet: Low-fat, Low-carb, Low-cholesterol. Avoid sugary/dark drinks/sodas and white foods (i.e., bread, pasta, potatoes, rice)  Aerobic exercise: 20-30 min/day x 5 days/week  Diabetic Eye Exam: UTD. Dr. Barrientos 9/13/22  Diabetic Foot Exam: UTD. Following Dr. Escalante  Kidney Protection: ARB  Statin Therapy: Yes             Current Assessment & Plan     Continue current regimen         Relevant Orders    Hemoglobin A1C    Comprehensive Metabolic Panel    Hypothyroidism    Relevant Orders    TSH    T4, Free       GI    Gastroesophageal reflux disease    Relevant Medications    omeprazole (PRILOSEC) 40 MG capsule       Other    Wellness examination    Overview     Colon Cancer Screening: colonoscopy done by Dr. Emmanuel Green on 9/30/2020. Dx: two polyps removed, tubular adenomas; diverticula disease          Other Visit Diagnoses       Anxiety        Relevant Medications    QUEtiapine (SEROQUEL) 25 MG Tab    Acute on chronic systolic  heart failure        Relevant Medications    spironolactone (ALDACTONE) 25 MG tablet    Screening for prostate cancer        Relevant Orders    PSA, Screening            Health Maintenance Topics with due status: Not Due       Topic Last Completion Date    TETANUS VACCINE 05/08/2018    Colorectal Cancer Screening 09/30/2020    Foot Exam 07/14/2022    Diabetes Urine Screening 10/11/2022    Eye Exam 11/18/2022    Lipid Panel 02/13/2023    Hemoglobin A1c 02/13/2023    High Dose Statin 02/14/2023       Future Appointments   Date Time Provider Department Center   2/20/2023 12:30 PM RON Valdez [facility] NEPHR [facility]   3/7/2023 10:45 AM Claribel Canales MD [facility] NEURO [facility]   5/15/2023  7:30 AM RON Esquivel [facility] INTMED [facility]      I spent a total of 30 minutes on the day of the visit.  This includes face to face time and non-face to face time preparing to see the patient (eg, review of tests), obtaining and/or reviewing separately obtained history, documenting clinical information in the electronic or other health record, independently interpreting results and communicating results to the patient/family/caregiver, or care coordinator.      Signature:  RON Esquivel  [facility]  [address] [id number]    Date of encounter: 2/14/23

## 2023-02-14 NOTE — ASSESSMENT & PLAN NOTE
Renal indices somewhat improved from hospitalization  F/u Renal 2/20/23  Avoid NSAIDs (i.e., Advil, Aleve, Ibuprofen, Motrin, Naproxen, Diclofenac, Indocin, Celebrex, Mobic, Voltaren). Avoid the following GI meds: Milk of Mag, Mylanta, Fleets Enema, and Pepto-Bismol. Okay to take Tylenol (acetaminophen) (if no end-stage liver disease), low dose ASA (81 mg), Miralax, Tums, and Colace. Increase clear fluid intake. Avoid dark sodas.

## 2023-02-14 NOTE — ASSESSMENT & PLAN NOTE
Take your medicines, even if you feel well   ?Watch for changes in your symptoms- notify the office with any concerns!  ?Call the office if you gain weight suddenly - Weigh yourself every morning after you urinate, but before you eat breakfast. Wear roughly the same amount of clothing every time. And make sure to write down your weight every day on a calendar. Call if your weight goes up by 2 or more pounds (1 kilogram) in 1 day, or 4 or more pounds (2 kilograms) in 1 week. When you have heart failure, sudden weight gain is a sign that your body could be holding on to too much fluid. You might need a change in your medicines.  ?Cut down on salt - Try not to add salt at the table or when you cook. Also, avoid foods that come in boxes and cans, unless their labels say they are low in sodium. The best choices for food are fresh or fresh frozen foods, and foods you prepare yourself  ?Lose weight, if you are overweight - If you are overweight, your heart has to work extra hard to keep up with your body's needs.  ?Stop smoking - Smoking worsens heart failure and increases the chance that you will have a heart attack or die.  ?Limit alcohol - If you are a woman, do not have more than 1 drink a day. If you are a man, do not have more than 2.  ?Be active     F/u with Dr. Oreilly Friday

## 2023-02-20 ENCOUNTER — OFFICE VISIT (OUTPATIENT)
Dept: NEPHROLOGY | Facility: CLINIC | Age: 58
End: 2023-02-20
Payer: MEDICARE

## 2023-02-20 VITALS
RESPIRATION RATE: 18 BRPM | SYSTOLIC BLOOD PRESSURE: 114 MMHG | HEIGHT: 73 IN | BODY MASS INDEX: 33.88 KG/M2 | WEIGHT: 255.63 LBS | HEART RATE: 83 BPM | DIASTOLIC BLOOD PRESSURE: 70 MMHG | OXYGEN SATURATION: 98 % | TEMPERATURE: 98 F

## 2023-02-20 DIAGNOSIS — D51.8 OTHER VITAMIN B12 DEFICIENCY ANEMIA: ICD-10-CM

## 2023-02-20 DIAGNOSIS — R39.9 LOWER URINARY TRACT SYMPTOMS (LUTS): ICD-10-CM

## 2023-02-20 DIAGNOSIS — I10 ESSENTIAL HYPERTENSION: ICD-10-CM

## 2023-02-20 DIAGNOSIS — N18.32 STAGE 3B CHRONIC KIDNEY DISEASE: Primary | ICD-10-CM

## 2023-02-20 PROCEDURE — 99214 PR OFFICE/OUTPT VISIT, EST, LEVL IV, 30-39 MIN: ICD-10-PCS | Mod: S$PBB,,, | Performed by: NURSE PRACTITIONER

## 2023-02-20 PROCEDURE — 99214 OFFICE O/P EST MOD 30 MIN: CPT | Mod: S$PBB,,, | Performed by: NURSE PRACTITIONER

## 2023-02-20 PROCEDURE — 99214 OFFICE O/P EST MOD 30 MIN: CPT | Mod: PBBFAC | Performed by: NURSE PRACTITIONER

## 2023-02-20 NOTE — PROGRESS NOTES
[facility]  Nephrology Clinic Note    Chief complaint: Chronic Kidney Disease (RTC, took meds, dyspnea)    History of present illness:   Luigi Gotti is a 57 y.o. Other male with past medical history of  chronic kidney disease, heart failure with reduced ejection fraction, coronary artery disease, gastroparesis, gastritis, ischemic colitis, peripheral vascular disease, dyslipidemia, hypertension, BPH, diabetes mellitus type 2, and obesity.    Patient presents for follow-up appointment in Nephrology Clinic today.  Tells me that his cardiologist started a new medication for fluid, patient can not recall the name of the medication.    Denies shortness of breath or edema.  Complains of persistent lower urinary tract symptoms (difficulty initiating urinary stream, nocturia).    Review of Systems  12 point review of systems conducted, negative except as stated in the history of present illness.    Allergies: Patient is allergic to pork derived (porcine) and clopidogrel.     Past Medical History:  has a past medical history of BPH (benign prostatic hyperplasia), CAD (coronary artery disease), CHF (congestive heart failure), GERD (gastroesophageal reflux disease), Other hyperlipidemia, PVD (peripheral vascular disease), and Type 2 diabetes mellitus without complications.    Procedure History:  has a past surgical history that includes Coronary artery bypass graft; Tonsillectomy; Cholecystectomy; Right heart catheterization (Right, 9/20/2022); and Left heart catheterization (N/A, 2/7/2023).    Family History: family history includes Cancer in his mother; Diabetes in his brother, mother, and sister; Hypertension in his father and mother; Kidney disease in his brother; Stroke in his father.    Social History:  reports that he has never smoked. He has never used smokeless tobacco. He reports that he does not currently use drugs. He reports that he does not drink alcohol.    Physical exam  BP  "114/70 (BP Location: Right arm, Patient Position: Sitting, BP Method: Large (Automatic))   Pulse 83   Temp 98.1 °F (36.7 °C) (Oral)   Resp 18   Ht 6' 0.84" (1.85 m)   Wt 115.9 kg (255 lb 9.6 oz)   SpO2 98%   BMI 33.88 kg/m²   General appearance: Patient is in no acute distress.  Skin: No rashes or wounds.  HEENT: PERRLA, EOMI, no scleral icterus, no JVD. Neck is supple.  Chest: Respirations are unlabored. Lungs sounds are clear.   Heart: S1, S2.   Abdomen: Benign.  Obese  : Deferred.  Extremities: No edema, peripheral pulses are palpable.   Neuro: No focal deficits.     Home Medications:    Current Outpatient Medications:     aspirin (ECOTRIN) 81 MG EC tablet, Take 81 mg by mouth once daily., Disp: , Rfl:     atorvastatin (LIPITOR) 80 MG tablet, Take 80 mg by mouth every evening., Disp: , Rfl:     azelastine (ASTELIN) 137 mcg (0.1 %) nasal spray, 1 spray by Nasal route 2 (two) times daily., Disp: , Rfl:     carvediloL (COREG) 25 MG tablet, Take 25 mg by mouth 2 (two) times daily., Disp: , Rfl:     cyanocobalamin (VITAMIN B-12) 1000 MCG tablet, Take 2,000 mcg by mouth once daily at 6am., Disp: , Rfl:     ezetimibe (ZETIA) 10 mg tablet, Take 10 mg by mouth once daily at 6am., Disp: , Rfl:     fluticasone propionate (FLONASE) 50 mcg/actuation nasal spray, Flonase Allergy Relief Take No date recorded No form recorded No frequency recorded No route recorded No set duration recorded No set duration amount recorded active No dosage strength recorded No dosage strength units of measure recorded, Disp: , Rfl:     HYDROcodone-acetaminophen (NORCO) 7.5-325 mg per tablet, Take 1 tablet by mouth every 8 (eight) hours as needed., Disp: , Rfl:     hydrOXYzine (ATARAX) 50 MG tablet, Take 50 mg by mouth 3 (three) times daily., Disp: , Rfl:     isosorbide mononitrate (IMDUR) 60 MG 24 hr tablet, Take 60 mg by mouth once daily., Disp: , Rfl:     lactulose (CHRONULAC) 10 gram/15 mL solution, TAKE 15 MLS BY MOUTH 2 TIMES DAILY " AS NEEDED FOR CONSTIPATION, Disp: 2700 mL, Rfl: 1    levETIRAcetam (KEPPRA) 500 MG Tab, Take 1 tablet (500 mg total) by mouth 2 (two) times daily., Disp: 180 tablet, Rfl: 1    levocetirizine (XYZAL) 5 MG tablet, Take 5 mg by mouth once daily., Disp: , Rfl:     levothyroxine (SYNTHROID) 25 MCG tablet, Take 1 tablet (25 mcg total) by mouth before breakfast., Disp: 90 tablet, Rfl: 1    metoclopramide HCl (REGLAN) 10 MG tablet, Take 10 mg by mouth once daily., Disp: , Rfl:     montelukast (SINGULAIR) 10 mg tablet, Take 1 tablet (10 mg total) by mouth once daily., Disp: 90 tablet, Rfl: 1    nitroGLYCERIN (NITROSTAT) 0.4 MG SL tablet, Place 0.4 mg under the tongue as needed., Disp: , Rfl:     NOVOLIN 70/30 U-100 INSULIN 100 unit/mL (70-30) injection, Inject 45 Units into the skin 2 (two) times daily with meals., Disp: , Rfl:     omeprazole (PRILOSEC) 40 MG capsule, Take 1 capsule (40 mg total) by mouth every evening., Disp: 90 capsule, Rfl: 1    prasugreL (EFFIENT) 10 mg Tab, Take 10 mg by mouth once daily., Disp: , Rfl:     QUEtiapine (SEROQUEL) 25 MG Tab, Take 1 tablet (25 mg total) by mouth every evening., Disp: 90 tablet, Rfl: 1    ranolazine (RANEXA) 1,000 mg Tb12, Take 1,000 mg by mouth 2 (two) times daily., Disp: , Rfl:     spironolactone (ALDACTONE) 25 MG tablet, Take 1 tablet (25 mg total) by mouth once daily., Disp: 90 tablet, Rfl: 1    tamsulosin (FLOMAX) 0.4 mg Cap, TAKE 1 CAPSULE BY MOUTH ONCE DAILY BEFORE A MEAL, Disp: 90 capsule, Rfl: 3    torsemide (DEMADEX) 20 MG Tab, Take 20 mg by mouth 2 (two) times a day., Disp: , Rfl:     TRUE METRIX GLUCOSE METER Misc, TEST 3 TIMES A DAY, Disp: , Rfl:     TRUETRACK TEST Strp, TEST 3 TIMES A DAY, Disp: , Rfl:     lacosamide (VIMPAT) 50 mg Tab, Take 1 tablet (50 mg total) by mouth once daily. (Patient not taking: Reported on 2/20/2023), Disp: 30 tablet, Rfl: 3    Laboratory data    Serum  Lab Results   Component Value Date    WBC 12.5 (H) 02/13/2023    HGB 12.3 (L)  02/13/2023    HCT 38.5 (L) 02/13/2023     02/13/2023    IRON 19 04/05/2022    TIBC 291 04/05/2022    TRANS 270 04/05/2022    LABIRON 7 04/05/2022    FERRITIN 190.69 04/05/2022    FOLATE 6.1 04/05/2022    NVIUAUKV00 943 04/05/2022    HAPTO 436 (H) 10/25/2021     10/25/2021     02/13/2023    K 3.9 02/13/2023    CHLORIDE 104 02/13/2023    CO2 27 02/13/2023    BUN 23.0 02/13/2023    CREATININE 1.91 (H) 02/13/2023    EGFRNORACEVR 40 02/13/2023    GLUCOSE 72 (L) 02/13/2023    CALCIUM 9.8 02/13/2023    ALKPHOS 140 02/13/2023    LABPROT 8.9 (H) 02/13/2023    ALBUMIN 4.1 02/13/2023    BILIDIR 0.5 04/13/2022    IBILI 0.40 04/13/2022    AST 19 02/13/2023    ALT 20 02/13/2023    MG 1.70 04/12/2022    PHOS 3.1 02/13/2023      Lab Results   Component Value Date    HGBA1C 7.0 02/13/2023    HIV Nonreactive 08/22/2022    HEPCAB Nonreactive 08/22/2022    HEPBSURFAG Nonreactive 08/22/2022     Urine:  Lab Results   Component Value Date    COLORUA Light-Yellow 02/13/2023    APPEARANCEUA Clear 02/13/2023    SGUA 1.012 02/13/2023    PHUA 5.5 02/13/2023    PROTEINUA Negative 02/13/2023    GLUCOSEUA Normal 02/13/2023    KETONESUA Negative 02/13/2023    BLOODUA Negative 02/13/2023    NITRITESUA Negative 02/13/2023    LEUKOCYTESUR 25 (A) 02/13/2023    RBCUA 0-5 02/13/2023    WBCUA 0-5 02/13/2023    BACTERIA Trace (A) 02/13/2023    SQEPUA Trace (A) 02/13/2023    HYALINECASTS 6-10 (A) 02/13/2023    CREATRANDUR 91.4 02/13/2023    PROTEINURINE 6.9 02/13/2023    UPROTCREA 0.1 02/13/2023         Imaging  US Retroperitoneal Limited 01/06/2023  Right Kidney: Length: 10.8 x 5.5 x 5.7 cm  Appearance: Normal echogenicity.  Collecting system: No hydronephrosis  Stones: None  Cyst/Mass: Hyperechoic area in the upper pole of the right kidney measures 1.1 x 9 mm x 1.1 cm a small angiomyolipoma cannot be completely excluded  Left Kidney:  Length: 12 x 5.7 x 4.4 cm  Appearance: Normal echogenicity.  Collecting system: No  hydronephrosis  Stones: Small calcification identified in the upper pole measuring 9 mm by 3 mm x 7 mm these might represent a nonocclusive stone  Cyst/Mass: None  Bladder:  Normal  Vessels:  Visualized portions of the IVC and aorta have a normal grayscale appearance.    Impression  Small echogenic area in the upper pole of the kidney small angiomyolipoma cannot be completely excluded.  Nonocclusive stone of the left kidney.  No other abnormalities       Impression and plan     Stage 3b chronic kidney disease  -     Comprehensive Metabolic Panel; Future; Expected date: 05/15/2023  Serum creatinine has been relatively stable over the past several months, there is no significant proteinuria or active urinary sediment.  Continue risk factor management and periodic monitoring. Continue DANICA blockade.    Will obtain records from Dr Oreilly, consider SGLT2i if patient is not on one already.  Return to clinic with repeat labs in 3 months.      Other vitamin B12 deficiency anemia  Will monitor H&H, no indications for AGUSTINA at this time.    Essential hypertension  Blood pressure reading is at goal, continue current antihypertensive regimen and 2 g a day dietary sodium restriction.      BMI 33.0-33.9,adult  Lifestyle and dietary interventions discussed, patient counseled on weight loss using portion control, non- sedentary lifestyle, low-carbohydrate/low fat diet.    Lower urinary tract symptoms (LUTS)  -     Ambulatory referral/consult to Urology; Future; Expected date: 02/27/2023  Will refer to Urology clinic.         2/20/2023  Robina Garrison NP  [facility] Nephrology

## 2023-03-06 NOTE — PROGRESS NOTES
[facility] Follow Up Office Visit Note    Initial Visit Date: 11/25/2022  Last Visit Date: 11/25/2022  Current Visit Date:  03/07/2023    Chief Complaint:     Chief Complaint   Patient presents with    Patient presents today with a hx of seizures       History of Present Illness:      This is 57 y.o. right hand dominant male with history of multiple cardiovascular risk factors, insulin-dependent type 2 diabetes, CKD III, PEA arrest in 2/2022, who is referred for provoked seizure in the setting of acute illness. Seizure free since discharge in 5/2022. During last visit, Lacosamide was decreased to 50 mg once a day.     Age of Onset : 5/19/2022 while in the hospital for CHF exacerbation      Semiology: cannot recall     Frequency: last event during hospitalization in 5/2022      Provocation Factors: CHF exacerbation      Risk Factors  - Family history of seizure disorders:  No  - History of focal CNS lesions: Yes history of PEA arrest in 2/2022  - History of CNS infections: No  - History head trauma with prolonged LOC: No  - History of childhood seizures, including febrile seizures: No  - History of development delay: No   - History of underlying mood disorder: No   - History of sleep disorder: Yes JOESPH on CPAP  - Recreational drug use: No  - Alcohol use: No  - Family planning and contraceptive use: Not Applicable        Medications:     Current AEDs:  Lacosamide 50 mg daily (11/25/2022 to present)  Levetiracetam 500 mg twice a day     Prior AEDs:  Vimpat 100 mg twice a day - only taking 100 mg once a day    Surgical Intervention & Devices:     - VNS:  - DBS  - RNS:  - Lobectomy:    Labs:     Results for orders placed or performed in visit on 02/13/23   Hemoglobin A1C   Result Value Ref Range    Hemoglobin A1c 7.0 <=7.0 %    Estimated Average Glucose 154.2 mg/dL   Comprehensive Metabolic Panel   Result Value Ref Range    Sodium Level 142 136 - 145 mmol/L    Potassium Level 3.9 3.5 - 5.1 mmol/L    Chloride 104 98  - 107 mmol/L    Carbon Dioxide 27 22 - 29 mmol/L    Glucose Level 72 (L) 74 - 100 mg/dL    Blood Urea Nitrogen 23.0 8.4 - 25.7 mg/dL    Creatinine 1.91 (H) 0.73 - 1.18 mg/dL    Calcium Level Total 9.8 8.4 - 10.2 mg/dL    Protein Total 8.9 (H) 6.4 - 8.3 gm/dL    Albumin Level 4.1 3.5 - 5.0 g/dL    Globulin 4.8 (H) 2.4 - 3.5 gm/dL    Albumin/Globulin Ratio 0.9 (L) 1.1 - 2.0 ratio    Bilirubin Total 0.6 <=1.5 mg/dL    Alkaline Phosphatase 140 40 - 150 unit/L    Alanine Aminotransferase 20 0 - 55 unit/L    Aspartate Aminotransferase 19 5 - 34 unit/L    eGFR 40 mls/min/1.73/m2   Lipid Panel   Result Value Ref Range    Cholesterol Total 121 <=200 mg/dL    HDL Cholesterol 37 35 - 60 mg/dL    Triglyceride 110 34 - 140 mg/dL    Cholesterol/HDL Ratio 3 0 - 5    Very Low Density Lipoprotein 22     LDL Cholesterol 62.00 50.00 - 140.00 mg/dL   Phosphorus   Result Value Ref Range    Phosphorus Level 3.1 2.3 - 4.7 mg/dL   CBC with Differential   Result Value Ref Range    WBC 12.5 (H) 4.5 - 11.5 x10(3)/mcL    RBC 4.50 (L) 4.70 - 6.10 x10(6)/mcL    Hgb 12.3 (L) 14.0 - 18.0 gm/dL    Hct 38.5 (L) 42.0 - 52.0 %    MCV 85.6 80.0 - 94.0 fL    MCH 27.3 pg    MCHC 31.9 (L) 33.0 - 36.0 mg/dL    RDW 12.9 11.5 - 17.0 %    Platelet 243 130 - 400 x10(3)/mcL    MPV 13.6 (H) 7.4 - 10.4 fL    IPF 19.3 (H) 0.9 - 11.2 %    Neut % 70.3 %    Lymph % 20.6 %    Mono % 5.7 %    Eos % 2.2 %    Basophil % 0.4 %    Lymph # 2.57 0.6 - 4.6 x10(3)/mcL    Neut # 8.77 2.1 - 9.2 x10(3)/mcL    Mono # 0.71 0.1 - 1.3 x10(3)/mcL    Eos # 0.27 0 - 0.9 x10(3)/mcL    Baso # 0.05 0 - 0.2 x10(3)/mcL    IG# 0.10 (H) 0 - 0.04 x10(3)/mcL    IG% 0.8 %    NRBC% 0.0 %       Studies:      - 24 hour EEG 5/20/2022 - 5/21/2022 at [facility]:  Encephalopathic changes.  Probable left frontal central spike.  - Ambulatory EEG:  - EMU Video EEG:  - MRI Brain without contrast May 21, 2022 at [facility]:  As per documentation, extensive chronic microvascular changes.    - NCHCT:  - WADA:    Review of  Systems:     All other systems reviewed and are negative.    Physical Exams:     Vitals:    03/07/23 1047   BP: 120/77   Pulse: 76   Temp: 98 °F (36.7 °C)       Physical Exam  Vitals and nursing note reviewed.   Constitutional:       Appearance: Normal appearance.   HENT:      Head: Normocephalic and atraumatic.      Nose: Nose normal.      Mouth/Throat:      Mouth: Mucous membranes are moist.      Pharynx: Oropharynx is clear.   Eyes:      Conjunctiva/sclera: Conjunctivae normal.   Cardiovascular:      Rate and Rhythm: Normal rate and regular rhythm.      Pulses: Normal pulses.      Heart sounds: Normal heart sounds.   Pulmonary:      Effort: Pulmonary effort is normal.      Breath sounds: Normal breath sounds.   Abdominal:      General: Abdomen is flat.      Palpations: Abdomen is soft.   Musculoskeletal:         General: Normal range of motion.      Cervical back: Normal range of motion.   Neurological:      Mental Status: He is alert.   Psychiatric:         Mood and Affect: Mood normal.         Comprehensive Neurological Exam:  Mental Status: Alert Oriented to Self, Date, and Place. Comprehension wnl. No dysarthria.   CN II - XII: MARSHALL, No APD, VFFC, No ptosis OU, EOMI without nystagmus, LT/Temp symmetric in CN V1-3 distribution, Face Symmetric, Tongue and Uvula midline, Trapezius symmetric bilateral.   Motor: tone and bulk wnl throughout, no abnormal involuntary or voluntary movements, 5/5 confrontation, Fine finger movements wnl b/l, No pronator drift.   Sensory: LT, Proprioception, Vibration, PP, Temp symmetric. .   Reflexes: 1+ throughout  Cerebellar: FNF wnl b/l  Gait: normal.    Assessment:     This is 57 y.o. right hand dominant male with history of multiple cardiovascular risk factors, insulin-dependent type 2 diabetes, CKD III, PEA arrest in 2/2022, who is referred for provoked seizure in the setting of acute illness. Seizure free since discharge in 5/2022.     Problem List Items Addressed This Visit           Neuro    New onset seizure - Primary       Plan:     [] discontinue Lacosamide 50 mg once a day   [] continue with Levetiracetam 500 mg twice a day for now     RTC 3 Months      Seizure education provided: including family planning and teratogenicity of AEDs, risk of uncontrolled seizure disorder, SUDEP. No driving until seizure free for six months ([place] state law). No swimming, climbing heights, or operate heavy machinery without supervision. Patient is advised to avoid Benadryl, Tramadol, Wellbutrin, flashing lights, alcohol, sleep deprivation, and certain anti-biotics that can lower seizure threshold.     I have explained the treatment plan, diagnosis, and prognosis to patient. All questions are answered to the best of my knowledge.     Face to face time 30 minutes, including documentation, chart review, counseling, education, review of test results, relevant medical records, and coordination of care.   I have explained the treatment plan, diagnosis, and prognosis to patient. All questions are answered to the best of my knowledge.         Claribel Canales MD   General Neurology  03/07/2023

## 2023-03-07 ENCOUNTER — OFFICE VISIT (OUTPATIENT)
Dept: NEUROLOGY | Facility: CLINIC | Age: 58
End: 2023-03-07
Payer: MEDICARE

## 2023-03-07 VITALS
WEIGHT: 253 LBS | BODY MASS INDEX: 34.27 KG/M2 | TEMPERATURE: 98 F | SYSTOLIC BLOOD PRESSURE: 120 MMHG | HEART RATE: 76 BPM | HEIGHT: 72 IN | DIASTOLIC BLOOD PRESSURE: 77 MMHG | OXYGEN SATURATION: 97 %

## 2023-03-07 DIAGNOSIS — R56.9 NEW ONSET SEIZURE: Primary | ICD-10-CM

## 2023-03-07 PROCEDURE — 99214 OFFICE O/P EST MOD 30 MIN: CPT | Mod: S$PBB,,, | Performed by: PSYCHIATRY & NEUROLOGY

## 2023-03-07 PROCEDURE — 99215 OFFICE O/P EST HI 40 MIN: CPT | Mod: PBBFAC | Performed by: PSYCHIATRY & NEUROLOGY

## 2023-03-07 PROCEDURE — 99214 PR OFFICE/OUTPT VISIT, EST, LEVL IV, 30-39 MIN: ICD-10-PCS | Mod: S$PBB,,, | Performed by: PSYCHIATRY & NEUROLOGY

## 2023-03-07 RX ORDER — LEVETIRACETAM 500 MG/1
500 TABLET ORAL 2 TIMES DAILY
Qty: 180 TABLET | Refills: 1 | Status: SHIPPED | OUTPATIENT
Start: 2023-03-07 | End: 2024-01-17 | Stop reason: SDUPTHER

## 2023-03-07 RX ORDER — RIVAROXABAN 2.5 MG/1
2.5 TABLET, FILM COATED ORAL 2 TIMES DAILY
COMMUNITY
Start: 2023-02-21

## 2023-03-07 RX ORDER — DAPAGLIFLOZIN 10 MG/1
10 TABLET, FILM COATED ORAL DAILY
COMMUNITY
Start: 2023-02-20

## 2023-03-08 ENCOUNTER — DOCUMENT SCAN (OUTPATIENT)
Dept: HOME HEALTH SERVICES | Facility: HOSPITAL | Age: 58
End: 2023-03-08
Payer: MEDICARE

## 2023-03-08 PROCEDURE — G0179 MD RECERTIFICATION HHA PT: HCPCS | Mod: ,,, | Performed by: NURSE PRACTITIONER

## 2023-03-08 PROCEDURE — G0179 PR HOME HEALTH MD RECERTIFICATION: ICD-10-PCS | Mod: ,,, | Performed by: NURSE PRACTITIONER

## 2023-03-14 ENCOUNTER — TELEPHONE (OUTPATIENT)
Dept: NEPHROLOGY | Facility: CLINIC | Age: 58
End: 2023-03-14
Payer: MEDICARE

## 2023-03-14 NOTE — TELEPHONE ENCOUNTER
RON Valdez RN  Please call the patient and obtain a correct and complete medication list from him.       Left message to return call.

## 2023-03-20 ENCOUNTER — LAB VISIT (OUTPATIENT)
Dept: LAB | Facility: HOSPITAL | Age: 58
End: 2023-03-20
Attending: INTERNAL MEDICINE
Payer: MEDICARE

## 2023-03-20 DIAGNOSIS — I25.118 CORONARY ARTERY DISEASE INVOLVING NATIVE CORONARY ARTERY WITH OTHER FORM OF ANGINA PECTORIS, UNSPECIFIED WHETHER NATIVE OR TRANSPLANTED HEART: Primary | ICD-10-CM

## 2023-03-20 LAB — BNP BLD-MCNC: 40.7 PG/ML

## 2023-03-20 PROCEDURE — 83880 ASSAY OF NATRIURETIC PEPTIDE: CPT

## 2023-03-20 PROCEDURE — 36415 COLL VENOUS BLD VENIPUNCTURE: CPT

## 2023-03-29 ENCOUNTER — LAB REQUISITION (OUTPATIENT)
Dept: LAB | Facility: HOSPITAL | Age: 58
End: 2023-03-29
Payer: MEDICARE

## 2023-03-29 DIAGNOSIS — I50.9 HEART FAILURE, UNSPECIFIED: ICD-10-CM

## 2023-03-29 LAB
ANION GAP SERPL CALC-SCNC: 11 MEQ/L
BUN SERPL-MCNC: 24.8 MG/DL (ref 8.4–25.7)
CALCIUM SERPL-MCNC: 9.6 MG/DL (ref 8.4–10.2)
CHLORIDE SERPL-SCNC: 106 MMOL/L (ref 98–107)
CO2 SERPL-SCNC: 28 MMOL/L (ref 22–29)
CREAT SERPL-MCNC: 1.98 MG/DL (ref 0.73–1.18)
CREAT/UREA NIT SERPL: 13
GFR SERPLBLD CREATININE-BSD FMLA CKD-EPI: 39 MLS/MIN/1.73/M2
GLUCOSE SERPL-MCNC: 62 MG/DL (ref 74–100)
POTASSIUM SERPL-SCNC: 4.6 MMOL/L (ref 3.5–5.1)
SODIUM SERPL-SCNC: 145 MMOL/L (ref 136–145)

## 2023-03-29 PROCEDURE — 80048 BASIC METABOLIC PNL TOTAL CA: CPT | Performed by: INTERNAL MEDICINE

## 2023-03-30 ENCOUNTER — OFFICE VISIT (OUTPATIENT)
Dept: UROLOGY | Facility: CLINIC | Age: 58
End: 2023-03-30
Payer: MEDICARE

## 2023-03-30 VITALS
BODY MASS INDEX: 34.13 KG/M2 | OXYGEN SATURATION: 100 % | TEMPERATURE: 98 F | WEIGHT: 252 LBS | RESPIRATION RATE: 18 BRPM | HEIGHT: 72 IN | DIASTOLIC BLOOD PRESSURE: 69 MMHG | SYSTOLIC BLOOD PRESSURE: 106 MMHG | HEART RATE: 71 BPM

## 2023-03-30 DIAGNOSIS — R39.12 BENIGN PROSTATIC HYPERPLASIA WITH WEAK URINARY STREAM: Primary | ICD-10-CM

## 2023-03-30 DIAGNOSIS — N40.1 BENIGN PROSTATIC HYPERPLASIA WITH WEAK URINARY STREAM: Primary | ICD-10-CM

## 2023-03-30 DIAGNOSIS — R35.1 NOCTURIA: ICD-10-CM

## 2023-03-30 DIAGNOSIS — R39.9 LOWER URINARY TRACT SYMPTOMS (LUTS): ICD-10-CM

## 2023-03-30 PROBLEM — R39.11 BENIGN PROSTATIC HYPERPLASIA WITH URINARY HESITANCY: Status: ACTIVE | Noted: 2022-03-23

## 2023-03-30 PROCEDURE — 99214 PR OFFICE/OUTPT VISIT, EST, LEVL IV, 30-39 MIN: ICD-10-PCS | Mod: S$PBB,,, | Performed by: NURSE PRACTITIONER

## 2023-03-30 PROCEDURE — 99214 OFFICE O/P EST MOD 30 MIN: CPT | Mod: S$PBB,,, | Performed by: NURSE PRACTITIONER

## 2023-03-30 PROCEDURE — 99215 OFFICE O/P EST HI 40 MIN: CPT | Mod: PBBFAC | Performed by: NURSE PRACTITIONER

## 2023-03-30 RX ORDER — TAMSULOSIN HYDROCHLORIDE 0.4 MG/1
0.4 CAPSULE ORAL DAILY
Qty: 90 CAPSULE | Refills: 3 | Status: SHIPPED | OUTPATIENT
Start: 2023-03-30 | End: 2023-05-15

## 2023-03-30 RX ORDER — FINASTERIDE 5 MG/1
5 TABLET, FILM COATED ORAL DAILY
Qty: 90 TABLET | Refills: 3 | Status: SHIPPED | OUTPATIENT
Start: 2023-03-30 | End: 2023-06-29

## 2023-03-30 NOTE — PROGRESS NOTES
Chief Complaint: No chief complaint on file.      HPI:  Patient is a 57-year-old male here for a urology referral due to difficulty initiating urine stream and nocturia.  Patient seen by renal NP with complaints of difficulty initiating urine and nocturia.  Patient currently on tamsulosin 0.4 mg p.o. daily. Patient urine stream weak,  moderate hesitancy, straining, interruption of stream, dribbling, frequency, nocturia 2-3 times per day, however patient states he drinks white up to bedtime and sometimes during the night.. FLACO: As described below.  Denies Family history of urologic pathology & personal history of stones.  Bladder scan 0 ml.        Allergies:  Review of patient's allergies indicates:   Allergen Reactions    Pork derived (porcine)      Other reaction(s): Pork    Plavix [clopidogrel] Hives and Rash       Medications:  Current Outpatient Medications   Medication Sig Dispense Refill    aspirin (ECOTRIN) 81 MG EC tablet Take 81 mg by mouth once daily.      atorvastatin (LIPITOR) 80 MG tablet Take 80 mg by mouth every evening.      azelastine (ASTELIN) 137 mcg (0.1 %) nasal spray 1 spray by Nasal route 2 (two) times daily.      carvediloL (COREG) 25 MG tablet Take 25 mg by mouth 2 (two) times daily.      cyanocobalamin (VITAMIN B-12) 1000 MCG tablet Take 2,000 mcg by mouth once daily at 6am.      ezetimibe (ZETIA) 10 mg tablet Take 10 mg by mouth once daily at 6am.      FARXIGA 10 mg tablet Take by mouth.      fluticasone propionate (FLONASE) 50 mcg/actuation nasal spray Flonase Allergy Relief Take No date recorded No form recorded No frequency recorded No route recorded No set duration recorded No set duration amount recorded active No dosage strength recorded No dosage strength units of measure recorded      HYDROcodone-acetaminophen (NORCO) 7.5-325 mg per tablet Take 1 tablet by mouth every 8 (eight) hours as needed.      hydrOXYzine (ATARAX) 50 MG tablet Take 50 mg by mouth 3 (three) times daily.       isosorbide mononitrate (IMDUR) 60 MG 24 hr tablet Take 60 mg by mouth once daily.      lactulose (CHRONULAC) 10 gram/15 mL solution TAKE 15 MLS BY MOUTH 2 TIMES DAILY AS NEEDED FOR CONSTIPATION 2700 mL 1    levETIRAcetam (KEPPRA) 500 MG Tab Take 1 tablet (500 mg total) by mouth 2 (two) times daily. 180 tablet 1    levocetirizine (XYZAL) 5 MG tablet Take 1 tablet (5 mg total) by mouth nightly as needed for Allergies. 30 tablet 2    levothyroxine (SYNTHROID) 25 MCG tablet TAKE 1 TABLET BY MOUTH BEFORE BREAKFAST. 90 tablet 1    metoclopramide HCl (REGLAN) 10 MG tablet Take 10 mg by mouth once daily.      nitroGLYCERIN (NITROSTAT) 0.4 MG SL tablet Place 0.4 mg under the tongue as needed.      NOVOLIN 70/30 U-100 INSULIN 100 unit/mL (70-30) injection Inject 45 Units into the skin 2 (two) times daily with meals.      omeprazole (PRILOSEC) 40 MG capsule Take 1 capsule (40 mg total) by mouth every evening. 90 capsule 1    prasugreL (EFFIENT) 10 mg Tab Take 10 mg by mouth once daily.      QUEtiapine (SEROQUEL) 25 MG Tab Take 1 tablet (25 mg total) by mouth every evening. 90 tablet 1    ranolazine (RANEXA) 1,000 mg Tb12 Take 1,000 mg by mouth 2 (two) times daily.      spironolactone (ALDACTONE) 25 MG tablet Take 1 tablet (25 mg total) by mouth once daily. 90 tablet 1    tamsulosin (FLOMAX) 0.4 mg Cap TAKE 1 CAPSULE BY MOUTH ONCE DAILY BEFORE A MEAL 90 capsule 3    torsemide (DEMADEX) 20 MG Tab Take 20 mg by mouth 2 (two) times a day.      TRUE METRIX GLUCOSE METER Misc TEST 3 TIMES A DAY      TRUETRACK TEST Strp TEST 3 TIMES A DAY      XARELTO 2.5 mg Tab Take by mouth.       No current facility-administered medications for this visit.       Review of Systems:  General: No fever, chills, fatigability, or weight loss.  Skin: No rashes, itching, or changes in color or texture of skin.  Chest: Denies HART, cyanosis, wheezing, cough, and sputum production.  Abdomen: Appetite fine. No weight loss. Denies diarrhea, abdominal pain,  hematemesis, or blood in stool.  Musculoskeletal: No joint stiffness or swelling. Denies back pain.  : As above.  All other review of systems negative.    PMH:  Past Medical History:   Diagnosis Date    BPH (benign prostatic hyperplasia)     CAD (coronary artery disease)     CHF (congestive heart failure)     GERD (gastroesophageal reflux disease)     Other hyperlipidemia     PVD (peripheral vascular disease)     Type 2 diabetes mellitus without complications        PSH:  Past Surgical History:   Procedure Laterality Date    CHOLECYSTECTOMY      CORONARY ARTERY BYPASS GRAFT      LEFT HEART CATHETERIZATION N/A 2/7/2023    Procedure: Left heart cath;  Surgeon: Jamin Melara MD;  Location: [facility] CATH LAB;  Service: Cardiology;  Laterality: N/A;    RIGHT HEART CATHETERIZATION Right 9/20/2022    Procedure: INSERTION, CATHETER, RIGHT HEART;  Surgeon: Cory Oreilly MD;  Location: [facility] CATH LAB;  Service: Cardiology;  Laterality: Right;  CARDIOMEMS RJ ACCESS    TONSILLECTOMY         FamHx:  Family History   Problem Relation Age of Onset    Diabetes Mother     Hypertension Mother     Cancer Mother     Hypertension Father     Stroke Father     Diabetes Sister     Diabetes Brother     Kidney disease Brother        SocHx:  Social History     Socioeconomic History    Marital status:      Spouse name: Viktoria    Number of children: 1   Tobacco Use    Smoking status: Never    Smokeless tobacco: Never   Substance and Sexual Activity    Alcohol use: Never    Drug use: Not Currently     Social Determinants of Health     Financial Resource Strain: Low Risk     Difficulty of Paying Living Expenses: Not very hard   Food Insecurity: No Food Insecurity    Worried About Running Out of Food in the Last Year: Never true    Ran Out of Food in the Last Year: Never true   Transportation Needs: No Transportation Needs    Lack of Transportation (Medical): No    Lack of Transportation (Non-Medical): No   Physical Activity: Inactive     Days of Exercise per Week: 0 days    Minutes of Exercise per Session: 0 min   Stress: No Stress Concern Present    Feeling of Stress : Not at all   Social Connections: Moderately Isolated    Frequency of Communication with Friends and Family: More than three times a week    Frequency of Social Gatherings with Friends and Family: Once a week    Attends Mosque Services: Never    Active Member of Clubs or Organizations: No    Attends Club or Organization Meetings: Never    Marital Status:    Housing Stability: Low Risk     Unable to Pay for Housing in the Last Year: No    Number of Places Lived in the Last Year: 1    Unstable Housing in the Last Year: No       Physical Exam:  There were no vitals filed for this visit.  General: A&Ox3, no apparent distress, no deformities  Neck: No masses, normal thyroid  Lungs: CTA bill, no use of accessory muscles  Heart: RRR, no arrhythmias  Abdomen: Soft, NT, ND, no masses, no hernias, no hepatosplenomegaly  Lymphatic: Neck and groin nodes negative  Skin: The skin is warm and dry. No jaundice.  Ext: No c/c/e.    GUMale: Test desc bill, no abnormalities of epididymus. Penis, with normal penile and scrotal skin. Meatus normal. Normal rectal tone, no hemorrhoids. Prostate: 2 finger breadth wide, flat, smooth, soft, nontender, no nodules or masses appreciated. SV not palpable. Perineum and anus normal.       Impression:  BPH, nocturia      Plan: PSA now will notify patient results, increase Flomax 0.8 mg p.o. daily, start Finasteride 5 mg p.o. daily, F/U 6 weeks. Instructed patient on Timed voiding every 2-3 hours, double voiding, avoiding bladder irritants, such as alcohol, citrus foods & drinks, caffeine drinks energy drinks, spicy foods, sodas, increase water intake.  Instructed patient to avoid drinking fluids 2 hours prior to bedtime and throughout the night along with voiding immediately prior to bedtime.

## 2023-03-30 NOTE — PROGRESS NOTES
Pt was seen by . Flomax will be increased for patient meds.  Next f/u visit will be in 6 weeks.

## 2023-04-06 ENCOUNTER — DOCUMENT SCAN (OUTPATIENT)
Dept: HOME HEALTH SERVICES | Facility: HOSPITAL | Age: 58
End: 2023-04-06
Payer: MEDICARE

## 2023-04-20 DIAGNOSIS — I50.22 CHRONIC SYSTOLIC HF (HEART FAILURE): ICD-10-CM

## 2023-04-20 DIAGNOSIS — I10 HYPERTENSION: ICD-10-CM

## 2023-04-20 DIAGNOSIS — I77.4 CELIAC ARTERY COMPRESSION SYNDROME: ICD-10-CM

## 2023-04-20 DIAGNOSIS — I25.118 CORONARY ARTERY DISEASE OF NATIVE ARTERY OF NATIVE HEART WITH STABLE ANGINA PECTORIS: ICD-10-CM

## 2023-04-20 DIAGNOSIS — E78.5 HYPERLIPIDEMIA: ICD-10-CM

## 2023-04-20 DIAGNOSIS — I87.2 VENOUS INSUFFICIENCY: ICD-10-CM

## 2023-04-20 DIAGNOSIS — E11.9 DIABETES MELLITUS, TYPE II: ICD-10-CM

## 2023-04-20 DIAGNOSIS — I73.9 CLAUDICATION: Primary | ICD-10-CM

## 2023-04-21 ENCOUNTER — TELEPHONE (OUTPATIENT)
Dept: INTERNAL MEDICINE | Facility: CLINIC | Age: 58
End: 2023-04-21
Payer: MEDICARE

## 2023-04-21 NOTE — TELEPHONE ENCOUNTER
Received a communication from  nurse from a 4/14 visit where patient was c/o CP, dizziness, and SOB. He needs to go to ED with any of these symptoms!

## 2023-04-28 ENCOUNTER — HOSPITAL ENCOUNTER (OUTPATIENT)
Dept: RADIOLOGY | Facility: HOSPITAL | Age: 58
Discharge: HOME | End: 2023-04-28
Attending: INTERNAL MEDICINE
Payer: MEDICARE

## 2023-04-28 DIAGNOSIS — I25.118 CORONARY ARTERY DISEASE OF NATIVE ARTERY OF NATIVE HEART WITH STABLE ANGINA PECTORIS: ICD-10-CM

## 2023-04-28 DIAGNOSIS — E11.9 DIABETES MELLITUS, TYPE II: ICD-10-CM

## 2023-04-28 DIAGNOSIS — I77.4 CELIAC ARTERY COMPRESSION SYNDROME: ICD-10-CM

## 2023-04-28 DIAGNOSIS — I73.9 CLAUDICATION: ICD-10-CM

## 2023-04-28 DIAGNOSIS — E78.5 HYPERLIPIDEMIA: ICD-10-CM

## 2023-04-28 DIAGNOSIS — I10 HYPERTENSION: ICD-10-CM

## 2023-04-28 DIAGNOSIS — I87.2 VENOUS INSUFFICIENCY: ICD-10-CM

## 2023-04-28 DIAGNOSIS — I50.22 CHRONIC SYSTOLIC HF (HEART FAILURE): ICD-10-CM

## 2023-04-28 PROCEDURE — 78494 HEART IMAGE SPECT: CPT

## 2023-04-30 ENCOUNTER — EXTERNAL HOME HEALTH (OUTPATIENT)
Dept: HOME HEALTH SERVICES | Facility: HOSPITAL | Age: 58
End: 2023-04-30
Payer: MEDICARE

## 2023-05-07 PROCEDURE — G0179 MD RECERTIFICATION HHA PT: HCPCS | Mod: ,,, | Performed by: NURSE PRACTITIONER

## 2023-05-07 PROCEDURE — G0179 PR HOME HEALTH MD RECERTIFICATION: ICD-10-PCS | Mod: ,,, | Performed by: NURSE PRACTITIONER

## 2023-05-15 ENCOUNTER — OFFICE VISIT (OUTPATIENT)
Dept: UROLOGY | Facility: CLINIC | Age: 58
End: 2023-05-15
Payer: MEDICARE

## 2023-05-15 ENCOUNTER — OFFICE VISIT (OUTPATIENT)
Dept: INTERNAL MEDICINE | Facility: CLINIC | Age: 58
End: 2023-05-15
Payer: MEDICARE

## 2023-05-15 VITALS
BODY MASS INDEX: 34.76 KG/M2 | TEMPERATURE: 98 F | SYSTOLIC BLOOD PRESSURE: 95 MMHG | HEIGHT: 72 IN | DIASTOLIC BLOOD PRESSURE: 68 MMHG | HEART RATE: 97 BPM | RESPIRATION RATE: 20 BRPM | WEIGHT: 256.63 LBS

## 2023-05-15 VITALS
DIASTOLIC BLOOD PRESSURE: 75 MMHG | RESPIRATION RATE: 20 BRPM | WEIGHT: 254.81 LBS | HEIGHT: 72 IN | SYSTOLIC BLOOD PRESSURE: 116 MMHG | OXYGEN SATURATION: 97 % | HEART RATE: 87 BPM | TEMPERATURE: 98 F | BODY MASS INDEX: 34.51 KG/M2

## 2023-05-15 DIAGNOSIS — N40.1 BPH WITH URINARY OBSTRUCTION: ICD-10-CM

## 2023-05-15 DIAGNOSIS — I50.22 CHRONIC SYSTOLIC HEART FAILURE: Primary | ICD-10-CM

## 2023-05-15 DIAGNOSIS — Z00.00 WELLNESS EXAMINATION: ICD-10-CM

## 2023-05-15 DIAGNOSIS — N13.8 BPH WITH URINARY OBSTRUCTION: ICD-10-CM

## 2023-05-15 DIAGNOSIS — R39.12 BENIGN PROSTATIC HYPERPLASIA WITH WEAK URINARY STREAM: ICD-10-CM

## 2023-05-15 DIAGNOSIS — R33.9 URINARY RETENTION: ICD-10-CM

## 2023-05-15 DIAGNOSIS — R35.1 NOCTURIA: Primary | ICD-10-CM

## 2023-05-15 DIAGNOSIS — Z79.4 TYPE 2 DIABETES MELLITUS WITH DIABETIC POLYNEUROPATHY, WITH LONG-TERM CURRENT USE OF INSULIN: ICD-10-CM

## 2023-05-15 DIAGNOSIS — N40.1 BENIGN PROSTATIC HYPERPLASIA WITH WEAK URINARY STREAM: ICD-10-CM

## 2023-05-15 DIAGNOSIS — N18.32 STAGE 3B CHRONIC KIDNEY DISEASE: ICD-10-CM

## 2023-05-15 DIAGNOSIS — E03.9 HYPOTHYROIDISM, UNSPECIFIED TYPE: ICD-10-CM

## 2023-05-15 DIAGNOSIS — E11.42 TYPE 2 DIABETES MELLITUS WITH DIABETIC POLYNEUROPATHY, WITH LONG-TERM CURRENT USE OF INSULIN: ICD-10-CM

## 2023-05-15 PROBLEM — D47.3 ESSENTIAL (HEMORRHAGIC) THROMBOCYTHEMIA: Status: ACTIVE | Noted: 2023-05-15

## 2023-05-15 PROBLEM — R94.4 DECREASED GFR: Status: RESOLVED | Noted: 2022-08-24 | Resolved: 2023-05-15

## 2023-05-15 LAB
BILIRUB SERPL-MCNC: NEGATIVE MG/DL
BLOOD URINE, POC: NEGATIVE
COLOR, POC UA: YELLOW
GLUCOSE UR QL STRIP: 500
KETONES UR QL STRIP: NEGATIVE
LEUKOCYTE ESTERASE URINE, POC: NEGATIVE
NITRITE, POC UA: NEGATIVE
PH, POC UA: 6.5
PROTEIN, POC: NEGATIVE
SPECIFIC GRAVITY, POC UA: 1.01
UROBILINOGEN, POC UA: 0.2

## 2023-05-15 PROCEDURE — 99213 PR OFFICE/OUTPT VISIT, EST, LEVL III, 20-29 MIN: ICD-10-PCS | Mod: S$PBB,,, | Performed by: NURSE PRACTITIONER

## 2023-05-15 PROCEDURE — 81001 URINALYSIS AUTO W/SCOPE: CPT | Mod: PBBFAC | Performed by: NURSE PRACTITIONER

## 2023-05-15 PROCEDURE — 99214 PR OFFICE/OUTPT VISIT, EST, LEVL IV, 30-39 MIN: ICD-10-PCS | Mod: S$PBB,,, | Performed by: NURSE PRACTITIONER

## 2023-05-15 PROCEDURE — 99214 OFFICE O/P EST MOD 30 MIN: CPT | Mod: S$PBB,,, | Performed by: NURSE PRACTITIONER

## 2023-05-15 PROCEDURE — 99215 OFFICE O/P EST HI 40 MIN: CPT | Mod: PBBFAC,27 | Performed by: NURSE PRACTITIONER

## 2023-05-15 PROCEDURE — 99214 OFFICE O/P EST MOD 30 MIN: CPT | Mod: PBBFAC,27 | Performed by: NURSE PRACTITIONER

## 2023-05-15 PROCEDURE — 99213 OFFICE O/P EST LOW 20 MIN: CPT | Mod: S$PBB,,, | Performed by: NURSE PRACTITIONER

## 2023-05-15 RX ORDER — SACUBITRIL AND VALSARTAN 24; 26 MG/1; MG/1
1 TABLET, FILM COATED ORAL 2 TIMES DAILY
COMMUNITY
Start: 2023-04-19 | End: 2023-10-05

## 2023-05-15 RX ORDER — LEVOTHYROXINE SODIUM 25 UG/1
25 TABLET ORAL
Qty: 90 TABLET | Refills: 1 | Status: SHIPPED | OUTPATIENT
Start: 2023-05-15 | End: 2023-11-10

## 2023-05-15 RX ORDER — HUMAN INSULIN 100 [USP'U]/ML
45 INJECTION, SUSPENSION SUBCUTANEOUS 2 TIMES DAILY WITH MEALS
Qty: 130 ML | Refills: 1
Start: 2023-05-15 | End: 2023-06-30 | Stop reason: SDUPTHER

## 2023-05-15 RX ORDER — EPLERENONE 25 MG/1
25 TABLET, FILM COATED ORAL DAILY
COMMUNITY
Start: 2023-03-30

## 2023-05-15 RX ORDER — SILODOSIN 4 MG/1
8 CAPSULE ORAL DAILY
Qty: 90 CAPSULE | Refills: 3 | Status: SHIPPED | OUTPATIENT
Start: 2023-05-15 | End: 2023-05-15

## 2023-05-15 RX ORDER — SILODOSIN 4 MG/1
8 CAPSULE ORAL DAILY
Qty: 90 CAPSULE | Refills: 3 | Status: SHIPPED | OUTPATIENT
Start: 2023-05-15 | End: 2023-05-16

## 2023-05-15 RX ORDER — VERICIGUAT 2.5 MG/1
2.5 TABLET, FILM COATED ORAL 2 TIMES DAILY
COMMUNITY
Start: 2023-04-28

## 2023-05-15 NOTE — ASSESSMENT & PLAN NOTE
Continue mgmt per Dr. Hickey/Dr. Oreilly  For any new or worsening symptoms that are urgent, or for any s/s of MI, CVA, or any other emergent concerns, please visit the ER for further eval. Otherwise, call clinic with questions or concerns.

## 2023-05-15 NOTE — PROGRESS NOTES
Seen by HERMELINDA Ray NP.  Bladder scan 308ml.  Espinal catheter inserted using sterile technique.  100cc clear yellow urine noted.  Tolerated well.  RTC 1 month.  Rx routed to pharmacy.

## 2023-05-15 NOTE — PROGRESS NOTES
Chief Complaint:   Chief Complaint   Patient presents with    BPH, Nocturia       HPI:  Patient is a 57-year-old male here for 6 week F/U  BPD weak stream and nocturia.  Patient seen by renal NP with complaints of difficulty initiating urine and nocturia.  Patient currently on tamsulosin 0.4 mg p.o. daily. Patient urine stream weak,  moderate hesitancy, straining, interruption of stream, dribbling, frequency, nocturia 2-3 times per day.  On last visit patient is tamsulosin increased to 0.8 mg p.o. daily and started on finasteride 5 mg p.o. daily, patient compliant with meds.  Today patient's bladder scan 108 ml.  Instructed patient will discontinue tamsulosin and start Rapaflo 8 mg p.o. daily.  Follow-up in 4 weeks.    Allergies:  Review of patient's allergies indicates:   Allergen Reactions    Pork derived (porcine)      Other reaction(s): Pork    Plavix [clopidogrel] Hives and Rash       Medications:  Current Outpatient Medications   Medication Sig Dispense Refill    aspirin (ECOTRIN) 81 MG EC tablet Take 81 mg by mouth once daily.      atorvastatin (LIPITOR) 80 MG tablet Take 80 mg by mouth every evening.      azelastine (ASTELIN) 137 mcg (0.1 %) nasal spray 1 spray by Nasal route 2 (two) times daily.      carvediloL (COREG) 25 MG tablet Take 25 mg by mouth 2 (two) times daily.      cyanocobalamin (VITAMIN B-12) 1000 MCG tablet Take 2,000 mcg by mouth once daily at 6am.      ENTRESTO 24-26 mg per tablet Take 1 tablet by mouth 2 (two) times daily.      eplerenone (INSPRA) 25 MG Tab Take 25 mg by mouth once daily.      ezetimibe (ZETIA) 10 mg tablet Take 10 mg by mouth once daily at 6am.      FARXIGA 10 mg tablet Take 10 mg by mouth once daily.      finasteride (PROSCAR) 5 mg tablet Take 1 tablet (5 mg total) by mouth once daily. 90 tablet 3    fluticasone propionate (FLONASE) 50 mcg/actuation nasal spray Flonase Allergy Relief Take No date recorded No form recorded No frequency recorded No route recorded No set  duration recorded No set duration amount recorded active No dosage strength recorded No dosage strength units of measure recorded      HYDROcodone-acetaminophen (NORCO) 7.5-325 mg per tablet Take 1 tablet by mouth every 8 (eight) hours as needed.      hydrOXYzine (ATARAX) 50 MG tablet Take 50 mg by mouth 3 (three) times daily.      isosorbide mononitrate (IMDUR) 60 MG 24 hr tablet Take 60 mg by mouth once daily.      lactulose (CHRONULAC) 10 gram/15 mL solution TAKE 15 MLS BY MOUTH 2 TIMES DAILY AS NEEDED FOR CONSTIPATION 2700 mL 1    levETIRAcetam (KEPPRA) 500 MG Tab Take 1 tablet (500 mg total) by mouth 2 (two) times daily. 180 tablet 1    levocetirizine (XYZAL) 5 MG tablet Take 1 tablet (5 mg total) by mouth nightly as needed for Allergies. 30 tablet 2    levothyroxine (SYNTHROID) 25 MCG tablet Take 1 tablet (25 mcg total) by mouth before breakfast. 90 tablet 1    metoclopramide HCl (REGLAN) 10 MG tablet Take 10 mg by mouth once daily.      montelukast (SINGULAIR) 10 mg tablet TAKE 1 TABLET BY MOUTH EVERY DAY 90 tablet 1    nitroGLYCERIN (NITROSTAT) 0.4 MG SL tablet Place 0.4 mg under the tongue as needed.      NOVOLIN 70/30 U-100 INSULIN 100 unit/mL (70-30) injection Inject 45 Units into the skin 2 (two) times daily with meals. 130 mL 1    omeprazole (PRILOSEC) 40 MG capsule Take 1 capsule (40 mg total) by mouth every evening. (Patient taking differently: Take 20 mg by mouth every evening.) 90 capsule 1    prasugreL (EFFIENT) 10 mg Tab Take 10 mg by mouth once daily.      QUEtiapine (SEROQUEL) 25 MG Tab Take 1 tablet (25 mg total) by mouth every evening. 90 tablet 1    ranolazine (RANEXA) 1,000 mg Tb12 Take 1,000 mg by mouth 2 (two) times daily.      tamsulosin (FLOMAX) 0.4 mg Cap Take 1 capsule (0.4 mg total) by mouth once daily. 90 capsule 3    torsemide (DEMADEX) 20 MG Tab Take 20 mg by mouth 2 (two) times a day.      TRUE METRIX GLUCOSE METER Misc TEST 3 TIMES A DAY      TRUETRACK TEST Strp TEST 3 TIMES A  DAY      VERQUVO 2.5 mg Tab Take 2.5 mg by mouth 2 (two) times a day.      XARELTO 2.5 mg Tab Take 2.5 mg by mouth 2 (two) times a day.       No current facility-administered medications for this visit.       Review of Systems:  General: No fever, chills, fatigability, or weight loss.  Skin: No rashes, itching, or changes in color or texture of skin.  Chest: Denies HART, cyanosis, wheezing, cough, and sputum production.  Abdomen: Appetite fine. No weight loss. Denies diarrhea, abdominal pain, hematemesis, or blood in stool.  Musculoskeletal: No joint stiffness or swelling. Denies back pain.  : As above.  All other review of systems negative.    PMH:  Past Medical History:   Diagnosis Date    BPH (benign prostatic hyperplasia)     CAD (coronary artery disease)     CHF (congestive heart failure)     GERD (gastroesophageal reflux disease)     Other hyperlipidemia     PVD (peripheral vascular disease)     Type 2 diabetes mellitus without complications        PSH:  Past Surgical History:   Procedure Laterality Date    CHOLECYSTECTOMY      CORONARY ARTERY BYPASS GRAFT      LEFT HEART CATHETERIZATION N/A 2/7/2023    Procedure: Left heart cath;  Surgeon: Jamin Melara MD;  Location: [facility] CATH LAB;  Service: Cardiology;  Laterality: N/A;    RIGHT HEART CATHETERIZATION Right 9/20/2022    Procedure: INSERTION, CATHETER, RIGHT HEART;  Surgeon: Cory Oreilly MD;  Location: [facility] CATH LAB;  Service: Cardiology;  Laterality: Right;  CARDIOMEMS RJ ACCESS    TONSILLECTOMY         FamHx:  Family History   Problem Relation Age of Onset    Diabetes Mother     Hypertension Mother     Cancer Mother     Hypertension Father     Stroke Father     Diabetes Sister     Diabetes Brother     Kidney disease Brother        SocHx:  Social History     Socioeconomic History    Marital status:      Spouse name: Viktoria    Number of children: 1   Tobacco Use    Smoking status: Never     Passive exposure: Never    Smokeless tobacco: Never    Substance and Sexual Activity    Alcohol use: Never    Drug use: Not Currently    Sexual activity: Not Currently     Partners: Female     Social Determinants of Health     Financial Resource Strain: Low Risk     Difficulty of Paying Living Expenses: Not very hard   Food Insecurity: No Food Insecurity    Worried About Running Out of Food in the Last Year: Never true    Ran Out of Food in the Last Year: Never true   Transportation Needs: No Transportation Needs    Lack of Transportation (Medical): No    Lack of Transportation (Non-Medical): No   Physical Activity: Inactive    Days of Exercise per Week: 0 days    Minutes of Exercise per Session: 0 min   Stress: No Stress Concern Present    Feeling of Stress : Not at all   Social Connections: Moderately Isolated    Frequency of Communication with Friends and Family: More than three times a week    Frequency of Social Gatherings with Friends and Family: Once a week    Attends Anabaptist Services: Never    Active Member of Clubs or Organizations: No    Attends Club or Organization Meetings: Never    Marital Status:    Housing Stability: Low Risk     Unable to Pay for Housing in the Last Year: No    Number of Places Lived in the Last Year: 1    Unstable Housing in the Last Year: No       Physical Exam:  Vitals:    05/15/23 1320   BP: 116/75   Pulse: 87   Resp: 20   Temp: 97.9 °F (36.6 °C)     General: A&Ox3, no apparent distress, no deformities  Neck: No masses, normal thyroid  Lungs: CTA bill, no use of accessory muscles  Heart: RRR, no arrhythmias  Abdomen: Soft, NT, ND, no masses, no hernias, no hepatosplenomegaly  Lymphatic: Neck and groin nodes negative  Skin: The skin is warm and dry. No jaundice.  Ext: No c/c/e.      Urinalysis:  Color, UA Yellow    Spec Grav UA 1.010    pH, UA 6.5    WBC, UA Negative    Nitrite, UA Negative    Protein, POC Negative    Glucose,     Ketones, UA Negative    Urobilinogen, UA 0.2    Bilirubin, POC Negative    Blood, UA  Negative        Impression:  Urinary retention    Plan:  Instructed patient to discontinue tamsulosin start Rapaflo 8 mg p.o. daily, RTC in 1 month for bladder trial.  Patient's symptoms persist or worsen we may consider a TURP.

## 2023-05-15 NOTE — PROGRESS NOTES
RON Esquivel   [facility]  [address] [facility] [address]      PATIENT NAME: Luigi Gotti  : 1965  DATE: 5/15/23  MRN: [id number]        Reason for Visit / Chief Complaint: Follow-up (Lab review)       History of Present Illness / Problem Focused Workflow     Luigi Gotti presents to the clinic with Follow-up (Lab review)     Initial Visit (2020): 54 y.o. Croatian male presenting to [facility] to establish primary care.   Previous PCP: Dr. CHA Fernandez, last visit 19   PmHx: Type II DM controlled with insulin, Chronic GERD, Hx of Gastroparesis/Gastritis/Ischemic colitis (following GI, Dr. Green), CAD (following Dr. Oreilly, [facility]), PVD (following an interventional cardiologist, Dr. Cespedes), CHF, HLD, and BPH   SHx: EGD, Celiac Bypass, Colonoscopy, Visceral Arteriogram, coronary artery graft angiography, cataract extraction, CABG x 2, Tonsillectomy, Billie, mx vascular procedures (see hx)   FHx: HTN, T2DM, Clotting D/o (mother), Seizure, CVA (father), Breast Ca (mother)   Complaints today: Medication refills      Bp at goal at present.      HgA1c: 7.9%, slightly above  goal   Medications: Novolin 70/30 80 units in the morning and 90 units SQ at night  CBG log: None provided. But pt did express CBGs run between 140s-250s   S/S of hyperglycemia, no or hypoglycemia, no      Chronic GERD managed with Nexium.   He's following Cardio for hx of CAD, PVD, and CHF. He's currently taking ASA, Atorvastatin 80 mg daily, Coreg 25 mg po BID, Digoxin 125 mcg, Furosemide 40 mg po daily, Imdur 60 mg po daily, Ranexa 1,000 mg po BID, and Effient 10 mg po daily. Also has Nitro for use as needed.   Triglycerides 317, LDL 86. Taking Atorvastatin 80 mg po daily.      No acute concerns today.     (20): Pt presenting for f/u.   Bp at goal at present.   HgA1c: 8.2%, slightly above  goal and increased from previous   Medications: Novolin 70/30 80  "units in the morning and 90 units SQ at night  CBG log: None provided. But pt did express CBGs run between 140s-180s; reports lowest in one-teens; fasting CBG was 152 on yesterday   Denies over S/S of hyperglycemia or hypoglycemia.      eGFR < 60 mL/min. Creatinine 1.40; slight improvement from previous.      Total WBC count elevated. Has been elevated since last year. Total RBC count, H/H slightly decreased. He does report being seen at [facility] on [address] ~ 6  days ago due to cough. He states that he was diagnosed with "bronchitis with wheezing." He reports that a CXR was performed. He was prescribed a Z-pack and cough syrup, both of which he's completed. He continues to c/o dry cough. Denies fever, chills, HA, or SOB.     (8/7/2020): 54 y.o. Pakistani male with a PmHx of Type II DM controlled with insulin, Chronic GERD, h/o dysphagia, Hx of Gastroparesis/Gastritis/Ischemic colitis (following GI, Dr. Green), CAD (following Dr. Oreilly, [facility]), PVD (following an interventional cardiologist, Dr. Cespedes), CHF, HLD, and BPH, presenting for routine f/u. HgA1c 7.4%. He is taking Novolin 70/30 80 units in the morning and 92 units SQ at night. He denies any s/s of hypoglycemia or hyperglycemia. Total WBC count elevated. Has been elevated since last year. Total RBC count, H/H slightly decreased as well. He reports cough from last OV is improved and doesn't occur all of the time. Declines any work-up at present after being offered to do a CXR. He continues to follow Cards, Dr. Oreilly, and is following Dr. Georges for foot care. He does have a h/o BPH but hasn't followed up with Uro in some time. Reports feelings of incomplete ejaculation and was told it was probably from prostate issues by another provider. No other problems stated.     (11/9/2020): 55 y.o. Pakistani male with a PmHx of Type II DM controlled with insulin, Chronic GERD, h/o dysphagia, Hx of Gastroparesis/Gastritis/Ischemic colitis " "(following GI, Dr. Green), CAD (following [facility] Morrison), PVD (following an interventional cardiologist, Dr. Cespedes), CHF, HLD, and BPH, presenting for routine f/u. HgA1c 6.7%. He is taking Novolin 70/30 80 units in the morning and 92 units SQ at night. He denies any s/s of hypoglycemia or hyperglycemia. Total WBC count elevated. Has been elevated since last year. Renal indices slightly decreased w/ small rise in creatinine. He admits that he's  not been drinking a lot of water. Previously referred to Uro but reports no appt made. He is requesting the second Shingrix today. He continues to follow Cards, CV surgeon, GI, and podiatry. States had a colonoscopy about 1 week ago and noted to have polyps. Reports GI prescribed ferrous gluconate. Will request results. He reports chronic dyspnea x many years. Reports no improvement after cardiac sx but denies any worsening dyspnea. No other problems stated.     Telemedicine Visit (12/21/2020): 55 y.o. Argentine male with a PmHx of Type II DM controlled with insulin, Chronic GERD, h/o dysphagia, Hx of Gastroparesis/Gastritis/Ischemic colitis (following GI, Dr. Green), CAD (following [facility] Morrison), PVD (following an interventional cardiologist, Dr. Cepsedes), CHF, HLD, and BPH, presenting for ED f/u via telemedicine. Apparently pt presented to [facility] last week due to issues with blood sugars but states he was diagnosed with influenza and hospitalized for suspected COVID. He is stating that sugar was in the "20s," but also stated that during the hospitalization "they checked it six times a day until it came down to 100." So unknown if pt had issues with hyperglycemia or hypoglycemia. States "They gave me something [insulin] different in there and it messed my numbers up!" At present, he remains on his routine dose of Novolin 70/30 and reports CBGs have been in the 120s-140s. He reports, "I'm doing good now." At time of visit, ED records have not been received but " have been requested. Reports has a cough but it is improved. Denies CP or SOB. He has no other concerns. F/u scheduled 2/2021.     (2/10/2021): 55 y.o. Monegasque male with a PmHx of Type II DM controlled with insulin, Chronic GERD, h/o dysphagia, Hx of Gastroparesis/Gastritis/Ischemic colitis (following GI, Dr. Green), Celiac Dz, CAD (following Dr. Oreilly, [facility]), PVD (following an interventional cardiologist, Dr. Cespedes), CHF, HLD, and BPH, presenting for routine f/u. Conducted a phone visit 12/2020 for ED f/u. At the time, pt reported that he was diagnosed and treated for the flu while hospitalized at [facility]. Records received later revealed that that pt was being treated for COVID pneumonia after testing + for COVID ~9 days prior to hospital admit on 12/10/20. However, pt denies this and states he was never told he had COVID despite prescription medications being prescribed on 12/1/20 by a physician who's noted to have worked in the ED at [facility]. During his hospitalization from 12/10/20 to 12/17/20, he was treated for COVID pneumonia. D-dimer was elevated but CTA was negative for PE. He is taking Eliquis. Plans to speak to Cards tomorrow about continued use of this as he's on mx antiplatelets. Chemistry panel improved. Glucose low 100s. He denies any s/s of hypoglycemia. Total WBC count improved. He was previously referred to Heme clinic for unexplained leukocytosis. He has an appt 3/23/21. He will see his Cardiologist, Dr. Oreilly, on tomorrow. He has a chronic h/o dyspnea (a CT thorax was ordered in November but pt never completed) and intermittent angina that is no different from baseline. He denies any CP or SOB at this time. Last used Nitro a few days ago. He has no other complaints.      During hospitalization, pt had a CTA chest that revealed:  1.2 cm nodule in right lobe of thyroid gland, no evidence of PE, and consolidative changes throughout lungs     (5/11/2021): 55 y.o. Monegasque male with a PmHx of  Type II DM controlled with insulin, Chronic GERD, h/o dysphagia, Hx of Gastroparesis/Gastritis/Ischemic colitis (following GI, Dr. Green), Celiac Dz, CAD (following [facility] Morrison), PVD (following an interventional cardiologist, Dr. Cespedes), CHF, HLD, and BPH, presenting for routine f/u. He was previously referred to Heme clinic for unexplained leukocytosis. He had an appt 3/23/21 but was a No Show. A letter was mailed for pt to call and re-schedule. He will undergo a thyroid US to monitor a 1.2 cm nodule on 6/3/21. Reviewed colonoscopy done by Dr. Emmanuel Green on 9/30/2020. Dx: two polyps removed, tubular adenomas; diverticula disease.      Lab review:   HgA1c 7.1. He continues with Novolin N as prescribed. He does not check CBGs. Denies overt s/s of hypoglycemia or hyperglycemia.   Renal indices stable     He reports that he follows Dr. Alex Duckworth for a retinal d/o. Scheduled to f/u 5/18/21.   Scheduled for LHC 5/13/21 with Dr. Oreilly.   Scheduled with Uro 7/8/21.      No other concerns.     (9/14/2021): 56 y.o. Greenlandic male with a PmHx of Type II DM controlled with insulin, Chronic GERD, h/o dysphagia, Hx of Gastroparesis/Gastritis/Ischemic colitis (following GI, Dr. Green), Celiac Dz, CAD (following [facility]), PVD (following an interventional cardiologist, Dr. Cespedes), CHF, HLD, and BPH, presenting for routine f/u. Pt underwent a thyroid US in May that did not reveal any acute abnormalities. He was seen by Uro in July for abnl ejaculation. Told likely d/t Flomax. He wished to remain on Flomax. Can f/u PRN. HgA1c 7.5%. Reports compliance with insulin. No log provided. CBC stable compared to baseline. Previously referred to Heme/Onc. Triglycerides 309. Prescribed Atorvastatin 80 mg po daily and Zetia 10 mg po daily. Does not take Zetia daily. Pt will f/u with Dr. Oreilly next month. No other problems stated.     (12/16/2021): 56 y.o. Greenlandic male with a PmHx of Type II DM controlled  with insulin, Chronic GERD, h/o dysphagia, Hx of Gastroparesis/Gastritis/Ischemic colitis (following GI, Dr. Green), Celiac Dz, CAD (following [facility] Morrison), PVD (following an interventional cardiologist, Dr. Cespedes), CHF, HLD, CKD, and BPH, presenting for routine f/u. Pt previously referred to Heme/Onc for unexplained leukocytosis and anemia. So far, work-up unrevealing aside from dx of B12 deficiency. He was last seen 12/10 to projected 3 month f/u. HgA1c 7.5%. Reports compliance with insulin. Denies overt s/s of hypoglycemia. No log provided. PSA 1.03. Renal indices stable. Pt reports undergoing a vein procedure to LLE per Dr. Oreilly yesterday. F/u 1/5/22. He reports hitting right great toe ~2 weeks ago. Podiatrist removed nail. Area doing well. He is UTD on influenza vaccine. No other concerns.     (7/14/2022): 57 y.o. Nepalese male with a PmHx of Type II DM controlled with insulin, Chronic GERD, h/o dysphagia, Hx of Gastroparesis/Gastritis/Ischemic colitis (following GI, Dr. Green), Celiac Dz, CAD (following [facility] Morrison), PVD (following an interventional cardiologist, Dr. Cespedes), CHF, HLD, CKD, and BPH, presenting for routine f/u. Since last visit, patient was hospitalized on 4/4/22 until [facility] after experiencing PEA while en route to the ED via EMS from home. He achieved ROSC after about 12 min of CPD, epi, and bicarb. He was intubated and admitted to the ICU upon arrival the the ED. He was treated for possible PNA while hospitalized. Cards did see him but signed off, recommending outpatient f/u. He'd had a LHC 3/9/22 that revealed nonobstructive CAD. In hospital, an Echo done 4/5/22 showed LVEF 35-40% with grade 2 diastolic dysfunction. He required 2 units of PRBCs in hospital. He also experienced a fall during admission. CT head was negative for acute bleeds. He was ultimately discharged to inpatient rehab at [facility] where he reports he stayed  "for 2 weeks. Then, he presented to [facility] ED on 5/2/22 with c/o shortness of breath and bilateral lower extremity swelling. EMS reported patient with respiratory distress and pulse ox in the mid 70s upon their arrival to his home. He was placed on CPAP and has some improvement in oxygenation and work of breathing. There is mention of seizures occurring while hospitalized at [facility] (apparently MRI brain and EEG done--unable to see results at this time). He developed a wound to right ankle/heel during last hospitalization as well. He was discharged from [facility] on 5/24/22 and [place] [facility] [facility] He is receiving wound care to ankle/heel wound and P.T. at home. Admits some decompensation in overall physical status due to an approximate 3-month long hospitalization. He still mentions chronic dizziness, which was present prior to April hospitalization, but overall stable. Will see Dr. Oreilly on 7/22/22. This will be his first visit post-hospitalization. No other concerns.     (8/24/2022): 57 y.o. Albanian male with a PmHx of Type II DM controlled with insulin, Chronic GERD, h/o dysphagia, Hx of Gastroparesis/Gastritis/Ischemic colitis (following GI, Dr. Green), Celiac Dz, CAD (following Dr. Oreilly, [facility]), PVD (following an interventional cardiologist, Dr. Cespedes), CHF, HLD, CKD, and BPH, presenting for routine f/u. Lab review significant for: H/H 11.3/34.9, BUN 14, creatinine 1.62, eGFR 49, HgA1c 8.1, FBG 91. Patient reports recently seeing Dr. Oreilly and having "fluid pill" decreased by 50% 2/2 "dehydration." He will f/u at the end of September. Does report 1-lb weight gain since yesterday but no LE swelling at present, SOB, or CP. HgA1c did increase from previous, however, patient reports glucose was as high as 400s during hospitalization. States during last week, glucose 100s-200. Alleges glucose dropped to "15" last night. He did not lose " consciousness, but was dizzy and weak. Drank orange juice with complete resolution in symptoms. Admits last ate [employer] around 4 pm yesterday and did not eat when he took insulin (Novolin 70/30) last night. Right ankle wound is improved per report. C/w home health care. Scheduled in Neuro in November. Will need refill on Keppra and Vimpat at this time. No seizure activity reported since last visit.     (10/14/2022): 57 y.o. Grenadian male with a PmHx of Type II DM controlled with insulin, Chronic GERD, h/o dysphagia, Hx of Gastroparesis/Gastritis/Ischemic colitis (following GI, Dr. Green), Celiac Dz, CAD (following [facility] Morrison), PVD (following an interventional cardiologist, Dr. Cespedes), CHF, HLD, CKD, and BPH, presenting for routine f/u. Lab review significant for: creatinine 1.78, eGFR 44, HgA1c 7.2, , alk phos 155. Patient reports recently seeing Dr. Oreilly and having torsemide increased back to BID. Reports seen by Dr. Barrientos last month for eye exam. Right ankle wound continues to improve per report. C/w home health care. C/o hard, infrequent BM. Last BM 2 days ago. He was taking Lactulose with relief, but is out. Amenable to receiving the influenza vaccine. No other concerns.     (2/14/2023): 57 y.o. Grenadian male with a PmHx of Type II DM controlled with insulin, Chronic GERD, h/o dysphagia, Hx of Gastroparesis/Gastritis/Ischemic colitis (following GI, Dr. Green), Celiac Dz, CAD (following J[facility] Morrison), PVD (following an interventional cardiologist, Dr. Cespedes), CHF, HLD, CKD, and BPH, presenting for routine f/u. Patient with recent hospitalization from 2/3/2023 to 2/9/2023 with SOB noted at clinic visit with Dr. Oreilly. In ED, he was found to have NINFA on CKD. He was diruresed until euvolemic. He underwent a LHC 2/7/23. Results below. Recent labs significant for:  Creatinine 1.91 (trending down)  eGFR 40 (slightly improved)  BUN 23  LDL 62  A1c 7.0% (patient relates CBGs were  300s-400s during hospitalization but improved since he's back home; glucose 70s on yesterday's labs. Denies any overt s/s of hypoglycemia)     He is scheduled with Renal clinic 2/20/23 and will f/u with Dr. Oreilly in clinic on Friday.     [facility] 2.7.23  Left main-patent  Lad-mid InStent chronic total occlusion, competitive flow noted at diagonal  LCX-40% proximal InStent restenosis unchanged from previous cath.  Distal circumflex small vessel just beyond stent edge, appears to have 80% stenosis vessel is 1 mm in size  RCA-patent dominant vessel  Lima to Lad-diagonal jump graft- widely patent     Today's Visit (5/15/2023): 58 y.o. Puerto Rican male with a PmHx of Type II DM controlled with insulin, Chronic GERD, h/o dysphagia, Hx of Gastroparesis/Gastritis/Ischemic colitis (following GI, Dr. Green), Celiac Dz, CAD (following Dr. Oreilly, [facility]), PVD (following an interventional cardiologist, Dr. Cespedes), CHF, HLD, CKD, and BPH, presenting for routine diabetic f/u. He was here in February, but most recent labs were drawn 3/30/23 because he'd gone to lab for Uro, and all orders were pulled. His A1c is now 6.7%; decreased from 7.0%. He continues with Novolin 70/30 45 units BID. Denies any overt s/s of hypoglycemia or hyperglycemia. Of note, his Novolin 70/30 has been noted prescribed by Dr. PATRICIA Padilla, [facility], but he alleges he never followed him for diabetic care. States his son goes there but he is not a patient. He has no CBG log. TFTs WNL. Renal function stable compared to baseline. He will f/u in renal clinic 5/22/23. PSA noted 1.02. He is s/p an Echo 4/28/23 which revealed:  Global Hypokinesia with LV systolic dysfunction. LVEF-20%    He is now on Entresto and has also been prescribed Farxiga per cardiologist Dr. Hickey. He denies any new or worsening chest pain or SOB. States chronic symptoms are stable.    No other concerns.       Review of Systems     Review of Systems   Constitutional: Negative.    HENT: Negative.      Eyes: Negative.    Respiratory: Negative.  Negative for apnea, cough, choking, chest tightness, shortness of breath, wheezing and stridor.    Cardiovascular: Negative.  Negative for chest pain, palpitations and leg swelling.   Gastrointestinal: Negative.    Endocrine: Negative.    Genitourinary: Negative.    Musculoskeletal: Negative.    Skin: Negative.    Allergic/Immunologic: Negative.    Neurological: Negative.    Hematological: Negative.    Psychiatric/Behavioral: Negative.       Medical / Social / Family History     Past Medical History:   Diagnosis Date    BPH (benign prostatic hyperplasia)     CAD (coronary artery disease)     CHF (congestive heart failure)     GERD (gastroesophageal reflux disease)     Other hyperlipidemia     PVD (peripheral vascular disease)     Type 2 diabetes mellitus without complications        Past Surgical History:   Procedure Laterality Date    CHOLECYSTECTOMY      CORONARY ARTERY BYPASS GRAFT      LEFT HEART CATHETERIZATION N/A 2/7/2023    Procedure: Left heart cath;  Surgeon: Jamin Melara MD;  Location: [facility] CATH LAB;  Service: Cardiology;  Laterality: N/A;    RIGHT HEART CATHETERIZATION Right 9/20/2022    Procedure: INSERTION, CATHETER, RIGHT HEART;  Surgeon: Cory Oreilly MD;  Location: [facility] CATH LAB;  Service: Cardiology;  Laterality: Right;  CARDIOMEMS RJ ACCESS    TONSILLECTOMY         Social History    reports that he has never smoked. He has never been exposed to tobacco smoke. He has never used smokeless tobacco. He reports that he does not currently use drugs. He reports that he does not drink alcohol.    Family History  's family history includes Cancer in his mother; Diabetes in his brother, mother, and sister; Hypertension in his father and mother; Kidney disease in his brother; Stroke in his father.    Medications and Allergies     Medications  Current Outpatient Medications   Medication Instructions    aspirin (ECOTRIN) 81 mg, Oral, Daily    atorvastatin  (LIPITOR) 80 mg, Oral, Nightly    azelastine (ASTELIN) 137 mcg (0.1 %) nasal spray 1 spray, Nasal, 2 times daily    carvediloL (COREG) 25 mg, Oral, 2 times daily    cyanocobalamin (VITAMIN B-12) 2,000 mcg, Oral, Daily    ENTRESTO 24-26 mg per tablet 1 tablet, Oral, 2 times daily    eplerenone (INSPRA) 25 mg, Oral, Daily    ezetimibe (ZETIA) 10 mg, Oral, Daily    FARXIGA 10 mg, Oral, Daily    finasteride (PROSCAR) 5 mg, Oral, Daily    fluticasone propionate (FLONASE) 50 mcg/actuation nasal spray Flonase Allergy Relief Take No date recorded No form recorded No frequency recorded No route recorded No set duration recorded No set duration amount recorded active No dosage strength recorded No dosage strength units of measure recorded    HYDROcodone-acetaminophen (NORCO) 7.5-325 mg per tablet 1 tablet, Oral, Every 8 hours PRN    hydrOXYzine (ATARAX) 50 mg, Oral, 3 times daily    isosorbide mononitrate (IMDUR) 60 mg, Oral, Daily    lactulose (CHRONULAC) 10 gram/15 mL solution TAKE 15 MLS BY MOUTH 2 TIMES DAILY AS NEEDED FOR CONSTIPATION    levETIRAcetam (KEPPRA) 500 mg, Oral, 2 times daily    levocetirizine (XYZAL) 5 mg, Oral, Nightly PRN    levothyroxine (SYNTHROID) 25 mcg, Oral, Before breakfast    metoclopramide HCl (REGLAN) 10 mg, Oral, Daily    montelukast (SINGULAIR) 10 mg tablet TAKE 1 TABLET BY MOUTH EVERY DAY    nitroGLYCERIN (NITROSTAT) 0.4 mg, Sublingual, As needed (PRN)    NOVOLIN 70/30 U-100 INSULIN 100 unit/mL (70-30) injection 45 Units, Subcutaneous, 2 times daily with meals    omeprazole (PRILOSEC) 40 mg, Oral, Nightly    prasugreL (EFFIENT) 10 mg, Oral, Daily    QUEtiapine (SEROQUEL) 25 mg, Oral, Nightly    ranolazine (RANEXA) 1,000 mg, Oral, 2 times daily    tamsulosin (FLOMAX) 0.4 mg, Oral, Daily    torsemide (DEMADEX) 20 mg, Oral, 2 times daily    TRUE METRIX GLUCOSE METER Misc TEST 3 TIMES A DAY    TRUETRACK TEST Strp TEST 3 TIMES A DAY    VERQUVO 2.5 mg, Oral, 2 times daily    XARELTO 2.5 mg, Oral, 2  times daily       Allergies  Review of patient's allergies indicates:   Allergen Reactions    Pork derived (porcine)      Other reaction(s): Pork    Plavix [clopidogrel] Hives and Rash       Physical Examination   Visit Vitals  BP 95/68 (BP Location: Left arm, Patient Position: Standing, BP Method: Large (Manual))   Pulse 97   Temp 98.1 °F (36.7 °C) (Oral)   Resp 20   Ht 6' (1.829 m)   Wt 116.4 kg (256 lb 9.6 oz)   BMI 34.80 kg/m²     Physical Exam  Constitutional:       Appearance: Normal appearance. He is obese.   HENT:      Head: Normocephalic and atraumatic.      Right Ear: External ear normal.      Left Ear: External ear normal.      Nose: Nose normal.      Mouth/Throat:      Mouth: Mucous membranes are moist.      Dentition: Abnormal dentition. Dental caries present.   Eyes:      Extraocular Movements: Extraocular movements intact.   Cardiovascular:      Rate and Rhythm: Normal rate.      Pulses: Normal pulses.      Heart sounds: Normal heart sounds.   Pulmonary:      Effort: Pulmonary effort is normal.      Breath sounds: Normal breath sounds.   Abdominal:      General: Abdomen is protuberant. Bowel sounds are normal.      Palpations: Abdomen is soft.   Musculoskeletal:         General: Normal range of motion.      Right lower leg: No edema.      Left lower leg: No edema.   Skin:     General: Skin is warm and dry.   Neurological:      General: No focal deficit present.      Mental Status: He is alert and oriented to person, place, and time.   Psychiatric:         Mood and Affect: Mood normal.         Behavior: Behavior normal.         Thought Content: Thought content normal.         Judgment: Judgment normal.         Results     Chemistry:  Lab Results   Component Value Date     03/30/2023    K 4.7 03/30/2023    CHLORIDE 106 03/30/2023    BUN 24.5 03/30/2023    CREATININE 2.07 (H) 03/30/2023    EGFRNORACEVR 37 03/30/2023    GLUCOSE 185 (H) 03/30/2023    CALCIUM 9.6 03/30/2023    ALKPHOS 126 03/30/2023     LABPROT 8.2 03/30/2023    ALBUMIN 3.7 03/30/2023    BILIDIR 0.5 04/13/2022    IBILI 0.40 04/13/2022    AST 15 03/30/2023    ALT 15 03/30/2023    MG 1.70 04/12/2022    PHOS 3.1 02/13/2023        Lab Results   Component Value Date    HGBA1C 6.7 03/30/2023        Hematology:  Lab Results   Component Value Date    WBC 12.5 (H) 02/13/2023    HGB 12.3 (L) 02/13/2023    HCT 38.5 (L) 02/13/2023     02/13/2023       Lipid Panel:  Lab Results   Component Value Date    CHOL 121 02/13/2023    HDL 37 02/13/2023    LDL 62.00 02/13/2023    TRIG 110 02/13/2023    TRIG 148 05/22/2022    TOTALCHOLEST 3 02/13/2023        Urine:  Lab Results   Component Value Date    COLORUA Light-Yellow 02/13/2023    APPEARANCEUA Clear 02/13/2023    SGUA 1.012 02/13/2023    PHUA 5.5 02/13/2023    PROTEINUA Negative 02/13/2023    GLUCOSEUA Normal 02/13/2023    KETONESUA Negative 02/13/2023    BLOODUA Negative 02/13/2023    NITRITESUA Negative 02/13/2023    LEUKOCYTESUR 25 (A) 02/13/2023    RBCUA 0-5 02/13/2023    WBCUA 0-5 02/13/2023    BACTERIA Trace (A) 02/13/2023    SQEPUA Trace (A) 02/13/2023    HYALINECASTS 6-10 (A) 02/13/2023    CREATRANDUR 91.4 02/13/2023    PROTEINURINE 6.9 02/13/2023    UPROTCREA 0.1 02/13/2023          Assessment        ICD-10-CM ICD-9-CM   1. Chronic systolic heart failure  I50.22 428.22   2. Type 2 diabetes mellitus with diabetic polyneuropathy, with long-term current use of insulin  E11.42 250.60    Z79.4 357.2     V58.67   3. Hypothyroidism, unspecified type  E03.9 244.9   4. Stage 3b chronic kidney disease  N18.32 585.3   5. Benign prostatic hyperplasia with weak urinary stream  N40.1 600.01    R39.12 788.62   6. Wellness examination  Z00.00 V70.0        Plan (including Health Maintenance)     Problem List Items Addressed This Visit          Cardiac/Vascular    Chronic systolic heart failure - Primary    Overview     Echo 4/28/23: LVEF 20%           Current Assessment & Plan     Continue mgmt per Dr. Hickey/  "[place]  For any new or worsening symptoms that are urgent, or for any s/s of MI, CVA, or any other emergent concerns, please visit the ER for further eval. Otherwise, call clinic with questions or concerns.                Renal/    Benign prostatic hyperplasia with weak urinary stream    Current Assessment & Plan     Seen by uro 3/30/23 with plans to: "increase Flomax 0.8 mg p.o. daily, start Finasteride 5 mg p.o. daily, F/U 6 weeks"           Stage 3b chronic kidney disease    Current Assessment & Plan     Renal indices stable  Avoid NSAIDs (i.e., Advil, Aleve, Ibuprofen, Motrin, Naproxen, Diclofenac, Indocin, Celebrex, Mobic, Voltaren). Avoid the following GI meds: Milk of Mag, Mylanta, Fleets Enema, and Pepto-Bismol. Okay to take Tylenol (acetaminophen) (if no end-stage liver disease), low dose ASA (81 mg), Miralax, Tums, and Colace. Increase clear fluid intake. Avoid dark sodas.  Keep Renal f/u 5/22/23              Endocrine    Type 2 diabetes mellitus with diabetic polyneuropathy, with long-term current use of insulin    Overview     Current medications: Novolin 70/30 45 units BID  A1c level: 6.7%; goal <8% for pt due to risk of hypoglycemia  CBG trends: None provided  Educated on diabetic diet: Low-fat, Low-carb, Low-cholesterol. Avoid sugary/dark drinks/sodas and white foods (i.e., bread, pasta, potatoes, rice)  Aerobic exercise: 20-30 min/day x 5 days/week  Diabetic Eye Exam: UTD. Dr. Barrientos 9/13/22  Diabetic Foot Exam: UTD. Following Dr. Escalante  Kidney Protection: ARB  Statin Therapy: Yes               Current Assessment & Plan     A1c at goal. No c/o hypoglycemia. Continue regimen  Note, also on Farxiga due to CHF per cards           Relevant Medications    NOVOLIN 70/30 U-100 INSULIN 100 unit/mL (70-30) injection    Hypothyroidism    Current Assessment & Plan     TFTs WNL  Continue current regimen           Relevant Medications    levothyroxine (SYNTHROID) 25 MCG tablet       Other    Wellness " examination    Overview     Colon Cancer Screening: colonoscopy done by Dr. Emmanuel Green on 9/30/2020. Dx: two polyps removed, tubular adenomas; diverticula disease  Prostate Cancer Screening Latest Reference Range & Units 03/30/23 15:15   PSA, Screen <=4.00 ng/mL 1.02                 Health Maintenance Due   Topic Date Due    COVID-19 Vaccine (4 - Booster for Pfizer series) 04/08/2022    Foot Exam  07/14/2023       Future Appointments   Date Time Provider Department Center   5/15/2023  1:00 PM Matt Ray NP [facility] UROLO [place]    5/22/2023 12:15 PM RON Valdez [facility] NEPHR [place]   7/5/2023  3:30 PM Claribel Canales MD [facility] NEURO [place]   8/15/2023  7:30 AM RON Esquivel [facility] INTMED [place]        Follow up in about 3 months (around 8/15/2023).    Signature:  RON Esquivel  [facility]  [address] [facility] [id number]    Date of encounter: 5/15/23

## 2023-05-15 NOTE — ASSESSMENT & PLAN NOTE
Renal indices stable  Avoid NSAIDs (i.e., Advil, Aleve, Ibuprofen, Motrin, Naproxen, Diclofenac, Indocin, Celebrex, Mobic, Voltaren). Avoid the following GI meds: Milk of Mag, Mylanta, Fleets Enema, and Pepto-Bismol. Okay to take Tylenol (acetaminophen) (if no end-stage liver disease), low dose ASA (81 mg), Miralax, Tums, and Colace. Increase clear fluid intake. Avoid dark sodas.  Keep Renal f/u 5/22/23

## 2023-05-15 NOTE — ASSESSMENT & PLAN NOTE
"Seen by uro 3/30/23 with plans to: "increase Flomax 0.8 mg p.o. daily, start Finasteride 5 mg p.o. daily, F/U 6 weeks"  "

## 2023-05-16 DIAGNOSIS — N40.1 BPH WITH URINARY OBSTRUCTION: Primary | ICD-10-CM

## 2023-05-16 DIAGNOSIS — N13.8 BPH WITH URINARY OBSTRUCTION: ICD-10-CM

## 2023-05-16 DIAGNOSIS — N40.1 BPH WITH URINARY OBSTRUCTION: ICD-10-CM

## 2023-05-16 DIAGNOSIS — N13.8 BPH WITH URINARY OBSTRUCTION: Primary | ICD-10-CM

## 2023-05-16 DIAGNOSIS — R33.9 URINARY RETENTION: ICD-10-CM

## 2023-05-16 RX ORDER — SILODOSIN 4 MG/1
8 CAPSULE ORAL DAILY
Qty: 90 CAPSULE | Refills: 3 | Status: SHIPPED | OUTPATIENT
Start: 2023-05-16 | End: 2023-05-16

## 2023-05-16 RX ORDER — ALFUZOSIN HYDROCHLORIDE 10 MG/1
10 TABLET, EXTENDED RELEASE ORAL
Qty: 90 TABLET | Refills: 3 | Status: SHIPPED | OUTPATIENT
Start: 2023-05-16 | End: 2024-05-15

## 2023-05-22 ENCOUNTER — OFFICE VISIT (OUTPATIENT)
Dept: NEPHROLOGY | Facility: CLINIC | Age: 58
End: 2023-05-22
Payer: MEDICARE

## 2023-05-22 VITALS
WEIGHT: 258 LBS | TEMPERATURE: 98 F | HEIGHT: 71 IN | HEART RATE: 84 BPM | SYSTOLIC BLOOD PRESSURE: 108 MMHG | DIASTOLIC BLOOD PRESSURE: 73 MMHG | BODY MASS INDEX: 36.12 KG/M2 | RESPIRATION RATE: 20 BRPM | OXYGEN SATURATION: 99 %

## 2023-05-22 DIAGNOSIS — N18.32 CKD STAGE G3B/A1, GFR 30-44 AND ALBUMIN CREATININE RATIO <30 MG/G: Primary | ICD-10-CM

## 2023-05-22 DIAGNOSIS — I10 ESSENTIAL HYPERTENSION: ICD-10-CM

## 2023-05-22 PROCEDURE — 99214 OFFICE O/P EST MOD 30 MIN: CPT | Mod: S$PBB,,, | Performed by: NURSE PRACTITIONER

## 2023-05-22 PROCEDURE — 99214 PR OFFICE/OUTPT VISIT, EST, LEVL IV, 30-39 MIN: ICD-10-PCS | Mod: S$PBB,,, | Performed by: NURSE PRACTITIONER

## 2023-05-22 PROCEDURE — 99215 OFFICE O/P EST HI 40 MIN: CPT | Mod: PBBFAC | Performed by: NURSE PRACTITIONER

## 2023-05-22 NOTE — PROGRESS NOTES
[facility]  Nephrology Clinic Note    Chief complaint: Chronic Kidney Disease (Follow up)    History of present illness:   Luigi Gotti is a 58 y.o. Other male with past medical history of chronic kidney disease, heart failure with reduced ejection fraction (20%), coronary artery disease, gastroparesis, gastritis, ischemic colitis, peripheral vascular disease, dyslipidemia, hypertension, BPH, diabetes mellitus type 2, and obesity.    Patient presents for follow-up appointment in Nephrology Clinic today.   Complains of occasional episodes of chest pain, denies shortness of breath or diaphoresis.  Review of Systems  12 point review of systems conducted, negative except as stated in the history of present illness.    Allergies: Patient is allergic to pork derived (porcine) and plavix [clopidogrel].     Past Medical History:  has a past medical history of BPH (benign prostatic hyperplasia), CAD (coronary artery disease), CHF (congestive heart failure), GERD (gastroesophageal reflux disease), Other hyperlipidemia, PVD (peripheral vascular disease), and Type 2 diabetes mellitus without complications.    Procedure History:  has a past surgical history that includes Coronary artery bypass graft; Tonsillectomy; Cholecystectomy; Right heart catheterization (Right, 9/20/2022); and Left heart catheterization (N/A, 2/7/2023).    Family History: family history includes Cancer in his mother; Diabetes in his brother, mother, and sister; Hypertension in his father and mother; Kidney disease in his brother; Stroke in his father.    Social History:  reports that he has never smoked. He has never been exposed to tobacco smoke. He has never used smokeless tobacco. He reports that he does not currently use drugs. He reports that he does not drink alcohol.    Physical exam  /73 (BP Location: Left arm, Patient Position: Sitting, BP Method: Large (Automatic))   Pulse 84   Temp 98 °F (36.7 °C) (Oral)   " Resp 20   Ht 5' 11" (1.803 m)   Wt 117 kg (258 lb)   SpO2 99%   BMI 35.98 kg/m²   General appearance: Patient is in no acute distress.  Skin: No rashes or wounds.  HEENT: PERRLA, EOMI, no scleral icterus, no JVD. Neck is supple.  Chest: Respirations are unlabored. Lungs sounds are clear.   Heart: S1, S2.   Abdomen: Benign.  Obese  : Deferred.  Extremities: No edema, peripheral pulses are palpable.   Neuro: No focal deficits.     Home Medications:    Current Outpatient Medications:     alfuzosin (UROXATRAL) 10 mg Tb24, Take 1 tablet (10 mg total) by mouth daily with breakfast., Disp: 90 tablet, Rfl: 3    aspirin (ECOTRIN) 81 MG EC tablet, Take 81 mg by mouth once daily., Disp: , Rfl:     atorvastatin (LIPITOR) 80 MG tablet, Take 80 mg by mouth every evening., Disp: , Rfl:     azelastine (ASTELIN) 137 mcg (0.1 %) nasal spray, 1 spray by Nasal route 2 (two) times daily., Disp: , Rfl:     carvediloL (COREG) 25 MG tablet, Take 25 mg by mouth 2 (two) times daily., Disp: , Rfl:     cyanocobalamin (VITAMIN B-12) 1000 MCG tablet, Take 2,000 mcg by mouth once daily at 6am., Disp: , Rfl:     ENTRESTO 24-26 mg per tablet, Take 1 tablet by mouth 2 (two) times daily., Disp: , Rfl:     eplerenone (INSPRA) 25 MG Tab, Take 25 mg by mouth once daily., Disp: , Rfl:     ezetimibe (ZETIA) 10 mg tablet, Take 10 mg by mouth once daily at 6am., Disp: , Rfl:     FARXIGA 10 mg tablet, Take 10 mg by mouth once daily., Disp: , Rfl:     ferrous gluconate (FERGON) 324 MG tablet, Take 1 tablet by mouth once daily., Disp: , Rfl:     finasteride (PROSCAR) 5 mg tablet, Take 1 tablet (5 mg total) by mouth once daily., Disp: 90 tablet, Rfl: 3    fluticasone propionate (FLONASE) 50 mcg/actuation nasal spray, Flonase Allergy Relief Take No date recorded No form recorded No frequency recorded No route recorded No set duration recorded No set duration amount recorded active No dosage strength recorded No dosage strength units of measure recorded, " Disp: , Rfl:     HYDROcodone-acetaminophen (NORCO) 7.5-325 mg per tablet, Take 1 tablet by mouth every 8 (eight) hours as needed., Disp: , Rfl:     hydrOXYzine (ATARAX) 50 MG tablet, Take 50 mg by mouth 3 (three) times daily., Disp: , Rfl:     isosorbide mononitrate (IMDUR) 60 MG 24 hr tablet, Take 60 mg by mouth once daily., Disp: , Rfl:     lactulose (CHRONULAC) 10 gram/15 mL solution, TAKE 15 MLS BY MOUTH 2 TIMES DAILY AS NEEDED FOR CONSTIPATION, Disp: 2700 mL, Rfl: 1    levETIRAcetam (KEPPRA) 500 MG Tab, Take 1 tablet (500 mg total) by mouth 2 (two) times daily., Disp: 180 tablet, Rfl: 1    levocetirizine (XYZAL) 5 MG tablet, Take 1 tablet (5 mg total) by mouth nightly as needed for Allergies., Disp: 30 tablet, Rfl: 2    levothyroxine (SYNTHROID) 25 MCG tablet, Take 1 tablet (25 mcg total) by mouth before breakfast., Disp: 90 tablet, Rfl: 1    metoclopramide HCl (REGLAN) 10 MG tablet, Take 10 mg by mouth once daily., Disp: , Rfl:     montelukast (SINGULAIR) 10 mg tablet, TAKE 1 TABLET BY MOUTH EVERY DAY, Disp: 90 tablet, Rfl: 1    nitroGLYCERIN (NITROSTAT) 0.4 MG SL tablet, Place 0.4 mg under the tongue as needed., Disp: , Rfl:     NOVOLIN 70/30 U-100 INSULIN 100 unit/mL (70-30) injection, Inject 45 Units into the skin 2 (two) times daily with meals., Disp: 130 mL, Rfl: 1    omeprazole (PRILOSEC) 40 MG capsule, Take 1 capsule (40 mg total) by mouth every evening. (Patient taking differently: Take 20 mg by mouth every evening.), Disp: 90 capsule, Rfl: 1    prasugreL (EFFIENT) 10 mg Tab, Take 10 mg by mouth once daily., Disp: , Rfl:     QUEtiapine (SEROQUEL) 25 MG Tab, Take 1 tablet (25 mg total) by mouth every evening., Disp: 90 tablet, Rfl: 1    ranolazine (RANEXA) 1,000 mg Tb12, Take 1,000 mg by mouth 2 (two) times daily., Disp: , Rfl:     torsemide (DEMADEX) 20 MG Tab, Take 20 mg by mouth once daily., Disp: , Rfl:     TRUE METRIX GLUCOSE METER Misc, TEST 3 TIMES A DAY, Disp: , Rfl:     TRUETRACK TEST Strp,  TEST 3 TIMES A DAY, Disp: , Rfl:     VERQUVO 2.5 mg Tab, Take 2.5 mg by mouth 2 (two) times a day., Disp: , Rfl:     XARELTO 2.5 mg Tab, Take 2.5 mg by mouth 2 (two) times a day., Disp: , Rfl:     Laboratory data    Serum  Lab Results   Component Value Date    WBC 12.5 (H) 02/13/2023    HGB 12.3 (L) 02/13/2023    HCT 38.5 (L) 02/13/2023     02/13/2023    IRON 19 04/05/2022    TIBC 291 04/05/2022    TRANS 270 04/05/2022    LABIRON 7 04/05/2022    FERRITIN 190.69 04/05/2022    FOLATE 6.1 04/05/2022    HTOIZNNX67 943 04/05/2022    HAPTO 436 (H) 10/25/2021     10/25/2021     05/15/2023    K 4.0 05/15/2023    CHLORIDE 104 05/15/2023    CO2 28 05/15/2023    BUN 25.8 (H) 05/15/2023    CREATININE 2.16 (H) 05/15/2023    EGFRNORACEVR 35 05/15/2023    GLUCOSE 94 05/15/2023    CALCIUM 9.9 05/15/2023    ALKPHOS 116 05/15/2023    LABPROT 8.4 (H) 05/15/2023    ALBUMIN 4.0 05/15/2023    BILIDIR 0.5 04/13/2022    IBILI 0.40 04/13/2022    AST 18 05/15/2023    ALT 20 05/15/2023    MG 1.70 04/12/2022    PHOS 3.1 02/13/2023      Lab Results   Component Value Date    HGBA1C 6.7 03/30/2023    HIV Nonreactive 08/22/2022    HEPCAB Nonreactive 08/22/2022    HEPBSURFAG Nonreactive 08/22/2022     Urine:  Lab Results   Component Value Date    COLORUA Light-Yellow 02/13/2023    APPEARANCEUA Clear 02/13/2023    SGUA 1.012 02/13/2023    PHUA 5.5 02/13/2023    PROTEINUA Negative 02/13/2023    GLUCOSEUA Normal 02/13/2023    KETONESUA Negative 02/13/2023    BLOODUA Negative 02/13/2023    NITRITESUA Negative 02/13/2023    LEUKOCYTESUR 25 (A) 02/13/2023    RBCUA 0-5 02/13/2023    WBCUA 0-5 02/13/2023    BACTERIA Trace (A) 02/13/2023    SQEPUA Trace (A) 02/13/2023    HYALINECASTS 6-10 (A) 02/13/2023    CREATRANDUR 91.4 02/13/2023    PROTEINURINE 6.9 02/13/2023    UPROTCREA 0.1 02/13/2023         Imaging  US Retroperitoneal Limited 01/06/2023    Right Kidney:    Length: 10.8 x 5.5 x 5.7 cm    Appearance: Normal  echogenicity.    Collecting system: No hydronephrosis    Stones: None    Cyst/Mass: Hyperechoic area in the upper pole of the right kidney measures 1.1 x 9 mm x 1.1 cm a small angiomyolipoma cannot be completely excluded    Left Kidney:    Length: 12 x 5.7 x 4.4 cm    Appearance: Normal echogenicity.    Collecting system: No hydronephrosis    Stones: Small calcification identified in the upper pole measuring 9 mm by 3 mm x 7 mm these might represent a nonocclusive stone    Cyst/Mass: None    Bladder:    Normal    Vessels:    Visualized portions of the IVC and aorta have a normal grayscale appearance.    Impression  Small echogenic area in the upper pole of the kidney small angiomyolipoma cannot be completely excluded.    Nonocclusive stone of the left kidney.    No other abnormalities      Electronically signed by: Ranjan Taveras  Date:    01/06/2023  Time:    15:19      Impression and plan     CKD stage G3b/A1, GFR 30-44 and albumin creatinine ratio <30 mg/g  Serum creatinine has been relatively stable over the past several months, there is no significant proteinuria or active urinary sediment.    Continue RAASi and SGLT 2 inhibitor therapy along with risk factor management/renal sparing activities.  Patient was strongly encouraged to seek care in the emergency department should chest pain recur, he verbalized understanding of the instructions.  Follow up in about 6 weeks (around 7/3/2023).      Essential hypertension  Blood pressure reading is at goal, continue current antihypertensive regimen and 2 g a day dietary sodium restriction.      BMI 35.0-35.9,adult  Lifestyle and dietary interventions discussed, patient counseled on weight loss using portion control, non- sedentary lifestyle, low-carbohydrate/low fat diet.          5/22/2023  Robina Garrison NP  [facility]

## 2023-05-29 RX ORDER — LEVOCETIRIZINE DIHYDROCHLORIDE 5 MG/1
5 TABLET, FILM COATED ORAL NIGHTLY PRN
Qty: 90 TABLET | Refills: 1 | Status: SHIPPED | OUTPATIENT
Start: 2023-05-29 | End: 2023-12-08

## 2023-05-30 ENCOUNTER — PATIENT OUTREACH (OUTPATIENT)
Dept: HOME HEALTH SERVICES | Facility: HOSPITAL | Age: 58
End: 2023-05-30
Payer: MEDICARE

## 2023-05-30 ENCOUNTER — EXTERNAL HOME HEALTH (OUTPATIENT)
Dept: HOME HEALTH SERVICES | Facility: HOSPITAL | Age: 58
End: 2023-05-30
Payer: MEDICARE

## 2023-06-06 ENCOUNTER — ANESTHESIA EVENT (OUTPATIENT)
Dept: CARDIOLOGY | Facility: HOSPITAL | Age: 58
End: 2023-06-06
Payer: MEDICARE

## 2023-06-06 RX ORDER — MULTIVIT WITH MINERALS/HERBS
1 TABLET ORAL DAILY
COMMUNITY

## 2023-06-09 ENCOUNTER — ANESTHESIA (OUTPATIENT)
Dept: CARDIOLOGY | Facility: HOSPITAL | Age: 58
End: 2023-06-09
Payer: MEDICARE

## 2023-06-09 ENCOUNTER — HOSPITAL ENCOUNTER (OUTPATIENT)
Facility: HOSPITAL | Age: 58
Discharge: HOME | End: 2023-06-09
Attending: INTERNAL MEDICINE | Admitting: INTERNAL MEDICINE
Payer: MEDICARE

## 2023-06-09 VITALS
SYSTOLIC BLOOD PRESSURE: 155 MMHG | HEIGHT: 73 IN | TEMPERATURE: 98 F | HEART RATE: 72 BPM | DIASTOLIC BLOOD PRESSURE: 85 MMHG | RESPIRATION RATE: 14 BRPM | OXYGEN SATURATION: 100 % | WEIGHT: 257.94 LBS | BODY MASS INDEX: 34.19 KG/M2

## 2023-06-09 DIAGNOSIS — I49.9 ARRHYTHMIA: ICD-10-CM

## 2023-06-09 DIAGNOSIS — I50.9 CHF (CONGESTIVE HEART FAILURE): ICD-10-CM

## 2023-06-09 DIAGNOSIS — I25.5 ISCHEMIC CARDIOMYOPATHY: ICD-10-CM

## 2023-06-09 LAB
BASOPHILS # BLD AUTO: 0.04 X10(3)/MCL
BASOPHILS NFR BLD AUTO: 0.4 %
EOSINOPHIL # BLD AUTO: 0.24 X10(3)/MCL (ref 0–0.9)
EOSINOPHIL NFR BLD AUTO: 2.3 %
ERYTHROCYTE [DISTWIDTH] IN BLOOD BY AUTOMATED COUNT: 13.6 % (ref 11.5–17)
HCT VFR BLD AUTO: 36.3 % (ref 42–52)
HGB BLD-MCNC: 11.6 G/DL (ref 14–18)
IMM GRANULOCYTES # BLD AUTO: 0.09 X10(3)/MCL (ref 0–0.04)
IMM GRANULOCYTES NFR BLD AUTO: 0.9 %
LYMPHOCYTES # BLD AUTO: 1.99 X10(3)/MCL (ref 0.6–4.6)
LYMPHOCYTES NFR BLD AUTO: 18.8 %
MCH RBC QN AUTO: 28.9 PG (ref 27–31)
MCHC RBC AUTO-ENTMCNC: 32 G/DL (ref 33–36)
MCV RBC AUTO: 90.3 FL (ref 80–94)
MONOCYTES # BLD AUTO: 0.69 X10(3)/MCL (ref 0.1–1.3)
MONOCYTES NFR BLD AUTO: 6.5 %
NEUTROPHILS # BLD AUTO: 7.52 X10(3)/MCL (ref 2.1–9.2)
NEUTROPHILS NFR BLD AUTO: 71.1 %
NRBC BLD AUTO-RTO: 0 %
PLATELET # BLD AUTO: 202 X10(3)/MCL (ref 130–400)
PMV BLD AUTO: 13.3 FL (ref 7.4–10.4)
POCT GLUCOSE: 173 MG/DL (ref 70–110)
POCT GLUCOSE: 192 MG/DL (ref 70–110)
RBC # BLD AUTO: 4.02 X10(6)/MCL (ref 4.7–6.1)
WBC # SPEC AUTO: 10.57 X10(3)/MCL (ref 4.5–11.5)

## 2023-06-09 PROCEDURE — 93010 EKG 12-LEAD: ICD-10-PCS | Mod: ,,, | Performed by: INTERNAL MEDICINE

## 2023-06-09 PROCEDURE — 37000009 HC ANESTHESIA EA ADD 15 MINS: Performed by: INTERNAL MEDICINE

## 2023-06-09 PROCEDURE — 25000003 PHARM REV CODE 250: Performed by: ANESTHESIOLOGY

## 2023-06-09 PROCEDURE — 33270 INS/REP SUBQ DEFIBRILLATOR: CPT | Performed by: INTERNAL MEDICINE

## 2023-06-09 PROCEDURE — 93010 ELECTROCARDIOGRAM REPORT: CPT | Mod: ,,, | Performed by: INTERNAL MEDICINE

## 2023-06-09 PROCEDURE — 85025 COMPLETE CBC W/AUTO DIFF WBC: CPT | Performed by: INTERNAL MEDICINE

## 2023-06-09 PROCEDURE — 37000008 HC ANESTHESIA 1ST 15 MINUTES: Performed by: INTERNAL MEDICINE

## 2023-06-09 PROCEDURE — C9290 INJ, BUPIVACAINE LIPOSOME: HCPCS | Performed by: INTERNAL MEDICINE

## 2023-06-09 PROCEDURE — 93005 ELECTROCARDIOGRAM TRACING: CPT

## 2023-06-09 PROCEDURE — 63600175 PHARM REV CODE 636 W HCPCS: Performed by: INTERNAL MEDICINE

## 2023-06-09 PROCEDURE — C1896 LEAD, AICD, NON SING/DUAL: HCPCS | Performed by: INTERNAL MEDICINE

## 2023-06-09 PROCEDURE — 25000003 PHARM REV CODE 250: Performed by: NURSE ANESTHETIST, CERTIFIED REGISTERED

## 2023-06-09 PROCEDURE — 63600175 PHARM REV CODE 636 W HCPCS: Performed by: NURSE ANESTHETIST, CERTIFIED REGISTERED

## 2023-06-09 PROCEDURE — D9220A PRA ANESTHESIA: Mod: ANES,,, | Performed by: ANESTHESIOLOGY

## 2023-06-09 PROCEDURE — C1722 AICD, SINGLE CHAMBER: HCPCS | Performed by: INTERNAL MEDICINE

## 2023-06-09 PROCEDURE — D9220A PRA ANESTHESIA: ICD-10-PCS | Mod: CRNA,,, | Performed by: NURSE ANESTHETIST, CERTIFIED REGISTERED

## 2023-06-09 PROCEDURE — D9220A PRA ANESTHESIA: Mod: CRNA,,, | Performed by: NURSE ANESTHETIST, CERTIFIED REGISTERED

## 2023-06-09 PROCEDURE — D9220A PRA ANESTHESIA: ICD-10-PCS | Mod: ANES,,, | Performed by: ANESTHESIOLOGY

## 2023-06-09 DEVICE — SUBCUTANEOUS ELECTRODE
Type: IMPLANTABLE DEVICE | Site: CHEST | Status: FUNCTIONAL
Brand: EMBLEM™ S-ICD

## 2023-06-09 DEVICE — SUBCUTANEOUS IMPLANTABLE CARDIOVERTER DEFIBRILLATOR
Type: IMPLANTABLE DEVICE | Site: OTHER (ADD COMMENT) | Status: FUNCTIONAL
Brand: EMBLEM™ MRI S-ICD

## 2023-06-09 RX ORDER — ACETAMINOPHEN 325 MG/1
650 TABLET ORAL EVERY 4 HOURS PRN
Status: DISCONTINUED | OUTPATIENT
Start: 2023-06-09 | End: 2023-06-09 | Stop reason: HOSPADM

## 2023-06-09 RX ORDER — MIDAZOLAM HYDROCHLORIDE 1 MG/ML
INJECTION INTRAMUSCULAR; INTRAVENOUS
Status: DISCONTINUED | OUTPATIENT
Start: 2023-06-09 | End: 2023-06-09

## 2023-06-09 RX ORDER — PROPOFOL 10 MG/ML
INJECTION, EMULSION INTRAVENOUS
Status: DISCONTINUED | OUTPATIENT
Start: 2023-06-09 | End: 2023-06-09

## 2023-06-09 RX ORDER — LIDOCAINE HYDROCHLORIDE 20 MG/ML
INJECTION INTRAVENOUS
Status: DISCONTINUED | OUTPATIENT
Start: 2023-06-09 | End: 2023-06-09

## 2023-06-09 RX ORDER — ROCURONIUM BROMIDE 10 MG/ML
INJECTION, SOLUTION INTRAVENOUS
Status: DISCONTINUED | OUTPATIENT
Start: 2023-06-09 | End: 2023-06-09

## 2023-06-09 RX ORDER — HYDROMORPHONE HYDROCHLORIDE 2 MG/ML
0.2 INJECTION, SOLUTION INTRAMUSCULAR; INTRAVENOUS; SUBCUTANEOUS EVERY 5 MIN PRN
Status: DISCONTINUED | OUTPATIENT
Start: 2023-06-09 | End: 2023-06-09

## 2023-06-09 RX ORDER — FENTANYL CITRATE 50 UG/ML
25 INJECTION, SOLUTION INTRAMUSCULAR; INTRAVENOUS EVERY 5 MIN PRN
Status: DISCONTINUED | OUTPATIENT
Start: 2023-06-09 | End: 2023-06-09

## 2023-06-09 RX ORDER — GLYCOPYRROLATE 0.2 MG/ML
INJECTION INTRAMUSCULAR; INTRAVENOUS
Status: DISCONTINUED | OUTPATIENT
Start: 2023-06-09 | End: 2023-06-09

## 2023-06-09 RX ORDER — FENTANYL CITRATE 50 UG/ML
INJECTION, SOLUTION INTRAMUSCULAR; INTRAVENOUS
Status: DISCONTINUED | OUTPATIENT
Start: 2023-06-09 | End: 2023-06-09

## 2023-06-09 RX ORDER — LIDOCAINE HYDROCHLORIDE 10 MG/ML
1 INJECTION, SOLUTION EPIDURAL; INFILTRATION; INTRACAUDAL; PERINEURAL ONCE
Status: DISCONTINUED | OUTPATIENT
Start: 2023-06-09 | End: 2023-06-09 | Stop reason: HOSPADM

## 2023-06-09 RX ORDER — SODIUM CHLORIDE 0.9 % (FLUSH) 0.9 %
10 SYRINGE (ML) INJECTION
Status: DISCONTINUED | OUTPATIENT
Start: 2023-06-09 | End: 2023-06-09

## 2023-06-09 RX ORDER — CEFAZOLIN SODIUM 2 G/50ML
2 SOLUTION INTRAVENOUS
Status: DISCONTINUED | OUTPATIENT
Start: 2023-06-09 | End: 2023-08-17

## 2023-06-09 RX ORDER — ACETAMINOPHEN 10 MG/ML
1000 INJECTION, SOLUTION INTRAVENOUS ONCE
Status: DISCONTINUED | OUTPATIENT
Start: 2023-06-09 | End: 2023-06-09

## 2023-06-09 RX ORDER — MORPHINE SULFATE 4 MG/ML
2 INJECTION, SOLUTION INTRAMUSCULAR; INTRAVENOUS EVERY 4 HOURS PRN
Status: DISCONTINUED | OUTPATIENT
Start: 2023-06-09 | End: 2023-06-09 | Stop reason: HOSPADM

## 2023-06-09 RX ORDER — FAMOTIDINE 10 MG/ML
20 INJECTION INTRAVENOUS ONCE
Status: COMPLETED | OUTPATIENT
Start: 2023-06-09 | End: 2023-06-09

## 2023-06-09 RX ORDER — ONDANSETRON 2 MG/ML
INJECTION INTRAMUSCULAR; INTRAVENOUS
Status: DISCONTINUED | OUTPATIENT
Start: 2023-06-09 | End: 2023-06-09

## 2023-06-09 RX ORDER — HYDROCODONE BITARTRATE AND ACETAMINOPHEN 5; 325 MG/1; MG/1
1 TABLET ORAL EVERY 4 HOURS PRN
Status: DISCONTINUED | OUTPATIENT
Start: 2023-06-09 | End: 2023-06-09 | Stop reason: HOSPADM

## 2023-06-09 RX ADMIN — FENTANYL CITRATE 50 MCG: 50 INJECTION, SOLUTION INTRAMUSCULAR; INTRAVENOUS at 08:06

## 2023-06-09 RX ADMIN — ROCURONIUM BROMIDE 10 MG: 10 SOLUTION INTRAVENOUS at 07:06

## 2023-06-09 RX ADMIN — PROPOFOL 100 MG: 10 INJECTION, EMULSION INTRAVENOUS at 07:06

## 2023-06-09 RX ADMIN — FENTANYL CITRATE 50 MCG: 50 INJECTION, SOLUTION INTRAMUSCULAR; INTRAVENOUS at 07:06

## 2023-06-09 RX ADMIN — SUGAMMADEX 200 MG: 100 INJECTION, SOLUTION INTRAVENOUS at 09:06

## 2023-06-09 RX ADMIN — LIDOCAINE HYDROCHLORIDE 80 MG: 20 INJECTION INTRAVENOUS at 07:06

## 2023-06-09 RX ADMIN — FAMOTIDINE 20 MG: 10 INJECTION, SOLUTION INTRAVENOUS at 06:06

## 2023-06-09 RX ADMIN — ROCURONIUM BROMIDE 40 MG: 10 SOLUTION INTRAVENOUS at 07:06

## 2023-06-09 RX ADMIN — MIDAZOLAM HYDROCHLORIDE 2 MG: 1 INJECTION, SOLUTION INTRAMUSCULAR; INTRAVENOUS at 07:06

## 2023-06-09 RX ADMIN — CEFAZOLIN SODIUM 2 G: 2 SOLUTION INTRAVENOUS at 08:06

## 2023-06-09 RX ADMIN — ONDANSETRON 4 MG: 2 INJECTION INTRAMUSCULAR; INTRAVENOUS at 08:06

## 2023-06-09 RX ADMIN — SODIUM CHLORIDE: 9 INJECTION, SOLUTION INTRAVENOUS at 07:06

## 2023-06-09 RX ADMIN — ROCURONIUM BROMIDE 20 MG: 10 SOLUTION INTRAVENOUS at 08:06

## 2023-06-09 RX ADMIN — GLYCOPYRROLATE 0.2 MG: 0.2 INJECTION INTRAMUSCULAR; INTRAVENOUS at 07:06

## 2023-06-09 NOTE — Clinical Note
The lead was inserted under fluorscopic guidance, thresholds were tested, was connected to generator, was sutured in place and was secured in place. The lead was inserted in the chest.

## 2023-06-09 NOTE — DISCHARGE INSTRUCTIONS
Remove dressing in 24hrs  -No driving for two Days  -Do not raise left arm above shoulder for 4 weeks  -Do not lift anything heavier than a gallon of milk for 4 weeks  -No showering/bathing for 1 week, sponge bath only, use soap and water only. Do not get incision wet for 1 week.   -No lotions, powders, creams around site for 1 week.  - Return to the nearest emergency room if you start running a fever; have any kind of discharge coming from the site, the site looks red or swollen.  - If site starts to bleed, lay flat on the ground, apply pressure to the site and call 911.     Resume Xarelto tomorrow.

## 2023-06-09 NOTE — ANESTHESIA PROCEDURE NOTES
Intubation    Date/Time: 6/9/2023 7:59 AM  Performed by: Joey Gerhardt, CRNA  Authorized by: Alek Palmer DO     Intubation:     Induction:  Intravenous    Intubated:  Postinduction    Mask Ventilation:  Easy with oral airway    Attempts:  1    Attempted By:  CRNA    Method of Intubation:  Video laryngoscopy    Blade:  Romy 4    Laryngeal View Grade: Grade I - full view of cords      Difficult Airway Encountered?: No      Complications:  None    Airway Device:  Oral endotracheal tube    Airway Device Size:  7.5    Style/Cuff Inflation:  Cuffed    Inflation Amount (mL):  7    Tube secured:  22    Secured at:  The lips    Placement Verified By:  Capnometry    Complicating Factors:  Short neck and poor neck/head extension    Findings Post-Intubation:  BS equal bilateral

## 2023-06-09 NOTE — Clinical Note
A generator pocket was made and opened at the left lateral text with electrocautery and sharp dissection.

## 2023-06-09 NOTE — INTERVAL H&P NOTE
The patient has been examined and the H&P has been reviewed:    I concur with the findings and no changes have occurred since H&P was written.    Procedure risks, benefits and alternative options discussed and understood by patient/family.    We will proceed with a subcut ICD implant.       Active Hospital Problems    Diagnosis  POA    *Cardiomyopathy [I42.9]  Yes      Resolved Hospital Problems   No resolved problems to display.

## 2023-06-09 NOTE — TRANSFER OF CARE
"Anesthesia Transfer of Care Note    Patient: Luigi Gotti    Procedure(s) Performed: Procedure(s) (LRB):  Insertion, ICD (N/A)    Patient location: PACU    Anesthesia Type: general    Transport from OR: Transported from OR on room air with adequate spontaneous ventilation    Post pain: adequate analgesia    Post assessment: no apparent anesthetic complications and tolerated procedure well    Post vital signs: stable    Level of consciousness: awake and alert    Nausea/Vomiting: no nausea/vomiting    Complications: none    Transfer of care protocol was followed      Last vitals:   Visit Vitals  /74   Pulse 72   Temp 36.6 °C (97.9 °F)   Resp 16   Ht 6' 1.23" (1.86 m)   Wt 117 kg (257 lb 15 oz)   SpO2 98%   BMI 33.82 kg/m²     "

## 2023-06-09 NOTE — ANESTHESIA PREPROCEDURE EVALUATION
06/09/2023  Luigi Gotti is a 58 y.o., male.      Pre-op Assessment    I have reviewed the Patient Summary Reports.     I have reviewed the Nursing Notes. I have reviewed the NPO Status.   I have reviewed the Medications.     Review of Systems  Anesthesia Hx:  No problems with previous Anesthesia    Social:  Non-Smoker    Cardiovascular:   Hypertension Past MI (MI 04/2022, many stents) CAD  CABG/stent (CABG, Stent)  Angina CHF PVD hyperlipidemia Ischemic cardiomyopathy   Pulmonary:   Shortness of breath Sleep Apnea (mild, no CPAP) O2 dependent - 3LPM qhs     Renal/:   Chronic Renal Disease, ARF, CKD renal calculi BPH    Hepatic/GI:   GERD Denies Liver Disease. Denies Hepatitis.    Neurological:   TIA, Denies CVA. Seizures (Single seizure due to heart medication)    Endocrine:   Diabetes, well controlled, type 2, using insulin Hypothyroidism  Obesity / BMI > 30  Psych:   anxiety          Physical Exam  General: Well nourished, Cooperative, Alert and Oriented    Airway:  Mallampati: I   Mouth Opening: Normal  TM Distance: Normal  Tongue: Normal  Neck ROM: Normal ROM    Dental:  Periodontal disease        Anesthesia Plan  Type of Anesthesia, risks & benefits discussed:    Anesthesia Type: Gen Supraglottic Airway  Intra-op Monitoring Plan: Standard ASA Monitors  Induction:  IV  Informed Consent: Informed consent signed with the Patient and all parties understand the risks and agree with anesthesia plan.  All questions answered.   ASA Score: 4  Day of Surgery Review of History & Physical: H&P Update referred to the surgeon/provider.    Ready For Surgery From Anesthesia Perspective.     .

## 2023-06-09 NOTE — Clinical Note
A dressing was applied to the incision. Tefla and tegaderm to sternal wound, and left lateral torso wound

## 2023-06-09 NOTE — ANESTHESIA POSTPROCEDURE EVALUATION
Anesthesia Post Evaluation    Patient: Luigi Gotti    Procedure(s) Performed: Procedure(s) (LRB):  Insertion, ICD (N/A)    Final Anesthesia Type: general      Patient location during evaluation: PACU  Patient participation: Yes- Able to Participate  Level of consciousness: awake and alert  Post-procedure vital signs: reviewed and stable  Pain management: adequate  Airway patency: patent    PONV status at discharge: No PONV  Anesthetic complications: no      Cardiovascular status: blood pressure returned to baseline  Respiratory status: spontaneous ventilation and unassisted  Hydration status: euvolemic  Follow-up needed           Vitals Value Taken Time   /85 06/09/23 1201   Temp 36.6 °C (97.9 °F) 06/09/23 0941   Pulse 72 06/09/23 1201   Resp 27 06/09/23 1022   SpO2 93 % 06/09/23 1201   Vitals shown include unvalidated device data.      Event Time   Out of Recovery 10:25:08         Pain/Francesco Score: Francesco Score: 10 (6/9/2023 10:51 AM)

## 2023-06-14 NOTE — DISCHARGE SUMMARY
[facility]  Discharge Note  Short Stay    Procedure(s) (LRB):  Insertion, ICD (N/A)      OUTCOME: Patient tolerated treatment/procedure well without complication and is now ready for discharge.  Patient had a subcutaneous ICD implant with no complications.     DISPOSITION: Home or Self Care    FINAL DIAGNOSIS:  Cardiomyopathy    FOLLOWUP: In clinic    DISCHARGE INSTRUCTIONS:  No discharge procedures on file.      Clinical Reference Documents Added to Patient Instructions         Document    AUTOMATIC CARDIOVERTER DEFIBRILLATOR IMPLANTATION DISCHARGE INSTRUCTIONS (ENGLISH)    GENERAL ANESTHESIA DISCHARGE INSTRUCTIONS (ENGLISH)            TIME SPENT ON DISCHARGE: 30 minutes

## 2023-06-29 ENCOUNTER — OFFICE VISIT (OUTPATIENT)
Dept: UROLOGY | Facility: CLINIC | Age: 58
End: 2023-06-29
Payer: MEDICARE

## 2023-06-29 VITALS
SYSTOLIC BLOOD PRESSURE: 122 MMHG | HEIGHT: 73 IN | BODY MASS INDEX: 33.93 KG/M2 | WEIGHT: 256 LBS | OXYGEN SATURATION: 98 % | RESPIRATION RATE: 18 BRPM | TEMPERATURE: 98 F | HEART RATE: 75 BPM | DIASTOLIC BLOOD PRESSURE: 78 MMHG

## 2023-06-29 DIAGNOSIS — N40.0 BENIGN PROSTATIC HYPERPLASIA, UNSPECIFIED WHETHER LOWER URINARY TRACT SYMPTOMS PRESENT: ICD-10-CM

## 2023-06-29 DIAGNOSIS — R33.9 URINARY RETENTION: Primary | ICD-10-CM

## 2023-06-29 LAB
BILIRUB SERPL-MCNC: NEGATIVE MG/DL
BLOOD URINE, POC: NEGATIVE
COLOR, POC UA: YELLOW
GLUCOSE UR QL STRIP: 500
KETONES UR QL STRIP: NEGATIVE
LEUKOCYTE ESTERASE URINE, POC: NEGATIVE
NITRITE, POC UA: NEGATIVE
PH, POC UA: 6
PROTEIN, POC: NEGATIVE
SPECIFIC GRAVITY, POC UA: 1.01
UROBILINOGEN, POC UA: 0.2

## 2023-06-29 PROCEDURE — 99213 PR OFFICE/OUTPT VISIT, EST, LEVL III, 20-29 MIN: ICD-10-PCS | Mod: S$PBB,,, | Performed by: NURSE PRACTITIONER

## 2023-06-29 PROCEDURE — 99215 OFFICE O/P EST HI 40 MIN: CPT | Mod: PBBFAC | Performed by: NURSE PRACTITIONER

## 2023-06-29 PROCEDURE — 81001 URINALYSIS AUTO W/SCOPE: CPT | Mod: PBBFAC | Performed by: NURSE PRACTITIONER

## 2023-06-29 PROCEDURE — 99213 OFFICE O/P EST LOW 20 MIN: CPT | Mod: S$PBB,,, | Performed by: NURSE PRACTITIONER

## 2023-06-29 NOTE — PROGRESS NOTES
Chief Complaint: No chief complaint on file.      HPI: Patient is a 57-year-old male here for 6 week F/U  BPD weak stream and nocturia.  Patient seen by renal NP with complaints of difficulty initiating urine and nocturia.  Patient currently on alfuzosin 10 mg p.o. daily after failed treatment from tamsulosin 0.8 mg p.o. daily, patient also recently started on last visit finasteride 5 mg p.o. daily. Patient urine stream much improved with alfuzosin and denies hesitancy, however he does have some mild episodes of straining.  Patient denies interruption of stream, dribbling, frequency, nocturia     Allergies:  Review of patient's allergies indicates:   Allergen Reactions    Pork derived (porcine)      Other reaction(s): Pork    Tenriism choice      Plavix [clopidogrel] Hives and Rash       Medications:  Current Outpatient Medications   Medication Sig Dispense Refill    alfuzosin (UROXATRAL) 10 mg Tb24 Take 1 tablet (10 mg total) by mouth daily with breakfast. 90 tablet 3    aspirin (ECOTRIN) 81 MG EC tablet Take 81 mg by mouth once daily.      atorvastatin (LIPITOR) 80 MG tablet Take 80 mg by mouth every evening.      azelastine (ASTELIN) 137 mcg (0.1 %) nasal spray 1 spray by Nasal route 2 (two) times daily.      b complex vitamins tablet Take 1 tablet by mouth once daily.      carvediloL (COREG) 25 MG tablet Take 25 mg by mouth 2 (two) times daily.      cyanocobalamin (VITAMIN B-12) 1000 MCG tablet Take 2,000 mcg by mouth once daily at 6am.      ENTRESTO 24-26 mg per tablet Take 1 tablet by mouth 2 (two) times daily.      eplerenone (INSPRA) 25 MG Tab Take 25 mg by mouth once daily.      ezetimibe (ZETIA) 10 mg tablet Take 10 mg by mouth once daily at 6am.      FARXIGA 10 mg tablet Take 10 mg by mouth once daily.      ferrous gluconate 225 mg (27 mg iron) Tab Take 1 tablet by mouth once daily.      fluticasone propionate (FLONASE) 50 mcg/actuation nasal spray Flonase Allergy Relief Take No date recorded No form  recorded No frequency recorded No route recorded No set duration recorded No set duration amount recorded active No dosage strength recorded No dosage strength units of measure recorded      HYDROcodone-acetaminophen (NORCO) 7.5-325 mg per tablet Take 1 tablet by mouth every 8 (eight) hours as needed.      hydrOXYzine (ATARAX) 50 MG tablet Take 50 mg by mouth 3 (three) times daily.      isosorbide mononitrate (IMDUR) 60 MG 24 hr tablet Take 60 mg by mouth once daily.      lactulose (CHRONULAC) 10 gram/15 mL solution TAKE 15 MLS BY MOUTH 2 TIMES DAILY AS NEEDED FOR CONSTIPATION 2700 mL 1    levETIRAcetam (KEPPRA) 500 MG Tab Take 1 tablet (500 mg total) by mouth 2 (two) times daily. 180 tablet 1    levocetirizine (XYZAL) 5 MG tablet TAKE 1 TABLET (5 MG TOTAL) BY MOUTH NIGHTLY AS NEEDED FOR ALLERGIES 90 tablet 1    levothyroxine (SYNTHROID) 25 MCG tablet Take 1 tablet (25 mcg total) by mouth before breakfast. 90 tablet 1    metoclopramide HCl (REGLAN) 10 MG tablet Take 10 mg by mouth once daily.      montelukast (SINGULAIR) 10 mg tablet TAKE 1 TABLET BY MOUTH EVERY DAY 90 tablet 1    nitroGLYCERIN (NITROSTAT) 0.4 MG SL tablet Place 0.4 mg under the tongue as needed.      NOVOLIN 70/30 U-100 INSULIN 100 unit/mL (70-30) injection Inject 45 Units into the skin 2 (two) times daily with meals. 130 mL 1    omeprazole (PRILOSEC) 40 MG capsule Take 1 capsule (40 mg total) by mouth every evening. 90 capsule 1    prasugreL (EFFIENT) 10 mg Tab Take 10 mg by mouth once daily.      QUEtiapine (SEROQUEL) 25 MG Tab Take 1 tablet (25 mg total) by mouth every evening. 90 tablet 1    ranolazine (RANEXA) 1,000 mg Tb12 Take 1,000 mg by mouth 2 (two) times daily.      torsemide (DEMADEX) 20 MG Tab Take 20 mg by mouth 2 (two) times a day.      TRUE METRIX GLUCOSE METER Misc TEST 3 TIMES A DAY      TRUETRACK TEST Strp TEST 3 TIMES A DAY      VERQUVO 2.5 mg Tab Take 2.5 mg by mouth 2 (two) times a day.      XARELTO 2.5 mg Tab Take 2.5 mg by  mouth 2 (two) times a day.       No current facility-administered medications for this visit.     Facility-Administered Medications Ordered in Other Visits   Medication Dose Route Frequency Provider Last Rate Last Admin    cefazolin (ANCEF) 2 gram in dextrose 5% 50 mL IVPB (premix)  2 g Intravenous On Call Procedure Perry Eldridge MD   2 g at 06/09/23 0808       Review of Systems:  General: No fever, chills, fatigability, or weight loss.  Skin: No rashes, itching, or changes in color or texture of skin.  Chest: Denies HART, cyanosis, wheezing, cough, and sputum production.  Abdomen: Appetite fine. No weight loss. Denies diarrhea, abdominal pain, hematemesis, or blood in stool.  Musculoskeletal: No joint stiffness or swelling. Denies back pain.  : As above.  All other review of systems negative.    PMH:  Past Medical History:   Diagnosis Date    Anxiety     Arthritis     BPH (benign prostatic hyperplasia)     CAD (coronary artery disease)     CHF (congestive heart failure)     Depression     Dizziness     GERD (gastroesophageal reflux disease)     Hypertension     Ischemic cardiomyopathy     Myocardial infarction     Obesity     Other hyperlipidemia     Oxygen dependent     3L/NC AT HS    PVD (peripheral vascular disease)     Renal disorder     kidney stones    Seizures     SOB (shortness of breath) on exertion     Thyroid disease     Type 2 diabetes mellitus without complications     Weakness of both legs        PSH:  Past Surgical History:   Procedure Laterality Date    CARDIAC DEFIBRILLATOR PLACEMENT N/A 6/9/2023    Procedure: Insertion, ICD;  Surgeon: Perry Eldridge MD;  Location: [facility] CATH LAB;  Service: Cardiology;  Laterality: N/A;  SICD W/ ANEST. (BS)    CHOLECYSTECTOMY      COLONOSCOPY      multiple    CORONARY ARTERY BYPASS GRAFT      CORONARY STENT PLACEMENT      ESOPHAGOGASTRODUODENOSCOPY      LEFT HEART CATHETERIZATION N/A 02/07/2023    Procedure: Left heart cath;  Surgeon: Jamin  CECY Melara MD;  Location: [facility] CATH LAB;  Service: Cardiology;  Laterality: N/A;    RIGHT HEART CATHETERIZATION Right 09/20/2022    Procedure: INSERTION, CATHETER, RIGHT HEART;  Surgeon: Cory Oreilly MD;  Location: [facility] CATH LAB;  Service: Cardiology;  Laterality: Right;  CARDIOMEMS RJ ACCESS    STENT, PERIPHERAL GRAFT      TONSILLECTOMY         FamHx:  Family History   Problem Relation Age of Onset    Diabetes Mother     Hypertension Mother     Cancer Mother     Hypertension Father     Stroke Father     Diabetes Sister     Diabetes Brother     Kidney disease Brother        SocHx:  Social History     Socioeconomic History    Marital status:      Spouse name: Viktoria    Number of children: 1   Tobacco Use    Smoking status: Never     Passive exposure: Never    Smokeless tobacco: Never   Substance and Sexual Activity    Alcohol use: Never    Drug use: Not Currently    Sexual activity: Not Currently     Partners: Female     Social Determinants of Health     Financial Resource Strain: Low Risk     Difficulty of Paying Living Expenses: Not very hard   Food Insecurity: No Food Insecurity    Worried About Running Out of Food in the Last Year: Never true    Ran Out of Food in the Last Year: Never true   Transportation Needs: No Transportation Needs    Lack of Transportation (Medical): No    Lack of Transportation (Non-Medical): No   Physical Activity: Inactive    Days of Exercise per Week: 0 days    Minutes of Exercise per Session: 0 min   Stress: No Stress Concern Present    Feeling of Stress : Not at all   Social Connections: Moderately Isolated    Frequency of Communication with Friends and Family: More than three times a week    Frequency of Social Gatherings with Friends and Family: Once a week    Attends [employer] Services: Never    Active Member of Clubs or Organizations: No    Attends Club or Organization Meetings: Never    Marital Status:    Housing Stability: Low Risk     Unable to Pay for Housing in  the Last Year: No    Number of Places Lived in the Last Year: 1    Unstable Housing in the Last Year: No       Physical Exam:  Vitals:    06/29/23 1237   BP: 122/78   Pulse: 75   Resp: 18   Temp: 98.2 °F (36.8 °C)     General: A&Ox3, no apparent distress, no deformities  Neck: No masses, normal thyroid  Lungs: CTA bill, no use of accessory muscles  Heart: RRR, no arrhythmias  Abdomen: Soft, NT, ND, no masses, no hernias, no hepatosplenomegaly  Lymphatic: Neck and groin nodes negative  Skin: The skin is warm and dry. No jaundice.  Ext: No c/c/e.      Urinalysis:  Results for orders placed or performed in visit on 05/15/23   POCT URINE DIPSTICK WITH MICROSCOPE, AUTOMATED   Result Value Ref Range    Color, UA Yellow     Spec Grav UA 1.010     pH, UA 6.5     WBC, UA Negative     Nitrite, UA Negative     Protein, POC Negative     Glucose,      Ketones, UA Negative     Urobilinogen, UA 0.2     Bilirubin, POC Negative     Blood, UA Negative    Microscopic urinalysis showed negative for RBCs, WBCs, nitrites.            Impression:  BPH, urinary retention      Plan: Instructed patient continue alfuzosin 10 mg p.o. daily and finasteride 5 mg p.o. daily RTC 6 months.  Reinforced patient teaching on timed voiding every 2-3 hrs, double voiding, avoidance of bladder irritants such as alcohol, citrus foods, chocolate, caffeinated drinks, energy drinks, spicy foods, sugar, caffeine products, sodas. Instructed patient to avoid drinking fluids 1-2 hours prior to bedtime & void immediately before bedtime.

## 2023-06-30 ENCOUNTER — TELEPHONE (OUTPATIENT)
Dept: INTERNAL MEDICINE | Facility: CLINIC | Age: 58
End: 2023-06-30
Payer: MEDICARE

## 2023-06-30 DIAGNOSIS — Z79.4 TYPE 2 DIABETES MELLITUS WITH DIABETIC POLYNEUROPATHY, WITH LONG-TERM CURRENT USE OF INSULIN: ICD-10-CM

## 2023-06-30 DIAGNOSIS — E11.42 TYPE 2 DIABETES MELLITUS WITH DIABETIC POLYNEUROPATHY, WITH LONG-TERM CURRENT USE OF INSULIN: ICD-10-CM

## 2023-06-30 RX ORDER — HUMAN INSULIN 100 [USP'U]/ML
INJECTION, SUSPENSION SUBCUTANEOUS
Qty: 130 ML | Refills: 4 | OUTPATIENT
Start: 2023-06-30

## 2023-06-30 RX ORDER — HUMAN INSULIN 100 [USP'U]/ML
45 INJECTION, SUSPENSION SUBCUTANEOUS 2 TIMES DAILY WITH MEALS
Qty: 130 ML | Refills: 1 | Status: SHIPPED | OUTPATIENT
Start: 2023-06-30 | End: 2023-08-17 | Stop reason: SDUPTHER

## 2023-07-03 ENCOUNTER — OFFICE VISIT (OUTPATIENT)
Dept: NEPHROLOGY | Facility: CLINIC | Age: 58
End: 2023-07-03
Payer: MEDICARE

## 2023-07-03 VITALS
TEMPERATURE: 98 F | WEIGHT: 248.19 LBS | HEART RATE: 85 BPM | HEIGHT: 73 IN | SYSTOLIC BLOOD PRESSURE: 113 MMHG | RESPIRATION RATE: 18 BRPM | BODY MASS INDEX: 32.89 KG/M2 | DIASTOLIC BLOOD PRESSURE: 73 MMHG | OXYGEN SATURATION: 99 %

## 2023-07-03 DIAGNOSIS — I10 PRIMARY HYPERTENSION: ICD-10-CM

## 2023-07-03 DIAGNOSIS — N18.32 CKD STAGE G3B/A1, GFR 30-44 AND ALBUMIN CREATININE RATIO <30 MG/G: Primary | ICD-10-CM

## 2023-07-03 PROCEDURE — 99214 PR OFFICE/OUTPT VISIT, EST, LEVL IV, 30-39 MIN: ICD-10-PCS | Mod: S$PBB,,, | Performed by: NURSE PRACTITIONER

## 2023-07-03 PROCEDURE — 99214 OFFICE O/P EST MOD 30 MIN: CPT | Mod: S$PBB,,, | Performed by: NURSE PRACTITIONER

## 2023-07-03 PROCEDURE — 99215 OFFICE O/P EST HI 40 MIN: CPT | Mod: PBBFAC | Performed by: NURSE PRACTITIONER

## 2023-07-03 NOTE — PROGRESS NOTES
[facility]  Nephrology Clinic Note    Chief complaint: Chronic Kidney Disease (RTC, took meds, w/o complaints)    History of present illness:   Luigi Gotti is a 58 y.o. Other male with past medical history of chronic kidney disease, heart failure with reduced ejection fraction (20%, s/p ICD 6/9/2023), coronary artery disease, gastroparesis, gastritis, ischemic colitis, peripheral vascular disease, dyslipidemia, hypertension, BPH, diabetes mellitus type 2, and obesity.    Patient presents for follow-up appointment in Nephrology Clinic today. Denies complaints.     Review of Systems  12 point review of systems conducted, negative except as stated in the history of present illness.    Allergies: Patient is allergic to pork derived (porcine) and plavix [clopidogrel].     Past Medical History:  has a past medical history of Anxiety, Arthritis, BPH (benign prostatic hyperplasia), CAD (coronary artery disease), CHF (congestive heart failure), Depression, Dizziness, GERD (gastroesophageal reflux disease), Hypertension, Ischemic cardiomyopathy, Myocardial infarction, Obesity, Other hyperlipidemia, Oxygen dependent, PVD (peripheral vascular disease), Renal disorder, Seizures, SOB (shortness of breath) on exertion, Thyroid disease, Type 2 diabetes mellitus without complications, and Weakness of both legs.    Procedure History:  has a past surgical history that includes Coronary artery bypass graft; Tonsillectomy; Cholecystectomy; Right heart catheterization (Right, 09/20/2022); Left heart catheterization (N/A, 02/07/2023); stent, peripheral graft; Coronary stent placement; Colonoscopy; Esophagogastroduodenoscopy; and Cardiac defibrillator placement (N/A, 6/9/2023).    Family History: family history includes Cancer in his mother; Diabetes in his brother, mother, and sister; Hypertension in his father and mother; Kidney disease in his brother; Stroke in his father.    Social History:  reports  "that he has never smoked. He has never been exposed to tobacco smoke. He has never used smokeless tobacco. He reports that he does not currently use drugs. He reports that he does not drink alcohol.    Physical exam  /73 (BP Location: Right arm, Patient Position: Sitting, BP Method: Large (Automatic))   Pulse 85   Temp 98.4 °F (36.9 °C) (Oral)   Resp 18   Ht 6' 0.84" (1.85 m)   Wt 112.6 kg (248 lb 3.2 oz)   SpO2 99%   BMI 32.89 kg/m²   General appearance: Patient is in no acute distress.  Skin: No rashes or wounds.  HEENT: PERRLA, EOMI, no scleral icterus, no JVD. Neck is supple.  Chest: Respirations are unlabored. Lungs sounds are clear.   Heart: S1, S2.   Abdomen: Benign.  : Deferred.  Extremities: No edema, peripheral pulses are palpable.   Neuro: No focal deficits.     Home Medications:    Current Outpatient Medications:     alfuzosin (UROXATRAL) 10 mg Tb24, Take 1 tablet (10 mg total) by mouth daily with breakfast., Disp: 90 tablet, Rfl: 3    aspirin (ECOTRIN) 81 MG EC tablet, Take 81 mg by mouth once daily., Disp: , Rfl:     atorvastatin (LIPITOR) 80 MG tablet, Take 80 mg by mouth every evening., Disp: , Rfl:     azelastine (ASTELIN) 137 mcg (0.1 %) nasal spray, 1 spray by Nasal route 2 (two) times daily., Disp: , Rfl:     b complex vitamins tablet, Take 1 tablet by mouth once daily., Disp: , Rfl:     carvediloL (COREG) 25 MG tablet, Take 25 mg by mouth 2 (two) times daily., Disp: , Rfl:     cyanocobalamin (VITAMIN B-12) 1000 MCG tablet, Take 2,000 mcg by mouth once daily at 6am., Disp: , Rfl:     eplerenone (INSPRA) 25 MG Tab, Take 25 mg by mouth once daily., Disp: , Rfl:     ezetimibe (ZETIA) 10 mg tablet, Take 10 mg by mouth once daily at 6am., Disp: , Rfl:     FARXIGA 10 mg tablet, Take 10 mg by mouth once daily., Disp: , Rfl:     ferrous gluconate 225 mg (27 mg iron) Tab, Take 1 tablet by mouth once daily., Disp: , Rfl:     fluticasone propionate (FLONASE) 50 mcg/actuation nasal spray, " Flonase Allergy Relief Take No date recorded No form recorded No frequency recorded No route recorded No set duration recorded No set duration amount recorded active No dosage strength recorded No dosage strength units of measure recorded, Disp: , Rfl:     HYDROcodone-acetaminophen (NORCO) 7.5-325 mg per tablet, Take 1 tablet by mouth every 8 (eight) hours as needed., Disp: , Rfl:     hydrOXYzine (ATARAX) 50 MG tablet, Take 50 mg by mouth 3 (three) times daily., Disp: , Rfl:     isosorbide mononitrate (IMDUR) 60 MG 24 hr tablet, Take 60 mg by mouth once daily., Disp: , Rfl:     lactulose (CHRONULAC) 10 gram/15 mL solution, TAKE 15 MLS BY MOUTH 2 TIMES DAILY AS NEEDED FOR CONSTIPATION, Disp: 2700 mL, Rfl: 1    levETIRAcetam (KEPPRA) 500 MG Tab, Take 1 tablet (500 mg total) by mouth 2 (two) times daily., Disp: 180 tablet, Rfl: 1    levocetirizine (XYZAL) 5 MG tablet, TAKE 1 TABLET (5 MG TOTAL) BY MOUTH NIGHTLY AS NEEDED FOR ALLERGIES, Disp: 90 tablet, Rfl: 1    levothyroxine (SYNTHROID) 25 MCG tablet, Take 1 tablet (25 mcg total) by mouth before breakfast., Disp: 90 tablet, Rfl: 1    metoclopramide HCl (REGLAN) 10 MG tablet, Take 10 mg by mouth once daily., Disp: , Rfl:     montelukast (SINGULAIR) 10 mg tablet, TAKE 1 TABLET BY MOUTH EVERY DAY, Disp: 90 tablet, Rfl: 1    nitroGLYCERIN (NITROSTAT) 0.4 MG SL tablet, Place 0.4 mg under the tongue as needed., Disp: , Rfl:     NOVOLIN 70/30 U-100 INSULIN 100 unit/mL (70-30) injection, Inject 45 Units into the skin 2 (two) times daily with meals., Disp: 130 mL, Rfl: 1    omeprazole (PRILOSEC) 40 MG capsule, Take 1 capsule (40 mg total) by mouth every evening., Disp: 90 capsule, Rfl: 1    prasugreL (EFFIENT) 10 mg Tab, Take 10 mg by mouth once daily., Disp: , Rfl:     QUEtiapine (SEROQUEL) 25 MG Tab, Take 1 tablet (25 mg total) by mouth every evening., Disp: 90 tablet, Rfl: 1    ranolazine (RANEXA) 1,000 mg Tb12, Take 1,000 mg by mouth 2 (two) times daily., Disp: , Rfl:      torsemide (DEMADEX) 20 MG Tab, Take 20 mg by mouth 2 (two) times a day., Disp: , Rfl:     TRUE METRIX GLUCOSE METER Misc, TEST 3 TIMES A DAY, Disp: , Rfl:     TRUETRACK TEST Strp, TEST 3 TIMES A DAY, Disp: , Rfl:     VERQUVO 2.5 mg Tab, Take 2.5 mg by mouth 2 (two) times a day., Disp: , Rfl:     XARELTO 2.5 mg Tab, Take 2.5 mg by mouth 2 (two) times a day., Disp: , Rfl:     ENTRESTO 24-26 mg per tablet, Take 1 tablet by mouth 2 (two) times daily., Disp: , Rfl:   No current facility-administered medications for this visit.    Facility-Administered Medications Ordered in Other Visits:     cefazolin (ANCEF) 2 gram in dextrose 5% 50 mL IVPB (premix), 2 g, Intravenous, On Call Procedure, Perry Eldridge MD, 2 g at 06/09/23 0808    Laboratory data    Serum  Lab Results   Component Value Date    WBC 10.57 06/09/2023    HGB 11.6 (L) 06/09/2023    HCT 36.3 (L) 06/09/2023     06/09/2023    IRON 19 04/05/2022    TIBC 291 04/05/2022    TRANS 270 04/05/2022    LABIRON 7 04/05/2022    FERRITIN 377.3 (H) 05/09/2022    FOLATE 6.1 04/05/2022    EQLHGYWN34 943 04/05/2022    HAPTO 436 (H) 10/25/2021     10/25/2021     06/29/2023    K 4.8 06/29/2023    CHLORIDE 105 06/29/2023    CO2 24 06/29/2023    BUN 27.0 (H) 06/29/2023    CREATININE 2.20 (H) 06/29/2023    EGFRNORACEVR 34 06/29/2023    GLUCOSE 261 (H) 06/29/2023    CALCIUM 9.2 06/29/2023    ALKPHOS 112 06/29/2023    LABPROT 8.1 06/29/2023    ALBUMIN 3.7 06/29/2023    BILIDIR 0.5 04/13/2022    IBILI 0.40 04/13/2022    AST 15 06/29/2023    ALT 18 06/29/2023    MG 1.70 04/12/2022    PHOS 3.1 02/13/2023      Lab Results   Component Value Date    HGBA1C 6.7 03/30/2023    HIV Nonreactive 08/22/2022    HEPCAB Nonreactive 08/22/2022    HEPBSURFAG Nonreactive 08/22/2022     Urine:  Lab Results   Component Value Date    COLORUA Light-Yellow 02/13/2023    APPEARANCEUA Clear 02/13/2023    SGUA 1.012 02/13/2023    PHUA 5.5 02/13/2023    PROTEINUA Negative  02/13/2023    GLUCOSEUA Normal 02/13/2023    KETONESUA Negative 02/13/2023    BLOODUA Negative 02/13/2023    NITRITESUA Negative 02/13/2023    LEUKOCYTESUR 25 (A) 02/13/2023    RBCUA 0-5 02/13/2023    WBCUA 0-5 02/13/2023    BACTERIA Trace (A) 02/13/2023    SQEPUA Trace (A) 02/13/2023    HYALINECASTS 6-10 (A) 02/13/2023    CREATRANDUR 91.4 02/13/2023    PROTEINURINE 6.9 02/13/2023    UPROTCREA 0.1 02/13/2023         Imaging  US Retroperitoneal Limited 01/06/2023    Right Kidney:  Length: 10.8 x 5.5 x 5.7 cm  Appearance: Normal echogenicity.  Collecting system: No hydronephrosis  Stones: None  Cyst/Mass: Hyperechoic area in the upper pole of the right kidney measures 1.1 x 9 mm x 1.1 cm a small angiomyolipoma cannot be completely excluded  Left Kidney:  Length: 12 x 5.7 x 4.4 cm  Appearance: Normal echogenicity.  Collecting system: No hydronephrosis  Stones: Small calcification identified in the upper pole measuring 9 mm by 3 mm x 7 mm these might represent a nonocclusive stone  Cyst/Mass: None  Bladder:  Normal  Vessels:  Visualized portions of the IVC and aorta have a normal grayscale appearance.    Impression  Small echogenic area in the upper pole of the kidney small angiomyolipoma cannot be completely excluded.  Nonocclusive stone of the left kidney.  No other abnormalities  Electronically signed by: Ranjan Taveras  Date:    01/06/2023  Time:    15:19      Impression and plan     CKD stage G3b/A1, GFR 30-44 and albumin creatinine ratio <30 mg/g  -     Comprehensive Metabolic Panel; Future; Expected date: 09/25/2023  -     Phosphorus; Future; Expected date: 09/25/2023  -     PTH, Intact; Future; Expected date: 09/25/2023  -     Protein/Creatinine Ratio, Urine; Future; Expected date: 09/25/2023  -     Urinalysis, Reflex to Urine Culture; Future; Expected date: 09/25/2023  -     Uric Acid; Future; Expected date: 09/25/2023  Serum creatinine has been relatively stable over the past several months, there is no  significant proteinuria or active urinary sediment.    Continue RAASi and SGLT 2 inhibitor therapy along with risk factor management/renal sparing activities. Continue renal sparing activities:  -2 g a day dietary sodium restriction  -optimize glycemic control (goal A1c is less than 7%)  -control high blood pressure (goal blood pressure is less than 130/80, patient was advised to check blood pressure once or twice a week and bring blood pressure logs to next office visit)  -exercise at least 30 minutes a day, 5 days a week  -maintain healthy weight  -decrease or stop alcohol use  -do not smoke  -stay well hydrated (drink water only, avoid juices, sweet tea, and sodas)  -ask about staying up-to-date on vaccinations (flu vaccine, pneumonia vaccine, hepatitis B vaccine)  -avoid excessive use of NSAIDs (ibuprofen, naproxen, Aleve, Advil, Toradol, Mobic), take Tylenol as needed for headache or mild pain  -take cholesterol lowering medications if prescribed (LDL goal less than 100)  Follow up in about 3 months (around 10/3/2023).     Primary hypertension  Blood pressure reading is at goal, continue current antihypertensive regimen and 2 g a day dietary sodium restriction.      BMI 32.0-32.9,adult  Lifestyle and dietary interventions discussed, patient counseled on weight loss using portion control, non- sedentary lifestyle, low-carbohydrate/low fat diet.           7/3/2023  Robina Garrison NP  [facility]ph[facility]

## 2023-07-06 PROCEDURE — G0179 MD RECERTIFICATION HHA PT: HCPCS | Mod: ,,, | Performed by: NURSE PRACTITIONER

## 2023-07-06 PROCEDURE — G0179 PR HOME HEALTH MD RECERTIFICATION: ICD-10-PCS | Mod: ,,, | Performed by: NURSE PRACTITIONER

## 2023-07-10 NOTE — ADDENDUM NOTE
Addended by: TIERNEY HENRY on: 7/10/2023 12:09 PM     Modules accepted: Orders, Level of Service

## 2023-07-13 DIAGNOSIS — K59.00 CONSTIPATION, UNSPECIFIED CONSTIPATION TYPE: ICD-10-CM

## 2023-07-18 RX ORDER — LACTULOSE 10 G/15ML
10 SOLUTION ORAL; RECTAL 2 TIMES DAILY PRN
Qty: 2700 ML | Refills: 1 | Status: SHIPPED | OUTPATIENT
Start: 2023-07-18 | End: 2023-12-08

## 2023-08-02 ENCOUNTER — EXTERNAL HOME HEALTH (OUTPATIENT)
Dept: HOME HEALTH SERVICES | Facility: HOSPITAL | Age: 58
End: 2023-08-02
Payer: MEDICARE

## 2023-08-14 PROBLEM — Z00.00 WELLNESS EXAMINATION: Status: RESOLVED | Noted: 2023-02-14 | Resolved: 2023-08-14

## 2023-08-16 ENCOUNTER — LAB VISIT (OUTPATIENT)
Dept: LAB | Facility: HOSPITAL | Age: 58
End: 2023-08-16
Attending: NURSE PRACTITIONER
Payer: MEDICARE

## 2023-08-16 DIAGNOSIS — Z79.4 TYPE 2 DIABETES MELLITUS WITH DIABETIC POLYNEUROPATHY, WITH LONG-TERM CURRENT USE OF INSULIN: ICD-10-CM

## 2023-08-16 DIAGNOSIS — N18.32 STAGE 3B CHRONIC KIDNEY DISEASE: ICD-10-CM

## 2023-08-16 DIAGNOSIS — E11.42 TYPE 2 DIABETES MELLITUS WITH DIABETIC POLYNEUROPATHY, WITH LONG-TERM CURRENT USE OF INSULIN: ICD-10-CM

## 2023-08-16 LAB
ALBUMIN SERPL-MCNC: 3.8 G/DL (ref 3.5–5)
ALBUMIN/GLOB SERPL: 0.8 RATIO (ref 1.1–2)
ALP SERPL-CCNC: 147 UNIT/L (ref 40–150)
ALT SERPL-CCNC: 17 UNIT/L (ref 0–55)
AST SERPL-CCNC: 17 UNIT/L (ref 5–34)
BASOPHILS # BLD AUTO: 0.04 X10(3)/MCL
BASOPHILS NFR BLD AUTO: 0.3 %
BILIRUB SERPL-MCNC: 0.5 MG/DL
BUN SERPL-MCNC: 21.7 MG/DL (ref 8.4–25.7)
CALCIUM SERPL-MCNC: 9.5 MG/DL (ref 8.4–10.2)
CHLORIDE SERPL-SCNC: 104 MMOL/L (ref 98–107)
CHOLEST SERPL-MCNC: 139 MG/DL
CHOLEST/HDLC SERPL: 3 {RATIO} (ref 0–5)
CO2 SERPL-SCNC: 25 MMOL/L (ref 22–29)
CREAT SERPL-MCNC: 1.91 MG/DL (ref 0.73–1.18)
EOSINOPHIL # BLD AUTO: 0.22 X10(3)/MCL (ref 0–0.9)
EOSINOPHIL NFR BLD AUTO: 1.8 %
ERYTHROCYTE [DISTWIDTH] IN BLOOD BY AUTOMATED COUNT: 12.9 % (ref 11.5–17)
EST. AVERAGE GLUCOSE BLD GHB EST-MCNC: 139.9 MG/DL
GFR SERPLBLD CREATININE-BSD FMLA CKD-EPI: 40 MLS/MIN/1.73/M2
GLOBULIN SER-MCNC: 4.7 GM/DL (ref 2.4–3.5)
GLUCOSE SERPL-MCNC: 176 MG/DL (ref 74–100)
HBA1C MFR BLD: 6.5 %
HCT VFR BLD AUTO: 40.4 % (ref 42–52)
HDLC SERPL-MCNC: 41 MG/DL (ref 35–60)
HGB BLD-MCNC: 13.2 G/DL (ref 14–18)
IMM GRANULOCYTES # BLD AUTO: 0.1 X10(3)/MCL (ref 0–0.04)
IMM GRANULOCYTES NFR BLD AUTO: 0.8 %
LDLC SERPL CALC-MCNC: 77 MG/DL (ref 50–140)
LYMPHOCYTES # BLD AUTO: 2.09 X10(3)/MCL (ref 0.6–4.6)
LYMPHOCYTES NFR BLD AUTO: 16.8 %
MCH RBC QN AUTO: 29.3 PG (ref 27–31)
MCHC RBC AUTO-ENTMCNC: 32.7 G/DL (ref 33–36)
MCV RBC AUTO: 89.6 FL (ref 80–94)
MONOCYTES # BLD AUTO: 0.63 X10(3)/MCL (ref 0.1–1.3)
MONOCYTES NFR BLD AUTO: 5.1 %
NEUTROPHILS # BLD AUTO: 9.37 X10(3)/MCL (ref 2.1–9.2)
NEUTROPHILS NFR BLD AUTO: 75.2 %
NRBC BLD AUTO-RTO: 0 %
PLATELET # BLD AUTO: 242 X10(3)/MCL (ref 130–400)
PMV BLD AUTO: 12.7 FL (ref 7.4–10.4)
POTASSIUM SERPL-SCNC: 5 MMOL/L (ref 3.5–5.1)
PROT SERPL-MCNC: 8.5 GM/DL (ref 6.4–8.3)
RBC # BLD AUTO: 4.51 X10(6)/MCL (ref 4.7–6.1)
SODIUM SERPL-SCNC: 140 MMOL/L (ref 136–145)
TRIGL SERPL-MCNC: 106 MG/DL (ref 34–140)
VLDLC SERPL CALC-MCNC: 21 MG/DL
WBC # SPEC AUTO: 12.45 X10(3)/MCL (ref 4.5–11.5)

## 2023-08-16 PROCEDURE — 80053 COMPREHEN METABOLIC PANEL: CPT

## 2023-08-16 PROCEDURE — 36415 COLL VENOUS BLD VENIPUNCTURE: CPT

## 2023-08-16 PROCEDURE — 85025 COMPLETE CBC W/AUTO DIFF WBC: CPT

## 2023-08-16 PROCEDURE — 80061 LIPID PANEL: CPT

## 2023-08-16 PROCEDURE — 83036 HEMOGLOBIN GLYCOSYLATED A1C: CPT

## 2023-08-17 ENCOUNTER — OFFICE VISIT (OUTPATIENT)
Dept: INTERNAL MEDICINE | Facility: CLINIC | Age: 58
End: 2023-08-17
Payer: MEDICARE

## 2023-08-17 VITALS
HEIGHT: 72 IN | DIASTOLIC BLOOD PRESSURE: 82 MMHG | WEIGHT: 252.63 LBS | RESPIRATION RATE: 20 BRPM | HEART RATE: 82 BPM | SYSTOLIC BLOOD PRESSURE: 123 MMHG | BODY MASS INDEX: 34.22 KG/M2 | TEMPERATURE: 98 F

## 2023-08-17 DIAGNOSIS — Z79.4 TYPE 2 DIABETES MELLITUS WITH DIABETIC POLYNEUROPATHY, WITH LONG-TERM CURRENT USE OF INSULIN: ICD-10-CM

## 2023-08-17 DIAGNOSIS — D63.1 ANEMIA DUE TO STAGE 3A CHRONIC KIDNEY DISEASE: ICD-10-CM

## 2023-08-17 DIAGNOSIS — I10 ESSENTIAL HYPERTENSION: ICD-10-CM

## 2023-08-17 DIAGNOSIS — N18.31 ANEMIA DUE TO STAGE 3A CHRONIC KIDNEY DISEASE: ICD-10-CM

## 2023-08-17 DIAGNOSIS — Z00.00 WELLNESS EXAMINATION: ICD-10-CM

## 2023-08-17 DIAGNOSIS — F41.9 ANXIETY: ICD-10-CM

## 2023-08-17 DIAGNOSIS — E11.42 TYPE 2 DIABETES MELLITUS WITH DIABETIC POLYNEUROPATHY, WITH LONG-TERM CURRENT USE OF INSULIN: ICD-10-CM

## 2023-08-17 DIAGNOSIS — D72.829 LEUKOCYTOSIS, UNSPECIFIED TYPE: ICD-10-CM

## 2023-08-17 DIAGNOSIS — E78.2 MIXED HYPERLIPIDEMIA: ICD-10-CM

## 2023-08-17 DIAGNOSIS — L60.3 DYSTROPHIC NAIL: ICD-10-CM

## 2023-08-17 DIAGNOSIS — N18.32 STAGE 3B CHRONIC KIDNEY DISEASE: Primary | ICD-10-CM

## 2023-08-17 DIAGNOSIS — R56.9 NEW ONSET SEIZURE: ICD-10-CM

## 2023-08-17 DIAGNOSIS — K21.9 GASTROESOPHAGEAL REFLUX DISEASE, UNSPECIFIED WHETHER ESOPHAGITIS PRESENT: ICD-10-CM

## 2023-08-17 PROBLEM — D47.3 ESSENTIAL (HEMORRHAGIC) THROMBOCYTHEMIA: Status: RESOLVED | Noted: 2023-05-15 | Resolved: 2023-08-17

## 2023-08-17 PROBLEM — L89.512: Status: RESOLVED | Noted: 2022-07-14 | Resolved: 2023-08-17

## 2023-08-17 PROCEDURE — 99214 PR OFFICE/OUTPT VISIT, EST, LEVL IV, 30-39 MIN: ICD-10-PCS | Mod: S$PBB,,, | Performed by: NURSE PRACTITIONER

## 2023-08-17 PROCEDURE — 99215 OFFICE O/P EST HI 40 MIN: CPT | Mod: PBBFAC | Performed by: NURSE PRACTITIONER

## 2023-08-17 PROCEDURE — 99214 OFFICE O/P EST MOD 30 MIN: CPT | Mod: S$PBB,,, | Performed by: NURSE PRACTITIONER

## 2023-08-17 RX ORDER — FERROUS GLUCONATE 324(38)MG
1 TABLET ORAL
COMMUNITY
Start: 2023-07-28

## 2023-08-17 RX ORDER — OMEPRAZOLE 40 MG/1
40 CAPSULE, DELAYED RELEASE ORAL NIGHTLY
Qty: 90 CAPSULE | Refills: 1 | Status: SHIPPED | OUTPATIENT
Start: 2023-08-17 | End: 2024-01-04

## 2023-08-17 RX ORDER — METOCLOPRAMIDE 10 MG/1
10 TABLET ORAL DAILY
Qty: 90 TABLET | Refills: 1 | Status: SHIPPED | OUTPATIENT
Start: 2023-08-17 | End: 2024-01-23 | Stop reason: SDUPTHER

## 2023-08-17 RX ORDER — QUETIAPINE FUMARATE 25 MG/1
25 TABLET, FILM COATED ORAL NIGHTLY
Qty: 90 TABLET | Refills: 1 | Status: SHIPPED | OUTPATIENT
Start: 2023-08-17 | End: 2024-02-13

## 2023-08-17 RX ORDER — HUMAN INSULIN 100 [USP'U]/ML
45 INJECTION, SUSPENSION SUBCUTANEOUS 2 TIMES DAILY WITH MEALS
Qty: 130 ML | Refills: 1 | Status: SHIPPED | OUTPATIENT
Start: 2023-08-17 | End: 2024-01-23 | Stop reason: SDUPTHER

## 2023-08-17 NOTE — ASSESSMENT & PLAN NOTE
LDL slightly increased from previous, but still at goal for diabetic  Continue statin and Zetia  Educated on a low-fat, low-cholesterol diet and aerobic exercise (20-30 mins/day x 5 days a week). Encouraged healthy fruits and veggie intake. Increase water intake. Avoid excess ETOH intake and smoking

## 2023-08-17 NOTE — ASSESSMENT & PLAN NOTE
Continue mgmt per Renal  Indices stable for now  Avoid NSAIDs (i.e., Advil, Aleve, Ibuprofen, Motrin, Naproxen, Diclofenac, Indocin, Celebrex, Mobic, Voltaren). Avoid the following GI meds: Milk of Mag, Mylanta, Fleets Enema, and Pepto-Bismol. Okay to take Tylenol (acetaminophen) (if no end-stage liver disease), low dose ASA (81 mg), Miralax, Tums, and Colace. Increase clear fluid intake. Avoid dark sodas.

## 2023-08-17 NOTE — PROGRESS NOTES
RON Esquivel   [facility]  [address] [address]      PATIENT NAME: Luigi Gotti  : 1965  DATE: 23  MRN: [id number]        Reason for Visit / Chief Complaint: Follow-up (Lab review) and Medication Refill       History of Present Illness / Problem Focused Workflow     Luigi Gotti presents to the clinic with Follow-up (Lab review) and Medication Refill     Initial Visit (2020): 54 y.o. [place] male presenting [place] [facility] [place].   Previous PCP: Dr. CHA Fernandez, last visit 19   PmHx: Type II DM controlled with insulin, Chronic GERD, Hx of Gastroparesis/Gastritis/Ischemic colitis (following GI, Dr. Green), CAD (following Dr. Oreilly, [facility]), PVD (following an interventional cardiologist, Dr. Cespedes), CHF, HLD, and BPH   SHx: EGD, Celiac Bypass, Colonoscopy, Visceral Arteriogram, coronary artery graft angiography, cataract extraction, CABG x 2, Tonsillectomy, Billie, mx vascular procedures (see hx)   FHx: HTN, T2DM, Clotting D/o (mother), Seizure, CVA (father), Breast Ca (mother)   Complaints today: Medication refills      Bp at goal at present.      HgA1c: 7.9%, slightly above  goal   Medications: Novolin 70/30 80 units in the morning and 90 units SQ at night  CBG log: None provided. But pt did express CBGs run between 140s-250s   S/S of hyperglycemia, no or hypoglycemia, no      Chronic GERD managed with Nexium.   He's following Cardio for hx of CAD, PVD, and CHF. He's currently taking ASA, Atorvastatin 80 mg daily, Coreg 25 mg po BID, Digoxin 125 mcg, Furosemide 40 mg po daily, Imdur 60 mg po daily, Ranexa 1,000 mg po BID, and Effient 10 mg po daily. Also has Nitro for use as needed.   Triglycerides 317, LDL 86. Taking Atorvastatin 80 mg po daily.      No acute concerns today.     (20): Pt presenting for f/u.   Bp at goal at present.   HgA1c: 8.2%, slightly above  goal and increased  "from previous   Medications: Novolin 70/30 80 units in the morning and 90 units SQ at night  CBG log: None provided. But pt did express CBGs run between 140s-180s; reports lowest in one-teens; fasting CBG was 152 on yesterday   Denies over S/S of hyperglycemia or hypoglycemia.      eGFR < 60 mL/min. Creatinine 1.40; slight improvement from previous.      Total WBC count elevated. Has been elevated since last year. Total RBC count, H/H slightly decreased. He does report being seen at [facility] clinic on [address] ~ 6  days ago due to cough. He states that he was diagnosed with "bronchitis with wheezing." He reports that a CXR was performed. He was prescribed a Z-pack and cough syrup, both of which he's completed. He continues to c/o dry cough. Denies fever, chills, HA, or SOB.     (8/7/2020): 54 y.o. Scottish male with a PmHx of Type II DM controlled with insulin, Chronic GERD, h/o dysphagia, Hx of Gastroparesis/Gastritis/Ischemic colitis (following GI, Dr. Green), CAD (following Dr. Oreilly, [facility]), PVD (following an interventional cardiologist, Dr. Cespedes), CHF, HLD, and BPH, presenting for routine f/u. HgA1c 7.4%. He is taking Novolin 70/30 80 units in the morning and 92 units SQ at night. He denies any s/s of hypoglycemia or hyperglycemia. Total WBC count elevated. Has been elevated since last year. Total RBC count, H/H slightly decreased as well. He reports cough from last OV is improved and doesn't occur all of the time. Declines any work-up at present after being offered to do a CXR. He continues to follow Cards, Dr. Oreilly, and is following Dr. Georges for foot care. He does have a h/o BPH but hasn't followed up with Uro in some time. Reports feelings of incomplete ejaculation and was told it was probably from prostate issues by another provider. No other problems stated.     (11/9/2020): 55 y.o. Scottish male with a PmHx of Type II DM controlled with insulin, Chronic GERD, h/o dysphagia, Hx of " "Gastroparesis/Gastritis/Ischemic colitis (following GI, Dr. Green), CAD (following JHON Morrison), PVD (following an interventional cardiologist, Dr. Cespedes), CHF, HLD, and BPH, presenting for routine f/u. HgA1c 6.7%. He is taking Novolin 70/30 80 units in the morning and 92 units SQ at night. He denies any s/s of hypoglycemia or hyperglycemia. Total WBC count elevated. Has been elevated since last year. Renal indices slightly decreased w/ small rise in creatinine. He admits that he's  not been drinking a lot of water. Previously referred to Uro but reports no appt made. He is requesting the second Shingrix today. He continues to follow Cards, CV surgeon, GI, and podiatry. States had a colonoscopy about 1 week ago and noted to have polyps. Reports GI prescribed ferrous gluconate. Will request results. He reports chronic dyspnea x many years. Reports no improvement after cardiac sx but denies any worsening dyspnea. No other problems stated.     Telemedicine Visit (12/21/2020): 55 y.o. Ecuad[place] male with a PmHx of Type II DM controlled with insulin, Chronic GERD, h/o dysphagia, Hx of Gastroparesis/Gastritis/Ischemic colitis (following GI, Dr. Green), CAD (following J[facility]ON Morrison), PVD (following an interventional cardiologist, Dr. Cespedes), CHF, HLD, and BPH, presenting for ED f/u via telemedicine. Apparently pt presented to [facility] last week due to issues with blood sugars but states he was diagnosed with influenza and hospitalized for suspected COVID. He is stating that sugar was in the "20s," but also stated that during the hospitalization "they checked it six times a day until it came down to 100." So unknown if pt had issues with hyperglycemia or hypoglycemia. States "They gave me something [insulin] different in there and it messed my numbers up!" At present, he remains on his routine dose of Novolin 70/30 and reports CBGs have been in the 120s-140s. He reports, "I'm doing good now." At time of visit, " ED records have not been received but have been requested. Reports has a cough but it is improved. Denies CP or SOB. He has no other concerns. F/u scheduled 2/2021.     (2/10/2021): 55 y.o. [place] Zealander male with a PmHx of Type II DM controlled with insulin, Chronic GERD, h/o dysphagia, Hx of Gastroparesis/Gastritis/Ischemic colitis (following GI, Dr. Green), Celiac Dz, CAD (following Dr. Oreilly, [facility]), PVD (following an interventional cardiologist, Dr. Cespedes), CHF, HLD, and BPH, presenting for routine f/u. Conducted a phone visit 12/2020 for ED f/u. At the time, pt reported that he was diagnosed and treated for the flu while hospitalized at [facility]. Records received later revealed that that pt was being treated for COVID pneumonia after testing + for COVID ~9 days prior to hospital admit on 12/10/20. However, pt denies this and states he was never told he had COVID despite prescription medications being prescribed on 12/1/20 by a physician who's noted to have worked in the ED at [facility]. During his hospitalization from 12/10/20 to 12/17/20, he was treated for COVID pneumonia. D-dimer was elevated but CTA was negative for PE. He is taking Eliquis. Plans to speak to Cards tomorrow about continued use of this as he's on mx antiplatelets. Chemistry panel improved. Glucose low 100s. He denies any s/s of hypoglycemia. Total WBC count improved. He was previously referred to Heme clinic for unexplained leukocytosis. He has an appt 3/23/21. He will see his Cardiologist, Dr. Oreilly, on tomorrow. He has a chronic h/o dyspnea (a CT thorax was ordered in November but pt never completed) and intermittent angina that is no different from baseline. He denies any CP or SOB at this time. Last used Nitro a few days ago. He has no other complaints.      During hospitalization, pt had a CTA chest that revealed:  1.2 cm nodule in right lobe of thyroid gland, no evidence of PE, and consolidative changes throughout lungs     (5/11/2021): 55  y.o. Greek male with a PmHx of Type II DM controlled with insulin, Chronic GERD, h/o dysphagia, Hx of Gastroparesis/Gastritis/Ischemic colitis (following GI, Dr. Green), Celiac Dz, CAD (following JHON Morrison), PVD (following an interventional cardiologist, Dr. Cespedes), CHF, HLD, and BPH, presenting for routine f/u. He was previously referred to Heme clinic for unexplained leukocytosis. He had an appt 3/23/21 but was a No Show. A letter was mailed for pt to call and re-schedule. He will undergo a thyroid US to monitor a 1.2 cm nodule on 6/3/21. Reviewed colonoscopy done by Dr. Emmanuel Green on 9/30/2020. Dx: two polyps removed, tubular adenomas; diverticula disease.      Lab review:   HgA1c 7.1. He continues with Novolin N as prescribed. He does not check CBGs. Denies overt s/s of hypoglycemia or hyperglycemia.   Renal indices stable     He reports that he follows Dr. Alex Duckworth for a retinal d/o. Scheduled to f/u 5/18/21.   Scheduled for LHC 5/13/21 with Dr. Oreilly.   Scheduled with Uro 7/8/21.      No other concerns.     (9/14/2021): 56 y.o. Greek male with a PmHx of Type II DM controlled with insulin, Chronic GERD, h/o dysphagia, Hx of Gastroparesis/Gastritis/Ischemic colitis (following GI, Dr. Green), Celiac Dz, CAD (following JHON Mrorison), PVD (following an interventional cardiologist, Dr. Cespedes), CHF, HLD, and BPH, presenting for routine f/u. Pt underwent a thyroid US in May that did not reveal any acute abnormalities. He was seen by Uro in July for abnl ejaculation. Told likely d/t Flomax. He wished to remain on Flomax. Can f/u PRN. HgA1c 7.5%. Reports compliance with insulin. No log provided. CBC stable compared to baseline. Previously referred to Heme/Onc. Triglycerides 309. Prescribed Atorvastatin 80 mg po daily and Zetia 10 mg po daily. Does not take Zetia daily. Pt will f/u with Dr. Oreilly next month. No other problems stated.     (12/16/2021): 56 y.o. Greek male  with a PmHx of Type II DM controlled with insulin, Chronic GERD, h/o dysphagia, Hx of Gastroparesis/Gastritis/Ischemic colitis (following GI, Dr. Green), Celiac Dz, CAD (following JHON Morrison), PVD (following an interventional cardiologist, Dr. Cespedes), CHF, HLD, CKD, and BPH, presenting for routine f/u. Pt previously referred to Heme/Onc for unexplained leukocytosis and anemia. So far, work-up unrevealing aside from dx of B12 deficiency. He was last seen 12/10 to projected 3 month f/u. HgA1c 7.5%. Reports compliance with insulin. Denies overt s/s of hypoglycemia. No log provided. PSA 1.03. Renal indices stable. Pt reports undergoing a vein procedure to LLE per Dr. Oreilly yesterday. F/u 1/5/22. He reports hitting right great toe ~2 weeks ago. Podiatrist removed nail. Area doing well. He is UTD on influenza vaccine. No other concerns.     (7/14/2022): 57 y.o. Citizen of [place] male with a PmHx of Type II DM controlled with insulin, Chronic GERD, h/o dysphagia, Hx of Gastroparesis/Gastritis/Ischemic colitis (following GI, Dr. Green), Celiac Dz, CAD (following JHON Morrison), PVD (following an interventional cardiologist, Dr. Cespedes), CHF, HLD, CKD, and BPH, presenting for routine f/u. Since last visit, patient was hospitalized on 4/4/22 until 4/13/22 (to [facility]) after experiencing PEA while en route to the ED via EMS from home. He achieved ROSC after about 12 min of CPD, epi, and bicarb. He was intubated and admitted to the ICU upon arrival the the ED. He was treated for possible PNA while hospitalized. Cards did see him but signed off, recommending outpatient f/u. He'd had a LHC 3/9/22 that revealed nonobstructive CAD. In hospital, an Echo done 4/5/22 showed LVEF 35-40% with grade 2 diastolic dysfunction. He required 2 units of PRBCs in hospital. He also experienced a fall during admission. CT head was negative for acute bleeds. He was ultimately discharged to inpatient rehab at [facility] where he reports he stayed for 2 weeks. Then, he presented to [facility] ED on 5/2/22 with c/o shortness of breath and bilateral lower extremity swelling. EMS reported patient with respiratory distress and pulse ox in the mid 70s upon their arrival to his home. He was placed on CPAP and has some improvement in oxygenation and work of breathing. There is mention of seizures occurring while hospitalized at [facility] (apparently MRI brain and EEG done--unable to see results at this time). He developed a wound to right ankle/heel during last hospitalization as well. He was discharged from [facility] on 5/24/22 and transferred to [facility]. States discharged from [facility] approximately one week ago with [facility]. He is receiving wound care to ankle/heel wound and P.T. at home. Admits some decompensation in overall physical status due to an approximate 3-month long hospitalization. He still mentions chronic dizziness, which was present prior to April hospitalization, but overall stable. Will see Dr. Oreilly on 7/22/22. This will be his first visit post-hospitalization. No other concerns.     (8/24/2022): 57 y.o. Chilean male with a PmHx of Type II DM controlled with insulin, Chronic GERD, h/o dysphagia, Hx of Gastroparesis/Gastritis/Ischemic colitis (following GI, Dr. Green), Celiac Dz, CAD (following Dr. Oreilly, [facility]), PVD (following an interventional cardiologist, Dr. Cespedes), CHF, HLD, CKD, and BPH, presenting for routine f/u. Lab review significant for: H/H 11.3/34.9, BUN 14, creatinine 1.62, eGFR 49, HgA1c 8.1, FBG 91. Patient reports recently seeing Dr. Oreilly and having "fluid pill" decreased by 50% 2/2 "dehydration." He will f/u at the end of September. Does report 1-lb weight gain since yesterday but no LE swelling at present, SOB, or CP. HgA1c did increase from previous, however, patient reports glucose was as high as 400s during hospitalization. States during last week, glucose 100s-200. Alleges glucose dropped to "15" " last night. He did not lose consciousness, but was dizzy and weak. Drank orange juice with complete resolution in symptoms. Admits last ate [employer] around 4 pm yesterday and did not eat when he took insulin (Novolin 70/30) last night. Right ankle wound is improved per report. C/w home health care. Scheduled in Neuro in November. Will need refill on Keppra and Vimpat at this time. No seizure activity reported since last visit.     (10/14/2022): 57 y.o. American male with a PmHx of Type II DM controlled with insulin, Chronic GERD, h/o dysphagia, Hx of Gastroparesis/Gastritis/Ischemic colitis (following GI, Dr. Green), Celiac Dz, CAD (following JHON Morrison), PVD (following an interventional cardiologist, Dr. Cespedes), CHF, HLD, CKD, and BPH, presenting for routine f/u. Lab review significant for: creatinine 1.78, eGFR 44, HgA1c 7.2, , alk phos 155. Patient reports recently seeing Dr. Oreilly and having torsemide increased back to BID. Reports seen by Dr. Barrientos last month for eye exam. Right ankle wound continues to improve per report. C/w home health care. C/o hard, infrequent BM. Last BM 2 days ago. He was taking Lactulose with relief, but is out. Amenable to receiving the influenza vaccine. No other concerns.     (2/14/2023): 57 y.o. American male with a PmHx of Type II DM controlled with insulin, Chronic GERD, h/o dysphagia, Hx of Gastroparesis/Gastritis/Ischemic colitis (following GI, Dr. Green), Celiac Dz, CAD (following JHON Morrison), PVD (following an interventional cardiologist, Dr. Cespedes), CHF, HLD, CKD, and BPH, presenting for routine f/u. Patient with recent hospitalization from 2/3/2023 to 2/9/2023 with SOB noted at clinic visit with Dr. Oreilly. In ED, he was found to have NINFA on CKD. He was diruresed until euvolemic. He underwent a LHC 2/7/23. Results below. Recent labs significant for:  Creatinine 1.91 (trending down)  eGFR 40 (slightly improved)  BUN 23  LDL 62  A1c 7.0%  (patient relates CBGs were 300s-400s during hospitalization but improved since he's back home; glucose 70s on yesterday's labs. Denies any overt s/s of hypoglycemia)     He is scheduled with Renal clinic 2/20/23 and will f/u with Dr. Oreilly in clinic on Friday.     [facility] 2.7.23  Left main-patent  Lad-mid InStent chronic total occlusion, competitive flow noted at diagonal  LCX-40% proximal InStent restenosis unchanged from previous cath.  Distal circumflex small vessel just beyond stent edge, appears to have 80% stenosis vessel is 1 mm in size  RCA-patent dominant vessel  Lima to Lad-diagonal jump graft- widely patent     (5/15/2023): 58 y.o. Armenian male with a PmHx of Type II DM controlled with insulin, Chronic GERD, h/o dysphagia, Hx of Gastroparesis/Gastritis/Ischemic colitis (following GI, Dr. Green), Celiac Dz, CAD (following Dr. Oreilly, [facility]), PVD (following an interventional cardiologist, Dr. Cespedes), CHF, HLD, CKD, and BPH, presenting for routine diabetic f/u. He was here in February, but most recent labs were drawn 3/30/23 because he'd gone to lab for Uro, and all orders were pulled. His A1c is now 6.7%; decreased from 7.0%. He continues with Novolin 70/30 45 units BID. Denies any overt s/s of hypoglycemia or hyperglycemia. Of note, his Novolin 70/30 has been noted prescribed by mirtha Veliz, but he alleges he never followed him for diabetic care. States his son goes there but he is not a patient. He has no CBG log. TFTs WNL. Renal function stable compared to baseline. He will f/u in renal clinic 5/22/23. PSA noted 1.02. He is s/p an Echo 4/28/23 which revealed:  Global Hypokinesia with LV systolic dysfunction. LVEF-20%    He is now on Entresto and has also been prescribed Farxiga per cardiologist Dr. Hickey. He denies any new or worsening chest pain or SOB. States chronic symptoms are stable.    Today's Visit (8/17/2023): 58 y.o. Armenian male with a PmHx of Type II DM controlled with insulin,  Chronic GERD, h/o dysphagia, Hx of Gastroparesis/Gastritis/Ischemic colitis (following GI, Dr. Green), Celiac Dz, CAD (following JHON Morrison), PVD (following an interventional cardiologist, Dr. Cespedes), CHF, HLD, CKD, and BPH, presenting for routine diabetic f/u. His A1c is now 6.5%; decreased from 6.7%. He continues with Novolin 70/30 45 units BID. Denies any overt s/s of hypoglycemia or hyperglycemia. Of note, his Novolin 70/30 has been noted prescribed by mirtha Veliz, but he alleges he never followed him for diabetic care. States his son goes there but he is not a patient. He has no CBG log; not assessing CBGs at home. Renal function stable compared to baseline, and minimally improved from June assessment. He did f/u with Renal clinic 7/3/23.     Recent CBC revealed leukocytosis and anemia. Patient's WBC count has been noted waxing and waning dating back to 2017 (8.4-22.6); averaging 11.8-12.5 since 2/2023. RBC count and H/H noted similarly decreased dating back to 2017, with recent assessment yielding overall  improved numbers. He denies any overt s/s of anemia at this time.    He relates that he's been referred to a specialist in [place] 2/2 decreased hearing. States may need to have surgery.     08/16/23 12:10--FLP:  Cholesterol: 139  HDL: 41  LDL Cholesterol External: 77.00  Total Cholesterol/HDL Ratio: 3  Triglycerides: 106  Very Low Density Lipoprotein: 21  Prescribed Atorvastatin 80 mg po daily & Zetia 10 mg po daily for HLD.    Requesting medication refills. No other concerns.           Review of Systems     Review of Systems   Constitutional: Negative.    HENT: Negative.     Eyes: Negative.    Respiratory: Negative.  Negative for apnea, cough, choking, chest tightness, shortness of breath, wheezing and stridor.    Cardiovascular: Negative.  Negative for chest pain, palpitations and leg swelling.   Gastrointestinal: Negative.    Endocrine: Negative.    Genitourinary: Negative.     Musculoskeletal: Negative.    Skin: Negative.    Allergic/Immunologic: Negative.    Neurological: Negative.    Hematological: Negative.    Psychiatric/Behavioral: Negative.         Medical / Social / Family History     Past Medical History:   Diagnosis Date    Anxiety     Arthritis     BPH (benign prostatic hyperplasia)     CAD (coronary artery disease)     CHF (congestive heart failure)     Depression     Dizziness     GERD (gastroesophageal reflux disease)     Hypertension     Ischemic cardiomyopathy     Myocardial infarction     Obesity     Other hyperlipidemia     Oxygen dependent     3L/NC AT HS    PVD (peripheral vascular disease)     Renal disorder     kidney stones    Seizures     SOB (shortness of breath) on exertion     Thyroid disease     Type 2 diabetes mellitus without complications     Weakness of both legs        Past Surgical History:   Procedure Laterality Date    CARDIAC DEFIBRILLATOR PLACEMENT N/A 6/9/2023    Procedure: Insertion, ICD;  Surgeon: Perry Eldridge MD;  Location: [facility] CATH LAB;  Service: Cardiology;  Laterality: N/A;  SICD W/ ANEST. (BS)    CHOLECYSTECTOMY      COLONOSCOPY      multiple    CORONARY ARTERY BYPASS GRAFT      CORONARY STENT PLACEMENT      ESOPHAGOGASTRODUODENOSCOPY      LEFT HEART CATHETERIZATION N/A 02/07/2023    Procedure: Left heart cath;  Surgeon: Jamin Melara MD;  Location: [facility] CATH LAB;  Service: Cardiology;  Laterality: N/A;    RIGHT HEART CATHETERIZATION Right 09/20/2022    Procedure: INSERTION, CATHETER, RIGHT HEART;  Surgeon: Cory Oreilly MD;  Location: [facility] CATH LAB;  Service: Cardiology;  Laterality: Right;  CARDIOMEMS RJ ACCESS    STENT, PERIPHERAL GRAFT      TONSILLECTOMY         Social History    reports that he has never smoked. He has never been exposed to tobacco smoke. He has never used smokeless tobacco. He reports that he does not currently use drugs. He reports that he does not drink alcohol.    Family History  .'s family  history includes Cancer in his mother; Diabetes in his brother, mother, and sister; Hypertension in his father and mother; Kidney disease in his brother; Stroke in his father.    Medications and Allergies     Medications  Current Outpatient Medications   Medication Instructions    alfuzosin (UROXATRAL) 10 mg, Oral, With breakfast    aspirin (ECOTRIN) 81 mg, Oral, Daily    atorvastatin (LIPITOR) 80 mg, Oral, Nightly    azelastine (ASTELIN) 137 mcg (0.1 %) nasal spray 1 spray, Nasal, 2 times daily    b complex vitamins tablet 1 tablet, Oral, Daily    carvediloL (COREG) 25 mg, Oral, 2 times daily    cyanocobalamin (VITAMIN B-12) 2,000 mcg, Oral, Daily    ENTRESTO 24-26 mg per tablet 1 tablet, Oral, 2 times daily    eplerenone (INSPRA) 25 mg, Oral, Daily    ezetimibe (ZETIA) 10 mg, Oral, Daily    FARXIGA 10 mg, Oral, Daily    ferrous gluconate (FERGON) 324 MG tablet 1 tablet, Oral    fluticasone propionate (FLONASE) 50 mcg/actuation nasal spray Flonase Allergy Relief Take No date recorded No form recorded No frequency recorded No route recorded No set duration recorded No set duration amount recorded active No dosage strength recorded No dosage strength units of measure recorded    hydrOXYzine (ATARAX) 50 mg, Oral, 3 times daily    isosorbide mononitrate (IMDUR) 60 mg, Oral, Daily    lactulose (CHRONULAC) 10 g, Oral, 2 times daily PRN    levETIRAcetam (KEPPRA) 500 mg, Oral, 2 times daily    levocetirizine (XYZAL) 5 mg, Oral, Nightly PRN    levothyroxine (SYNTHROID) 25 mcg, Oral, Before breakfast    metoclopramide HCl (REGLAN) 10 mg, Oral, Daily    montelukast (SINGULAIR) 10 mg tablet TAKE 1 TABLET BY MOUTH EVERY DAY    nitroGLYCERIN (NITROSTAT) 0.4 mg, Sublingual, As needed (PRN)    NOVOLIN 70/30 U-100 INSULIN 100 unit/mL (70-30) injection 45 Units, Subcutaneous, 2 times daily with meals    omeprazole (PRILOSEC) 40 mg, Oral, Nightly    prasugreL (EFFIENT) 10 mg, Oral, Daily    QUEtiapine (SEROQUEL) 25 mg, Oral, Nightly     ranolazine (RANEXA) 1,000 mg, Oral, 2 times daily    torsemide (DEMADEX) 20 mg, Oral, 2 times daily    TRUE METRIX GLUCOSE METER Misc TEST 3 TIMES A DAY    TRUETRACK TEST Strp TEST 3 TIMES A DAY    VERQUVO 2.5 mg, Oral, 2 times daily    XARELTO 2.5 mg, Oral, 2 times daily       Allergies  Review of patient's allergies indicates:   Allergen Reactions    Pork derived (porcine)      Other reaction(s): Pork    [employer] choice      Plavix [clopidogrel] Hives and Rash       Physical Examination   Visit Vitals  /82 (BP Location: Left arm, Patient Position: Sitting, BP Method: Large (Automatic))   Pulse 82   Temp 98.1 °F (36.7 °C) (Oral)   Resp 20   Ht 6' (1.829 m)   Wt 114.6 kg (252 lb 9.6 oz)   BMI 34.26 kg/m²     Physical Exam  Constitutional:       Appearance: Normal appearance. He is obese.   HENT:      Head: Normocephalic and atraumatic.      Right Ear: External ear normal.      Left Ear: External ear normal.      Nose: Nose normal.      Mouth/Throat:      Mouth: Mucous membranes are moist.      Dentition: Abnormal dentition. Dental caries present.   Eyes:      Extraocular Movements: Extraocular movements intact.   Cardiovascular:      Rate and Rhythm: Normal rate.      Pulses:           Dorsalis pedis pulses are 1+ on the right side and 1+ on the left side.      Heart sounds: Normal heart sounds.   Pulmonary:      Effort: Pulmonary effort is normal.      Breath sounds: Normal breath sounds.   Abdominal:      General: Abdomen is protuberant. Bowel sounds are normal.      Palpations: Abdomen is soft.   Musculoskeletal:         General: Normal range of motion.      Right lower leg: No edema.      Left lower leg: No edema.   Feet:      Right foot:      Protective Sensation: 10 sites tested.  0 sites sensed.      Skin integrity: Dry skin present.      Toenail Condition: Right toenails are abnormally thick and long.      Left foot:      Protective Sensation: 10 sites tested.  0 sites sensed.      Skin integrity:  Dry skin present.      Toenail Condition: Left toenails are abnormally thick and long.   Skin:     General: Skin is warm and dry.   Neurological:      General: No focal deficit present.      Mental Status: He is alert and oriented to person, place, and time.   Psychiatric:         Mood and Affect: Mood normal.         Behavior: Behavior normal.         Thought Content: Thought content normal.         Judgment: Judgment normal.           Results     Chemistry:  Lab Results   Component Value Date     08/16/2023     05/24/2022    K 5.0 08/16/2023    K 4.4 05/24/2022    CHLORIDE 104 08/16/2023    BUN 21.7 08/16/2023    BUN 20 05/24/2022    CREATININE 1.91 (H) 08/16/2023    CREATININE 0.91 05/24/2022    EGFRNORACEVR 40 08/16/2023    GLUCOSE 176 (H) 08/16/2023    CALCIUM 9.5 08/16/2023    CALCIUM 8.5 (L) 05/24/2022    ALKPHOS 147 08/16/2023    LABPROT 8.5 (H) 08/16/2023    LABPROT 17.3 (H) 05/07/2022    ALBUMIN 3.8 08/16/2023    BILIDIR 0.5 04/13/2022    IBILI 0.40 04/13/2022    AST 17 08/16/2023    ALT 17 08/16/2023    MG 1.70 04/12/2022    PHOS 3.1 02/13/2023        Lab Results   Component Value Date    HGBA1C 6.5 08/16/2023        Hematology:  Lab Results   Component Value Date    WBC 12.45 (H) 08/16/2023    HGB 13.2 (L) 08/16/2023    HCT 40.4 (L) 08/16/2023     08/16/2023       Lipid Panel:  Lab Results   Component Value Date    CHOL 139 08/16/2023    CHOL 83 05/03/2022    HDL 41 08/16/2023    HDL 26 (L) 05/03/2022    LDL 77.00 08/16/2023    TRIG 106 08/16/2023    TRIG 148 05/22/2022    TOTALCHOLEST 3 08/16/2023        Urine:  Lab Results   Component Value Date    COLORUA Light-Yellow 02/13/2023    APPEARANCEUA Clear 02/13/2023    SGUA 1.012 02/13/2023    PHUA 5.5 02/13/2023    PROTEINUA Negative 02/13/2023    GLUCOSEUA Normal 02/13/2023    KETONESUA Negative 02/13/2023    BLOODUA Negative 02/13/2023    NITRITESUA Negative 02/13/2023    LEUKOCYTESUR 25 (A) 02/13/2023    RBCUA 0-5 02/13/2023    WBCUA  0-5 02/13/2023    BACTERIA Trace (A) 02/13/2023    SQEPUA Trace (A) 02/13/2023    HYALINECASTS 6-10 (A) 02/13/2023    CREATRANDUR 91.4 02/13/2023    PROTEINURINE 6.9 02/13/2023    UPROTCREA 0.1 02/13/2023          Assessment        ICD-10-CM ICD-9-CM   1. Stage 3b chronic kidney disease  N18.32 585.3   2. Mixed hyperlipidemia  E78.2 272.2   3. Essential hypertension  I10 401.9   4. Anxiety  F41.9 300.00   5. Gastroesophageal reflux disease, unspecified whether esophagitis present  K21.9 530.81   6. Type 2 diabetes mellitus with diabetic polyneuropathy, with long-term current use of insulin  E11.42 250.60    Z79.4 357.2     V58.67   7. Dystrophic nail  L60.3 703.8   8. New onset seizure  R56.9 780.39   9. Leukocytosis, unspecified type  D72.829 288.60   10. Anemia due to stage 3a chronic kidney disease  N18.31 285.21    D63.1 585.3   11. Wellness examination  Z00.00 V70.0          Plan (including Health Maintenance)     Problem List Items Addressed This Visit          Neuro    New onset seizure    Current Assessment & Plan     Age of Onset : 5/19/2022 while in the hospital for CHF exacerbation   Last seen in Neuro: 3/7/23  Plan: Cont Keppra 500 mg po BID per Neuro  No sz since last visit            Cardiac/Vascular    Hyperlipidemia    Current Assessment & Plan     LDL slightly increased from previous, but still at goal for diabetic  Continue statin and Zetia  Educated on a low-fat, low-cholesterol diet and aerobic exercise (20-30 mins/day x 5 days a week). Encouraged healthy fruits and veggie intake. Increase water intake. Avoid excess ETOH intake and smoking            Essential hypertension       Renal/    Stage 3b chronic kidney disease - Primary    Current Assessment & Plan     Continue mgmt per Renal  Indices stable for now  Avoid NSAIDs (i.e., Advil, Aleve, Ibuprofen, Motrin, Naproxen, Diclofenac, Indocin, Celebrex, Mobic, Voltaren). Avoid the following GI meds: Milk of Mag, Mylanta, Fleets Enema, and  Pepto-Bismol. Okay to take Tylenol (acetaminophen) (if no end-stage liver disease), low dose ASA (81 mg), Miralax, Tums, and Colace. Increase clear fluid intake. Avoid dark sodas.              Oncology    Anemia    Relevant Orders    Ambulatory referral/consult to Hematology / Oncology       Endocrine    Type 2 diabetes mellitus with diabetic polyneuropathy, with long-term current use of insulin    Overview     Current medications: Novolin 70/30 45 units BID  A1c level: 6.5%; goal <8% for pt due to risk of hypoglycemia  CBG trends: None provided  Educated on diabetic diet: Low-fat, Low-carb, Low-cholesterol. Avoid sugary/dark drinks/sodas and white foods (i.e., bread, pasta, potatoes, rice)  Aerobic exercise: 20-30 min/day x 5 days/week  Diabetic Eye Exam: UTD. Dr. Barrientos 9/13/22  Diabetic Foot Exam: 8/17/23. Following Dr. Escalante  Kidney Protection: ARB  Statin Therapy: Yes             Current Assessment & Plan     A1c at goal. No c/o hypoglycemia. Continue regimen  Note, also on Farxiga due to CHF per cards         Relevant Medications    NOVOLIN 70/30 U-100 INSULIN 100 unit/mL (70-30) injection    Other Relevant Orders    Ambulatory referral/consult to Podiatry    Hemoglobin A1C    Comprehensive Metabolic Panel       GI    Gastroesophageal reflux disease    Relevant Medications    omeprazole (PRILOSEC) 40 MG capsule       Other    Wellness examination    Overview     Colon Cancer Screening: colonoscopy done by Dr. Emmanuel Green on 9/30/2020. Dx: two polyps removed, tubular adenomas; diverticula disease  Prostate Cancer Screening Latest Reference Range & Units 03/30/23 15:15   PSA, Screen <=4.00 ng/mL 1.02             Other Visit Diagnoses       Anxiety        Relevant Medications    QUEtiapine (SEROQUEL) 25 MG Tab    Dystrophic nail        Relevant Orders    Ambulatory referral/consult to Podiatry    Leukocytosis, unspecified type        Relevant Orders    Ambulatory referral/consult to Hematology / Oncology               I spent a total of 31 minutes on the day of the visit.  This includes face to face time and non-face to face time preparing to see the patient (eg, review of tests), obtaining and/or reviewing separately obtained history, documenting clinical information in the electronic or other health record, independently interpreting results and communicating results to the patient/family/caregiver, or care coordinator.    For any new or worsening symptoms that are urgent, or for any s/s of MI, CVA, or any other emergent concerns, please visit the ER for further eval. Otherwise, call clinic with questions or concerns.    Health Maintenance Due   Topic Date Due    COVID-19 Vaccine (4 - Pfizer series) 04/08/2022       Future Appointments   Date Time Provider Department Center   10/5/2023 12:30 PM Robina Garrison FNP [facility] NEPHR [facility]   11/28/2023  7:45 AM Quinton Guidry FNP [facility]   1/4/2024 11:30 AM Matt Ray, BRAULIO [facility] UROLO [facility]        Follow up in about 3 months (around 11/17/2023).    Signature:  RON Esquivel  [facility]  [address] [id number]    Date of encounter: 8/17/23

## 2023-09-04 PROCEDURE — G0179 MD RECERTIFICATION HHA PT: HCPCS | Mod: ,,, | Performed by: NURSE PRACTITIONER

## 2023-09-13 ENCOUNTER — LAB REQUISITION (OUTPATIENT)
Dept: LAB | Facility: HOSPITAL | Age: 58
End: 2023-09-13

## 2023-09-13 DIAGNOSIS — I50.9 HEART FAILURE, UNSPECIFIED: ICD-10-CM

## 2023-09-13 LAB
ANION GAP SERPL CALC-SCNC: 13 MEQ/L
BUN SERPL-MCNC: 22.1 MG/DL (ref 8.4–25.7)
CALCIUM SERPL-MCNC: 8.8 MG/DL (ref 8.4–10.2)
CHLORIDE SERPL-SCNC: 101 MMOL/L (ref 98–107)
CO2 SERPL-SCNC: 22 MMOL/L (ref 22–29)
CREAT SERPL-MCNC: 2.21 MG/DL (ref 0.73–1.18)
CREAT/UREA NIT SERPL: 10
GFR SERPLBLD CREATININE-BSD FMLA CKD-EPI: 34 MLS/MIN/1.73/M2
GLUCOSE SERPL-MCNC: 329 MG/DL (ref 74–100)
POTASSIUM SERPL-SCNC: 4.7 MMOL/L (ref 3.5–5.1)
SODIUM SERPL-SCNC: 136 MMOL/L (ref 136–145)

## 2023-09-13 PROCEDURE — 80048 BASIC METABOLIC PNL TOTAL CA: CPT | Performed by: INTERNAL MEDICINE

## 2023-09-15 ENCOUNTER — EXTERNAL HOME HEALTH (OUTPATIENT)
Dept: HOME HEALTH SERVICES | Facility: HOSPITAL | Age: 58
End: 2023-09-15
Payer: MEDICARE

## 2023-10-02 ENCOUNTER — LAB VISIT (OUTPATIENT)
Dept: LAB | Facility: HOSPITAL | Age: 58
End: 2023-10-02
Attending: NURSE PRACTITIONER
Payer: MEDICARE

## 2023-10-02 DIAGNOSIS — N18.32 CKD STAGE G3B/A1, GFR 30-44 AND ALBUMIN CREATININE RATIO <30 MG/G: ICD-10-CM

## 2023-10-02 LAB
ALBUMIN SERPL-MCNC: 3.6 G/DL (ref 3.5–5)
ALBUMIN/GLOB SERPL: 0.8 RATIO (ref 1.1–2)
ALP SERPL-CCNC: 129 UNIT/L (ref 40–150)
ALT SERPL-CCNC: 22 UNIT/L (ref 0–55)
AST SERPL-CCNC: 18 UNIT/L (ref 5–34)
BILIRUB SERPL-MCNC: 0.5 MG/DL
BUN SERPL-MCNC: 31.2 MG/DL (ref 8.4–25.7)
CALCIUM SERPL-MCNC: 9.2 MG/DL (ref 8.4–10.2)
CHLORIDE SERPL-SCNC: 103 MMOL/L (ref 98–107)
CO2 SERPL-SCNC: 28 MMOL/L (ref 22–29)
CREAT SERPL-MCNC: 2.18 MG/DL (ref 0.73–1.18)
GFR SERPLBLD CREATININE-BSD FMLA CKD-EPI: 34 MLS/MIN/1.73/M2
GLOBULIN SER-MCNC: 4.5 GM/DL (ref 2.4–3.5)
GLUCOSE SERPL-MCNC: 140 MG/DL (ref 74–100)
PHOSPHATE SERPL-MCNC: 3.5 MG/DL (ref 2.3–4.7)
POTASSIUM SERPL-SCNC: 4.2 MMOL/L (ref 3.5–5.1)
PROT SERPL-MCNC: 8.1 GM/DL (ref 6.4–8.3)
PTH-INTACT SERPL-MCNC: 228.7 PG/ML (ref 8.7–77)
SODIUM SERPL-SCNC: 140 MMOL/L (ref 136–145)
URATE SERPL-MCNC: 10.5 MG/DL (ref 3.5–7.2)

## 2023-10-02 PROCEDURE — 83970 ASSAY OF PARATHORMONE: CPT

## 2023-10-02 PROCEDURE — 84100 ASSAY OF PHOSPHORUS: CPT

## 2023-10-02 PROCEDURE — 84550 ASSAY OF BLOOD/URIC ACID: CPT

## 2023-10-02 PROCEDURE — 36415 COLL VENOUS BLD VENIPUNCTURE: CPT

## 2023-10-02 PROCEDURE — 80053 COMPREHEN METABOLIC PANEL: CPT

## 2023-10-04 RX ORDER — MONTELUKAST SODIUM 10 MG/1
TABLET ORAL
Qty: 90 TABLET | Refills: 1 | Status: SHIPPED | OUTPATIENT
Start: 2023-10-04 | End: 2024-04-01

## 2023-10-05 ENCOUNTER — OFFICE VISIT (OUTPATIENT)
Dept: NEPHROLOGY | Facility: CLINIC | Age: 58
End: 2023-10-05
Payer: MEDICARE

## 2023-10-05 VITALS
BODY MASS INDEX: 35.3 KG/M2 | DIASTOLIC BLOOD PRESSURE: 77 MMHG | HEIGHT: 72 IN | WEIGHT: 260.63 LBS | HEART RATE: 69 BPM | TEMPERATURE: 98 F | RESPIRATION RATE: 18 BRPM | SYSTOLIC BLOOD PRESSURE: 122 MMHG | OXYGEN SATURATION: 100 %

## 2023-10-05 DIAGNOSIS — E79.0 HYPERURICEMIA: ICD-10-CM

## 2023-10-05 DIAGNOSIS — I10 PRIMARY HYPERTENSION: ICD-10-CM

## 2023-10-05 DIAGNOSIS — Z23 FLU VACCINE NEED: ICD-10-CM

## 2023-10-05 DIAGNOSIS — N18.32 CKD STAGE G3B/A1, GFR 30-44 AND ALBUMIN CREATININE RATIO <30 MG/G: Primary | ICD-10-CM

## 2023-10-05 PROCEDURE — 99214 PR OFFICE/OUTPT VISIT, EST, LEVL IV, 30-39 MIN: ICD-10-PCS | Mod: S$PBB,,, | Performed by: NURSE PRACTITIONER

## 2023-10-05 PROCEDURE — G0008 ADMIN INFLUENZA VIRUS VAC: HCPCS | Mod: PBBFAC

## 2023-10-05 PROCEDURE — 99214 OFFICE O/P EST MOD 30 MIN: CPT | Mod: S$PBB,,, | Performed by: NURSE PRACTITIONER

## 2023-10-05 PROCEDURE — 99215 OFFICE O/P EST HI 40 MIN: CPT | Mod: PBBFAC | Performed by: NURSE PRACTITIONER

## 2023-10-05 RX ORDER — FINASTERIDE 5 MG/1
5 TABLET, FILM COATED ORAL
COMMUNITY
Start: 2023-09-24

## 2023-10-05 RX ORDER — VALSARTAN 80 MG/1
80 TABLET ORAL DAILY
Qty: 90 TABLET | Refills: 3
Start: 2023-10-05 | End: 2024-01-17

## 2023-10-05 RX ORDER — VALSARTAN 80 MG/1
80 TABLET ORAL
COMMUNITY
Start: 2023-08-21 | End: 2023-10-05

## 2023-10-05 NOTE — PROGRESS NOTES
[facility]  Nephrology Clinic Note    Chief complaint: Chronic Kidney Disease (RTC, took meds, w/o complaints)    History of present illness:   Luigi Gotti is a 58 y.o. Other male with past medical history of chronic kidney disease, heart failure with reduced ejection fraction (20%, s/p ICD 6/9/2023), coronary artery disease, gastroparesis, gastritis, ischemic colitis, peripheral vascular disease, dyslipidemia, hypertension, BPH, diabetes mellitus type 2, and obesity.    Patient presents for follow-up appointment in Nephrology Clinic today. Denies complaints.     Review of Systems  12 point review of systems conducted, negative except as stated in the history of present illness.    Allergies: Patient is allergic to pork derived (porcine) and plavix [clopidogrel].     Past Medical History:  has a past medical history of Anxiety, Arthritis, BPH (benign prostatic hyperplasia), CAD (coronary artery disease), CHF (congestive heart failure), Depression, Dizziness, GERD (gastroesophageal reflux disease), Hypertension, Ischemic cardiomyopathy, Myocardial infarction, Obesity, Other hyperlipidemia, Oxygen dependent, PVD (peripheral vascular disease), Renal disorder, Seizures, SOB (shortness of breath) on exertion, Thyroid disease, Type 2 diabetes mellitus without complications, and Weakness of both legs.    Procedure History:  has a past surgical history that includes Coronary artery bypass graft; Tonsillectomy; Cholecystectomy; Right heart catheterization (Right, 09/20/2022); Left heart catheterization (N/A, 02/07/2023); stent, peripheral graft; Coronary stent placement; Colonoscopy; Esophagogastroduodenoscopy; and Cardiac defibrillator placement (N/A, 6/9/2023).    Family History: family history includes Cancer in his mother; Diabetes in his brother, mother, and sister; Hypertension in his father and mother; Kidney disease in his brother; Stroke in his father.    Social History:  reports  "that he has never smoked. He has never been exposed to tobacco smoke. He has never used smokeless tobacco. He reports that he does not currently use drugs. He reports that he does not drink alcohol.    Physical exam  /77 (BP Location: Right arm, Patient Position: Sitting, BP Method: Large (Automatic))   Pulse 69   Temp 98.2 °F (36.8 °C) (Oral)   Resp 18   Ht 5' 11.65" (1.82 m)   Wt 118.2 kg (260 lb 9.6 oz)   SpO2 100%   BMI 35.69 kg/m²   General appearance: Patient is in no acute distress.  Skin: No rashes or wounds.  HEENT: PERRLA, EOMI, no scleral icterus, no JVD. Neck is supple.  Chest: Respirations are unlabored. Lungs sounds are clear.   Heart: S1, S2.   Abdomen: Benign.  : Deferred.  Extremities: No edema, peripheral pulses are palpable.   Neuro: No focal deficits.     Home Medications:    Current Outpatient Medications:     alfuzosin (UROXATRAL) 10 mg Tb24, Take 1 tablet (10 mg total) by mouth daily with breakfast., Disp: 90 tablet, Rfl: 3    aspirin (ECOTRIN) 81 MG EC tablet, Take 81 mg by mouth once daily., Disp: , Rfl:     atorvastatin (LIPITOR) 80 MG tablet, Take 80 mg by mouth every evening., Disp: , Rfl:     azelastine (ASTELIN) 137 mcg (0.1 %) nasal spray, 1 spray by Nasal route 2 (two) times daily., Disp: , Rfl:     b complex vitamins tablet, Take 1 tablet by mouth once daily., Disp: , Rfl:     carvediloL (COREG) 25 MG tablet, Take 25 mg by mouth 2 (two) times daily., Disp: , Rfl:     cyanocobalamin (VITAMIN B-12) 1000 MCG tablet, Take 2,000 mcg by mouth once daily at 6am., Disp: , Rfl:     eplerenone (INSPRA) 25 MG Tab, Take 25 mg by mouth once daily., Disp: , Rfl:     ezetimibe (ZETIA) 10 mg tablet, Take 10 mg by mouth once daily at 6am., Disp: , Rfl:     FARXIGA 10 mg tablet, Take 10 mg by mouth once daily., Disp: , Rfl:     ferrous gluconate (FERGON) 324 MG tablet, Take 1 tablet by mouth., Disp: , Rfl:     finasteride (PROSCAR) 5 mg tablet, Take 5 mg by mouth., Disp: , Rfl:     " fluticasone propionate (FLONASE) 50 mcg/actuation nasal spray, Flonase Allergy Relief Take No date recorded No form recorded No frequency recorded No route recorded No set duration recorded No set duration amount recorded active No dosage strength recorded No dosage strength units of measure recorded, Disp: , Rfl:     hydrOXYzine (ATARAX) 50 MG tablet, Take 50 mg by mouth 3 (three) times daily., Disp: , Rfl:     isosorbide mononitrate (IMDUR) 60 MG 24 hr tablet, Take 60 mg by mouth once daily., Disp: , Rfl:     lactulose (CHRONULAC) 10 gram/15 mL solution, Take 15 mLs (10 g total) by mouth 2 (two) times daily as needed (constipation)., Disp: 2700 mL, Rfl: 1    levETIRAcetam (KEPPRA) 500 MG Tab, Take 1 tablet (500 mg total) by mouth 2 (two) times daily., Disp: 180 tablet, Rfl: 1    levocetirizine (XYZAL) 5 MG tablet, TAKE 1 TABLET (5 MG TOTAL) BY MOUTH NIGHTLY AS NEEDED FOR ALLERGIES, Disp: 90 tablet, Rfl: 1    levothyroxine (SYNTHROID) 25 MCG tablet, Take 1 tablet (25 mcg total) by mouth before breakfast., Disp: 90 tablet, Rfl: 1    metoclopramide HCl (REGLAN) 10 MG tablet, Take 1 tablet (10 mg total) by mouth once daily., Disp: 90 tablet, Rfl: 1    montelukast (SINGULAIR) 10 mg tablet, TAKE 1 TABLET BY MOUTH EVERY DAY, Disp: 90 tablet, Rfl: 1    nitroGLYCERIN (NITROSTAT) 0.4 MG SL tablet, Place 0.4 mg under the tongue as needed., Disp: , Rfl:     NOVOLIN 70/30 U-100 INSULIN 100 unit/mL (70-30) injection, Inject 45 Units into the skin 2 (two) times daily with meals., Disp: 130 mL, Rfl: 1    omeprazole (PRILOSEC) 40 MG capsule, Take 1 capsule (40 mg total) by mouth every evening., Disp: 90 capsule, Rfl: 1    prasugreL (EFFIENT) 10 mg Tab, Take 10 mg by mouth once daily., Disp: , Rfl:     QUEtiapine (SEROQUEL) 25 MG Tab, Take 1 tablet (25 mg total) by mouth every evening., Disp: 90 tablet, Rfl: 1    ranolazine (RANEXA) 1,000 mg Tb12, Take 1,000 mg by mouth 2 (two) times daily., Disp: , Rfl:     torsemide (DEMADEX) 20  MG Tab, Take 20 mg by mouth 2 (two) times a day., Disp: , Rfl:     TRUE METRIX GLUCOSE METER Misc, TEST 3 TIMES A DAY, Disp: , Rfl:     TRUETRACK TEST Strp, TEST 3 TIMES A DAY, Disp: , Rfl:     VERQUVO 2.5 mg Tab, Take 2.5 mg by mouth 2 (two) times a day., Disp: , Rfl:     XARELTO 2.5 mg Tab, Take 2.5 mg by mouth 2 (two) times a day., Disp: , Rfl:     valsartan (DIOVAN) 80 MG tablet, Take 1 tablet (80 mg total) by mouth once daily., Disp: 90 tablet, Rfl: 3    Laboratory data    Serum  Lab Results   Component Value Date    WBC 12.45 (H) 08/16/2023    HGB 13.2 (L) 08/16/2023    HCT 40.4 (L) 08/16/2023     08/16/2023    IRON 32 05/09/2022    TIBC 265 05/09/2022    TRANS 270 04/05/2022    LABIRON 7 04/05/2022    FERRITIN 377.3 (H) 05/09/2022    FOLATE 6.1 04/05/2022    [id number] 943 04/05/2022    HAPTO 436 (H) 10/25/2021     10/25/2021     10/02/2023    K 4.2 10/02/2023    CHLORIDE 103 10/02/2023    CO2 28 10/02/2023    BUN 31.2 (H) 10/02/2023    CREATININE 2.18 (H) 10/02/2023    EGFRNORACEVR 34 10/02/2023    GLUCOSE 140 (H) 10/02/2023    CALCIUM 9.2 10/02/2023    ALKPHOS 129 10/02/2023    LABPROT 8.1 10/02/2023    ALBUMIN 3.6 10/02/2023    BILIDIR 0.5 04/13/2022    IBILI 0.40 04/13/2022    AST 18 10/02/2023    ALT 22 10/02/2023    MG 1.70 04/12/2022    PHOS 3.5 10/02/2023      Lab Results   Component Value Date    HGBA1C 6.5 08/16/2023    .7 (H) 10/02/2023    HIV Nonreactive 08/22/2022    HEPCAB Nonreactive 08/22/2022    HEPBSURFAG Nonreactive 08/22/2022     Urine:  Lab Results   Component Value Date    COLORUA Light-Yellow 02/13/2023    APPEARANCEUA Clear 02/13/2023    SGUA 1.012 02/13/2023    PHUA 5.5 02/13/2023    PROTEINUA Negative 02/13/2023    GLUCOSEUA Normal 02/13/2023    KETONESUA Negative 02/13/2023    BLOODUA Negative 02/13/2023    NITRITESUA Negative 02/13/2023    LEUKOCYTESUR 25 (A) 02/13/2023    RBCUA 0-5 02/13/2023    WBCUA 0-5 02/13/2023    BACTERIA Trace (A) 02/13/2023     SQEPUA Trace (A) 02/13/2023    HYALINECASTS 6-10 (A) 02/13/2023    CREATRANDUR 91.4 02/13/2023    PROTEINURINE 6.9 02/13/2023    UPROTCREA 0.1 02/13/2023         Imaging  US Retroperitoneal Limited 01/06/2023     Right Kidney:  Length: 10.8 x 5.5 x 5.7 cm  Appearance: Normal echogenicity.  Collecting system: No hydronephrosis  Stones: None  Cyst/Mass: Hyperechoic area in the upper pole of the right kidney measures 1.1 x 9 mm x 1.1 cm a small angiomyolipoma cannot be completely excluded  Left Kidney:  Length: 12 x 5.7 x 4.4 cm  Appearance: Normal echogenicity.  Collecting system: No hydronephrosis  Stones: Small calcification identified in the upper pole measuring 9 mm by 3 mm x 7 mm these might represent a nonocclusive stone  Cyst/Mass: None  Bladder:  Normal  Vessels:  Visualized portions of the IVC and aorta have a normal grayscale appearance.     Impression  Small echogenic area in the upper pole of the kidney small angiomyolipoma cannot be completely excluded.  Nonocclusive stone of the left kidney.  No other abnormalities  Electronically signed by: Ranjan Taveras  Date:                                         01/06/2023  Time:                                        15:19    Impression and plan     CKD stage G3b/A1, GFR 30-44 and albumin creatinine ratio <30 mg/g  Serum creatinine has been relatively stable over the past several months, there is no significant proteinuria or active urinary sediment.    Continue RAASi, MRA, and SGLT 2 inhibitor therapy along with risk factor management/renal sparing activities. Continue renal sparing activities:  -2 g a day dietary sodium restriction  -optimize glycemic control (goal A1c is less than 7%)  -control high blood pressure (goal blood pressure is less than 130/80, patient was advised to check blood pressure once or twice a week and bring blood pressure logs to next office visit)  -exercise at least 30 minutes a day, 5 days a week  -maintain healthy weight  -decrease  or stop alcohol use  -do not smoke  -stay well hydrated (drink water only, avoid juices, sweet tea, and sodas)  -ask about staying up-to-date on vaccinations (flu vaccine, pneumonia vaccine, hepatitis B vaccine)  -avoid excessive use of NSAIDs (ibuprofen, naproxen, Aleve, Advil, Toradol, Mobic), take Tylenol as needed for headache or mild pain  -take cholesterol lowering medications if prescribed (LDL goal less than 100)  Follow up in about 4 months (around 2/5/2024).    Hyperuricemia  Patient is asymptomatic.  Continue low purine diet.  Will monitor for symptoms of gout flare.    Primary hypertension  Blood pressure reading is at goal, continue current antihypertensive regimen and 2 g a day dietary sodium restriction.      BMI 35.69, adult  Lifestyle and dietary interventions discussed, patient encouraged to maintain non-sedentary lifestyle and well-balanced diet.     Flu vaccine need  -     Influenza - Quadrivalent *Preferred* (6 months+) (PF)  Will give flu vaccine today.          10/5/2023  Robina Garrison NP  [facility] Nephrology

## 2023-10-11 DIAGNOSIS — Z79.4 TYPE 2 DIABETES MELLITUS WITH DIABETIC POLYNEUROPATHY, WITH LONG-TERM CURRENT USE OF INSULIN: Primary | ICD-10-CM

## 2023-10-11 DIAGNOSIS — E11.42 TYPE 2 DIABETES MELLITUS WITH DIABETIC POLYNEUROPATHY, WITH LONG-TERM CURRENT USE OF INSULIN: Primary | ICD-10-CM

## 2023-10-11 RX ORDER — CALCIUM CITRATE/VITAMIN D3 200MG-6.25
TABLET ORAL
Qty: 100 STRIP | Refills: 11 | Status: SHIPPED | OUTPATIENT
Start: 2023-10-11 | End: 2023-10-13

## 2023-10-12 ENCOUNTER — LAB REQUISITION (OUTPATIENT)
Dept: LAB | Facility: HOSPITAL | Age: 58
End: 2023-10-12
Payer: MEDICARE

## 2023-10-12 DIAGNOSIS — I50.20 UNSPECIFIED SYSTOLIC (CONGESTIVE) HEART FAILURE: ICD-10-CM

## 2023-10-12 DIAGNOSIS — I10 ESSENTIAL (PRIMARY) HYPERTENSION: ICD-10-CM

## 2023-10-12 DIAGNOSIS — I25.10 ATHEROSCLEROTIC HEART DISEASE OF NATIVE CORONARY ARTERY WITHOUT ANGINA PECTORIS: ICD-10-CM

## 2023-10-12 DIAGNOSIS — I13.0 HYPERTENSIVE HEART AND CHRONIC KIDNEY DISEASE WITH HEART FAILURE AND STAGE 1 THROUGH STAGE 4 CHRONIC KIDNEY DISEASE, OR UNSPECIFIED CHRONIC KIDNEY DISEASE: ICD-10-CM

## 2023-10-12 LAB
ANION GAP SERPL CALC-SCNC: 11 MEQ/L
BASOPHILS # BLD AUTO: 0.02 X10(3)/MCL
BASOPHILS NFR BLD AUTO: 0.2 %
BUN SERPL-MCNC: 18.2 MG/DL (ref 8.4–25.7)
CALCIUM SERPL-MCNC: 9.1 MG/DL (ref 8.4–10.2)
CHLORIDE SERPL-SCNC: 105 MMOL/L (ref 98–107)
CO2 SERPL-SCNC: 26 MMOL/L (ref 22–29)
CREAT SERPL-MCNC: 1.72 MG/DL (ref 0.73–1.18)
CREAT/UREA NIT SERPL: 11
EOSINOPHIL # BLD AUTO: 0.24 X10(3)/MCL (ref 0–0.9)
EOSINOPHIL NFR BLD AUTO: 2.3 %
ERYTHROCYTE [DISTWIDTH] IN BLOOD BY AUTOMATED COUNT: 12.6 % (ref 11.5–17)
GFR SERPLBLD CREATININE-BSD FMLA CKD-EPI: 46 MLS/MIN/1.73/M2
GLUCOSE SERPL-MCNC: 134 MG/DL (ref 74–100)
HCT VFR BLD AUTO: 38 % (ref 42–52)
HGB BLD-MCNC: 12.1 G/DL (ref 14–18)
IMM GRANULOCYTES # BLD AUTO: 0.09 X10(3)/MCL (ref 0–0.04)
IMM GRANULOCYTES NFR BLD AUTO: 0.9 %
INR PPP: 1.1
LYMPHOCYTES # BLD AUTO: 1.86 X10(3)/MCL (ref 0.6–4.6)
LYMPHOCYTES NFR BLD AUTO: 18 %
MCH RBC QN AUTO: 29.2 PG (ref 27–31)
MCHC RBC AUTO-ENTMCNC: 31.8 G/DL (ref 33–36)
MCV RBC AUTO: 91.8 FL (ref 80–94)
MONOCYTES # BLD AUTO: 0.56 X10(3)/MCL (ref 0.1–1.3)
MONOCYTES NFR BLD AUTO: 5.4 %
NEUTROPHILS # BLD AUTO: 7.59 X10(3)/MCL (ref 2.1–9.2)
NEUTROPHILS NFR BLD AUTO: 73.2 %
NRBC BLD AUTO-RTO: 0 %
PLATELET # BLD AUTO: 209 X10(3)/MCL (ref 130–400)
PMV BLD AUTO: 12.9 FL (ref 7.4–10.4)
POTASSIUM SERPL-SCNC: 4.5 MMOL/L (ref 3.5–5.1)
PROTHROMBIN TIME: 13.9 SECONDS (ref 12.5–14.5)
RBC # BLD AUTO: 4.14 X10(6)/MCL (ref 4.7–6.1)
SODIUM SERPL-SCNC: 142 MMOL/L (ref 136–145)
WBC # SPEC AUTO: 10.36 X10(3)/MCL (ref 4.5–11.5)

## 2023-10-12 PROCEDURE — 80048 BASIC METABOLIC PNL TOTAL CA: CPT | Performed by: INTERNAL MEDICINE

## 2023-10-12 PROCEDURE — 85610 PROTHROMBIN TIME: CPT | Performed by: INTERNAL MEDICINE

## 2023-10-12 PROCEDURE — 85025 COMPLETE CBC W/AUTO DIFF WBC: CPT | Performed by: INTERNAL MEDICINE

## 2023-10-13 RX ORDER — CALCIUM CITRATE/VITAMIN D3 200MG-6.25
TABLET ORAL
Qty: 100 STRIP | Refills: 11 | Status: SHIPPED | OUTPATIENT
Start: 2023-10-13 | End: 2024-01-23 | Stop reason: SDUPTHER

## 2023-10-19 ENCOUNTER — HOSPITAL ENCOUNTER (OUTPATIENT)
Facility: HOSPITAL | Age: 58
Discharge: HOME | End: 2023-10-19
Attending: INTERNAL MEDICINE
Payer: MEDICARE

## 2023-10-19 VITALS
SYSTOLIC BLOOD PRESSURE: 149 MMHG | DIASTOLIC BLOOD PRESSURE: 95 MMHG | BODY MASS INDEX: 34.47 KG/M2 | TEMPERATURE: 98 F | HEART RATE: 80 BPM | HEIGHT: 73 IN | WEIGHT: 260.13 LBS | OXYGEN SATURATION: 95 % | RESPIRATION RATE: 20 BRPM

## 2023-10-19 DIAGNOSIS — I42.0 DILATED CARDIOMYOPATHY: Primary | ICD-10-CM

## 2023-10-19 DIAGNOSIS — I42.8 NONISCHEMIC CARDIOMYOPATHY: ICD-10-CM

## 2023-10-19 DIAGNOSIS — I49.9 ARRHYTHMIA: ICD-10-CM

## 2023-10-19 DIAGNOSIS — I25.10 CAD (CORONARY ARTERY DISEASE): ICD-10-CM

## 2023-10-19 PROCEDURE — 99152 MOD SED SAME PHYS/QHP 5/>YRS: CPT | Performed by: INTERNAL MEDICINE

## 2023-10-19 PROCEDURE — 93010 EKG 12-LEAD: ICD-10-PCS | Mod: ,,, | Performed by: INTERNAL MEDICINE

## 2023-10-19 PROCEDURE — 25000003 PHARM REV CODE 250: Performed by: INTERNAL MEDICINE

## 2023-10-19 PROCEDURE — 93010 ELECTROCARDIOGRAM REPORT: CPT | Mod: ,,, | Performed by: INTERNAL MEDICINE

## 2023-10-19 PROCEDURE — 99153 MOD SED SAME PHYS/QHP EA: CPT | Performed by: INTERNAL MEDICINE

## 2023-10-19 PROCEDURE — C1894 INTRO/SHEATH, NON-LASER: HCPCS | Performed by: INTERNAL MEDICINE

## 2023-10-19 PROCEDURE — 63600175 PHARM REV CODE 636 W HCPCS: Performed by: INTERNAL MEDICINE

## 2023-10-19 PROCEDURE — C1824 GENERATOR, CCM, IMPLANT: HCPCS | Performed by: INTERNAL MEDICINE

## 2023-10-19 PROCEDURE — 93005 ELECTROCARDIOGRAM TRACING: CPT

## 2023-10-19 PROCEDURE — 0408T INSJ/RPLC CARDIAC MODULJ SYS: CPT | Performed by: INTERNAL MEDICINE

## 2023-10-19 PROCEDURE — C1898 LEAD, PMKR, OTHER THAN TRANS: HCPCS | Performed by: INTERNAL MEDICINE

## 2023-10-19 DEVICE — IMPLANTABLE DEVICE: Type: IMPLANTABLE DEVICE | Site: HEART | Status: FUNCTIONAL

## 2023-10-19 DEVICE — OPTIMIZER SMART MINI IMPLANTABLE PULSE GENERATOR FOR THE TREATMENT OF HEART FAILURESYSTEM CONSISTS OF:(1) OPTIMIZER SMART MINI IMPLANTABLE PULSE GENERATOR (IPG)(1) ALLEN #2 TORQUE WRENCH(1) PORT PLUG
Type: IMPLANTABLE DEVICE | Site: HEART | Status: FUNCTIONAL
Brand: OPTIMIZER

## 2023-10-19 RX ORDER — MIDAZOLAM HYDROCHLORIDE 1 MG/ML
INJECTION INTRAMUSCULAR; INTRAVENOUS
Status: DISCONTINUED | OUTPATIENT
Start: 2023-10-19 | End: 2023-10-30 | Stop reason: HOSPADM

## 2023-10-19 RX ORDER — ACETAMINOPHEN 325 MG/1
650 TABLET ORAL EVERY 4 HOURS PRN
Status: DISCONTINUED | OUTPATIENT
Start: 2023-10-19 | End: 2023-10-30 | Stop reason: HOSPADM

## 2023-10-19 RX ORDER — LIDOCAINE HYDROCHLORIDE 10 MG/ML
INJECTION INFILTRATION; PERINEURAL
Status: DISCONTINUED | OUTPATIENT
Start: 2023-10-19 | End: 2023-10-30 | Stop reason: HOSPADM

## 2023-10-19 RX ORDER — VANCOMYCIN HCL IN 5 % DEXTROSE 1G/250ML
1000 PLASTIC BAG, INJECTION (ML) INTRAVENOUS ONCE
Status: COMPLETED | OUTPATIENT
Start: 2023-10-19 | End: 2023-10-19

## 2023-10-19 RX ORDER — HYDROCODONE BITARTRATE AND ACETAMINOPHEN 5; 325 MG/1; MG/1
1 TABLET ORAL EVERY 4 HOURS PRN
Status: DISCONTINUED | OUTPATIENT
Start: 2023-10-19 | End: 2023-10-30 | Stop reason: HOSPADM

## 2023-10-19 RX ORDER — SODIUM CHLORIDE 9 MG/ML
INJECTION, SOLUTION INTRAVENOUS ONCE
Status: COMPLETED | OUTPATIENT
Start: 2023-10-19 | End: 2023-10-19

## 2023-10-19 RX ORDER — MORPHINE SULFATE 4 MG/ML
2 INJECTION, SOLUTION INTRAMUSCULAR; INTRAVENOUS EVERY 4 HOURS PRN
Status: DISCONTINUED | OUTPATIENT
Start: 2023-10-19 | End: 2023-10-30 | Stop reason: HOSPADM

## 2023-10-19 RX ORDER — FENTANYL CITRATE 50 UG/ML
INJECTION, SOLUTION INTRAMUSCULAR; INTRAVENOUS
Status: DISCONTINUED | OUTPATIENT
Start: 2023-10-19 | End: 2023-10-30 | Stop reason: HOSPADM

## 2023-10-19 RX ADMIN — SODIUM CHLORIDE: 9 INJECTION, SOLUTION INTRAVENOUS at 01:10

## 2023-10-19 NOTE — DISCHARGE SUMMARY
[facility] - Cath Lab Services  Discharge Note  Short Stay    Procedure(s) (LRB):  Insertion, Transvenous Electrode Lead, Pacing ICD or Cardiac Pacemaker (N/A)      OUTCOME: Patient tolerated treatment/procedure well without complication and is now ready for discharge.    DISPOSITION: Home or Self Care    FINAL DIAGNOSIS:  Chronic systolic heart failure    FOLLOWUP: In clinic    DISCHARGE INSTRUCTIONS:    Discharge Procedure Orders   Diet Cardiac     Diet general     Lifting restrictions   Order Comments: Do not raise arm above shoulder height and no more then 15 pounds.     Notify your health care provider if you experience any of the following:  temperature >100.4     Notify your health care provider if you experience any of the following:  redness, tenderness, or signs of infection (pain, swelling, redness, odor or green/yellow discharge around incision site)        TIME SPENT ON DISCHARGE: 15 minutes

## 2023-10-19 NOTE — Clinical Note
A percutaneous stick to the right subclavian vein was performed. Ultrasound guidance was used to obtain access.

## 2023-10-20 LAB — POCT GLUCOSE: 125 MG/DL (ref 70–110)

## 2023-11-03 PROCEDURE — G0179 MD RECERTIFICATION HHA PT: HCPCS | Mod: ,,, | Performed by: NURSE PRACTITIONER

## 2023-11-06 ENCOUNTER — LAB VISIT (OUTPATIENT)
Dept: LAB | Facility: HOSPITAL | Age: 58
End: 2023-11-06
Attending: NURSE PRACTITIONER
Payer: MEDICARE

## 2023-11-06 ENCOUNTER — PATIENT MESSAGE (OUTPATIENT)
Dept: INTERNAL MEDICINE | Facility: CLINIC | Age: 58
End: 2023-11-06
Payer: MEDICARE

## 2023-11-06 DIAGNOSIS — Z79.4 TYPE 2 DIABETES MELLITUS WITH DIABETIC POLYNEUROPATHY, WITH LONG-TERM CURRENT USE OF INSULIN: ICD-10-CM

## 2023-11-06 DIAGNOSIS — E11.42 TYPE 2 DIABETES MELLITUS WITH DIABETIC POLYNEUROPATHY, WITH LONG-TERM CURRENT USE OF INSULIN: ICD-10-CM

## 2023-11-06 LAB
ALBUMIN SERPL-MCNC: 3.6 G/DL (ref 3.5–5)
ALBUMIN/GLOB SERPL: 0.8 RATIO (ref 1.1–2)
ALP SERPL-CCNC: 134 UNIT/L (ref 40–150)
ALT SERPL-CCNC: 15 UNIT/L (ref 0–55)
AST SERPL-CCNC: 14 UNIT/L (ref 5–34)
BILIRUB SERPL-MCNC: 0.7 MG/DL
BUN SERPL-MCNC: 25 MG/DL (ref 8.4–25.7)
CALCIUM SERPL-MCNC: 9.4 MG/DL (ref 8.4–10.2)
CHLORIDE SERPL-SCNC: 105 MMOL/L (ref 98–107)
CO2 SERPL-SCNC: 24 MMOL/L (ref 22–29)
CREAT SERPL-MCNC: 2.55 MG/DL (ref 0.73–1.18)
EST. AVERAGE GLUCOSE BLD GHB EST-MCNC: 151.3 MG/DL
GFR SERPLBLD CREATININE-BSD FMLA CKD-EPI: 28 MLS/MIN/1.73/M2
GLOBULIN SER-MCNC: 4.3 GM/DL (ref 2.4–3.5)
GLUCOSE SERPL-MCNC: 247 MG/DL (ref 74–100)
HBA1C MFR BLD: 6.9 %
POTASSIUM SERPL-SCNC: 4.8 MMOL/L (ref 3.5–5.1)
PROT SERPL-MCNC: 7.9 GM/DL (ref 6.4–8.3)
SODIUM SERPL-SCNC: 140 MMOL/L (ref 136–145)

## 2023-11-06 PROCEDURE — 83036 HEMOGLOBIN GLYCOSYLATED A1C: CPT

## 2023-11-06 PROCEDURE — 36415 COLL VENOUS BLD VENIPUNCTURE: CPT

## 2023-11-06 PROCEDURE — 80053 COMPREHEN METABOLIC PANEL: CPT

## 2023-11-06 NOTE — TELEPHONE ENCOUNTER
Patient recently completed labs. His renal function is worsened from previous. He saw Renal last month. Advise to make sure he's hydrating with water and not lenny or juices. No NSAIDs such as Advil, Aleve, IBU, Diclofenac, Toradol, Mobic, etc.    If he starts to experience weakness, dizziness, CP, SOB, abd pain, or any emergent concerns, go to ER. Otherwise, f/u with renal clinic.

## 2023-11-07 DIAGNOSIS — E03.9 HYPOTHYROIDISM, UNSPECIFIED TYPE: ICD-10-CM

## 2023-11-10 RX ORDER — LEVOTHYROXINE SODIUM 25 UG/1
25 TABLET ORAL
Qty: 90 TABLET | Refills: 1 | Status: SHIPPED | OUTPATIENT
Start: 2023-11-10

## 2023-11-20 PROBLEM — Z00.00 WELLNESS EXAMINATION: Status: RESOLVED | Noted: 2023-02-14 | Resolved: 2023-11-20

## 2023-12-01 DIAGNOSIS — K59.00 CONSTIPATION, UNSPECIFIED CONSTIPATION TYPE: ICD-10-CM

## 2023-12-08 DIAGNOSIS — N40.1 BPH WITH URINARY OBSTRUCTION: Primary | ICD-10-CM

## 2023-12-08 DIAGNOSIS — N13.8 BPH WITH URINARY OBSTRUCTION: Primary | ICD-10-CM

## 2023-12-08 RX ORDER — LEVOCETIRIZINE DIHYDROCHLORIDE 5 MG/1
5 TABLET, FILM COATED ORAL NIGHTLY PRN
Qty: 90 TABLET | Refills: 1 | Status: SHIPPED | OUTPATIENT
Start: 2023-12-08 | End: 2024-06-05

## 2023-12-08 RX ORDER — LACTULOSE 10 G/15ML
10 SOLUTION ORAL; RECTAL 2 TIMES DAILY PRN
Qty: 2700 ML | Refills: 1 | Status: SHIPPED | OUTPATIENT
Start: 2023-12-08

## 2023-12-18 PROBLEM — D72.829 LEUKOCYTOSIS: Status: ACTIVE | Noted: 2023-12-18

## 2023-12-18 PROBLEM — E53.8 B12 DEFICIENCY: Status: ACTIVE | Noted: 2023-12-18

## 2023-12-19 ENCOUNTER — OFFICE VISIT (OUTPATIENT)
Dept: HEMATOLOGY/ONCOLOGY | Facility: CLINIC | Age: 58
End: 2023-12-19
Attending: INTERNAL MEDICINE
Payer: MEDICARE

## 2023-12-19 VITALS
DIASTOLIC BLOOD PRESSURE: 77 MMHG | WEIGHT: 264.81 LBS | OXYGEN SATURATION: 100 % | TEMPERATURE: 98 F | HEIGHT: 73 IN | SYSTOLIC BLOOD PRESSURE: 116 MMHG | HEART RATE: 77 BPM | BODY MASS INDEX: 35.1 KG/M2

## 2023-12-19 DIAGNOSIS — D63.1 ANEMIA DUE TO STAGE 3A CHRONIC KIDNEY DISEASE: ICD-10-CM

## 2023-12-19 DIAGNOSIS — N18.31 ANEMIA DUE TO STAGE 3A CHRONIC KIDNEY DISEASE: ICD-10-CM

## 2023-12-19 DIAGNOSIS — E53.8 B12 DEFICIENCY: ICD-10-CM

## 2023-12-19 DIAGNOSIS — D72.829 LEUKOCYTOSIS, UNSPECIFIED TYPE: Primary | ICD-10-CM

## 2023-12-19 PROCEDURE — 99213 OFFICE O/P EST LOW 20 MIN: CPT | Mod: S$PBB,,, | Performed by: INTERNAL MEDICINE

## 2023-12-19 PROCEDURE — 99215 OFFICE O/P EST HI 40 MIN: CPT | Mod: PBBFAC | Performed by: INTERNAL MEDICINE

## 2023-12-19 PROCEDURE — 99213 PR OFFICE/OUTPT VISIT, EST, LEVL III, 20-29 MIN: ICD-10-PCS | Mod: S$PBB,,, | Performed by: INTERNAL MEDICINE

## 2023-12-19 NOTE — PROGRESS NOTES
History:  Past Medical History:   Diagnosis Date    Anxiety     Arthritis     BPH (benign prostatic hyperplasia)     CAD (coronary artery disease)     CHF (congestive heart failure)     Depression     Dizziness     GERD (gastroesophageal reflux disease)     Hypertension     Ischemic cardiomyopathy     Myocardial infarction     Obesity     Other hyperlipidemia     Oxygen dependent     3L/NC AT HS    PVD (peripheral vascular disease)     Renal disorder     kidney stones    Seizures     SOB (shortness of breath) on exertion     Thyroid disease     Type 2 diabetes mellitus without complications     Weakness of both legs    Past medical history: Ulcerative colitis.  TIA.  Dyslipidemia.  Hypertension.  NIDDM.  CAD.  Venous insufficiency of lower extremity.  Sleep apnea.  Peripheral arterial disease.  Neuropathy.  MI.  Gastroparesis.  Dysphagia.  Claudication.  CHF.  Celiac artery stenosis.  Ischemic colitis.  Celiac disease.  BPH.  Anemia. EGD.  Celiac bypass.  Colonoscopy.  Visceral arteriogram.  CABG.  Cataract extraction.  CABG x2.  Tonsillectomy.  Cholecystectomy.  Multiple vascular procedures.  -05/13/2021: [facility], coronary angiography and ventriculography, coronary artery graft angiography, bilateral extremity arteriogram (history of vasculopathy with CAD and multiple PCI, celiac artery stenosis s/p stenting, PAD with multiple interventions; worsening angina accelerated angina; worsening abdominal pain; noninvasive imaging negative for celiac stenosis; peripheral claudication symptoms, left greater than right)  -09/13/2021: Nerve conduction studies/needle EMG (leg numbness and weakness; symmetric bilateral lower extremity distal weakness with reduced sensation to pinprick and vibration in a gradient distribution): Abnormal electrophysiologic study; generalized, symmetric, length dependent, axonal, sensorimotor polyneuropathy, moderate to severe) patient has history of NIDDM, etc.)  -04/28/2023:  LVEF study (MUGA):   Anteroseptal, lateral, and inferior wall hypokinesia; global LVEF 20%  -06/09/2023:  Ischemic cardiomyopathy:  Successful implantation of SICD (subcu ICD) for primary intervention  -10/19/2023:  Nonischemic cardiomyopathy: Insertion, Transvenous Electrode Lead, Pacing ICD or Cardiac Pacemaker (Colby Mcintosh MD)    Social history: .  Lives in [place], [place].  Does not work; disabled secondary to multiple medical problems.  No history of tobacco, alcohol, or illicit drug abuse.  Has 1 child.    Family history: Mother with history of clotting disorder (pulmonary embolism); she also experienced breast cancer.    Health maintenance:   -04/05/2019: EGD (anemia): Normal esophagus; profound gastroparesis; no gastric ulceration or gastritis; patent pylorus; normal duodenum in the first, second, and third portions   -09/30/2020: Colonoscopy (anemia): Left-sided colonic diverticular disease, moderate; 5 mm polyp at 45 cm and descending colon, cold snare; 4 mm polyp ascending colon, cold forcep biopsy; no bleeding source identified (ascending colon polypectomy, tubular adenoma; descending colon 45 cm polypectomy, tubular adenoma)  Past Surgical History:   Procedure Laterality Date    CARDIAC DEFIBRILLATOR PLACEMENT N/A 6/9/2023    Procedure: Insertion, ICD;  Surgeon: Perry Eldridge MD;  Location: [facility] CATH LAB;  Service: Cardiology;  Laterality: N/A;  SICD W/ ANEST. (BS)    CHOLECYSTECTOMY      COLONOSCOPY      multiple    CORONARY ARTERY BYPASS GRAFT      CORONARY STENT PLACEMENT      ESOPHAGOGASTRODUODENOSCOPY      INSERTION, TRANSVENOUS ELECTRODE LEAD, PACING ICD OR CARDIAC PACEMAKER N/A 10/19/2023    Procedure: Insertion, Transvenous Electrode Lead, Pacing ICD or Cardiac Pacemaker;  Surgeon: Colby Mcintosh MD;  Location: [facility] CATH LAB;  Service: Cardiology;  Laterality: N/A;  CCM IMPLANT    LEFT HEART CATHETERIZATION N/A 02/07/2023    Procedure: Left heart cath;  Surgeon: Jamin Melara MD;   Location: [facility] CATH LAB;  Service: Cardiology;  Laterality: N/A;    RIGHT HEART CATHETERIZATION Right 09/20/2022    Procedure: INSERTION, CATHETER, RIGHT HEART;  Surgeon: Cory Oreilly MD;  Location: [facility] CATH LAB;  Service: Cardiology;  Laterality: Right;  CARDIOMEMS RJ ACCESS    STENT, PERIPHERAL GRAFT      TONSILLECTOMY        Social History     Socioeconomic History    Marital status:      Spouse name: Viktoria    Number of children: 1   Tobacco Use    Smoking status: Never     Passive exposure: Never    Smokeless tobacco: Never   Substance and Sexual Activity    Alcohol use: Never    Drug use: Not Currently    Sexual activity: Not Currently     Partners: Female     Social Determinants of Health     Financial Resource Strain: Low Risk  (2/14/2023)    Overall Financial Resource Strain (CARDIA)     Difficulty of Paying Living Expenses: Not very hard   Food Insecurity: No Food Insecurity (2/14/2023)    Hunger Vital Sign     Worried About Running Out of Food in the Last Year: Never true     Ran Out of Food in the Last Year: Never true   Transportation Needs: No Transportation Needs (2/14/2023)    PRAPARE - Transportation     Lack of Transportation (Medical): No     Lack of Transportation (Non-Medical): No   Physical Activity: Inactive (2/14/2023)    Exercise Vital Sign     Days of Exercise per Week: 0 days     Minutes of Exercise per Session: 0 min   Stress: No Stress Concern Present (2/14/2023)    [employer] - Occupational Stress Questionnaire     Feeling of Stress : Not at all   Social Connections: Moderately Isolated (2/14/2023)    Social Connection and Isolation Panel [NHANES]     Frequency of Communication with Friends and Family: More than three times a week     Frequency of Social Gatherings with Friends and Family: Once a week     Attends Sabianist Services: Never     Active Member of Clubs or Organizations: No     Marital Status:    Housing Stability: Low Risk   (2/14/2023)    Housing Stability Vital Sign     Unable to Pay for Housing in the Last Year: No     Number of Places Lived in the Last Year: 1     Unstable Housing in the Last Year: No      Family History   Problem Relation Age of Onset    Diabetes Mother     Hypertension Mother     Cancer Mother     Hypertension Father     Stroke Father     Diabetes Sister     Diabetes Brother     Kidney disease Brother         Reason for Follow-up:  Reason for follow-up:   -Chronic leukocytosis   -Chronic normocytic anemia   -B12 deficiency      History of Present Illness:   No chief complaint on file.        Oncologic/Hematologic History:  Oncology History    No history exists.    55-year-old gentleman referred from internal medicine, for evaluation of unexplained leukocytosis and anemia.  Initially seen 10/21/2021 (see below)     04/06/2020: WBC 14.8.  Hemoglobin 11.1.  Platelets 266,000/mm³.    06/09/2020: WBC 15.7.  Hemoglobin 11.1.  Platelets 241,000/mm³.    08/07/2020: Peripheral blood smear examination: Within normal limits     08/07/2020: Serum iron 88.  TIBC 479, elevated.  Transferrin saturation 18.4%.  Ferritin 10.4, low.     11/02/2020: PSA 0.77, normal.     11/02/2020: JAK2 V6 17F mutation negative.  JAK2 exon 12-15 mutations negative.     WBC:   -Chronically and intermittently elevated, without consistent upward trend, as far back as 10/29/2014   -Latest, 11.8K on 02/02/2021     The WBC Differential showed no significant left shift or abnormal cells.     Hemoglobin:   -Chronic variable anemia   -Hemoglobin 12.4 on 02/02/2021   -Hemoglobin 9.3 on 06/29/2019   -Hemoglobin 9.7 on 11/04/2018, etc.     MCV normal      10/21/2021:  Presents for initial hematology consultation.  Very pleasant gentleman.  In no acute discomfort.  -Chronic leukocytosis and normocytic anemia.  Not on corticosteroids or lithium.  -No history of cancers or any other blood dyscrasias.  No recurrent fevers, chills, infections, anorexia,  unintentional weight loss, weakness, fatigue, any lumps or lymphadenopathy, skin rash, abnormal bleeding, chest pain, dyspnea, abdominal pain, nausea, vomiting, anorexia, unintentional weight loss, etc.  -Has multiple underlying medical problems.  Says that he has 26 stents in the heart.  Says that he has 5 stents in the legs.  States that he has 1 stent in celiac artery.  -ECOG 1.    Interval History:  [No matching plan found]   [No matching plan found]     12/10/2021:  -12/10/2021: Labs reviewed; creatinine 1.43, chronically elevated; alk phos 152; WBC 12.9, hemoglobin 12.1, MCV 86, platelets 276,000/mm³  Presents for follow-up visit.  No new symptoms.  Chronic stable weakness, fatigue, decreased hearing, ringing in the ears, and numbness and tingling (diabetic neuropathy).  Compliant with B12 pill    12/19/2023:   -12/10/2021: B12 level 533 (absorbing satisfactorily via oral route); ferritin level 106.83, normal  -no showed 03/10/2022  -now, once again referred from [facility] Internal Medicine, with leukocytosis  -follows up with cardiology for systolic heart failure, cardiomyopathy, S/P SICD, celiac artery stenosis, peripheral artery disease, venous insufficiency, claudication, CAD, cardiac stents (drug-eluting stent into left circumflex coronary artery)  -04/28/2023:  LVEF study (MUGA):  Anteroseptal, lateral, and inferior wall hypokinesia; global LVEF 20%  -06/09/2023:  Ischemic cardiomyopathy:  Successful implantation of SICD (subcu ICD) for primary intervention  -10/19/2023:  Nonischemic cardiomyopathy: Insertion, Transvenous Electrode Lead, Pacing ICD or Cardiac Pacemaker (Arnaldo, Colby COMBS MD)  -WBC: 10.36 K on 10/12/2023; 12.45 K on 08/16/2023; 10.57 K on 06/09/2023; 12.5 K on 02/13/2023; 12.1 K on 02/07/2023; 10.9 K on 02/03/2023, etc.  -hemoglobin:  Slightly low (range: 10.6 -13.2)  -platelets normal  -differential count normal  -CKD:  Creatinine 2.55 on 11/06/2023, rising  Presents for hematology consultation,  accompanied by his wife.  In no acute discomfort.  Some headaches.  Some exertional dyspnea.  Some nausea.  Has cardiac pacemaker in place.  No recurrent fevers, chills, or infections.  Follows up with PCP at [facility].  Chronic, reactive, mild-to-moderate leukocytosis.  Compliant with vitamin B12 1000 mcg p.o. q.day.      Medications:  Current Outpatient Medications on File Prior to Visit   Medication Sig Dispense Refill    alfuzosin (UROXATRAL) 10 mg Tb24 Take 1 tablet (10 mg total) by mouth daily with breakfast. 90 tablet 3    aspirin (ECOTRIN) 81 MG EC tablet Take 81 mg by mouth once daily.      atorvastatin (LIPITOR) 80 MG tablet Take 80 mg by mouth every evening.      azelastine (ASTELIN) 137 mcg (0.1 %) nasal spray 1 spray by Nasal route 2 (two) times daily.      b complex vitamins tablet Take 1 tablet by mouth once daily.      carvediloL (COREG) 25 MG tablet Take 25 mg by mouth 2 (two) times daily.      cyanocobalamin (VITAMIN B-12) 1000 MCG tablet Take 2,000 mcg by mouth once daily at 6am.      eplerenone (INSPRA) 25 MG Tab Take 25 mg by mouth once daily.      ezetimibe (ZETIA) 10 mg tablet Take 10 mg by mouth once daily at 6am.      FARXIGA 10 mg tablet Take 10 mg by mouth once daily.      ferrous gluconate (FERGON) 324 MG tablet Take 1 tablet by mouth.      finasteride (PROSCAR) 5 mg tablet Take 5 mg by mouth.      fluticasone propionate (FLONASE) 50 mcg/actuation nasal spray Flonase Allergy Relief Take No date recorded No form recorded No frequency recorded No route recorded No set duration recorded No set duration amount recorded active No dosage strength recorded No dosage strength units of measure recorded      hydrOXYzine (ATARAX) 50 MG tablet Take 50 mg by mouth 3 (three) times daily.      isosorbide mononitrate (IMDUR) 60 MG 24 hr tablet Take 60 mg by mouth once daily.      lactulose (CHRONULAC) 10 gram/15 mL solution TAKE 15 MLS (10 G TOTAL) BY MOUTH 2 (TWO) TIMES DAILY AS NEEDED (CONSTIPATION). 2700  mL 1    levETIRAcetam (KEPPRA) 500 MG Tab Take 1 tablet (500 mg total) by mouth 2 (two) times daily. 180 tablet 1    levocetirizine (XYZAL) 5 MG tablet TAKE 1 TABLET (5 MG TOTAL) BY MOUTH NIGHTLY AS NEEDED FOR ALLERGIES 90 tablet 1    levothyroxine (SYNTHROID) 25 MCG tablet TAKE 1 TABLET BY MOUTH DAILY BEFORE BREAKFAST. 90 tablet 1    metoclopramide HCl (REGLAN) 10 MG tablet Take 1 tablet (10 mg total) by mouth once daily. 90 tablet 1    montelukast (SINGULAIR) 10 mg tablet TAKE 1 TABLET BY MOUTH EVERY DAY 90 tablet 1    nitroGLYCERIN (NITROSTAT) 0.4 MG SL tablet Place 0.4 mg under the tongue as needed.      NOVOLIN 70/30 U-100 INSULIN 100 unit/mL (70-30) injection Inject 45 Units into the skin 2 (two) times daily with meals. 130 mL 1    omeprazole (PRILOSEC) 40 MG capsule Take 1 capsule (40 mg total) by mouth every evening. 90 capsule 1    prasugreL (EFFIENT) 10 mg Tab Take 10 mg by mouth once daily.      QUEtiapine (SEROQUEL) 25 MG Tab Take 1 tablet (25 mg total) by mouth every evening. 90 tablet 1    ranolazine (RANEXA) 1,000 mg Tb12 Take 1,000 mg by mouth 2 (two) times daily.      torsemide (DEMADEX) 20 MG Tab Take 20 mg by mouth 2 (two) times a day.      TRUE METRIX GLUCOSE METER Misc TEST 3 TIMES A DAY      TRUE METRIX GLUCOSE TEST STRIP Strp USE TO TEST 3 TIMES A  strip 11    valsartan (DIOVAN) 80 MG tablet Take 1 tablet (80 mg total) by mouth once daily. 90 tablet 3    VERQUVO 2.5 mg Tab Take 2.5 mg by mouth 2 (two) times a day.      XARELTO 2.5 mg Tab Take 2.5 mg by mouth 2 (two) times a day.       No current facility-administered medications on file prior to visit.       Review of Systems:   All systems reviewed and found to be negative except for the symptoms detailed above    Physical Examination:   VITAL SIGNS: There were no vitals filed for this visit.  GENERAL:  In no apparent distress.    HEAD:  No signs of head trauma.  EYES:  Pupils are equal.  Extraocular motions intact.    EARS:  Hearing  grossly intact.  MOUTH:  Oropharynx is normal.   NECK:  No adenopathy, no JVD.     CHEST:  Chest with clear breath sounds bilaterally.  No wheezes, rales, rhonchi.    CARDIAC:  Regular rate and rhythm.  S1 and S2, without murmurs, gallops, rubs.  VASCULAR:  No Edema.  Peripheral pulses normal and equal in all extremities.  ABDOMEN:  Soft, without detectable tenderness.  No sign of distention.  No   rebound or guarding, and no masses palpated.   Bowel Sounds normal.  MUSCULOSKELETAL:  Good range of motion of all major joints. Extremities without clubbing, cyanosis or edema.    NEUROLOGIC EXAM:  Alert and oriented x 3.  No focal sensory or strength deficits.   Speech normal.  Follows commands.  PSYCHIATRIC:  Mood normal.    Results for orders placed or performed in visit on 10/12/23   CBC Auto Differential    Narrative    The following orders were created for panel order CBC Auto Differential.  Procedure                               Abnormality         Status                     ---------                               -----------         ------                     CBC with Differential[[id number]]       Abnormal            Final result                 Please view results for these tests on the individual orders.   CBC with Differential   Result Value Ref Range    WBC 10.36 4.50 - 11.50 x10(3)/mcL    RBC 4.14 (L) 4.70 - 6.10 x10(6)/mcL    Hgb 12.1 (L) 14.0 - 18.0 g/dL    Hct 38.0 (L) 42.0 - 52.0 %    MCV 91.8 80.0 - 94.0 fL    MCH 29.2 27.0 - 31.0 pg    MCHC 31.8 (L) 33.0 - 36.0 g/dL    RDW 12.6 11.5 - 17.0 %    Platelet 209 130 - 400 x10(3)/mcL    MPV 12.9 (H) 7.4 - 10.4 fL    Neut % 73.2 %    Lymph % 18.0 %    Mono % 5.4 %    Eos % 2.3 %    Basophil % 0.2 %    Lymph # 1.86 0.6 - 4.6 x10(3)/mcL    Neut # 7.59 2.1 - 9.2 x10(3)/mcL    Mono # 0.56 0.1 - 1.3 x10(3)/mcL    Eos # 0.24 0 - 0.9 x10(3)/mcL    Baso # 0.02 <=0.2 x10(3)/mcL    IG# 0.09 (H) 0 - 0.04 x10(3)/mcL    IG% 0.9 %    NRBC% 0.0 %     Results for orders  placed or performed in visit on 11/06/23   Comprehensive Metabolic Panel   Result Value Ref Range    Sodium Level 140 136 - 145 mmol/L    Potassium Level 4.8 3.5 - 5.1 mmol/L    Chloride 105 98 - 107 mmol/L    Carbon Dioxide 24 22 - 29 mmol/L    Glucose Level 247 (H) 74 - 100 mg/dL    Blood Urea Nitrogen 25.0 8.4 - 25.7 mg/dL    Creatinine 2.55 (H) 0.73 - 1.18 mg/dL    Calcium Level Total 9.4 8.4 - 10.2 mg/dL    Protein Total 7.9 6.4 - 8.3 gm/dL    Albumin Level 3.6 3.5 - 5.0 g/dL    Globulin 4.3 (H) 2.4 - 3.5 gm/dL    Albumin/Globulin Ratio 0.8 (L) 1.1 - 2.0 ratio    Bilirubin Total 0.7 <=1.5 mg/dL    Alkaline Phosphatase 134 40 - 150 unit/L    Alanine Aminotransferase 15 0 - 55 unit/L    Aspartate Aminotransferase 14 5 - 34 unit/L    eGFR 28 mls/min/1.73/m2       Assessment:  Problem List Items Addressed This Visit    None   58-year gentleman with a multitude of medical problems including NIDDM, hypertension, CAD, peripheral vascular disease, MI, multiple coronary stents, celiac artery stent for celiac artery stenosis, multiple stents in lower extremities for peripheral vascular disease, s/p CABG, etc., evaluated for chronic mild benign appearing leukocytosis as far back as 10/2014, and chronic normocytic variable degree of anemia.    Chronic leukocytosis:   -Chronically and intermittently elevated WBC, without consistent upward trend, as far back as 10/29/2014   -15.4 (09/13/2021); differential count normal   -Platelet count normal   -Very mild normocytic anemia   -Peripheral blood smear within normal limits (08/07/2020)   -JAK2 mutation negative (11/02/2020)   -Completely asymptomatic; no recurrent fevers, chills, infections, weakness, fatigue, anorexia, or unintentional weight loss.   -10/25/2021: Peripheral blood smear review: Reactive lymphocytes; myeloid cells show a left shift; no blasts; RBC showed normal sites, hyperchromasia, normochromasia   -10/25/2021: Flow cytometry of blood: No abnormal myeloid,  B-cell, T-cell, or NK cell population identified   -10/25/2021: WBC 13.8 (chronically elevated, mild)   -10/25/2021: LAP score: 302, elevated (normal range: )  (Chronic mild intermittent leukocytosis; reactive, since LAP score is elevated; ESR, CRP elevated)  -WBC: 10.36 K on 10/12/2023; 12.45 K on 08/16/2023; 10.57 K on 06/09/2023; 12.5 K on 02/13/2023; 12.1 K on 02/07/2023; 10.9 K on 02/03/2023, etc.  -hemoglobin:  Slightly low (range: 10.6 -13.2)  -platelets normal  -differential count normal      Chronic, normocytic, variable degree of anemia:   -Hemoglobin: 12.3 (09/13/2021) 12.4 (02/02/2021); 11.1 (04/06/2020); 15.7 (06/09/2020); 9.3 (06/29/2019); 9.7 (11/04/2018) anemia-MCV normal   -Iron deficient (08/07/2020) (serum iron 88, TIBC 479, transferrin saturation 18.4%, ferritin 10.4)   -EGD (04/05/2019): No source of bleeding   -Colonoscopy (09/30/2020): No source of bleeding   -Completely asymptomatic; no weakness or fatigue.   -10/25/2021: Peripheral blood smear review: Reactive lymphocytes; myeloid cells show a left shift; no blasts; RBC showed normal sites, hyperchromasia, normochromasia   -10/25/2021: Flow cytometry of blood: No abnormal myeloid, B-cell, T-cell, or NK cell population identified   -10/25/2021: Hemoglobin 11.6, MCV 87.1   -10/25/2021: LDH normal; serum iron 75, TIBC 325, transferrin saturation 23%, CRP 1.67 (elevated); B12 level 198 (low); haptoglobin 436 (elevated); IgG, IgA, IgM normal; no monoclonal protein on SPEP and CINDY; RBC folate 986 (normal); ESR 65, elevated; reticulocyte count 2.7%; kappa 45.65 (elevated); lambda 31.55, elevated; kappa/lambda ratio 1.45, normal   -11/08/2021: Intrinsic factor antibody negative   -11/11/2021: B12 1000 mcg p.o. daily, started   -12/10/2021: B12 level 533 (absorbing satisfactorily via oral route); ferritin level 106.83, normal  To summarize (anemia):   -Chronic normocytic variable degree of anemia   -Iron deficient in 08/2020   -No source of  bleeding on EGD (04/2019) and colonoscopy (09/2020)   -Iron stores normal (10/25/2021)   -No hemolysis; no paraproteinemia (10/25/2021)   -B12 deficiency, level 198 (10/25/2021); intrinsic factor antibody negative   -Oral B12 1000 mcg daily, started 11/11/2021  -hemoglobin:  Slightly low (range: 10.6 -13.2)      B12 deficiency:  -B12 deficiency, level 198 (10/25/2021); intrinsic factor antibody negative  -Oral B12 1000 mcg daily, started 11/11/2021  -12/10/2021: B12 level 533 (absorbing satisfactorily via oral route); ferritin level 106.83, normal      Plan:   -Evaluated for chronic mild intermittent leukocytosis   -Reactive (elevated LAP score, ESR, CRP)   -SHARON-2 mutation negative (11/02/2020)   -Flow cytometry of blood negative (10/25/2021)   -Non-smoker  (Reactive leukocytosis)     -Chronic normocytic variable degree of anemia   -Iron deficient in 08/2020; iron stores normal (10/25/2021)   -No source of bleeding on EGD and colonoscopy     -Diagnosed with B12 deficiency (10/25/2021)   -Intrinsic factor antibody negative   -Oral B12 1000 mcg p.o. daily, started 11/11/2021   -Absorbing satisfactorily via oral route (B12 level 533 on 12/10/2021)   >>>   -Continue B12 1000 mcg p.o. daily     Does not need hematology follow-up anymore.   Hereby, respectfully discharged back to the care of referring providers.     Above discussed with the patient.  All questions answered.   Discussed labs and gave him copies of relevant reports.   He understands and agrees this plan.       Follow-up:  No follow-ups on file.

## 2023-12-21 ENCOUNTER — TELEPHONE (OUTPATIENT)
Dept: NEPHROLOGY | Facility: CLINIC | Age: 58
End: 2023-12-21
Payer: MEDICARE

## 2023-12-21 NOTE — TELEPHONE ENCOUNTER
Spoke with Johana, requesting lab orders to be sent for next renal appointment. Labs faxed per request.

## 2023-12-21 NOTE — TELEPHONE ENCOUNTER
----- Message from Breonna Quiñonez sent at 12/21/2023  1:50 PM CST -----  Johana with [facility] was calling about this pt and would like the nurse to give her a call back @ [phone]

## 2024-01-02 PROCEDURE — G0179 MD RECERTIFICATION HHA PT: HCPCS | Mod: ,,, | Performed by: NURSE PRACTITIONER

## 2024-01-03 ENCOUNTER — EXTERNAL HOME HEALTH (OUTPATIENT)
Dept: HOME HEALTH SERVICES | Facility: HOSPITAL | Age: 59
End: 2024-01-03
Payer: MEDICARE

## 2024-01-04 ENCOUNTER — OFFICE VISIT (OUTPATIENT)
Dept: UROLOGY | Facility: CLINIC | Age: 59
End: 2024-01-04
Payer: MEDICARE

## 2024-01-04 VITALS
WEIGHT: 271.19 LBS | RESPIRATION RATE: 20 BRPM | OXYGEN SATURATION: 99 % | TEMPERATURE: 98 F | DIASTOLIC BLOOD PRESSURE: 60 MMHG | HEIGHT: 73 IN | HEART RATE: 88 BPM | BODY MASS INDEX: 35.94 KG/M2 | SYSTOLIC BLOOD PRESSURE: 90 MMHG

## 2024-01-04 DIAGNOSIS — N40.0 BENIGN PROSTATIC HYPERPLASIA, UNSPECIFIED WHETHER LOWER URINARY TRACT SYMPTOMS PRESENT: ICD-10-CM

## 2024-01-04 DIAGNOSIS — R33.9 URINARY RETENTION: Primary | ICD-10-CM

## 2024-01-04 LAB — POC RESIDUAL URINE VOLUME: 81 ML (ref 0–100)

## 2024-01-04 PROCEDURE — 99213 OFFICE O/P EST LOW 20 MIN: CPT | Mod: S$PBB,,, | Performed by: NURSE PRACTITIONER

## 2024-01-04 PROCEDURE — 99215 OFFICE O/P EST HI 40 MIN: CPT | Mod: PBBFAC | Performed by: NURSE PRACTITIONER

## 2024-01-04 PROCEDURE — 51798 US URINE CAPACITY MEASURE: CPT | Mod: PBBFAC | Performed by: NURSE PRACTITIONER

## 2024-01-04 RX ORDER — HYDROCODONE BITARTRATE AND ACETAMINOPHEN 5; 325 MG/1; MG/1
1 TABLET ORAL EVERY 6 HOURS PRN
COMMUNITY
Start: 2023-10-20

## 2024-01-04 RX ORDER — ESOMEPRAZOLE MAGNESIUM 40 MG/1
1 CAPSULE, DELAYED RELEASE ORAL NIGHTLY
COMMUNITY

## 2024-01-04 NOTE — PROGRESS NOTES
Placed in room. Bladder scan done and tolerated well. 81 ml urine residual noted. D/C instructions given and understood. RTC 6 months

## 2024-01-04 NOTE — PROGRESS NOTES
Chief Complaint: No chief complaint on file.      HPI:  Patient is a 58-year-old male here for 6  month F/U  BPH, weak stream and nocturia.  Patient seen by renal NP with complaints of difficulty initiating urine and nocturia.  Patient currently on alfuzosin 10 mg p.o. daily after failed treatment from tamsulosin 0.8 mg p.o. daily, patient also recently started on last visit finasteride 5 mg p.o. daily. Bladder scan  81 mL.Patient PSA 0.49 corrected to 0.98. Patient urine stream much improved with alfuzosin and denies hesitancy,  dysuria, urinary frequency, urinary urgency, urinary incontinence, urinary retention, gross hematuria, nocturia.      Allergies:  Review of patient's allergies indicates:   Allergen Reactions    Pork derived (porcine)      Other reaction(s): Pork    Lutheran choice      Plavix [clopidogrel] Hives and Rash       Medications:  Current Outpatient Medications   Medication Sig Dispense Refill    alfuzosin (UROXATRAL) 10 mg Tb24 Take 1 tablet (10 mg total) by mouth daily with breakfast. 90 tablet 3    aspirin (ECOTRIN) 81 MG EC tablet Take 81 mg by mouth once daily.      atorvastatin (LIPITOR) 80 MG tablet Take 80 mg by mouth every evening.      azelastine (ASTELIN) 137 mcg (0.1 %) nasal spray 1 spray by Nasal route 2 (two) times daily.      b complex vitamins tablet Take 1 tablet by mouth once daily.      carvediloL (COREG) 25 MG tablet Take 25 mg by mouth 2 (two) times daily.      cyanocobalamin (VITAMIN B-12) 1000 MCG tablet Take 2,000 mcg by mouth once daily at 6am.      eplerenone (INSPRA) 25 MG Tab Take 25 mg by mouth once daily.      ezetimibe (ZETIA) 10 mg tablet Take 10 mg by mouth once daily at 6am.      FARXIGA 10 mg tablet Take 10 mg by mouth once daily.      ferrous gluconate (FERGON) 324 MG tablet Take 1 tablet by mouth.      finasteride (PROSCAR) 5 mg tablet Take 5 mg by mouth.      fluticasone propionate (FLONASE) 50 mcg/actuation nasal spray Flonase Allergy Relief Take No date  recorded No form recorded No frequency recorded No route recorded No set duration recorded No set duration amount recorded active No dosage strength recorded No dosage strength units of measure recorded      hydrOXYzine (ATARAX) 50 MG tablet Take 50 mg by mouth 3 (three) times daily.      isosorbide mononitrate (IMDUR) 60 MG 24 hr tablet Take 60 mg by mouth once daily.      lactulose (CHRONULAC) 10 gram/15 mL solution TAKE 15 MLS (10 G TOTAL) BY MOUTH 2 (TWO) TIMES DAILY AS NEEDED (CONSTIPATION). 2700 mL 1    levETIRAcetam (KEPPRA) 500 MG Tab Take 1 tablet (500 mg total) by mouth 2 (two) times daily. 180 tablet 1    levocetirizine (XYZAL) 5 MG tablet TAKE 1 TABLET (5 MG TOTAL) BY MOUTH NIGHTLY AS NEEDED FOR ALLERGIES 90 tablet 1    levothyroxine (SYNTHROID) 25 MCG tablet TAKE 1 TABLET BY MOUTH DAILY BEFORE BREAKFAST. 90 tablet 1    metoclopramide HCl (REGLAN) 10 MG tablet Take 1 tablet (10 mg total) by mouth once daily. 90 tablet 1    montelukast (SINGULAIR) 10 mg tablet TAKE 1 TABLET BY MOUTH EVERY DAY 90 tablet 1    nitroGLYCERIN (NITROSTAT) 0.4 MG SL tablet Place 0.4 mg under the tongue as needed.      NOVOLIN 70/30 U-100 INSULIN 100 unit/mL (70-30) injection Inject 45 Units into the skin 2 (two) times daily with meals. 130 mL 1    omeprazole (PRILOSEC) 40 MG capsule Take 1 capsule (40 mg total) by mouth every evening. 90 capsule 1    prasugreL (EFFIENT) 10 mg Tab Take 10 mg by mouth once daily.      QUEtiapine (SEROQUEL) 25 MG Tab Take 1 tablet (25 mg total) by mouth every evening. 90 tablet 1    ranolazine (RANEXA) 1,000 mg Tb12 Take 1,000 mg by mouth 2 (two) times daily.      torsemide (DEMADEX) 20 MG Tab Take 20 mg by mouth 2 (two) times a day.      TRUE METRIX GLUCOSE METER Misc TEST 3 TIMES A DAY      TRUE METRIX GLUCOSE TEST STRIP Strp USE TO TEST 3 TIMES A  strip 11    valsartan (DIOVAN) 80 MG tablet Take 1 tablet (80 mg total) by mouth once daily. 90 tablet 3    VERQUVO 2.5 mg Tab Take 2.5 mg by  mouth 2 (two) times a day.      XARELTO 2.5 mg Tab Take 2.5 mg by mouth 2 (two) times a day.       No current facility-administered medications for this visit.       Review of Systems:  General: No fever, chills, fatigability, or weight loss.  Skin: No rashes, itching, or changes in color or texture of skin.  Chest: Denies HART, cyanosis, wheezing, cough, and sputum production.  Abdomen: Appetite fine. No weight loss. Denies diarrhea, abdominal pain, hematemesis, or blood in stool.  Musculoskeletal: No joint stiffness or swelling. Denies back pain.  : As above.  All other review of systems negative.    PMH:  Past Medical History:   Diagnosis Date    Anxiety     Arthritis     BPH (benign prostatic hyperplasia)     CAD (coronary artery disease)     CHF (congestive heart failure)     Depression     Dizziness     GERD (gastroesophageal reflux disease)     Hypertension     Ischemic cardiomyopathy     Myocardial infarction     Obesity     Other hyperlipidemia     Oxygen dependent     3L/NC AT HS    PVD (peripheral vascular disease)     Renal disorder     kidney stones    Seizures     SOB (shortness of breath) on exertion     Thyroid disease     Type 2 diabetes mellitus without complications     Weakness of both legs        PSH:  Past Surgical History:   Procedure Laterality Date    CARDIAC DEFIBRILLATOR PLACEMENT N/A 6/9/2023    Procedure: Insertion, ICD;  Surgeon: Perry Eldridge MD;  Location: [facility] CATH LAB;  Service: Cardiology;  Laterality: N/A;  SICD W/ NABEELST. (BS)    CHOLECYSTECTOMY      COLONOSCOPY      multiple    CORONARY ARTERY BYPASS GRAFT      CORONARY STENT PLACEMENT      ESOPHAGOGASTRODUODENOSCOPY      INSERTION, TRANSVENOUS ELECTRODE LEAD, PACING ICD OR CARDIAC PACEMAKER N/A 10/19/2023    Procedure: Insertion, Transvenous Electrode Lead, Pacing ICD or Cardiac Pacemaker;  Surgeon: Colby Mcintosh MD;  Location: [facility] CATH LAB;  Service: Cardiology;  Laterality: N/A;  [facility] IMPLANT    LEFT HEART  CATHETERIZATION N/A 02/07/2023    Procedure: Left heart cath;  Surgeon: Jamin Melara MD;  Location: [facility] CATH LAB;  Service: Cardiology;  Laterality: N/A;    RIGHT HEART CATHETERIZATION Right 09/20/2022    Procedure: INSERTION, CATHETER, RIGHT HEART;  Surgeon: Cory Oreilly MD;  Location: [facility] CATH LAB;  Service: Cardiology;  Laterality: Right;  CARDIOMEMS RJ ACCESS    STENT, PERIPHERAL GRAFT      TONSILLECTOMY         FamHx:  Family History   Problem Relation Age of Onset    Diabetes Mother     Hypertension Mother     Cancer Mother     Hypertension Father     Stroke Father     Diabetes Sister     Diabetes Brother     Kidney disease Brother        SocHx:  Social History     Socioeconomic History    Marital status:      Spouse name: Viktoria    Number of children: 1   Tobacco Use    Smoking status: Never     Passive exposure: Never    Smokeless tobacco: Never   Substance and Sexual Activity    Alcohol use: Never    Drug use: Not Currently    Sexual activity: Not Currently     Partners: Female     Social Determinants of Health     Financial Resource Strain: Low Risk  (2/14/2023)    Overall Financial Resource Strain (CARDIA)     Difficulty of Paying Living Expenses: Not very hard   Food Insecurity: No Food Insecurity (2/14/2023)    Hunger Vital Sign     Worried About Running Out of Food in the Last Year: Never true     Ran Out of Food in the Last Year: Never true   Transportation Needs: No Transportation Needs (2/14/2023)    PRAPARE - Transportation     Lack of Transportation (Medical): No     Lack of Transportation (Non-Medical): No   Physical Activity: Inactive (2/14/2023)    Exercise Vital Sign     Days of Exercise per Week: 0 days     Minutes of Exercise per Session: 0 min   Stress: No Stress Concern Present (2/14/2023)    [employer] - Occupational Stress Questionnaire     Feeling of Stress : Not at all   Social Connections: Moderately Isolated (2/14/2023)    Social Connection  and Isolation Panel [NHANES]     Frequency of Communication with Friends and Family: More than three times a week     Frequency of Social Gatherings with Friends and Family: Once a week     Attends Sabianist Services: Never     Active Member of Clubs or Organizations: No     Marital Status:    Housing Stability: Low Risk  (2/14/2023)    Housing Stability Vital Sign     Unable to Pay for Housing in the Last Year: No     Number of Places Lived in the Last Year: 1     Unstable Housing in the Last Year: No       Physical Exam:  There were no vitals filed for this visit.  General: A&Ox3, no apparent distress, no deformities  Neck: No masses, normal thyroid  Lungs: CTA bill, no use of accessory muscles  Heart: RRR, no arrhythmias  Abdomen: Soft, NT, ND, no masses, no hernias, no hepatosplenomegaly  Lymphatic: Neck and groin nodes negative  Skin: The skin is warm and dry. No jaundice.  Ext: No c/c/e.    GUMale: Test desc bill, no abnormalities of epididymus. Penis  Uncircumcised, with normal penile and scrotal skin. Meatus normal. Normal rectal tone, no hemorrhoids. Prostate: 2 finger breadth wide,  flat, smooth, soft, nontender, no nodules or masses appreciated. SV not palpable. Perineum and anus normal.      Recent Labs     12/19/23  0846   PSA 0.49        Impression:  1. Benign prostatic hyperplasia, unspecified whether lower urinary tract symptoms present  - POCT URINE DIPSTICK WITH MICROSCOPE, AUTOMATED  - POCT Bladder Scan    2. Urinary retention  - POCT Bladder Scan      Plan: Instructed patient   Continue alfuzosin 10 mg p.o. daily and Proscar 5 mg p.o. daily.  Reinforced patient teaching on timed voiding every 2-3 hrs, double voiding, avoidance of bladder irritants such as alcohol, citrus foods, chocolate, caffeinated drinks, energy drinks, spicy foods, sugar, caffeine products, sodas. Instructed patient to avoid drinking fluids 1-2 hours prior to bedtime & void immediately before bedtime.  RTC 6 months with  PSA.

## 2024-01-12 PROBLEM — E66.01 SEVERE OBESITY (BMI 35.0-39.9) WITH COMORBIDITY: Status: ACTIVE | Noted: 2024-01-12

## 2024-01-12 NOTE — PROGRESS NOTES
[facility] Neurology Follow Up Office Visit Note    Initial Visit Date: 11/25/2022  Last Visit Date: 3/7/2023  Current Visit Date:  01/17/2024    Chief Complaint:     Chief Complaint   Patient presents with    Seizures     States no seizures since last OV-needs refills       History of Present Illness:      This is 58 y.o. right hand dominant male with history of multiple cardiovascular risk factors, insulin-dependent type 2 diabetes, CKD III, PEA arrest in 2/2022, who is referred for provoked seizure in the setting of acute illness. Seizure free since discharge in 5/2022. During last visit, Lacosamide was discontinued.     Age of Onset : 5/19/2022 while in the hospital for CHF exacerbation      Semiology: cannot recall     Frequency: last event during hospitalization in 5/2022      Provocation Factors: CHF exacerbation      Risk Factors  - Family history of seizure disorders:  No  - History of focal CNS lesions: Yes history of PEA arrest in 2/2022  - History of CNS infections: No  - History head trauma with prolonged LOC: No  - History of childhood seizures, including febrile seizures: No  - History of development delay: No   - History of underlying mood disorder: No   - History of sleep disorder: Yes JOESPH on CPAP  - Recreational drug use: No  - Alcohol use: No  - Family planning and contraceptive use: Not Applicable     Medications:     Current Anti-Seizure Medication(s):  Levetiracetam 500 mg twice a day     Prior Anti-Seizure Medication(s):  Vimpat 100 mg twice a day - only taking 100 mg once a day     Surgical Intervention & Devices:     - VNS:  - DBS  - RNS:  - Lobectomy:    Labs:     Results for orders placed or performed in visit on 01/04/24   POCT Bladder Scan   Result Value Ref Range    POC Residual Urine Volume 81 0 - 100 mL       Studies:      - 24 hour EEG 5/20/2022 - 5/21/2022 at [facility]:  Encephalopathic changes.  Probable left frontal central spike.  - Ambulatory EEG:  - EMU Video EEG:  - MRI Brain without  contrast May 21, 2022 at [facility]:  As per documentation, extensive chronic microvascular changes.    - NCHCT:  - WADA:    Review of Systems:     Review of Systems   All other systems reviewed and are negative.      Physical Exams:     Vitals:    01/17/24 1416   BP: 124/83   Pulse: 91   Resp: 14   Temp: 98.2 °F (36.8 °C)       Physical Exam  Vitals and nursing note reviewed.   Constitutional:       Appearance: Normal appearance.   HENT:      Head: Normocephalic and atraumatic.      Nose: Nose normal.      Mouth/Throat:      Mouth: Mucous membranes are moist.      Pharynx: Oropharynx is clear.   Eyes:      Conjunctiva/sclera: Conjunctivae normal.   Cardiovascular:      Rate and Rhythm: Normal rate and regular rhythm.      Pulses: Normal pulses.      Heart sounds: Normal heart sounds.   Pulmonary:      Effort: Pulmonary effort is normal.      Breath sounds: Normal breath sounds.   Abdominal:      General: Abdomen is flat.      Palpations: Abdomen is soft.   Musculoskeletal:         General: Normal range of motion.      Cervical back: Normal range of motion.   Neurological:      Mental Status: He is alert.   Psychiatric:         Mood and Affect: Mood normal.         Comprehensive Neurological Exam:  Mental Status: Alert Oriented to Self, Date, and Place. Comprehension wnl. No dysarthria.   CN II - XII: MARSHALL, No APD, VFFC, No ptosis OU, EOMI without nystagmus, LT/Temp symmetric in CN V1-3 distribution, Face Symmetric, Tongue and Uvula midline, Trapezius symmetric bilateral.   Motor: tone and bulk wnl throughout, no abnormal involuntary or voluntary movements, 5/5 confrontation, Fine finger movements wnl b/l, No pronator drift.   Sensory: LT, Proprioception, Vibration, PP, Temp symmetric. .   Reflexes: 1+ throughout  Cerebellar: FNF wnl b/l  Gait: normal.    Assessment:     This is 58 y.o. right hand dominant male with history of multiple cardiovascular risk factors, insulin-dependent type 2 diabetes, CKD III, PEA arrest in  2/2022, who is referred for provoked seizure in the setting of acute illness. Seizure free since discharge in 5/2022.      Problem List Items Addressed This Visit          Neuro    New onset seizure       Endocrine    Type 2 diabetes mellitus with diabetic polyneuropathy, with long-term current use of insulin    Severe obesity (BMI 35.0-39.9) with comorbidity - Primary       Plan:     [] decrease Levetiracetam to 250 mg twice a day     RTC 4 Months      Visit today is associated with current or anticipated ongoing medical care related to this patient's single serious condition/complex condition as documented above.     Seizure education provided: including family planning and teratogenicity of AEDs, risk of uncontrolled seizure disorder, SUDEP. No driving until seizure free for six months ([place] state law). No swimming, climbing heights, or operate heavy machinery without supervision. Patient is advised to avoid Benadryl, Tramadol, Wellbutrin, flashing lights, alcohol, sleep deprivation, and certain anti-biotics that can lower seizure threshold.     I have explained the treatment plan, diagnosis, and prognosis to patient. All questions are answered to the best of my knowledge.     Face to face time 30 minutes, including documentation, chart review, counseling, education, review of test results, relevant medical records, and coordination of care.   I have explained the treatment plan, diagnosis, and prognosis to patient. All questions are answered to the best of my knowledge.         Claribel Canales MD   General Neurology  01/17/2024

## 2024-01-17 ENCOUNTER — OFFICE VISIT (OUTPATIENT)
Dept: NEUROLOGY | Facility: CLINIC | Age: 59
End: 2024-01-17
Payer: MEDICARE

## 2024-01-17 VITALS
HEIGHT: 73 IN | TEMPERATURE: 98 F | WEIGHT: 268.19 LBS | SYSTOLIC BLOOD PRESSURE: 124 MMHG | RESPIRATION RATE: 14 BRPM | HEART RATE: 91 BPM | DIASTOLIC BLOOD PRESSURE: 83 MMHG | BODY MASS INDEX: 35.54 KG/M2 | OXYGEN SATURATION: 97 %

## 2024-01-17 DIAGNOSIS — R56.9 NEW ONSET SEIZURE: ICD-10-CM

## 2024-01-17 DIAGNOSIS — E11.42 TYPE 2 DIABETES MELLITUS WITH DIABETIC POLYNEUROPATHY, WITH LONG-TERM CURRENT USE OF INSULIN: ICD-10-CM

## 2024-01-17 DIAGNOSIS — Z79.4 TYPE 2 DIABETES MELLITUS WITH DIABETIC POLYNEUROPATHY, WITH LONG-TERM CURRENT USE OF INSULIN: ICD-10-CM

## 2024-01-17 DIAGNOSIS — E66.01 SEVERE OBESITY (BMI 35.0-39.9) WITH COMORBIDITY: Primary | ICD-10-CM

## 2024-01-17 PROCEDURE — G2211 COMPLEX E/M VISIT ADD ON: HCPCS | Mod: S$PBB,,, | Performed by: PSYCHIATRY & NEUROLOGY

## 2024-01-17 PROCEDURE — 99215 OFFICE O/P EST HI 40 MIN: CPT | Mod: PBBFAC | Performed by: PSYCHIATRY & NEUROLOGY

## 2024-01-17 PROCEDURE — 99214 OFFICE O/P EST MOD 30 MIN: CPT | Mod: S$PBB,,, | Performed by: PSYCHIATRY & NEUROLOGY

## 2024-01-17 RX ORDER — SPIRONOLACTONE 25 MG/1
1 TABLET ORAL NIGHTLY
COMMUNITY

## 2024-01-17 RX ORDER — LEVETIRACETAM 250 MG/1
250 TABLET ORAL 2 TIMES DAILY
Qty: 60 TABLET | Refills: 5 | Status: SHIPPED | OUTPATIENT
Start: 2024-01-17 | End: 2024-05-21

## 2024-01-17 RX ORDER — TAMSULOSIN HYDROCHLORIDE 0.4 MG/1
1 CAPSULE ORAL DAILY
COMMUNITY
End: 2024-05-21

## 2024-01-17 RX ORDER — VALSARTAN 40 MG/1
40 TABLET ORAL 2 TIMES DAILY
COMMUNITY
Start: 2023-12-04 | End: 2024-05-21

## 2024-01-22 PROBLEM — I25.118 CORONARY ARTERY DISEASE OF NATIVE ARTERY OF NATIVE HEART WITH STABLE ANGINA PECTORIS: Status: ACTIVE | Noted: 2022-07-12

## 2024-01-22 PROBLEM — N18.4 CHRONIC KIDNEY DISEASE (CKD), STAGE IV (SEVERE): Status: ACTIVE | Noted: 2024-01-22

## 2024-01-22 PROBLEM — J44.9 CHRONIC OBSTRUCTIVE PULMONARY DISEASE, UNSPECIFIED COPD TYPE: Status: ACTIVE | Noted: 2024-01-22

## 2024-01-22 NOTE — PROGRESS NOTES
"  Family Medicine    Patient ID: [id number]   274}  Chief Complaint: Follow-up (Pt is here for follow up )         HPI:     Luigi Gotti is a 58 y.o. male here today for a follow up. No other complaints today.   He is accompanied by his wife today.   Seen by urology recently. On alfuzosin and proscar. RTC in 6mon.   Seen by heme/onc. No worrisome cause for leukocytosis. No need for further workup.  Seen by neurology. Decreased Keppra. RTC in 4 months  Seen by cardiology. Had a CCM placed in October.  Seen by nephrology. Stable at that time. RTC in 4 months.    Here for DM f/u. On Farxiga and Novolin 70/30 45u BID    He reports that his home sugars are 70-300s. Medium range 125.   He does not have a log with him.     Has home health at this time with no plans to stop. She comes 1x week.   Reports some numbness and tingling in toes bilaterally. No sores.     The patient presents with diabetes.  The patient denies polyuria, polydipsia, polyphagia, hypoglycemia and paresthesias.  The patient currently complains of no podiatric problems.  The patient has excellent compliance.  Hemoglobin A1c   Date Value Ref Range Status   11/06/2023 6.9 <=7.0 % Final   08/16/2023 6.5 <=7.0 % Final   03/30/2023 6.7 <=7.0 % Final     Diabetes Management Status  Statin: Taking  ACE/ARB: Taking    Screening or Prevention Patient's value Goal Complete/Controlled?   HgA1C Testing and Control   Lab Results   Component Value Date    HGBA1C 6.9 11/06/2023      Annually/Less than 8% Yes   Lipid profile : 08/16/2023 Annually Yes   LDL control Lab Results   Component Value Date    LDLCALC 38 05/03/2022    Annually/Less than 100 mg/dl  Yes   Nephropathy screening No results found for: "LABMICR"  Lab Results   Component Value Date    PROTEINUA Negative 02/13/2023        Annually Yes   Blood pressure BP Readings from Last 1 Encounters:   01/23/24 103/68    Less than 140/90 Yes   Dilated retinal exam : 09/05/2023 Annually Yes   Foot exam   : " 08/17/2023 Annually Yes              274}  Past Medical History:   Diagnosis Date    Anxiety     Arthritis     BPH (benign prostatic hyperplasia)     CAD (coronary artery disease)     CHF (congestive heart failure)     Depression     Dizziness     GERD (gastroesophageal reflux disease)     Hypertension     Ischemic cardiomyopathy     Myocardial infarction     Obesity     Other hyperlipidemia     Oxygen dependent     3L/NC AT HS    PVD (peripheral vascular disease)     Renal disorder     kidney stones    Seizures     SOB (shortness of breath) on exertion     Thyroid disease     Type 2 diabetes mellitus without complications     Weakness of both legs          Past Surgical History:   Procedure Laterality Date    CARDIAC DEFIBRILLATOR PLACEMENT N/A 6/9/2023    Procedure: Insertion, ICD;  Surgeon: Perry Eldridge MD;  Location: [facility] CATH LAB;  Service: Cardiology;  Laterality: N/A;  SICD W/ ANEST. (BS)    CHOLECYSTECTOMY      COLONOSCOPY      multiple    CORONARY ARTERY BYPASS GRAFT      CORONARY STENT PLACEMENT      ESOPHAGOGASTRODUODENOSCOPY      INSERTION, TRANSVENOUS ELECTRODE LEAD, PACING ICD OR CARDIAC PACEMAKER N/A 10/19/2023    Procedure: Insertion, Transvenous Electrode Lead, Pacing ICD or Cardiac Pacemaker;  Surgeon: Colby Mcintosh MD;  Location: [facility] CATH LAB;  Service: Cardiology;  Laterality: N/A;  CCM IMPLANT    LEFT HEART CATHETERIZATION N/A 02/07/2023    Procedure: Left heart cath;  Surgeon: Jamin Melara MD;  Location: [facility] CATH LAB;  Service: Cardiology;  Laterality: N/A;    RIGHT HEART CATHETERIZATION Right 09/20/2022    Procedure: INSERTION, CATHETER, RIGHT HEART;  Surgeon: Cory Oreilly MD;  Location: [facility] CATH LAB;  Service: Cardiology;  Laterality: Right;  CARDIOMEMS RJ ACCESS    STENT, PERIPHERAL GRAFT      TONSILLECTOMY          Social History     Tobacco Use    Smoking status: Never     Passive exposure: Never    Smokeless tobacco: Never   Substance and Sexual Activity    Alcohol  "use: Never    Drug use: Not Currently    Sexual activity: Not Currently     Partners: Female        Current Outpatient Medications   Medication Instructions    alfuzosin (UROXATRAL) 10 mg, Oral, With breakfast    aspirin (ECOTRIN) 81 mg, Oral, Daily    atorvastatin (LIPITOR) 80 mg, Oral, Nightly    azelastine (ASTELIN) 137 mcg (0.1 %) nasal spray 1 spray, Nasal, 2 times daily    b complex vitamins tablet 1 tablet, Oral, Daily    blood sugar diagnostic (TRUE METRIX GLUCOSE TEST STRIP) Strp USE TO TEST 3 TIMES A DAY    carvediloL (COREG) 25 mg, Oral, 2 times daily    cyanocobalamin (VITAMIN B-12) 2,000 mcg, Oral, Daily    eplerenone (INSPRA) 25 mg, Oral, Daily    esomeprazole (NEXIUM) 40 MG capsule 1 capsule, Oral, Nightly    ezetimibe (ZETIA) 10 mg, Oral, Daily    FARXIGA 10 mg, Oral, Daily    ferrous gluconate (FERGON) 324 MG tablet 1 tablet, Oral    finasteride (PROSCAR) 5 mg, Oral    fluticasone propionate (FLONASE) 50 mcg/actuation nasal spray Flonase Allergy Relief Take No date recorded No form recorded No frequency recorded No route recorded No set duration recorded No set duration amount recorded active No dosage strength recorded No dosage strength units of measure recorded    HYDROcodone-acetaminophen (NORCO) 5-325 mg per tablet 1 tablet, Oral, Every 6 hours PRN    hydrOXYzine (ATARAX) 50 mg, Oral, 3 times daily    insulin syringe-needle U-100 1 mL 31 gauge x 15/64" Syrg 1 each, Misc.(Non-Drug; Combo Route), 3 times daily    isosorbide mononitrate (IMDUR) 60 mg, Oral, Daily    lactulose (CHRONULAC) 10 g, Oral, 2 times daily PRN    levETIRAcetam (KEPPRA) 250 mg, Oral, 2 times daily    levocetirizine (XYZAL) 5 mg, Oral, Nightly PRN    levothyroxine (SYNTHROID) 25 mcg, Oral    metoclopramide HCl (REGLAN) 10 mg, Oral, Daily    montelukast (SINGULAIR) 10 mg tablet TAKE 1 TABLET BY MOUTH EVERY DAY    nitroGLYCERIN (NITROSTAT) 0.4 mg, Sublingual, As needed (PRN)    NOVOLIN 70/30 U-100 INSULIN 100 unit/mL (70-30) " "injection 45 Units, Subcutaneous, 2 times daily with meals    prasugreL (EFFIENT) 10 mg, Oral, Daily    QUEtiapine (SEROQUEL) 25 mg, Oral, Nightly    ranolazine (RANEXA) 1,000 mg, Oral, 2 times daily    spironolactone (ALDACTONE) 25 MG tablet 1 tablet, Oral, Nightly    tamsulosin (FLOMAX) 0.4 mg Cap 1 capsule, Oral, Daily    torsemide (DEMADEX) 20 mg, Oral, 2 times daily    TRUE METRIX GLUCOSE METER Misc TEST 3 TIMES A DAY    valsartan (DIOVAN) 40 mg, Oral, 2 times daily    VERQUVO 2.5 mg, Oral, 2 times daily    XARELTO 2.5 mg, Oral, 2 times daily     274}  Review of patient's allergies indicates:   Allergen Reactions    Pork derived (porcine)      Other reaction(s): Pork    Buddhist choice      Plavix [clopidogrel] Hives and Rash       Patient Care Team:  Quinton Guidry FNP as PCP - General (Family Medicine)  Robina Garrison FNP as Nurse Practitioner (Nephrology)     Subjective:     Review of Systems    12 point review of systems conducted, negative except as stated in the history of present illness. See HPI for details.    Objective:   274}  Visit Vitals  /68 (BP Location: Left arm, Patient Position: Sitting, BP Method: Large (Automatic))   Pulse 81   Temp 97.6 °F (36.4 °C) (Oral)   Resp 18   Ht 6' 1" (1.854 m)   Wt 118.8 kg (262 lb)   SpO2 97%   BMI 34.57 kg/m²         Physical Exam  Vitals reviewed.   Constitutional:       General: He is not in acute distress.     Appearance: Normal appearance.   Eyes:      Extraocular Movements: Extraocular movements intact.      Conjunctiva/sclera: Conjunctivae normal.   Cardiovascular:      Rate and Rhythm: Normal rate and regular rhythm.      Pulses: Normal pulses.      Heart sounds: Normal heart sounds. No murmur heard.     No friction rub. No gallop.   Pulmonary:      Effort: Pulmonary effort is normal.      Breath sounds: Normal breath sounds. No wheezing, rhonchi or rales.   Skin:     General: Skin is warm and dry.      Findings: No lesion or rash. "   Neurological:      General: No focal deficit present.      Mental Status: He is alert and oriented to person, place, and time.   Psychiatric:         Mood and Affect: Mood normal.         Behavior: Behavior normal.         Thought Content: Thought content normal.         Judgment: Judgment normal.           Visual Foot Inspection:  Normal -  Bilateral              Labs Reviewed:    274}  Chemistry:  Lab Results   Component Value Date     11/06/2023    K 4.8 11/06/2023    CHLORIDE 105 11/06/2023    BUN 25.0 11/06/2023    CREATININE 2.55 (H) 11/06/2023    EGFRNORACEVR 28 11/06/2023    GLUCOSE 247 (H) 11/06/2023    CALCIUM 9.4 11/06/2023    ALKPHOS 134 11/06/2023    LABPROT 7.9 11/06/2023    ALBUMIN 3.6 11/06/2023    BILIDIR 0.5 04/13/2022    IBILI 0.40 04/13/2022    AST 14 11/06/2023    ALT 15 11/06/2023    MG 2.20 11/28/2022    PHOS 3.5 10/02/2023    TSH 1.512 03/30/2023    TBLSBF9MHTQ 0.88 03/30/2023    PSA 0.49 12/19/2023        Lab Results   Component Value Date    HGBA1C 6.9 11/06/2023        Hematology:  Lab Results   Component Value Date    WBC 10.36 10/12/2023    HGB 12.1 (L) 10/12/2023    HCT 38.0 (L) 10/12/2023     10/12/2023       Lipid Panel:  Lab Results   Component Value Date    CHOL 139 08/16/2023    HDL 41 08/16/2023    LDL 77.00 08/16/2023    TRIG 106 08/16/2023    TOTALCHOLEST 3 08/16/2023        Urine:  Lab Results   Component Value Date    COLORUA Light-Yellow 02/13/2023    APPEARANCEUA Clear 02/13/2023    SGUA 1.012 02/13/2023    PHUA 5.5 02/13/2023    PROTEINUA Negative 02/13/2023    GLUCOSEUA Normal 02/13/2023    KETONESUA Negative 02/13/2023    BLOODUA Negative 02/13/2023    NITRITESUA Negative 02/13/2023    LEUKOCYTESUR 25 (A) 02/13/2023    RBCUA 0-5 02/13/2023    WBCUA 0-5 02/13/2023    BACTERIA Trace (A) 02/13/2023    SQEPUA Trace (A) 02/13/2023    HYALINECASTS 6-10 (A) 02/13/2023    CREATRANDUR 91.4 02/13/2023    PROTEINURINE 6.9 02/13/2023    UPROTCREA 0.1 02/13/2023     "    Assessment:    274}    ICD-10-CM ICD-9-CM   1. Type 2 diabetes mellitus with diabetic neuropathy, with long-term current use of insulin  E11.40 250.60    Z79.4 357.2     V58.67   2. Chronic obstructive pulmonary disease, unspecified COPD type  J44.9 496   3. Chronic kidney disease (CKD), stage IV (severe)  N18.4 585.4   4. Ulcerative colitis with complication, unspecified location  K51.919 556.9   5. Chronic systolic heart failure  I50.22 428.22   6. Stenosis of celiac artery  I77.1 447.1   7. Anemia due to stage 4 chronic kidney disease  N18.4 285.21    D63.1 585.4   8. Acute on chronic systolic heart failure  I50.23 428.23   9. Coronary artery disease of native artery of native heart with stable angina pectoris  I25.118 414.01     413.9   10. Benign prostatic hyperplasia with weak urinary stream  N40.1 600.01    R39.12 788.62   11. Essential hypertension  I10 401.9   12. Mixed hyperlipidemia  E78.2 272.2   13. Hypothyroidism, unspecified type  E03.9 244.9   14. Type 2 diabetes mellitus with diabetic polyneuropathy, with long-term current use of insulin  E11.42 250.60    Z79.4 357.2     V58.67        Plan:     1. Type 2 diabetes mellitus with diabetic neuropathy, with long-term current use of insulin  Assessment & Plan:  Stable. Continue current treatment plan    Orders:  -     Lipid Panel; Future; Expected date: 01/23/2024  -     Hemoglobin A1C; Future; Expected date: 01/23/2024  -     Microalbumin/Creatinine Ratio, Urine  -     insulin syringe-needle U-100 1 mL 31 gauge x 15/64" Syrg; 1 each by Misc.(Non-Drug; Combo Route) route 3 (three) times daily.  Dispense: 10 each; Refill: 11  -     metoclopramide HCl (REGLAN) 10 MG tablet; Take 1 tablet (10 mg total) by mouth once daily.  Dispense: 90 tablet; Refill: 1    2. Chronic obstructive pulmonary disease, unspecified COPD type  Assessment & Plan:  Stable. Cont current meds      3. Chronic kidney disease (CKD), stage IV (severe)  Assessment & Plan:  Check labs " today and adjust meds as needed. See nephrology as planned.       4. Ulcerative colitis with complication, unspecified location  Assessment & Plan:  Unchanged. No new symptoms.   Next Cscope in 2025.      5. Chronic systolic heart failure    6. Stenosis of celiac artery  Assessment & Plan:  Stable.       7. Anemia due to stage 4 chronic kidney disease  Assessment & Plan:  Stable. Monitor as planned.      8. Acute on chronic systolic heart failure  Overview:  [facility] 4/28/23: LVEF 20%    Assessment & Plan:  Stable. Continue care with cardiology as planned.      9. Coronary artery disease of native artery of native heart with stable angina pectoris  Overview:  [facility] 2/7/23: Stable    Assessment & Plan:  See cardiology as planned. Stable.      10. Benign prostatic hyperplasia with weak urinary stream  Assessment & Plan:  See urology as planned. Continue current treatment      11. Essential hypertension  Assessment & Plan:  Controlled. Cont meds.       12. Mixed hyperlipidemia  Assessment & Plan:  Controlled. Cont meds. F/u as planned.       13. Hypothyroidism, unspecified type    14. Type 2 diabetes mellitus with diabetic polyneuropathy, with long-term current use of insulin  Overview:  Current medications: Novolin 70/30 45 units BID  A1c level: 6.5%; goal <8% for pt due to risk of hypoglycemia  CBG trends: None provided  Educated on diabetic diet: Low-fat, Low-carb, Low-cholesterol. Avoid sugary/dark drinks/sodas and white foods (i.e., bread, pasta, potatoes, rice)  Aerobic exercise: 20-30 min/day x 5 days/week  Diabetic Eye Exam: UTD. Dr. Barrientos 9/13/22  Diabetic Foot Exam: 8/17/23. Following Dr. Escalante  Kidney Protection: ARB  Statin Therapy: Yes        Assessment & Plan:  Check labs today. Will call with results.   Check kidney function today and adjust meds if needed.   F/u in 6 months or sooner if problems arise.     Encourage to keep blood sugar log.     Orders:  -     NOVOLIN 70/30 U-100 INSULIN 100 unit/mL  (70-30) injection; Inject 45 Units into the skin 2 (two) times daily with meals.  Dispense: 130 mL; Refill: 1  -     blood sugar diagnostic (TRUE METRIX GLUCOSE TEST STRIP) Strp; USE TO TEST 3 TIMES A DAY  Dispense: 100 strip; Refill: 11         Follow up in about 6 months (around 7/23/2024) for diabetes. In addition to their scheduled follow up, the patient has also been instructed to follow up on as needed basis.     Future Appointments   Date Time Provider Department Center   1/23/2024  9:10 AM LAB[facility] [place]   2/5/2024 12:45 PM Robina Garrison FNP [facility] NEPHR [place]    5/21/2024  1:00 PM Claribel Canales MD [facility] NEURO [place]    7/5/2024 10:00 AM Matt Ray NP [facility] UROLO [place]    7/23/2024  7:30 AM Quinton Guidry FNP [facility] INTMED [place]             Ping Rodgers MD

## 2024-01-23 ENCOUNTER — OFFICE VISIT (OUTPATIENT)
Dept: INTERNAL MEDICINE | Facility: CLINIC | Age: 59
End: 2024-01-23
Payer: MEDICARE

## 2024-01-23 ENCOUNTER — EXTERNAL HOME HEALTH (OUTPATIENT)
Dept: HOME HEALTH SERVICES | Facility: HOSPITAL | Age: 59
End: 2024-01-23
Payer: MEDICARE

## 2024-01-23 VITALS
SYSTOLIC BLOOD PRESSURE: 103 MMHG | BODY MASS INDEX: 34.72 KG/M2 | HEART RATE: 81 BPM | TEMPERATURE: 98 F | DIASTOLIC BLOOD PRESSURE: 68 MMHG | OXYGEN SATURATION: 97 % | HEIGHT: 73 IN | RESPIRATION RATE: 18 BRPM | WEIGHT: 262 LBS

## 2024-01-23 DIAGNOSIS — R39.12 BENIGN PROSTATIC HYPERPLASIA WITH WEAK URINARY STREAM: ICD-10-CM

## 2024-01-23 DIAGNOSIS — K51.919 ULCERATIVE COLITIS WITH COMPLICATION, UNSPECIFIED LOCATION: ICD-10-CM

## 2024-01-23 DIAGNOSIS — Z79.4 TYPE 2 DIABETES MELLITUS WITH DIABETIC NEUROPATHY, WITH LONG-TERM CURRENT USE OF INSULIN: Primary | ICD-10-CM

## 2024-01-23 DIAGNOSIS — E11.40 TYPE 2 DIABETES MELLITUS WITH DIABETIC NEUROPATHY, WITH LONG-TERM CURRENT USE OF INSULIN: Primary | ICD-10-CM

## 2024-01-23 DIAGNOSIS — E03.9 HYPOTHYROIDISM, UNSPECIFIED TYPE: ICD-10-CM

## 2024-01-23 DIAGNOSIS — I77.1 STENOSIS OF CELIAC ARTERY: ICD-10-CM

## 2024-01-23 DIAGNOSIS — Z79.4 TYPE 2 DIABETES MELLITUS WITH DIABETIC POLYNEUROPATHY, WITH LONG-TERM CURRENT USE OF INSULIN: ICD-10-CM

## 2024-01-23 DIAGNOSIS — D63.1 ANEMIA DUE TO STAGE 4 CHRONIC KIDNEY DISEASE: ICD-10-CM

## 2024-01-23 DIAGNOSIS — N18.4 ANEMIA DUE TO STAGE 4 CHRONIC KIDNEY DISEASE: ICD-10-CM

## 2024-01-23 DIAGNOSIS — I25.118 CORONARY ARTERY DISEASE OF NATIVE ARTERY OF NATIVE HEART WITH STABLE ANGINA PECTORIS: ICD-10-CM

## 2024-01-23 DIAGNOSIS — E78.2 MIXED HYPERLIPIDEMIA: ICD-10-CM

## 2024-01-23 DIAGNOSIS — J44.9 CHRONIC OBSTRUCTIVE PULMONARY DISEASE, UNSPECIFIED COPD TYPE: ICD-10-CM

## 2024-01-23 DIAGNOSIS — N18.4 CHRONIC KIDNEY DISEASE (CKD), STAGE IV (SEVERE): ICD-10-CM

## 2024-01-23 DIAGNOSIS — I50.22 CHRONIC SYSTOLIC HEART FAILURE: ICD-10-CM

## 2024-01-23 DIAGNOSIS — E11.42 TYPE 2 DIABETES MELLITUS WITH DIABETIC POLYNEUROPATHY, WITH LONG-TERM CURRENT USE OF INSULIN: ICD-10-CM

## 2024-01-23 DIAGNOSIS — I10 ESSENTIAL HYPERTENSION: ICD-10-CM

## 2024-01-23 DIAGNOSIS — I50.23 ACUTE ON CHRONIC SYSTOLIC HEART FAILURE: ICD-10-CM

## 2024-01-23 DIAGNOSIS — N40.1 BENIGN PROSTATIC HYPERPLASIA WITH WEAK URINARY STREAM: ICD-10-CM

## 2024-01-23 PROCEDURE — 99215 OFFICE O/P EST HI 40 MIN: CPT | Mod: PBBFAC | Performed by: FAMILY MEDICINE

## 2024-01-23 PROCEDURE — 99214 OFFICE O/P EST MOD 30 MIN: CPT | Mod: S$PBB,,, | Performed by: FAMILY MEDICINE

## 2024-01-23 RX ORDER — METOCLOPRAMIDE 10 MG/1
10 TABLET ORAL DAILY
Qty: 90 TABLET | Refills: 1 | Status: SHIPPED | OUTPATIENT
Start: 2024-01-23 | End: 2024-07-21

## 2024-01-23 RX ORDER — HUMAN INSULIN 100 [USP'U]/ML
45 INJECTION, SUSPENSION SUBCUTANEOUS 2 TIMES DAILY WITH MEALS
Qty: 130 ML | Refills: 1 | Status: SHIPPED | OUTPATIENT
Start: 2024-01-23 | End: 2024-07-21

## 2024-01-23 RX ORDER — SYRINGE AND NEEDLE,INSULIN,1ML 31GX15/64"
1 SYRINGE, EMPTY DISPOSABLE MISCELLANEOUS 3 TIMES DAILY
Qty: 10 EACH | Refills: 11 | Status: SHIPPED | OUTPATIENT
Start: 2024-01-23 | End: 2025-01-22

## 2024-01-23 NOTE — ASSESSMENT & PLAN NOTE
Check labs today. Will call with results.   Check kidney function today and adjust meds if needed.   F/u in 6 months or sooner if problems arise.     Encourage to keep blood sugar log.

## 2024-01-23 NOTE — ADDENDUM NOTE
Addended by: TIERNEY HENRY on: 1/23/2024 10:42 AM     Modules accepted: Orders, Level of Service

## 2024-01-23 NOTE — PATIENT INSTRUCTIONS
Rodolfo Meyers,     If you are due for any health screening(s) below please notify me so we can arrange them to be ordered and scheduled. Most healthy patients at your age complete them, but you are free to accept or refuse.     If you can't do it, I'll definitely understand. If you can, I'd certainly appreciate it!    All of your core healthy metrics are met.

## 2024-01-26 ENCOUNTER — LAB VISIT (OUTPATIENT)
Dept: LAB | Facility: HOSPITAL | Age: 59
End: 2024-01-26
Attending: NURSE PRACTITIONER
Payer: MEDICARE

## 2024-01-26 ENCOUNTER — TELEPHONE (OUTPATIENT)
Dept: INTERNAL MEDICINE | Facility: CLINIC | Age: 59
End: 2024-01-26
Payer: MEDICARE

## 2024-01-26 DIAGNOSIS — N18.9 CHRONIC KIDNEY DISEASE, UNSPECIFIED CKD STAGE: ICD-10-CM

## 2024-01-26 DIAGNOSIS — N18.9 CHRONIC KIDNEY DISEASE, UNSPECIFIED CKD STAGE: Primary | ICD-10-CM

## 2024-01-26 LAB
APPEARANCE UR: CLEAR
BACTERIA #/AREA URNS AUTO: ABNORMAL /HPF
BILIRUB UR QL STRIP.AUTO: NEGATIVE
COLOR UR AUTO: ABNORMAL
CREAT UR-MCNC: 80.7 MG/DL (ref 63–166)
CREAT UR-MCNC: 82.1 MG/DL (ref 63–166)
GLUCOSE UR QL STRIP.AUTO: ABNORMAL
HYALINE CASTS #/AREA URNS LPF: ABNORMAL /LPF
KETONES UR QL STRIP.AUTO: NEGATIVE
LEUKOCYTE ESTERASE UR QL STRIP.AUTO: 75
MICROALBUMIN UR-MCNC: <5 UG/ML
MICROALBUMIN/CREAT RATIO PNL UR: NORMAL
NITRITE UR QL STRIP.AUTO: NEGATIVE
PH UR STRIP.AUTO: 6 [PH]
PROT UR QL STRIP.AUTO: NEGATIVE
PROT UR STRIP-MCNC: <6.8 MG/DL
RBC #/AREA URNS AUTO: ABNORMAL /HPF
RBC UR QL AUTO: NEGATIVE
SP GR UR STRIP.AUTO: 1.02 (ref 1–1.03)
SQUAMOUS #/AREA URNS LPF: ABNORMAL /HPF
UROBILINOGEN UR STRIP-ACNC: NORMAL
WBC #/AREA URNS AUTO: ABNORMAL /HPF

## 2024-01-26 PROCEDURE — 82570 ASSAY OF URINE CREATININE: CPT

## 2024-01-26 PROCEDURE — 81001 URINALYSIS AUTO W/SCOPE: CPT

## 2024-01-26 NOTE — TELEPHONE ENCOUNTER
----- Message from Ping Rodgers MD sent at 1/23/2024 11:39 AM CST -----  Please let Mr. Meyers know that his kidney function did improve some. His creatinine is now at 2.08 which has improved from 2.55. Keep his appt with the nephrologist as planned and ok to keep his meds at the current doses.

## 2024-01-30 ENCOUNTER — TELEPHONE (OUTPATIENT)
Dept: NEPHROLOGY | Facility: CLINIC | Age: 59
End: 2024-01-30
Payer: MEDICARE

## 2024-01-31 ENCOUNTER — EXTERNAL HOME HEALTH (OUTPATIENT)
Dept: HOME HEALTH SERVICES | Facility: HOSPITAL | Age: 59
End: 2024-01-31
Payer: MEDICARE

## 2024-02-05 ENCOUNTER — OFFICE VISIT (OUTPATIENT)
Dept: NEPHROLOGY | Facility: CLINIC | Age: 59
End: 2024-02-05
Payer: MEDICARE

## 2024-02-05 VITALS
HEIGHT: 73 IN | RESPIRATION RATE: 18 BRPM | OXYGEN SATURATION: 98 % | SYSTOLIC BLOOD PRESSURE: 128 MMHG | BODY MASS INDEX: 34.88 KG/M2 | WEIGHT: 263.19 LBS | DIASTOLIC BLOOD PRESSURE: 81 MMHG | HEART RATE: 73 BPM | TEMPERATURE: 98 F

## 2024-02-05 DIAGNOSIS — N18.32 CKD STAGE G3B/A1, GFR 30-44 AND ALBUMIN CREATININE RATIO <30 MG/G: Primary | ICD-10-CM

## 2024-02-05 DIAGNOSIS — N25.81 SECONDARY HYPERPARATHYROIDISM OF RENAL ORIGIN: ICD-10-CM

## 2024-02-05 DIAGNOSIS — I10 PRIMARY HYPERTENSION: ICD-10-CM

## 2024-02-05 DIAGNOSIS — I50.22 CHRONIC SYSTOLIC HEART FAILURE: ICD-10-CM

## 2024-02-05 PROCEDURE — 99215 OFFICE O/P EST HI 40 MIN: CPT | Mod: PBBFAC | Performed by: NURSE PRACTITIONER

## 2024-02-05 PROCEDURE — 99214 OFFICE O/P EST MOD 30 MIN: CPT | Mod: S$PBB,,, | Performed by: NURSE PRACTITIONER

## 2024-02-05 NOTE — PROGRESS NOTES
[facility]  Nephrology Clinic Note    Chief complaint: Follow-up (RTC, took meds, w/o complaints)    History of present illness:   Luigi Gotti is a 58 y.o. Other male with past medical history of chronic kidney disease, heart failure with reduced ejection fraction (20%, s/p ICD 6/9/2023), coronary artery disease, gastroparesis, gastritis, ischemic colitis, peripheral vascular disease, dyslipidemia, hypertension, BPH, diabetes mellitus type 2, and obesity.    Patient presents for follow-up appointment in Nephrology Clinic today. Denies complaints.     Review of Systems  12 point review of systems conducted, negative except as stated in the history of present illness.    Allergies: Patient is allergic to pork derived (porcine) and plavix [clopidogrel].     Past Medical History:  has a past medical history of Anxiety, Arthritis, BPH (benign prostatic hyperplasia), CAD (coronary artery disease), CHF (congestive heart failure), Depression, Dizziness, GERD (gastroesophageal reflux disease), Hypertension, Ischemic cardiomyopathy, Myocardial infarction, Obesity, Other hyperlipidemia, Oxygen dependent, PVD (peripheral vascular disease), Renal disorder, Seizures, SOB (shortness of breath) on exertion, Thyroid disease, Type 2 diabetes mellitus without complications, and Weakness of both legs.    Procedure History:  has a past surgical history that includes Coronary artery bypass graft; Tonsillectomy; Cholecystectomy; Right heart catheterization (Right, 09/20/2022); Left heart catheterization (N/A, 02/07/2023); stent, peripheral graft; Coronary stent placement; Colonoscopy; Esophagogastroduodenoscopy; Cardiac defibrillator placement (N/A, 6/9/2023); and insertion, transvenous electrode lead, pacing icd or cardiac pacemaker (N/A, 10/19/2023).    Family History: family history includes Cancer in his mother; Diabetes in his brother, mother, and sister; Hypertension in his father and mother; Kidney  "disease in his brother; Stroke in his father.    Social History:  reports that he has never smoked. He has never been exposed to tobacco smoke. He has never used smokeless tobacco. He reports that he does not currently use drugs. He reports that he does not drink alcohol.    Physical exam  /81 (BP Location: Right arm, Patient Position: Sitting, BP Method: Large (Automatic))   Pulse 73   Temp 98.3 °F (36.8 °C) (Oral)   Resp 18   Ht 6' 0.84" (1.85 m)   Wt 119.4 kg (263 lb 3.2 oz)   SpO2 98%   BMI 34.88 kg/m²   General appearance: Patient is in no acute distress.  Skin: No rashes or wounds.  HEENT: PERRLA, EOMI, no scleral icterus, no JVD. Neck is supple.  Chest: Respirations are unlabored. Lungs sounds are clear.   Heart: S1, S2.   Abdomen: Benign.  : Deferred.  Extremities: No edema, peripheral pulses are palpable.   Neuro: No focal deficits.     Home Medications:    Current Outpatient Medications:     alfuzosin (UROXATRAL) 10 mg Tb24, Take 1 tablet (10 mg total) by mouth daily with breakfast., Disp: 90 tablet, Rfl: 3    aspirin (ECOTRIN) 81 MG EC tablet, Take 81 mg by mouth once daily., Disp: , Rfl:     atorvastatin (LIPITOR) 80 MG tablet, Take 80 mg by mouth every evening., Disp: , Rfl:     azelastine (ASTELIN) 137 mcg (0.1 %) nasal spray, 1 spray by Nasal route 2 (two) times daily., Disp: , Rfl:     b complex vitamins tablet, Take 1 tablet by mouth once daily., Disp: , Rfl:     blood sugar diagnostic (TRUE METRIX GLUCOSE TEST STRIP) Strp, USE TO TEST 3 TIMES A DAY, Disp: 100 strip, Rfl: 11    carvediloL (COREG) 25 MG tablet, Take 25 mg by mouth 2 (two) times daily., Disp: , Rfl:     cyanocobalamin (VITAMIN B-12) 1000 MCG tablet, Take 2,000 mcg by mouth once daily at 6am., Disp: , Rfl:     eplerenone (INSPRA) 25 MG Tab, Take 25 mg by mouth once daily., Disp: , Rfl:     esomeprazole (NEXIUM) 40 MG capsule, Take 1 capsule by mouth every evening., Disp: , Rfl:     ezetimibe (ZETIA) 10 mg tablet, Take 10 " "mg by mouth once daily at 6am., Disp: , Rfl:     FARXIGA 10 mg tablet, Take 10 mg by mouth once daily., Disp: , Rfl:     ferrous gluconate (FERGON) 324 MG tablet, Take 1 tablet by mouth., Disp: , Rfl:     finasteride (PROSCAR) 5 mg tablet, Take 5 mg by mouth., Disp: , Rfl:     fluticasone propionate (FLONASE) 50 mcg/actuation nasal spray, Flonase Allergy Relief Take No date recorded No form recorded No frequency recorded No route recorded No set duration recorded No set duration amount recorded active No dosage strength recorded No dosage strength units of measure recorded, Disp: , Rfl:     HYDROcodone-acetaminophen (NORCO) 5-325 mg per tablet, Take 1 tablet by mouth every 6 (six) hours as needed., Disp: , Rfl:     hydrOXYzine (ATARAX) 50 MG tablet, Take 50 mg by mouth 3 (three) times daily., Disp: , Rfl:     insulin syringe-needle U-100 1 mL 31 gauge x 15/64" Syrg, 1 each by Misc.(Non-Drug; Combo Route) route 3 (three) times daily., Disp: 10 each, Rfl: 11    isosorbide mononitrate (IMDUR) 60 MG 24 hr tablet, Take 60 mg by mouth once daily., Disp: , Rfl:     lactulose (CHRONULAC) 10 gram/15 mL solution, TAKE 15 MLS (10 G TOTAL) BY MOUTH 2 (TWO) TIMES DAILY AS NEEDED (CONSTIPATION)., Disp: 2700 mL, Rfl: 1    levETIRAcetam (KEPPRA) 250 MG Tab, Take 1 tablet (250 mg total) by mouth 2 (two) times daily., Disp: 60 tablet, Rfl: 5    levocetirizine (XYZAL) 5 MG tablet, TAKE 1 TABLET (5 MG TOTAL) BY MOUTH NIGHTLY AS NEEDED FOR ALLERGIES, Disp: 90 tablet, Rfl: 1    levothyroxine (SYNTHROID) 25 MCG tablet, TAKE 1 TABLET BY MOUTH DAILY BEFORE BREAKFAST., Disp: 90 tablet, Rfl: 1    metoclopramide HCl (REGLAN) 10 MG tablet, Take 1 tablet (10 mg total) by mouth once daily., Disp: 90 tablet, Rfl: 1    montelukast (SINGULAIR) 10 mg tablet, TAKE 1 TABLET BY MOUTH EVERY DAY, Disp: 90 tablet, Rfl: 1    nitroGLYCERIN (NITROSTAT) 0.4 MG SL tablet, Place 0.4 mg under the tongue as needed., Disp: , Rfl:     NOVOLIN 70/30 U-100 INSULIN 100 " unit/mL (70-30) injection, Inject 45 Units into the skin 2 (two) times daily with meals., Disp: 130 mL, Rfl: 1    prasugreL (EFFIENT) 10 mg Tab, Take 10 mg by mouth once daily., Disp: , Rfl:     QUEtiapine (SEROQUEL) 25 MG Tab, Take 1 tablet (25 mg total) by mouth every evening., Disp: 90 tablet, Rfl: 1    ranolazine (RANEXA) 1,000 mg Tb12, Take 1,000 mg by mouth 2 (two) times daily., Disp: , Rfl:     spironolactone (ALDACTONE) 25 MG tablet, Take 1 tablet by mouth every evening., Disp: , Rfl:     tamsulosin (FLOMAX) 0.4 mg Cap, Take 1 capsule by mouth once daily., Disp: , Rfl:     torsemide (DEMADEX) 20 MG Tab, Take 20 mg by mouth 2 (two) times a day., Disp: , Rfl:     TRUE METRIX GLUCOSE METER Misc, TEST 3 TIMES A DAY, Disp: , Rfl:     valsartan (DIOVAN) 40 MG tablet, Take 40 mg by mouth 2 (two) times daily., Disp: , Rfl:     VERQUVO 2.5 mg Tab, Take 2.5 mg by mouth 2 (two) times a day., Disp: , Rfl:     XARELTO 2.5 mg Tab, Take 2.5 mg by mouth 2 (two) times a day., Disp: , Rfl:     Laboratory data    Lab Results   Component Value Date    WBC 8.79 01/26/2024    HGB 11.9 (L) 01/26/2024    HCT 36.8 (L) 01/26/2024     01/26/2024    IRON 32 05/09/2022    TIBC 265 05/09/2022    TRANS 270 04/05/2022    LABIRON 7 04/05/2022    FERRITIN 377.3 (H) 05/09/2022    FOLATE 6.1 04/05/2022    XFSIJLLW92 943 04/05/2022    HAPTO 436 (H) 10/25/2021     10/25/2021     01/26/2024    K 4.1 01/26/2024    CHLORIDE 106 01/26/2024    CO2 25 01/26/2024    BUN 22.3 01/26/2024    CREATININE 1.93 (H) 01/26/2024    EGFRNORACEVR 40 01/26/2024    GLUCOSE 191 (H) 01/26/2024    CALCIUM 9.0 01/26/2024    ALKPHOS 138 01/26/2024    LABPROT 7.6 01/26/2024    ALBUMIN 3.7 01/26/2024    BILIDIR 0.5 04/13/2022    IBILI 0.40 04/13/2022    AST 15 01/26/2024    ALT 18 01/26/2024    MG 2.20 11/28/2022    PHOS 3.5 10/02/2023      Lab Results   Component Value Date    HGBA1C 6.9 01/23/2024    .7 (H) 10/02/2023    HIV Nonreactive  08/22/2022    HEPCAB Nonreactive 08/22/2022    HEPBSURFAG Nonreactive 08/22/2022     Urine:  Lab Results   Component Value Date    COLORUA Light-Yellow 01/26/2024    APPEARANCEUA Clear 01/26/2024    SGUA 1.020 01/26/2024    PHUA 6.0 01/26/2024    PROTEINUA Negative 01/26/2024    GLUCOSEUA 4+ (A) 01/26/2024    KETONESUA Negative 01/26/2024    BLOODUA Negative 01/26/2024    NITRITESUA Negative 01/26/2024    LEUKOCYTESUR 75 (A) 01/26/2024    RBCUA 0-5 01/26/2024    WBCUA 6-10 (A) 01/26/2024    BACTERIA None Seen 01/26/2024    SQEPUA Occ (A) 01/26/2024    HYALINECASTS 0-2 (A) 01/26/2024    CREATRANDUR 82.1 01/26/2024    CREATRANDUR 80.7 01/26/2024    PROTEINURINE <6.8 01/26/2024    UPROTCREA 0.1 02/13/2023         Imaging  US Retroperitoneal Limited 01/06/2023  Right Kidney:  Length: 10.8 x 5.5 x 5.7 cm  Appearance: Normal echogenicity.  Collecting system: No hydronephrosis  Stones: None  Cyst/Mass: Hyperechoic area in the upper pole of the right kidney measures 1.1 x 9 mm x 1.1 cm a small angiomyolipoma cannot be completely excluded  Left Kidney:  Length: 12 x 5.7 x 4.4 cm  Appearance: Normal echogenicity.  Collecting system: No hydronephrosis  Stones: Small calcification identified in the upper pole measuring 9 mm by 3 mm x 7 mm these might represent a nonocclusive stone  Cyst/Mass: None  Bladder:  Normal  Vessels:  Visualized portions of the IVC and aorta have a normal grayscale appearance.  Impression  Small echogenic area in the upper pole of the kidney small angiomyolipoma cannot be completely excluded.  Nonocclusive stone of the left kidney.  No other abnormalities  Electronically signed by: Ranjan Taveras  Date:    01/06/2023  Time:    15:19      Impression    ICD-10-CM ICD-9-CM   1. CKD stage G3b/A1, GFR 30-44 and albumin creatinine ratio <30 mg/g  N18.32 585.3   2. Primary hypertension  I10 401.9   3. Chronic systolic heart failure  I50.22 428.22   4. Secondary hyperparathyroidism of renal origin  N25.81  588.81   5. BMI 34.0-34.9,adult  Z68.34 V85.34        Plan     CKD stage G3b/A1, GFR 30-44 and albumin creatinine ratio <30 mg/g  -     Comprehensive Metabolic Panel; Future; Expected date: 07/25/2024  Serum creatinine has been relatively stable over the past several months, there is no significant proteinuria or active urinary sediment.    Continue RAASi, MRA, and SGLT 2 inhibitor therapy along with risk factor management/renal sparing activities. Continue renal sparing activities:  -2 g a day dietary sodium restriction  -optimize glycemic control (goal A1c is less than 7%)  -control high blood pressure (goal blood pressure is less than 130/80, patient was advised to check blood pressure once or twice a week and bring blood pressure logs to next office visit)  -exercise at least 30 minutes a day, 5 days a week  -maintain healthy weight  -decrease or stop alcohol use  -do not smoke  -stay well hydrated (drink water only, avoid juices, sweet tea, and sodas)  -ask about staying up-to-date on vaccinations (flu vaccine, pneumonia vaccine, hepatitis B vaccine)  -avoid excessive use of NSAIDs (ibuprofen, naproxen, Aleve, Advil, Toradol, Mobic), take Tylenol as needed for headache or mild pain  -take cholesterol lowering medications if prescribed (LDL goal less than 100)  Follow up in about 6 months (around 8/5/2024).    Primary hypertension  Blood pressure reading is at goal, continue current antihypertensive regimen and 2 g a day dietary sodium restriction.      Chronic systolic heart failure  Patient is euvolemic on exam. Continue GDMT.     Secondary hyperparathyroidism of renal origin  -     PTH, Intact; Future; Expected date: 07/25/2024  -     Phosphorus; Future; Expected date: 07/25/2024  Will check iPTH and PO4 levels prior to next visit.     BMI 34.0-34.9,adult  Lifestyle and dietary interventions discussed, patient encouraged to maintain non-sedentary lifestyle and well-balanced diet.         Robina Garrison,  NP  [facility]

## 2024-03-14 ENCOUNTER — DOCUMENT SCAN (OUTPATIENT)
Dept: HOME HEALTH SERVICES | Facility: HOSPITAL | Age: 59
End: 2024-03-14
Payer: MEDICARE

## 2024-03-26 ENCOUNTER — EXTERNAL HOME HEALTH (OUTPATIENT)
Dept: HOME HEALTH SERVICES | Facility: HOSPITAL | Age: 59
End: 2024-03-26
Payer: MEDICARE

## 2024-04-01 RX ORDER — MONTELUKAST SODIUM 10 MG/1
TABLET ORAL
Qty: 90 TABLET | Refills: 1 | Status: SHIPPED | OUTPATIENT
Start: 2024-04-01

## 2024-04-15 ENCOUNTER — TELEPHONE (OUTPATIENT)
Dept: INTERNAL MEDICINE | Facility: CLINIC | Age: 59
End: 2024-04-15
Payer: MEDICARE

## 2024-04-15 NOTE — TELEPHONE ENCOUNTER
Called Johana and relayed message. She stated she understood and will relay this to the patient. I encouraged her to get patient to call and get scheduled.

## 2024-04-15 NOTE — TELEPHONE ENCOUNTER
Called and spoke to Johana.She stated [facility] determined the pt has no skill and doesn't need HH.Johana stated her concern is the patient has had ER visits with reported sugars as low 35 and as high as 450 and his weight fluctuates. She stated there is a language barrier and she believes both the patient and his wife aren't fully aware of what is being said to him.Johana also stated pt is hard of hearing because he has wax built up in ears. She would like to know the next steps to take other wise she will have to discharge him as a patient but given his sugar levels she would like to not discharge him. She stated her call back number is [phone] if you have any further questions to please contact her. Please advise.

## 2024-04-15 NOTE — TELEPHONE ENCOUNTER
Patient will need follow up appointment with PCP for these concerns, they may utilize the Urgent Care/ED for any acute medical conditions that cannot wait until this follow up. For clarification, Epic (the computer system) is stating the patient does not need home health? Patient had last follow up on 1/23/24 with Dr. Rodgers and A1c was 6.9. I also see where he went to ED on 3/26/24 and was treated for ear infection, could that be cause of ear issues? Have them [place] [facility] [place] if they can set up earlier appointment with PCP, thanks!

## 2024-04-15 NOTE — TELEPHONE ENCOUNTER
----- Message from Magi Velasco sent at 4/15/2024 11:03 AM CDT -----  Regarding: return call  Office call    Who Called:jacek sandoval/ [employer]    Who Left Message for Patient:    Does the patient know what this is regarding?:    Would the patient rather a call back or a response via [facility]?     Best Call Back Number:[phone]    Additional Information: Jacek with [employer] requested to speak with a nurse regarding the pt; please advise. Thanks.

## 2024-04-16 NOTE — TELEPHONE ENCOUNTER
Spoke with Lilliam at [facility]. She is wanting to know can they get the (Discharge Planning) plan of care signed for this pt. Pt has 3 weeks left before being discharged. If plan of care can not get signed pt will be discharged this week. She stated to f/u with Marizol lord Dr.     Thanks for your help.    [phone]  [phone]

## 2024-04-17 NOTE — ADDENDUM NOTE
Addended by: TIERNEY HENRY on: 4/17/2024 12:04 PM     Modules accepted: Orders, Level of Service

## 2024-05-15 NOTE — PROGRESS NOTES
[facility] Neurology Follow Up Office Visit Note    Initial Visit Date: 11/25/2022  Last Visit Date: 1/17/2024  Current Visit Date:  05/21/2024    Chief Complaint:     Chief Complaint   Patient presents with    Seizures     States no seizures since last OV         History of Present Illness:      This is 59 y.o. right hand dominant male with history of CHrEF with EF < 20% s/p AICD X2, insulin-dependent type 2 diabetes, CKD III, PEA arrest in 2/2022, who is referred for provoked seizure in the setting of acute illness. Seizure free since discharge in 5/2022. During last visit, levetiracetam was decreased to 250 mg twice a day.     Age of Onset : 5/19/2022 while in the hospital for CHF exacerbation      Semiology: cannot recall     Frequency: last event during hospitalization in 5/2022      Provocation Factors: CHF exacerbation      Risk Factors  - Family history of seizure disorders:  No  - History of focal CNS lesions: Yes history of PEA arrest in 2/2022  - History of CNS infections: No  - History head trauma with prolonged LOC: No  - History of childhood seizures, including febrile seizures: No  - History of development delay: No   - History of underlying mood disorder: No   - History of sleep disorder: Yes JOESPH on CPAP  - Recreational drug use: No  - Alcohol use: No  - Family planning and contraceptive use: Not Applicable     Medications:     Current Anti-Seizure Medication(s):  Levetiracetam 250 mg twice a day     Prior Anti-Seizure Medication(s):  Vimpat 100 mg twice a day - only taking 100 mg once a day   Levetiracetam 500 mg twice a day     Surgical Intervention & Devices:     - VNS:  - DBS  - RNS:  - Lobectomy:    Labs:     Results for orders placed or performed in visit on 01/26/24   Comprehensive Metabolic Panel   Result Value Ref Range    Sodium 143 136 - 145 mmol/L    Potassium 4.1 3.5 - 5.1 mmol/L    Chloride 106 98 - 107 mmol/L    CO2 25 22 - 29 mmol/L    Glucose 191 (H) 74 - 100 mg/dL    Blood Urea Nitrogen  22.3 8.4 - 25.7 mg/dL    Creatinine 1.93 (H) 0.73 - 1.18 mg/dL    Calcium 9.0 8.4 - 10.2 mg/dL    Protein Total 7.6 6.4 - 8.3 gm/dL    Albumin 3.7 3.5 - 5.0 g/dL    Globulin 3.9 (H) 2.4 - 3.5 gm/dL    Albumin/Globulin Ratio 0.9 (L) 1.1 - 2.0 ratio    Bilirubin Total 0.4 <=1.5 mg/dL     40 - 150 unit/L    ALT 18 0 - 55 unit/L    AST 15 5 - 34 unit/L    eGFR 40 mls/min/1.73/m2   CBC with Differential   Result Value Ref Range    WBC 8.79 4.50 - 11.50 x10(3)/mcL    RBC 4.11 (L) 4.70 - 6.10 x10(6)/mcL    Hgb 11.9 (L) 14.0 - 18.0 g/dL    Hct 36.8 (L) 42.0 - 52.0 %    MCV 89.5 80.0 - 94.0 fL    MCH 29.0 27.0 - 31.0 pg    MCHC 32.3 (L) 33.0 - 36.0 g/dL    RDW 12.2 11.5 - 17.0 %    Platelet 215 130 - 400 x10(3)/mcL    MPV 12.9 (H) 7.4 - 10.4 fL    Neut % 75.4 %    Lymph % 15.2 %    Mono % 6.5 %    Eos % 1.9 %    Basophil % 0.2 %    Lymph # 1.34 0.6 - 4.6 x10(3)/mcL    Neut # 6.62 2.1 - 9.2 x10(3)/mcL    Mono # 0.57 0.1 - 1.3 x10(3)/mcL    Eos # 0.17 0 - 0.9 x10(3)/mcL    Baso # 0.02 <=0.2 x10(3)/mcL    IG# 0.07 (H) 0 - 0.04 x10(3)/mcL    IG% 0.8 %    NRBC% 0.0 %       Studies:      - 24 hour EEG 5/20/2022 - 5/21/2022 at [facility]:  Encephalopathic changes.  Probable left frontal central spike.  - Ambulatory EEG:  - EMU Video EEG:  - MRI Brain without contrast May 21, 2022 at [facility]:  As per documentation, extensive chronic microvascular changes.    - NCHCT:  - WADA:    Review of Systems:     Review of Systems   All other systems reviewed and are negative.      Physical Exams:     Vitals:    05/21/24 1303   BP: 105/71   Pulse:    Resp:    Temp:          Physical Exam  Vitals and nursing note reviewed.   Constitutional:       Appearance: Normal appearance.   HENT:      Head: Normocephalic and atraumatic.      Nose: Nose normal.      Mouth/Throat:      Mouth: Mucous membranes are moist.      Pharynx: Oropharynx is clear.   Eyes:      Conjunctiva/sclera: Conjunctivae normal.   Cardiovascular:      Rate and Rhythm: Normal rate  and regular rhythm.      Pulses: Normal pulses.      Heart sounds: Normal heart sounds.   Pulmonary:      Effort: Pulmonary effort is normal.      Breath sounds: Normal breath sounds.   Abdominal:      General: Abdomen is flat.      Palpations: Abdomen is soft.   Musculoskeletal:         General: Normal range of motion.      Cervical back: Normal range of motion.   Neurological:      Mental Status: He is alert.   Psychiatric:         Mood and Affect: Mood normal.         Comprehensive Neurological Exam:  Mental Status: Alert Oriented to Self, Date, and Place. Comprehension wnl. No dysarthria.   CN II - XII: MARSHALL, No APD, VFFC, No ptosis OU, EOMI without nystagmus, LT/Temp symmetric in CN V1-3 distribution, Face Symmetric, Tongue and Uvula midline, Trapezius symmetric bilateral.   Motor: tone and bulk wnl throughout, no abnormal involuntary or voluntary movements, 5/5 confrontation, Fine finger movements wnl b/l, No pronator drift.   Sensory: LT, Proprioception, Vibration, PP, Temp symmetric. .   Reflexes: 1+ throughout  Cerebellar: FNF wnl b/l  Gait: normal.    Assessment:     This is 59 y.o. right hand dominant male with history of multiple cardiovascular risk factors, insulin-dependent type 2 diabetes, CKD III, PEA arrest in 2/2022, who is referred for provoked seizure in the setting of acute illness. Seizure free since discharge in 5/2022.      Problem List Items Addressed This Visit    None        Plan:     [] decrease Levetiracetam 250 mg daily for 30 days then stop     RTC 4 Months        Seizure education provided: including family planning and teratogenicity of AEDs, risk of uncontrolled seizure disorder, SUDEP. No driving until seizure free for six months ([place] state law). No swimming, climbing heights, or operate heavy machinery without supervision. Patient is advised to avoid Benadryl, Tramadol, Wellbutrin, flashing lights, alcohol, sleep deprivation, and certain anti-biotics that can lower seizure  threshold.     I have explained the treatment plan, diagnosis, and prognosis to patient. All questions are answered to the best of my knowledge.     Face to face time 30 minutes, including documentation, chart review, counseling, education, review of test results, relevant medical records, and coordination of care.   I have explained the treatment plan, diagnosis, and prognosis to patient. All questions are answered to the best of my knowledge.         Claribel Canales MD   General Neurology  05/21/2024

## 2024-05-21 ENCOUNTER — OFFICE VISIT (OUTPATIENT)
Dept: NEUROLOGY | Facility: CLINIC | Age: 59
End: 2024-05-21
Payer: MEDICARE

## 2024-05-21 VITALS
HEART RATE: 74 BPM | WEIGHT: 266.56 LBS | BODY MASS INDEX: 36.1 KG/M2 | TEMPERATURE: 98 F | HEIGHT: 72 IN | SYSTOLIC BLOOD PRESSURE: 105 MMHG | OXYGEN SATURATION: 97 % | DIASTOLIC BLOOD PRESSURE: 71 MMHG | RESPIRATION RATE: 12 BRPM

## 2024-05-21 DIAGNOSIS — R56.9 NEW ONSET SEIZURE: ICD-10-CM

## 2024-05-21 DIAGNOSIS — N13.8 BPH WITH URINARY OBSTRUCTION: ICD-10-CM

## 2024-05-21 DIAGNOSIS — N40.1 BPH WITH URINARY OBSTRUCTION: ICD-10-CM

## 2024-05-21 PROCEDURE — 99215 OFFICE O/P EST HI 40 MIN: CPT | Mod: PBBFAC | Performed by: PSYCHIATRY & NEUROLOGY

## 2024-05-21 PROCEDURE — 99214 OFFICE O/P EST MOD 30 MIN: CPT | Mod: S$PBB,,, | Performed by: PSYCHIATRY & NEUROLOGY

## 2024-05-21 RX ORDER — ALFUZOSIN HYDROCHLORIDE 10 MG/1
10 TABLET, EXTENDED RELEASE ORAL
Qty: 90 TABLET | Refills: 3 | Status: SHIPPED | OUTPATIENT
Start: 2024-05-21 | End: 2025-05-21

## 2024-05-21 RX ORDER — LEVETIRACETAM 250 MG/1
250 TABLET ORAL DAILY
Qty: 30 TABLET | Refills: 0 | Status: SHIPPED | OUTPATIENT
Start: 2024-05-21 | End: 2024-06-20

## 2024-06-17 RX ORDER — LEVOCETIRIZINE DIHYDROCHLORIDE 5 MG/1
5 TABLET, FILM COATED ORAL NIGHTLY PRN
Qty: 90 TABLET | Refills: 1 | Status: SHIPPED | OUTPATIENT
Start: 2024-06-17 | End: 2024-12-14

## 2024-06-17 NOTE — TELEPHONE ENCOUNTER
Pharmacy requesting refill for pt's levocetirizine (XYZAL) 5 MG tablet     LOV:1/23/24      NOV:7/23/24

## 2024-07-03 ENCOUNTER — LAB VISIT (OUTPATIENT)
Dept: LAB | Facility: HOSPITAL | Age: 59
End: 2024-07-03
Attending: NURSE PRACTITIONER
Payer: MEDICARE

## 2024-07-03 DIAGNOSIS — N25.81 SECONDARY HYPERPARATHYROIDISM OF RENAL ORIGIN: ICD-10-CM

## 2024-07-03 DIAGNOSIS — E11.40 TYPE 2 DIABETES MELLITUS WITH DIABETIC NEUROPATHY, WITH LONG-TERM CURRENT USE OF INSULIN: ICD-10-CM

## 2024-07-03 DIAGNOSIS — N18.32 CKD STAGE G3B/A1, GFR 30-44 AND ALBUMIN CREATININE RATIO <30 MG/G: ICD-10-CM

## 2024-07-03 DIAGNOSIS — E03.9 HYPOTHYROIDISM, UNSPECIFIED TYPE: ICD-10-CM

## 2024-07-03 DIAGNOSIS — Z79.4 TYPE 2 DIABETES MELLITUS WITH DIABETIC NEUROPATHY, WITH LONG-TERM CURRENT USE OF INSULIN: ICD-10-CM

## 2024-07-03 DIAGNOSIS — N40.0 BENIGN PROSTATIC HYPERPLASIA, UNSPECIFIED WHETHER LOWER URINARY TRACT SYMPTOMS PRESENT: ICD-10-CM

## 2024-07-03 LAB
ALBUMIN SERPL-MCNC: 3.5 G/DL (ref 3.5–5)
ALBUMIN/GLOB SERPL: 0.8 RATIO (ref 1.1–2)
ALP SERPL-CCNC: 128 UNIT/L (ref 40–150)
ALT SERPL-CCNC: 18 UNIT/L (ref 0–55)
ANION GAP SERPL CALC-SCNC: 10 MEQ/L
AST SERPL-CCNC: 15 UNIT/L (ref 5–34)
BILIRUB SERPL-MCNC: 0.6 MG/DL
BUN SERPL-MCNC: 18.5 MG/DL (ref 8.4–25.7)
CALCIUM SERPL-MCNC: 9.2 MG/DL (ref 8.4–10.2)
CHLORIDE SERPL-SCNC: 103 MMOL/L (ref 98–107)
CHOLEST SERPL-MCNC: 163 MG/DL
CHOLEST/HDLC SERPL: 3 {RATIO} (ref 0–5)
CO2 SERPL-SCNC: 28 MMOL/L (ref 22–29)
CREAT SERPL-MCNC: 2.01 MG/DL (ref 0.73–1.18)
CREAT/UREA NIT SERPL: 9
EST. AVERAGE GLUCOSE BLD GHB EST-MCNC: 154.2 MG/DL
GFR SERPLBLD CREATININE-BSD FMLA CKD-EPI: 38 ML/MIN/1.73/M2
GLOBULIN SER-MCNC: 4.6 GM/DL (ref 2.4–3.5)
GLUCOSE SERPL-MCNC: 85 MG/DL (ref 74–100)
HBA1C MFR BLD: 7 %
HDLC SERPL-MCNC: 48 MG/DL (ref 35–60)
LDLC SERPL CALC-MCNC: 89 MG/DL (ref 50–140)
PHOSPHATE SERPL-MCNC: 4 MG/DL (ref 2.3–4.7)
POTASSIUM SERPL-SCNC: 4.1 MMOL/L (ref 3.5–5.1)
PROT SERPL-MCNC: 8.1 GM/DL (ref 6.4–8.3)
PSA SERPL-MCNC: 0.82 NG/ML
PTH-INTACT SERPL-MCNC: 233.3 PG/ML (ref 8.7–77)
SODIUM SERPL-SCNC: 141 MMOL/L (ref 136–145)
TRIGL SERPL-MCNC: 130 MG/DL (ref 34–140)
TSH SERPL-ACNC: 2.14 UIU/ML (ref 0.35–4.94)
VLDLC SERPL CALC-MCNC: 26 MG/DL

## 2024-07-03 PROCEDURE — 80053 COMPREHEN METABOLIC PANEL: CPT

## 2024-07-03 PROCEDURE — 84100 ASSAY OF PHOSPHORUS: CPT

## 2024-07-03 PROCEDURE — 83970 ASSAY OF PARATHORMONE: CPT

## 2024-07-03 PROCEDURE — 83036 HEMOGLOBIN GLYCOSYLATED A1C: CPT

## 2024-07-03 PROCEDURE — 80061 LIPID PANEL: CPT

## 2024-07-03 PROCEDURE — 84443 ASSAY THYROID STIM HORMONE: CPT

## 2024-07-03 PROCEDURE — 84153 ASSAY OF PSA TOTAL: CPT

## 2024-07-03 PROCEDURE — 36415 COLL VENOUS BLD VENIPUNCTURE: CPT

## 2024-07-05 ENCOUNTER — OFFICE VISIT (OUTPATIENT)
Dept: UROLOGY | Facility: CLINIC | Age: 59
End: 2024-07-05
Payer: MEDICARE

## 2024-07-05 VITALS
HEART RATE: 71 BPM | TEMPERATURE: 98 F | RESPIRATION RATE: 18 BRPM | OXYGEN SATURATION: 98 % | HEIGHT: 72 IN | BODY MASS INDEX: 36.84 KG/M2 | DIASTOLIC BLOOD PRESSURE: 75 MMHG | WEIGHT: 272 LBS | SYSTOLIC BLOOD PRESSURE: 113 MMHG

## 2024-07-05 DIAGNOSIS — N40.1 BPH WITH URINARY OBSTRUCTION: ICD-10-CM

## 2024-07-05 DIAGNOSIS — R82.90 ABNORMAL URINE: ICD-10-CM

## 2024-07-05 DIAGNOSIS — N20.0 KIDNEY STONE: ICD-10-CM

## 2024-07-05 DIAGNOSIS — R33.9 URINARY RETENTION: Primary | ICD-10-CM

## 2024-07-05 DIAGNOSIS — N13.8 BPH WITH URINARY OBSTRUCTION: ICD-10-CM

## 2024-07-05 LAB — POC RESIDUAL URINE VOLUME: 124 ML (ref 0–100)

## 2024-07-05 PROCEDURE — 99215 OFFICE O/P EST HI 40 MIN: CPT | Mod: PBBFAC | Performed by: NURSE PRACTITIONER

## 2024-07-05 RX ORDER — FINASTERIDE 5 MG/1
5 TABLET, FILM COATED ORAL DAILY
Qty: 90 TABLET | Refills: 3 | Status: SHIPPED | OUTPATIENT
Start: 2024-07-05

## 2024-07-05 RX ORDER — ALFUZOSIN HYDROCHLORIDE 10 MG/1
10 TABLET, EXTENDED RELEASE ORAL
Qty: 90 TABLET | Refills: 3 | Status: SHIPPED | OUTPATIENT
Start: 2024-07-05 | End: 2025-07-05

## 2024-07-05 NOTE — PROGRESS NOTES
Chief Complaint:   Chief Complaint   Patient presents with    6 months follow up urine retention       HPI:    Patient is a 58-year-old male here for 6  month F/U  BPH, weak stream and nocturia.    Patient seen by renal NP with complaints of difficulty initiating urine and nocturia.    Patient currently on alfuzosin 10 mg p.o. daily after failed treatment from tamsulosin 0.8 mg p.o. daily, patient also recently started on last visit finasteride 5 mg p.o. daily.   Bladder scan 124 mL.  Patient PSA 0.82 corrected to 1.64.   Allergies:  Review of patient's allergies indicates:   Allergen Reactions    Pork derived (porcine)      Other reaction(s): Pork    Jainism choice      Plavix [clopidogrel] Hives and Rash       Medications:  Current Outpatient Medications   Medication Sig Dispense Refill    alfuzosin (UROXATRAL) 10 mg Tb24 Take 1 tablet (10 mg total) by mouth daily with breakfast. 90 tablet 3    aspirin (ECOTRIN) 81 MG EC tablet Take 81 mg by mouth once daily.      atorvastatin (LIPITOR) 80 MG tablet Take 80 mg by mouth every evening.      azelastine (ASTELIN) 137 mcg (0.1 %) nasal spray 1 spray by Nasal route 2 (two) times daily.      blood sugar diagnostic (TRUE METRIX GLUCOSE TEST STRIP) Strp USE TO TEST 3 TIMES A  strip 11    carvediloL (COREG) 25 MG tablet Take 25 mg by mouth 2 (two) times daily.      cyanocobalamin (VITAMIN B-12) 1000 MCG tablet Take 2,000 mcg by mouth once daily at 6am.      eplerenone (INSPRA) 25 MG Tab Take 25 mg by mouth once daily.      esomeprazole (NEXIUM) 40 MG capsule Take 1 capsule by mouth every evening.      ezetimibe (ZETIA) 10 mg tablet Take 10 mg by mouth once daily at 6am.      FARXIGA 10 mg tablet Take 10 mg by mouth once daily.      ferrous gluconate (FERGON) 324 MG tablet Take 1 tablet by mouth.      finasteride (PROSCAR) 5 mg tablet Take 5 mg by mouth.      fluticasone propionate (FLONASE) 50 mcg/actuation nasal spray Flonase Allergy Relief Take No date recorded  "No form recorded No frequency recorded No route recorded No set duration recorded No set duration amount recorded active No dosage strength recorded No dosage strength units of measure recorded      HYDROcodone-acetaminophen (NORCO) 5-325 mg per tablet Take 1 tablet by mouth every 6 (six) hours as needed.      hydrOXYzine (ATARAX) 50 MG tablet Take 50 mg by mouth 3 (three) times daily.      insulin syringe-needle U-100 1 mL 31 gauge x 15/64" Syrg 1 each by Misc.(Non-Drug; Combo Route) route 3 (three) times daily. 10 each 11    isosorbide mononitrate (IMDUR) 60 MG 24 hr tablet Take 60 mg by mouth once daily.      lactulose (CHRONULAC) 10 gram/15 mL solution TAKE 15 MLS (10 G TOTAL) BY MOUTH 2 (TWO) TIMES DAILY AS NEEDED (CONSTIPATION). 2700 mL 1    levocetirizine (XYZAL) 5 MG tablet TAKE 1 TABLET (5 MG TOTAL) BY MOUTH NIGHTLY AS NEEDED FOR ALLERGIES 90 tablet 1    levothyroxine (SYNTHROID) 25 MCG tablet TAKE 1 TABLET BY MOUTH DAILY BEFORE BREAKFAST. 90 tablet 1    metoclopramide HCl (REGLAN) 10 MG tablet Take 1 tablet (10 mg total) by mouth once daily. 90 tablet 1    montelukast (SINGULAIR) 10 mg tablet TAKE 1 TABLET BY MOUTH EVERY DAY 90 tablet 1    nitroGLYCERIN (NITROSTAT) 0.4 MG SL tablet Place 0.4 mg under the tongue as needed.      NOVOLIN 70/30 U-100 INSULIN 100 unit/mL (70-30) injection Inject 45 Units into the skin 2 (two) times daily with meals. 130 mL 1    prasugreL (EFFIENT) 10 mg Tab Take 10 mg by mouth once daily.      ranolazine (RANEXA) 1,000 mg Tb12 Take 1,000 mg by mouth 2 (two) times daily.      spironolactone (ALDACTONE) 25 MG tablet Take 1 tablet by mouth every evening.      torsemide (DEMADEX) 20 MG Tab Take 20 mg by mouth 2 (two) times a day.      TRUE METRIX GLUCOSE METER Misc TEST 3 TIMES A DAY      VERQUVO 2.5 mg Tab Take 2.5 mg by mouth 2 (two) times a day.      XARELTO 2.5 mg Tab Take 2.5 mg by mouth 2 (two) times a day.      levETIRAcetam (KEPPRA) 250 MG Tab Take 1 tablet (250 mg total) " by mouth once daily. 30 tablet 0    QUEtiapine (SEROQUEL) 25 MG Tab Take 1 tablet (25 mg total) by mouth every evening. 90 tablet 1     No current facility-administered medications for this visit.       Review of Systems:  General: No fever, chills, fatigability, or weight loss.  Skin: No rashes, itching, or changes in color or texture of skin.  Chest: Denies HART, cyanosis, wheezing, cough, and sputum production.  Abdomen: Appetite fine. No weight loss. Denies diarrhea, abdominal pain, hematemesis, or blood in stool.  Musculoskeletal: No joint stiffness or swelling. Denies back pain.  : As above.  All other review of systems negative.    PMH:  Past Medical History:   Diagnosis Date    Anxiety     Arthritis     BPH (benign prostatic hyperplasia)     CAD (coronary artery disease)     CHF (congestive heart failure)     Depression     Dizziness     GERD (gastroesophageal reflux disease)     Hypertension     Ischemic cardiomyopathy     Myocardial infarction     Obesity     Other hyperlipidemia     Oxygen dependent     3L/NC AT HS    PVD (peripheral vascular disease)     Renal disorder     kidney stones    Seizures     SOB (shortness of breath) on exertion     Thyroid disease     Type 2 diabetes mellitus without complications     Weakness of both legs        PSH:  Past Surgical History:   Procedure Laterality Date    CARDIAC DEFIBRILLATOR PLACEMENT N/A 6/9/2023    Procedure: Insertion, ICD;  Surgeon: Perry Eldridge MD;  Location: [facility] CATH LAB;  Service: Cardiology;  Laterality: N/A;  SICD W/ ANEST. (BS)    CHOLECYSTECTOMY      COLONOSCOPY      multiple    CORONARY ARTERY BYPASS GRAFT      CORONARY STENT PLACEMENT      ESOPHAGOGASTRODUODENOSCOPY      INSERTION, TRANSVENOUS ELECTRODE LEAD, PACING ICD OR CARDIAC PACEMAKER N/A 10/19/2023    Procedure: Insertion, Transvenous Electrode Lead, Pacing ICD or Cardiac Pacemaker;  Surgeon: Colby Mcintosh MD;  Location: [facility] CATH LAB;  Service: Cardiology;  Laterality:  N/A;  CCM IMPLANT    LEFT HEART CATHETERIZATION N/A 02/07/2023    Procedure: Left heart cath;  Surgeon: Jamin Melara MD;  Location: [facility] CATH LAB;  Service: Cardiology;  Laterality: N/A;    RIGHT HEART CATHETERIZATION Right 09/20/2022    Procedure: INSERTION, CATHETER, RIGHT HEART;  Surgeon: Cory Oreilly MD;  Location: [facility] CATH LAB;  Service: Cardiology;  Laterality: Right;  CARDIOMEMS RJ ACCESS    STENT, PERIPHERAL GRAFT      TONSILLECTOMY         FamHx:  Family History   Problem Relation Name Age of Onset    Diabetes Mother      Hypertension Mother      Cancer Mother      Hypertension Father      Stroke Father      Diabetes Sister      Diabetes Brother      Kidney disease Brother         SocHx:  Social History     Socioeconomic History    Marital status:      Spouse name: Viktoria    Number of children: 1   Tobacco Use    Smoking status: Never     Passive exposure: Never    Smokeless tobacco: Never   Substance and Sexual Activity    Alcohol use: Never    Drug use: Not Currently    Sexual activity: Not Currently     Partners: Female     Social Determinants of Health     Financial Resource Strain: Low Risk  (2/14/2023)    Overall Financial Resource Strain (CARDIA)     Difficulty of Paying Living Expenses: Not very hard   Food Insecurity: No Food Insecurity (2/14/2023)    Hunger Vital Sign     Worried About Running Out of Food in the Last Year: Never true     Ran Out of Food in the Last Year: Never true   Transportation Needs: No Transportation Needs (2/14/2023)    PRAPARE - Transportation     Lack of Transportation (Medical): No     Lack of Transportation (Non-Medical): No   Physical Activity: Inactive (2/14/2023)    Exercise Vital Sign     Days of Exercise per Week: 0 days     Minutes of Exercise per Session: 0 min   Stress: No Stress Concern Present (2/14/2023)    [employer] - Occupational Stress Questionnaire     Feeling of Stress : Not at all   Housing Stability: Low Risk   (2/14/2023)    Housing Stability Vital Sign     Unable to Pay for Housing in the Last Year: No     Number of Places Lived in the Last Year: 1     Unstable Housing in the Last Year: No       Physical Exam:  Vitals:    07/05/24 0937   BP: 113/75   Pulse: 71   Resp: 18   Temp: 97.9 °F (36.6 °C)     General: A&Ox3, no apparent distress, no deformities  Neck: No masses, normal thyroid  Lungs: CTA bill, no use of accessory muscles  Heart: RRR, no arrhythmias  Abdomen: Soft, NT, ND, no masses, no hernias, no hepatosplenomegaly  Lymphatic: Neck and groin nodes negative  Skin: The skin is warm and dry. No jaundice.  Ext: No c/c/e.    GUMale:  FLACO deferred at this time patient's last FLACO was on last visit January.    Labs:    Latest Reference Range & Units 06/19/17 15:23 11/02/20 09:42 12/15/21 09:30 03/30/23 15:15 12/19/23 08:46 07/03/24 07:39   Prostate Specific Antigen <=4.00 ng/mL 1.6 0.77 1.03 1.02 0.49 0.82           Imaging:  Renal ultrasound revealed: Nonocclusive stone of the left kidney.       Impression:  1. Urinary retention  - POCT Bladder Scan  2. Kidney stones  3.   BPH    Plan:  Instructed patient continue alfuzosin 10 mg p.o. daily and Proscar 5 mg p.o. daily.    Reinforced patient teaching on timed voiding every 2-3 hrs, double voiding, avoidance of bladder irritants such as alcohol, citrus foods, chocolate, caffeinated drinks, energy drinks, spicy foods, sugar, caffeine products, sodas. Instructed patient to avoid drinking fluids 1-2 hours prior to bedtime & void immediately before bedtime.  RTC 6 months with PSA and renal ultrasound.                             [facility]

## 2024-07-23 ENCOUNTER — OFFICE VISIT (OUTPATIENT)
Dept: INTERNAL MEDICINE | Facility: CLINIC | Age: 59
End: 2024-07-23
Payer: MEDICARE

## 2024-07-23 VITALS
RESPIRATION RATE: 20 BRPM | DIASTOLIC BLOOD PRESSURE: 72 MMHG | WEIGHT: 268.81 LBS | BODY MASS INDEX: 36.41 KG/M2 | HEART RATE: 75 BPM | TEMPERATURE: 98 F | HEIGHT: 72 IN | SYSTOLIC BLOOD PRESSURE: 111 MMHG

## 2024-07-23 DIAGNOSIS — E66.01 SEVERE OBESITY (BMI 35.0-39.9) WITH COMORBIDITY: ICD-10-CM

## 2024-07-23 DIAGNOSIS — Z79.4 TYPE 2 DIABETES MELLITUS WITH DIABETIC POLYNEUROPATHY, WITH LONG-TERM CURRENT USE OF INSULIN: Primary | ICD-10-CM

## 2024-07-23 DIAGNOSIS — I10 ESSENTIAL HYPERTENSION: ICD-10-CM

## 2024-07-23 DIAGNOSIS — I25.118 CORONARY ARTERY DISEASE OF NATIVE ARTERY OF NATIVE HEART WITH STABLE ANGINA PECTORIS: ICD-10-CM

## 2024-07-23 DIAGNOSIS — J44.9 CHRONIC OBSTRUCTIVE PULMONARY DISEASE, UNSPECIFIED COPD TYPE: ICD-10-CM

## 2024-07-23 DIAGNOSIS — F41.9 ANXIETY: ICD-10-CM

## 2024-07-23 DIAGNOSIS — I50.23 ACUTE ON CHRONIC SYSTOLIC HEART FAILURE: ICD-10-CM

## 2024-07-23 DIAGNOSIS — E03.9 HYPOTHYROIDISM, UNSPECIFIED TYPE: ICD-10-CM

## 2024-07-23 DIAGNOSIS — N18.32 STAGE 3B CHRONIC KIDNEY DISEASE: ICD-10-CM

## 2024-07-23 DIAGNOSIS — E78.2 MIXED HYPERLIPIDEMIA: ICD-10-CM

## 2024-07-23 DIAGNOSIS — E11.42 TYPE 2 DIABETES MELLITUS WITH DIABETIC POLYNEUROPATHY, WITH LONG-TERM CURRENT USE OF INSULIN: Primary | ICD-10-CM

## 2024-07-23 PROCEDURE — 99215 OFFICE O/P EST HI 40 MIN: CPT | Mod: S$PBB,,, | Performed by: NURSE PRACTITIONER

## 2024-07-23 PROCEDURE — 99215 OFFICE O/P EST HI 40 MIN: CPT | Mod: PBBFAC | Performed by: NURSE PRACTITIONER

## 2024-07-23 RX ORDER — QUETIAPINE FUMARATE 25 MG/1
25 TABLET, FILM COATED ORAL NIGHTLY
Qty: 90 TABLET | Refills: 1 | Status: SHIPPED | OUTPATIENT
Start: 2024-07-23 | End: 2025-01-19

## 2024-07-23 RX ORDER — HUMAN INSULIN 100 [USP'U]/ML
45 INJECTION, SUSPENSION SUBCUTANEOUS 2 TIMES DAILY WITH MEALS
Qty: 130 ML | Refills: 1 | Status: SHIPPED | OUTPATIENT
Start: 2024-07-23 | End: 2024-07-23 | Stop reason: SDUPTHER

## 2024-07-23 RX ORDER — HUMAN INSULIN 100 [USP'U]/ML
45 INJECTION, SUSPENSION SUBCUTANEOUS 2 TIMES DAILY WITH MEALS
Qty: 130 ML | Refills: 1 | Status: SHIPPED | OUTPATIENT
Start: 2024-07-23 | End: 2025-01-19

## 2024-07-23 RX ORDER — SACUBITRIL AND VALSARTAN 24; 26 MG/1; MG/1
1 TABLET, FILM COATED ORAL 2 TIMES DAILY
COMMUNITY
Start: 2024-06-03

## 2024-07-23 RX ORDER — PANTOPRAZOLE SODIUM 40 MG/1
40 TABLET, DELAYED RELEASE ORAL
COMMUNITY
Start: 2024-05-06

## 2024-07-23 RX ORDER — LEVOTHYROXINE SODIUM 25 UG/1
25 TABLET ORAL
Qty: 90 TABLET | Refills: 1 | Status: SHIPPED | OUTPATIENT
Start: 2024-07-23

## 2024-07-23 RX ORDER — VALSARTAN 40 MG/1
40 TABLET ORAL 2 TIMES DAILY
COMMUNITY
Start: 2024-05-22

## 2024-07-23 NOTE — ASSESSMENT & PLAN NOTE
Reviewed medications, denies any exertional chest pain or shortness for breath symptoms.  Continue baby aspirin, atorvastatin, Farxiga, isosorbide, Entresto, carvedilol.  Continue follow-up with Cardiology.

## 2024-07-23 NOTE — PROGRESS NOTES
[facility]  Ryley Cisse DNP     Patient ID: [id number]     Chief Complaint: Follow-up (Lab results, refused vaccines, needs refills)      HPI:     Luigi Gotti is a 59 y.o. male here today for follow up with PCP. Medical dx include: chronic kidney disease, heart failure with reduced ejection fraction (20%, s/p ICD 6/9/2023), coronary artery disease, gastroparesis, gastritis, ischemic colitis, peripheral vascular disease, dyslipidemia, hypertension, BPH, diabetes mellitus type 2, hx of provoked seizures during hospitalization ref(follows neuro), and obesity.       Health Maintenance         Date Due Completion Date    COVID-19 Vaccine (4 - 2023-24 season) 09/01/2023 2/11/2022    Pneumococcal Vaccines (Age 0-64) (2 of 2 - PCV) 07/23/2025 (Originally 2/10/2022) 2/10/2021    Influenza Vaccine (1) 09/01/2024 10/5/2023    Hemoglobin A1c 10/03/2024 7/3/2024    Foot Exam 01/23/2025 1/23/2024    Override on 8/17/2023: Done    Override on 7/14/2022: Done    Diabetes Urine Screening 01/26/2025 1/26/2024    Eye Exam 02/08/2025 2/8/2024    Lipid Panel 07/03/2025 7/3/2024    High Dose Statin 07/23/2025 7/23/2024    Aspirin/Antiplatelet Therapy 07/23/2025 7/23/2024    Colorectal Cancer Screening 09/30/2025 9/30/2020    TETANUS VACCINE 05/08/2028 5/8/2018            Past Medical History:   Diagnosis Date    Anxiety     Arthritis     BPH (benign prostatic hyperplasia)     CAD (coronary artery disease)     CHF (congestive heart failure)     Depression     Dizziness     GERD (gastroesophageal reflux disease)     Hypertension     Ischemic cardiomyopathy     Myocardial infarction     Obesity     Other hyperlipidemia     Oxygen dependent     3L/NC AT HS    PVD (peripheral vascular disease)     Renal disorder     kidney stones    Seizures     SOB (shortness of breath) on exertion     Thyroid disease     Type 2 diabetes mellitus without complications     Weakness of both legs         Past Surgical History:    Procedure Laterality Date    CARDIAC DEFIBRILLATOR PLACEMENT N/A 6/9/2023    Procedure: Insertion, ICD;  Surgeon: Perry Eldridge MD;  Location: [facility] CATH LAB;  Service: Cardiology;  Laterality: N/A;  SICD W/ SILVESTRE. (BS)    CHOLECYSTECTOMY      COLONOSCOPY      multiple    CORONARY ARTERY BYPASS GRAFT      CORONARY STENT PLACEMENT      ESOPHAGOGASTRODUODENOSCOPY      INSERTION, TRANSVENOUS ELECTRODE LEAD, PACING ICD OR CARDIAC PACEMAKER N/A 10/19/2023    Procedure: Insertion, Transvenous Electrode Lead, Pacing ICD or Cardiac Pacemaker;  Surgeon: Colby Mcintosh MD;  Location: [facility] CATH LAB;  Service: Cardiology;  Laterality: N/A;  CCM IMPLANT    LEFT HEART CATHETERIZATION N/A 02/07/2023    Procedure: Left heart cath;  Surgeon: Jamin Melara MD;  Location: [facility] CATH LAB;  Service: Cardiology;  Laterality: N/A;    RIGHT HEART CATHETERIZATION Right 09/20/2022    Procedure: INSERTION, CATHETER, RIGHT HEART;  Surgeon: Cory Oreilly MD;  Location: [facility] CATH LAB;  Service: Cardiology;  Laterality: Right;  CARDIOMEMS RJ ACCESS    STENT, PERIPHERAL GRAFT      TONSILLECTOMY          Social History     Tobacco Use    Smoking status: Never     Passive exposure: Never    Smokeless tobacco: Never   Substance and Sexual Activity    Alcohol use: Never    Drug use: Not Currently    Sexual activity: Not Currently     Partners: Female        Current Outpatient Medications   Medication Instructions    alfuzosin (UROXATRAL) 10 mg, Oral, With breakfast    aspirin (ECOTRIN) 81 mg, Oral, Daily    atorvastatin (LIPITOR) 80 mg, Oral, Nightly    azelastine (ASTELIN) 137 mcg (0.1 %) nasal spray 1 spray, Nasal, 2 times daily    blood sugar diagnostic (TRUE METRIX GLUCOSE TEST STRIP) Strp USE TO TEST 3 TIMES A DAY    carvediloL (COREG) 25 mg, Oral, 2 times daily    cyanocobalamin (VITAMIN B-12) 2,000 mcg, Oral, Daily    ENTRESTO 24-26 mg per tablet 1 tablet, Oral, 2 times daily    eplerenone (INSPRA) 25 mg, Oral, Daily     "esomeprazole (NEXIUM) 40 MG capsule 1 capsule, Oral, Nightly    ezetimibe (ZETIA) 10 mg, Oral, Daily    FARXIGA 10 mg, Oral, Daily    ferrous gluconate (FERGON) 324 MG tablet 1 tablet, Oral    finasteride (PROSCAR) 5 mg, Oral, Daily    fluticasone propionate (FLONASE) 50 mcg/actuation nasal spray Flonase Allergy Relief Take No date recorded No form recorded No frequency recorded No route recorded No set duration recorded No set duration amount recorded active No dosage strength recorded No dosage strength units of measure recorded    HYDROcodone-acetaminophen (NORCO) 5-325 mg per tablet 1 tablet, Oral, Every 6 hours PRN    hydrOXYzine (ATARAX) 50 mg, Oral, 3 times daily    insulin syringe-needle U-100 1 mL 31 gauge x 15/64" Syrg 1 each, Misc.(Non-Drug; Combo Route), 3 times daily    isosorbide mononitrate (IMDUR) 60 mg, Oral, Daily    lactulose (CHRONULAC) 10 g, Oral, 2 times daily PRN    levETIRAcetam (KEPPRA) 250 mg, Oral, Daily    levocetirizine (XYZAL) 5 mg, Oral, Nightly PRN    levothyroxine (SYNTHROID) 25 mcg, Oral, Before breakfast    montelukast (SINGULAIR) 10 mg tablet TAKE 1 TABLET BY MOUTH EVERY DAY    nitroGLYCERIN (NITROSTAT) 0.4 mg, Sublingual, As needed (PRN)    NOVOLIN 70/30 U-100 INSULIN 100 unit/mL (70-30) injection 45 Units, Subcutaneous, 2 times daily with meals, Inject 45 units in am and 70 units with evening meals    pantoprazole (PROTONIX) 40 mg, Oral    prasugreL (EFFIENT) 10 mg, Oral, Daily    QUEtiapine (SEROQUEL) 25 mg, Oral, Nightly    ranolazine (RANEXA) 1,000 mg, Oral, 2 times daily    spironolactone (ALDACTONE) 25 MG tablet 1 tablet, Oral, Nightly    torsemide (DEMADEX) 20 mg, Oral, 2 times daily    TRUE METRIX GLUCOSE METER Misc TEST 3 TIMES A DAY    valsartan (DIOVAN) 40 mg, Oral, 2 times daily    VERQUVO 2.5 mg, Oral, 2 times daily    XARELTO 2.5 mg, Oral, 2 times daily       Review of patient's allergies indicates:   Allergen Reactions    Pork derived (porcine)      Other " "reaction(s): Pork    Moravian choice      Plavix [clopidogrel] Hives and Rash        Patient Care Team:  Ryley Cisse FNP as PCP - General (Family Medicine)  Robina Garrison FNP as Nurse Practitioner (Nephrology)     Subjective:     Review of Systems   Constitutional:  Negative for appetite change, chills, diaphoresis and fever.   HENT:  Negative for ear pain, sinus pain and sore throat.    Eyes:  Negative for pain and visual disturbance.   Respiratory:  Negative for cough, shortness of breath and wheezing.    Cardiovascular:  Negative for chest pain, palpitations and leg swelling.   Gastrointestinal:  Negative for abdominal pain, blood in stool, diarrhea, nausea and vomiting.   Endocrine: Negative for cold intolerance.   Genitourinary:  Negative for difficulty urinating, dysuria, frequency and hematuria.   Musculoskeletal:  Negative for arthralgias, joint swelling and myalgias.   Skin:  Negative for color change and rash.   Allergic/Immunologic: Negative.    Neurological: Negative.  Negative for dizziness, syncope, light-headedness and numbness.   Hematological: Negative.    Psychiatric/Behavioral: Negative.  Negative for dysphoric mood and suicidal ideas. The patient is not nervous/anxious.    All other systems reviewed and are negative.      12 point review of systems conducted, negative except as stated in the history of present illness. See HPI for details.    Objective:     Visit Vitals  /72 (BP Location: Right arm, Patient Position: Sitting, BP Method: Large (Automatic))   Pulse 75   Temp 98.3 °F (36.8 °C) (Oral)   Resp 20   Ht 6' 0.01" (1.829 m)   Wt 121.9 kg (268 lb 12.8 oz)   BMI 36.45 kg/m²       Physical Exam  Vitals and nursing note reviewed.   Constitutional:       General: He is not in acute distress.     Appearance: He is not ill-appearing.   HENT:      Head: Normocephalic and atraumatic.      Mouth/Throat:      Mouth: Mucous membranes are moist.      Pharynx: Oropharynx is clear. "   Eyes:      General: No scleral icterus.     Extraocular Movements: Extraocular movements intact.      Conjunctiva/sclera: Conjunctivae normal.      Pupils: Pupils are equal, round, and reactive to light.   Neck:      Vascular: No carotid bruit.   Cardiovascular:      Rate and Rhythm: Normal rate and regular rhythm.      Heart sounds: No murmur heard.     No friction rub. No gallop.   Pulmonary:      Effort: Pulmonary effort is normal. No respiratory distress.      Breath sounds: Normal breath sounds. No wheezing, rhonchi or rales.   Abdominal:      General: Abdomen is flat. Bowel sounds are normal. There is no distension.      Palpations: Abdomen is soft. There is no mass.      Tenderness: There is no abdominal tenderness.   Musculoskeletal:         General: Normal range of motion.      Cervical back: Normal range of motion and neck supple.   Feet:      Right foot:      Toenail Condition: Right toenails are abnormally thick and long. Fungal disease present.     Left foot:      Toenail Condition: Left toenails are abnormally thick and long. Fungal disease present.  Skin:     General: Skin is warm and dry.   Neurological:      General: No focal deficit present.      Mental Status: He is alert.   Psychiatric:         Mood and Affect: Mood normal.         Labs Reviewed:     Chemistry:  Lab Results   Component Value Date     07/03/2024    K 4.1 07/03/2024    BUN 18.5 07/03/2024    CREATININE 2.01 (H) 07/03/2024    EGFRNORACEVR 38 07/03/2024    GLUCOSE 85 07/03/2024    CALCIUM 9.2 07/03/2024    ALKPHOS 128 07/03/2024    LABPROT 8.1 07/03/2024    ALBUMIN 3.5 07/03/2024    BILIDIR 0.5 04/13/2022    IBILI 0.40 04/13/2022    AST 15 07/03/2024    ALT 18 07/03/2024    MG 2.20 11/28/2022    PHOS 4.0 07/03/2024    TSH 2.140 07/03/2024    [id number] 0.88 03/30/2023    PSA 0.82 07/03/2024        Lab Results   Component Value Date    HGBA1C 7.0 07/03/2024        Hematology:  Lab Results   Component Value Date    WBC 8.79  01/26/2024    HGB 11.9 (L) 01/26/2024    HCT 36.8 (L) 01/26/2024     01/26/2024       Lipid Panel:  Lab Results   Component Value Date    CHOL 163 07/03/2024    HDL 48 07/03/2024    LDL 89.00 07/03/2024    TRIG 130 07/03/2024    TOTALCHOLEST 3 07/03/2024        Urine:  Lab Results   Component Value Date    APPEARANCEUA Clear 01/26/2024    SGUA 1.020 01/26/2024    PROTEINUA Negative 01/26/2024    KETONESUA Negative 01/26/2024    LEUKOCYTESUR 75 (A) 01/26/2024    RBCUA 0-5 01/26/2024    WBCUA 6-10 (A) 01/26/2024    BACTERIA None Seen 01/26/2024    SQEPUA Occ (A) 01/26/2024    HYALINECASTS 0-2 (A) 01/26/2024    CREATRANDUR 82.1 01/26/2024    CREATRANDUR 80.7 01/26/2024    PROTEINURINE <6.8 01/26/2024    UPROTCREA 0.1 02/13/2023        Assessment:       ICD-10-CM ICD-9-CM   1. Type 2 diabetes mellitus with diabetic polyneuropathy, with long-term current use of insulin  E11.42 250.60    Z79.4 357.2     V58.67   2. Anxiety  F41.9 300.00   3. Hypothyroidism, unspecified type  E03.9 244.9   4. Chronic obstructive pulmonary disease, unspecified COPD type  J44.9 496   5. Acute on chronic systolic heart failure  I50.23 428.23   6. Coronary artery disease of native artery of native heart with stable angina pectoris  I25.118 414.01     413.9   7. Mixed hyperlipidemia  E78.2 272.2   8. Essential hypertension  I10 401.9   9. Stage 3b chronic kidney disease  N18.32 585.3   10. Severe obesity (BMI 35.0-39.9) with comorbidity  E66.01 278.01        Plan:     1. Type 2 diabetes mellitus with diabetic polyneuropathy, with long-term current use of insulin  Assessment & Plan:  Lab Results   Component Value Date    HGBA1C 7.0 07/03/2024    HGBA1C 6.9 01/23/2024    LDL 89.00 07/03/2024    CREATININE 2.01 (H) 07/03/2024    CREATININE 0.91 05/24/2022      Diabetes Medications               FARXIGA 10 mg tablet Take 10 mg by mouth once daily.    NOVOLIN 70/30 U-100 INSULIN 100 unit/mL (70-30) injection Inject 45 Units into the skin 2  (two) times daily with meals. Inject 45 units in am and 70 units with evening meals        He self-increased his insulin Novolin 70/30 in the evening to 70 units subQ every evenings and he has been doing so for the past month with better fasting blood sugar.  States before this is fastings were in the 200s.  He would like to have this medication increased so he does not run out.  He states he titrated up to 50, 55, 60, 65, and now at 70.  We discussed the importance of monitoring for hypoglycemic reaction and if his blood sugars start going below 70 fasting then he needs to decrease this insulin evening dose.  He will continue 70/30 45 units in the morning and he verbalizes understanding of hypoglycemic precautions and emergency situations.    On ACE and Statin according to ADA guidelines.  Discussed caution with SGLT2s + diuretics as concomitant use can cause volume depletion. Discussed caution with DPP-Ivs and HF risk.  Follow ADA Diet. Avoid soda, simple sweets, and limit rice/pasta/breads/starches (no more than 45-50 grams per meal).  Maintain healthy weight with goal BMI <30.  Exercise 5 times per week for 30 minutes per day.  Stressed importance of daily foot exams and to wear approved DM shoes.   Stressed importance of annual dilated eye exam.          Orders:  -     Discontinue: NOVOLIN 70/30 U-100 INSULIN 100 unit/mL (70-30) injection; Inject 45 Units into the skin 2 (two) times daily with meals.  Dispense: 130 mL; Refill: 1  -     Hemoglobin A1C; Future; Expected date: 10/23/2024  -     Comprehensive Metabolic Panel; Future; Expected date: 10/23/2024  -     Microalbumin/Creatinine Ratio, Urine; Future; Expected date: 10/23/2024  -     Ambulatory referral/consult to Outpatient Case Management  -     Cancel: Ambulatory referral/consult to Home Health; Future; Expected date: 07/24/2024  -     Ambulatory referral/consult to Home Health; Future; Expected date: 07/24/2024  -     NOVOLIN 70/30 U-100 INSULIN 100  unit/mL (70-30) injection; Inject 45 Units into the skin 2 (two) times daily with meals. Inject 45 units in am and 70 units with evening meals  Dispense: 130 mL; Refill: 1  -     Ambulatory referral/consult to Wound Clinic; Future; Expected date: 07/30/2024    2. Anxiety  Assessment & Plan:  May continue current plan of care with Seroquel at bedtime, discussed precautions and high-risk medication and he denies any adverse effects.  Cardiology does perform EKGs at least twice a year.    Orders:  -     QUEtiapine (SEROQUEL) 25 MG Tab; Take 1 tablet (25 mg total) by mouth every evening.  Dispense: 90 tablet; Refill: 1    3. Hypothyroidism, unspecified type  Assessment & Plan:  TFTs WNL  Continue current regimen    Orders:  -     levothyroxine (SYNTHROID) 25 MCG tablet; Take 1 tablet (25 mcg total) by mouth before breakfast.  Dispense: 90 tablet; Refill: 1    4. Chronic obstructive pulmonary disease, unspecified COPD type  Assessment & Plan:  Stable. Cont current meds      5. Acute on chronic systolic heart failure  Overview:  Echo 4/28/23: LVEF 20%    Assessment & Plan:  He is euvolemic on exam today.  Follows with outside provider cardiology [facility], Dr. Oreilly.  On good medical therapy, reviewed and updated meds today as some were overlapping and cleaned up med list.  He would like home health for medication management, referral made.  Recommend 2 g sodium diet daily.  Recommend a.m. daily weights.    Orders:  -     Ambulatory referral/consult to Outpatient Case Management  -     Cancel: Ambulatory referral/consult to Home Health; Future; Expected date: 07/24/2024  -     Ambulatory referral/consult to Home Health; Future; Expected date: 07/24/2024    6. Coronary artery disease of native artery of native heart with stable angina pectoris  Overview:  [facility] 2/7/23: Stable    Assessment & Plan:  Reviewed medications, denies any exertional chest pain or shortness for breath symptoms.  Continue baby aspirin, atorvastatin, Farxiga,  isosorbide, Entresto, carvedilol.  Continue follow-up with Cardiology.      7. Mixed hyperlipidemia  Assessment & Plan:  Counseled for low-sodium, low carb, low-cholesterol, good fat, Dash diet.  Recommend increasing fiber in the diet to lower LDL, increasing fish oil and fish such as salmon may help increase HDL good cholesterol.  Increasing aerobic activity as tolerated may also help lower LDL.  Healthy weight maintenance is advised, portion control, calorie counting, and discussed difference between complex carbs and simple carbs.    Continue current statin therapy.        8. Essential hypertension  Assessment & Plan:  Goal BP < 140/90, best goal is BP <130/80 consistently   Reduce the amount of salt in your diet, follow a 2 gm sodium, DASH diet daily            Lose weight if you are overweight or have obesity  Avoid drinking too much alcohol  Stop smoking  Exercise at least 30 minutes per day most days of the week  Blood pressure has been well-controlled and stable on current medical plan      9. Stage 3b chronic kidney disease  Assessment & Plan:  Stable, chronic.  Avoid NSAIDs, avoid nephrotoxic agents, continue follow-up with Nephrology.  Continue to hydrate well daily.  Reviewed trending labs.  Discussed renal diet advice.  Discussed prevention of worsening renal indices and how to maintain healthy kidney function. Maintain optimal blood pressure control.      Orders:  -     Cancel: Ambulatory referral/consult to Home Health; Future; Expected date: 07/24/2024    10. Severe obesity (BMI 35.0-39.9) with comorbidity  Assessment & Plan:  Discussed importance of healthy weight maintenance and disease prevention.  Advised of low carb, low-fat, low-cholesterol, good fat diet, and importance of portion control measures.  Discussed drinking plenty of water daily, getting plenty of sleep nightly, stress reduction, and addressing any underlying lifestyle habits or past/recent stressors that may be contributing to  obesity.  Advised of importance of getting 20-30 minutes of aerobic activity and daily.             Follow up in about 3 months (around 10/23/2024) for follow up, with lab work prior to visit. In addition to their scheduled follow up, the patient has also been instructed to follow up on as needed basis.     Time spent today during visit, 65 minutes.     Future Appointments   Date Time Provider Department Center   8/5/2024  1:00 PM Robina Garrison FNP [facility] NEPHR [place]    9/25/2024  1:00 PM Claribel Canales MD [facility] NEURO [place]    10/23/2024 10:40 AM Ryley Cisse FNP [facility]   12/30/2024 10:30 AM [address] [place]    1/6/2025 10:00 AM Matt Ray NP [facility] UROLO [place]         RON Acosta

## 2024-07-23 NOTE — ASSESSMENT & PLAN NOTE
Lab Results   Component Value Date    HGBA1C 7.0 07/03/2024    HGBA1C 6.9 01/23/2024    LDL 89.00 07/03/2024    CREATININE 2.01 (H) 07/03/2024    CREATININE 0.91 05/24/2022      Diabetes Medications               FARXIGA 10 mg tablet Take 10 mg by mouth once daily.    NOVOLIN 70/30 U-100 INSULIN 100 unit/mL (70-30) injection Inject 45 Units into the skin 2 (two) times daily with meals. Inject 45 units in am and 70 units with evening meals        He self-increased his insulin Novolin 70/30 in the evening to 70 units subQ every evenings and he has been doing so for the past month with better fasting blood sugar.  States before this is fastings were in the 200s.  He would like to have this medication increased so he does not run out.  He states he titrated up to 50, 55, 60, 65, and now at 70.  We discussed the importance of monitoring for hypoglycemic reaction and if his blood sugars start going below 70 fasting then he needs to decrease this insulin evening dose.  He will continue 70/30 45 units in the morning and he verbalizes understanding of hypoglycemic precautions and emergency situations.    On ACE and Statin according to ADA guidelines.  Discussed caution with SGLT2s + diuretics as concomitant use can cause volume depletion. Discussed caution with DPP-Ivs and HF risk.  Follow ADA Diet. Avoid soda, simple sweets, and limit rice/pasta/breads/starches (no more than 45-50 grams per meal).  Maintain healthy weight with goal BMI <30.  Exercise 5 times per week for 30 minutes per day.  Stressed importance of daily foot exams and to wear approved DM shoes.   Stressed importance of annual dilated eye exam.

## 2024-07-23 NOTE — ASSESSMENT & PLAN NOTE
Goal BP < 140/90, best goal is BP <130/80 consistently   Reduce the amount of salt in your diet, follow a 2 gm sodium, DASH diet daily            Lose weight if you are overweight or have obesity  Avoid drinking too much alcohol  Stop smoking  Exercise at least 30 minutes per day most days of the week  Blood pressure has been well-controlled and stable on current medical plan

## 2024-07-23 NOTE — ASSESSMENT & PLAN NOTE
He is euvolemic on exam today.  Follows with outside provider cardiology [facility], Dr. Oreilly.  On good medical therapy, reviewed and updated meds today as some were overlapping and cleaned up med list.  He would like home health for medication management, referral made.  Recommend 2 g sodium diet daily.  Recommend a.m. daily weights.

## 2024-07-23 NOTE — ASSESSMENT & PLAN NOTE
May continue current plan of care with Seroquel at bedtime, discussed precautions and high-risk medication and he denies any adverse effects.  Cardiology does perform EKGs at least twice a year.

## 2024-07-29 ENCOUNTER — OUTPATIENT CASE MANAGEMENT (OUTPATIENT)
Dept: ADMINISTRATIVE | Facility: OTHER | Age: 59
End: 2024-07-29
Payer: MEDICARE

## 2024-07-29 DIAGNOSIS — N18.9 CHRONIC KIDNEY DISEASE, UNSPECIFIED CKD STAGE: Primary | ICD-10-CM

## 2024-07-29 NOTE — PROGRESS NOTES
7/29/2024-1st attempt to complete initial assessment for [facility]: Left message for patient requesting a return call. Letter has been sent via mail to patient with [facility] contact information. Will attempt to contact patient again at a later date.

## 2024-07-29 NOTE — LETTER
Luigi Gotti  [address] [place] [id number]      Dear Luigi Gotti,     I work with [facility]. We received a referral to call you to discuss your medical history. These services are free of charge and are offered to [facility] patients who have recently been discharged from any of our facilities or who have medical conditions that may require the skill of a nurse to assist with management.     I am a Registered Nurse who specializes in connecting patients with available medical and financial resources as well as addressing any educational needs that may be indicated.    I attempted to reach you by telephone, but I was unsuccessful. Please call our department so that we can go over some questions with you, regarding your health.    The Outpatient Care Management Department can be reached at [phone], from 8:00AM to 4:30 PM, on Monday thru Friday.     Additionally, [facility] also has a program where a nurse is available 24/7 to answer questions or provide medical advice, their number is [phone].      Thanks,        Luna Steiner RN  Outpatient Care Management  Phone #: [phone]

## 2024-07-30 ENCOUNTER — OUTPATIENT CASE MANAGEMENT (OUTPATIENT)
Dept: ADMINISTRATIVE | Facility: OTHER | Age: 59
End: 2024-07-30
Payer: MEDICARE

## 2024-07-30 NOTE — PROGRESS NOTES
Outpatient Care Management  Initial Patient Assessment    Patient: Luigi Gotti  MRN: [id number]  Date of Service: 07/30/2024  Completed by: Luna Steiner RN  Referral Date: 07/23/2024  Date of Eligibility: 7/24/2024  Program:   High Risk  Status: Ongoing  Effective Dates: 7/30/2024 - present  Responsible Staff: Luna Steiner RN        Reason for Visit   Patient presents with    OPCM Chart Review    OPCM Enrollment Call    Nursing Assessment       Brief Summary:  Luigi Gotti was referred by PCP, Bobby VELASQUEZ for management of DM. Patient qualifies for program based on 84.2 % risk score.   Active problem list, medical, surgical and social history reviewed. Active comorbidities include CAD, CKD, Heart failure, gastroparesis, constipation, HTN ,HLD, PVD, COPD, BPH, JOESPH. Areas of need identified by patient include management of DM through diet and lifestyle changes, need for financial assistance and food insecurity.     Next steps:     Pt agrees to follow up call on or around 8/6/2024- review medications; cbg readings, food recall, ACP  Mail welcome letter and guide to eating when DM  Refer to [place] for financial assistance  Message PCP for order for rolling walker at pt's request.     Disability Status  Is the patient alert and oriented (person, place, time, and situation)?: Alert and oriented x 4  Hearing Difficulty or Deaf: yes  Hearing Management: other  Visual Difficulty or Blind: yes  Visual and Hearing Needs Conclusion: Pt receives injections in eye ; pt reports [place]; no hearing aids  Vision Management: Other  Difficulty Concentrating, Remembering or Making Decisions: no  Communication Difficulty: no  Eating/Swallowing Difficulty: no  Walking or Climbing Stairs Difficulty: yes  Walking or Climbing Stairs: ambulation difficulty, requires equipment; stair climbing difficulty, requires equipment  Mobility Management: Pt uses mother's old walker; requests new one  Dressing/Bathing Difficulty:  yes  Dressing/Bathing: bathing difficulty, assistance 1 person; dressing difficulty, assistance 1 person  Dressing/Bathing Management: Wife assists with bathing and dressing  Toileting : Independent  Continence : Continence - Not a problem  Difficulty Managing Errands Independently: yes  Errands Management: Wife assists with errands- grocery , finance,driving  Equipment Currently Used at Home: CPAP; glucometer; shower chair; walker, rolling  ADL Conclusion Statement: Pt able to ambulate wiht walker, admits to unsteady gait; wife assists with dressing and bathing  Change in Functional Status Since Onset of Current Illness/Injury: no        Spiritual Beliefs  Spiritual, Cultural Beliefs, Christianity Practices, Values that Affect Care: no      Social History     Socioeconomic History    Marital status:      Spouse name: Viktoria    Number of children: 1   Tobacco Use    Smoking status: Never     Passive exposure: Never    Smokeless tobacco: Never   Substance and Sexual Activity    Alcohol use: Never    Drug use: Not Currently    Sexual activity: Not Currently     Partners: Female     Social Determinants of Health     Financial Resource Strain: Medium Risk (7/30/2024)    Overall Financial Resource Strain (CARDIA)     Difficulty of Paying Living Expenses: Somewhat hard   Food Insecurity: Food Insecurity Present (7/30/2024)    Hunger Vital Sign     Worried About Running Out of Food in the Last Year: Sometimes true     Ran Out of Food in the Last Year: Never true   Transportation Needs: No Transportation Needs (7/30/2024)    TRANSPORTATION NEEDS     Transportation : No   Physical Activity: Inactive (7/30/2024)    Exercise Vital Sign     Days of Exercise per Week: 0 days     Minutes of Exercise per Session: 0 min   Stress: Stress Concern Present (7/30/2024)    [employer] [place] [employer] - Occupational Stress Questionnaire     Feeling of Stress : To some extent   Housing Stability: Low Risk  (7/30/2024)     Housing Stability Vital Sign     Unable to Pay for Housing in the Last Year: No     Homeless in the Last Year: No       Roles and Relationships  Primary Source of Support/Comfort: spouse  Name of Support/Comfort Primary Source: Viktoria Gotti- wife      Advance Directives (For Healthcare)  Advance Directive  (If Adv Dir status is received, view document under Adv Dir in header or Chart Review Media tab): Unable to assess (review at next call)        Patient Reported Insurance  Verified current insurance plan:: Medicare; Medicaid            7/30/2024    11:38 AM 5/21/2024    12:59 PM 2/5/2024    12:40 PM 1/23/2024     8:10 AM 1/4/2024    11:35 AM 10/5/2023    12:09 PM 8/17/2023     1:57 PM   Depression Patient Health Questionnaire   Over the last two weeks how often have you been bothered by little interest or pleasure in doing things Several days Not at all Several days Not at all Not at all Several days Not at all   Over the last two weeks how often have you been bothered by feeling down, depressed or hopeless Several days Not at all Several days Not at all Not at all Several days Not at all   PHQ-2 Total Score 2 0 2 0 0 2 0       Learning Assessment       07/30/2024 1226 [facility] (7/30/2024 - Present)   Created by Luna Steiner, RN -  (Nurse) Status: Complete                 PRIMARY LEARNER     Primary Learner Name:  Luigi Gotti TD - 07/30/2024 1226    Relationship:  Patient TD - 07/30/2024 1226    Does the primary learner have any barriers to learning?:  No Barriers TD - 07/30/2024 1226    What is the preferred language of the primary learner?:  English TD - 07/30/2024 1226    Is an  required?:  No TD - 07/30/2024 1226    How does the primary learner prefer to learn new concepts?:  Listening TD - 07/30/2024 1226    How often do you need to have someone help you read instructions, pamphlets, or written material from your doctor or pharmacy?:  Never TD - 07/30/2024 1226         CO-LEARNER #1     No question answered        CO-LEARNER #2     No question answered        SPECIAL TOPICS     No question answered        ANSWERED BY:     -:  Patient TD - 07/30/2024 1226        Comments         Edit History       Luna Steiner, RN -  (Nurse)   07/30/2024 1224

## 2024-07-30 NOTE — LETTER
July 30, 2024             Dear Luigi,    Welcome to [facility] Complex Care Management Program.  It was a pleasure talking with you today.  My name is Luna Carsoningue, and I look forward to being your Care Manager.  My goal is to help you function at the healthiest and highest level possible.  You can contact me directly at [phone].    As an [facility] patient, some of the services we may be able to provide include:     Development of an individualized care plan with a Registered Nurse   Connection with a   Connection with available resources and services    Coordinate communication among your care team members   Provide coaching and education   Help you understand your doctors treatment plan  Help you obtain information about your insurance coverage.     All services provided by [facility] Complex Care Managers and other care team members are coordinated with and communicated to your primary care team.      As part of your enrollment, you will be receiving education materials and more information about these services in your [facility] account, by phone or through the mail.  If you do not wish to participate or receive information, please contact our office at [phone].      Sincerely,        Luna Steiner RN  [facility]   Out-patient RN Complex Care Manager

## 2024-08-01 ENCOUNTER — LAB VISIT (OUTPATIENT)
Dept: LAB | Facility: HOSPITAL | Age: 59
End: 2024-08-01
Attending: NURSE PRACTITIONER
Payer: MEDICARE

## 2024-08-01 ENCOUNTER — OFFICE VISIT (OUTPATIENT)
Dept: UROLOGY | Facility: CLINIC | Age: 59
End: 2024-08-01
Attending: NURSE PRACTITIONER
Payer: MEDICARE

## 2024-08-01 DIAGNOSIS — N13.8 BPH WITH URINARY OBSTRUCTION: Primary | ICD-10-CM

## 2024-08-01 DIAGNOSIS — N40.1 BPH WITH URINARY OBSTRUCTION: Primary | ICD-10-CM

## 2024-08-01 DIAGNOSIS — N13.8 BPH WITH URINARY OBSTRUCTION: ICD-10-CM

## 2024-08-01 DIAGNOSIS — N40.1 BPH WITH URINARY OBSTRUCTION: ICD-10-CM

## 2024-08-01 DIAGNOSIS — N18.9 CHRONIC KIDNEY DISEASE, UNSPECIFIED CKD STAGE: ICD-10-CM

## 2024-08-01 DIAGNOSIS — R82.90 ABNORMAL URINE: ICD-10-CM

## 2024-08-01 LAB
ALBUMIN SERPL-MCNC: 3.5 G/DL (ref 3.5–5)
ALBUMIN/GLOB SERPL: 0.8 RATIO (ref 1.1–2)
ALP SERPL-CCNC: 137 UNIT/L (ref 40–150)
ALT SERPL-CCNC: 15 UNIT/L (ref 0–55)
ANION GAP SERPL CALC-SCNC: 9 MEQ/L
AST SERPL-CCNC: 15 UNIT/L (ref 5–34)
BILIRUB SERPL-MCNC: 0.5 MG/DL
BUN SERPL-MCNC: 19.7 MG/DL (ref 8.4–25.7)
CALCIUM SERPL-MCNC: 9.6 MG/DL (ref 8.4–10.2)
CHLORIDE SERPL-SCNC: 103 MMOL/L (ref 98–107)
CO2 SERPL-SCNC: 26 MMOL/L (ref 22–29)
CREAT SERPL-MCNC: 1.92 MG/DL (ref 0.73–1.18)
CREAT/UREA NIT SERPL: 10
GFR SERPLBLD CREATININE-BSD FMLA CKD-EPI: 40 ML/MIN/1.73/M2
GLOBULIN SER-MCNC: 4.6 GM/DL (ref 2.4–3.5)
GLUCOSE SERPL-MCNC: 172 MG/DL (ref 74–100)
POTASSIUM SERPL-SCNC: 4.1 MMOL/L (ref 3.5–5.1)
PROT SERPL-MCNC: 8.1 GM/DL (ref 6.4–8.3)
PSA SERPL-MCNC: 0.45 NG/ML
SODIUM SERPL-SCNC: 138 MMOL/L (ref 136–145)

## 2024-08-01 PROCEDURE — 84153 ASSAY OF PSA TOTAL: CPT

## 2024-08-01 PROCEDURE — 80053 COMPREHEN METABOLIC PANEL: CPT

## 2024-08-01 PROCEDURE — 87086 URINE CULTURE/COLONY COUNT: CPT

## 2024-08-01 PROCEDURE — 99442 PR PHYSICIAN TELEPHONE EVALUATION 11-20 MIN: CPT | Mod: 95,,, | Performed by: NURSE PRACTITIONER

## 2024-08-01 PROCEDURE — 36415 COLL VENOUS BLD VENIPUNCTURE: CPT

## 2024-08-01 NOTE — PROGRESS NOTES
Established Patient - Audio Only Telehealth Visit     The patient location is:  Home  The chief complaint leading to consultation is:  BPH  Visit type: Virtual visit with audio only (telephone)  Total time spent with patient:  25  minutes     The reason for the audio only service rather than synchronous audio virtual visit was related to technical difficulties or patient preference/necessity.     Each patient to whom I provide medical services by telemedicine is:  (1) informed of the relationship between the physician and patient and the respective role of any other health care provider with respect to management of the patient; and (2) notified that they may decline to receive medical services by telemedicine and may withdraw from such care at any time. Patient verbally consented to receive this service via voice-only telephone call.     This service was not originating from a related E/M service provided within the previous 7 days nor will  to an E/M service or procedure within the next 24 hours or my soonest available appointment.  Prevailing standard of care was able to be met in this audio-only visit.  Chief Complaint:  BPH      HPI:   Patient is a 59-year-old male on this audio virtual visit with a history of BPH for results of PSA.  Patient currently on alfuzosin 10 mg p.o. daily after failed treatment from tamsulosin 0.8 mg p.o. daily, patient also recently started on last visit finasteride 5 mg p.o. daily started on 06/29/2023.   Bladder scan 124 mL.  Patient PSA 0.  0.45 corrected to 0.90 patient notified of the PSA values due to patient on Proscar it was corrected from 0.45 to 0.90.  Patient denies any abnormal urologic symptoms of dysuria urinary frequency urgency, incontinence urinary retention, urinary nocturia, gross hematuria.       Allergies:  Review of patient's allergies indicates:   Allergen Reactions    Pork derived (porcine)      Other reaction(s): Pork    Latter-day choice      Plavix  "[clopidogrel] Hives and Rash       Medications:  Current Outpatient Medications   Medication Sig Dispense Refill    alfuzosin (UROXATRAL) 10 mg Tb24 Take 1 tablet (10 mg total) by mouth daily with breakfast. 90 tablet 3    aspirin (ECOTRIN) 81 MG EC tablet Take 81 mg by mouth once daily.      atorvastatin (LIPITOR) 80 MG tablet Take 80 mg by mouth every evening.      azelastine (ASTELIN) 137 mcg (0.1 %) nasal spray 1 spray by Nasal route 2 (two) times daily.      blood sugar diagnostic (TRUE METRIX GLUCOSE TEST STRIP) Strp USE TO TEST 3 TIMES A  strip 11    carvediloL (COREG) 25 MG tablet Take 25 mg by mouth 2 (two) times daily.      cyanocobalamin (VITAMIN B-12) 1000 MCG tablet Take 2,000 mcg by mouth once daily at 6am.      ENTRESTO 24-26 mg per tablet Take 1 tablet by mouth 2 (two) times daily.      eplerenone (INSPRA) 25 MG Tab Take 25 mg by mouth once daily.      esomeprazole (NEXIUM) 40 MG capsule Take 1 capsule by mouth every evening.      ezetimibe (ZETIA) 10 mg tablet Take 10 mg by mouth once daily at 6am.      FARXIGA 10 mg tablet Take 10 mg by mouth once daily.      ferrous gluconate (FERGON) 324 MG tablet Take 1 tablet by mouth.      finasteride (PROSCAR) 5 mg tablet Take 1 tablet (5 mg total) by mouth once daily. 90 tablet 3    fluticasone propionate (FLONASE) 50 mcg/actuation nasal spray Flonase Allergy Relief Take No date recorded No form recorded No frequency recorded No route recorded No set duration recorded No set duration amount recorded active No dosage strength recorded No dosage strength units of measure recorded      HYDROcodone-acetaminophen (NORCO) 5-325 mg per tablet Take 1 tablet by mouth every 6 (six) hours as needed.      hydrOXYzine (ATARAX) 50 MG tablet Take 50 mg by mouth 3 (three) times daily.      insulin syringe-needle U-100 1 mL 31 gauge x 15/64" Syrg 1 each by Misc.(Non-Drug; Combo Route) route 3 (three) times daily. 10 each 11    isosorbide mononitrate (IMDUR) 60 MG 24 " hr tablet Take 60 mg by mouth once daily.      lactulose (CHRONULAC) 10 gram/15 mL solution TAKE 15 MLS (10 G TOTAL) BY MOUTH 2 (TWO) TIMES DAILY AS NEEDED (CONSTIPATION). 2700 mL 1    levETIRAcetam (KEPPRA) 250 MG Tab Take 1 tablet (250 mg total) by mouth once daily. 30 tablet 0    levocetirizine (XYZAL) 5 MG tablet TAKE 1 TABLET (5 MG TOTAL) BY MOUTH NIGHTLY AS NEEDED FOR ALLERGIES 90 tablet 1    levothyroxine (SYNTHROID) 25 MCG tablet Take 1 tablet (25 mcg total) by mouth before breakfast. 90 tablet 1    montelukast (SINGULAIR) 10 mg tablet TAKE 1 TABLET BY MOUTH EVERY DAY 90 tablet 1    nitroGLYCERIN (NITROSTAT) 0.4 MG SL tablet Place 0.4 mg under the tongue as needed.      NOVOLIN 70/30 U-100 INSULIN 100 unit/mL (70-30) injection Inject 45 Units into the skin 2 (two) times daily with meals. Inject 45 units in am and 70 units with evening meals 130 mL 1    pantoprazole (PROTONIX) 40 MG tablet Take 40 mg by mouth.      prasugreL (EFFIENT) 10 mg Tab Take 10 mg by mouth once daily.      QUEtiapine (SEROQUEL) 25 MG Tab Take 1 tablet (25 mg total) by mouth every evening. 90 tablet 1    ranolazine (RANEXA) 1,000 mg Tb12 Take 1,000 mg by mouth 2 (two) times daily.      spironolactone (ALDACTONE) 25 MG tablet Take 1 tablet by mouth every evening.      torsemide (DEMADEX) 20 MG Tab Take 20 mg by mouth 2 (two) times a day.      TRUE METRIX GLUCOSE METER Misc TEST 3 TIMES A DAY      valsartan (DIOVAN) 40 MG tablet Take 40 mg by mouth 2 (two) times daily.      VERQUVO 2.5 mg Tab Take 2.5 mg by mouth 2 (two) times a day.      XARELTO 2.5 mg Tab Take 2.5 mg by mouth 2 (two) times a day.       No current facility-administered medications for this visit.       Review of Systems:  General: No fever, chills, fatigability, or weight loss.  Skin: No rashes, itching, or changes in color or texture of skin.  Chest: Denies HART, cyanosis, wheezing, cough, and sputum production.  Abdomen: Appetite fine. No weight loss. Denies diarrhea,  abdominal pain, hematemesis, or blood in stool.  Musculoskeletal: No joint stiffness or swelling. Denies back pain.  : As above.  All other review of systems negative.    PMH:  Past Medical History:   Diagnosis Date    Anxiety     Arthritis     BPH (benign prostatic hyperplasia)     CAD (coronary artery disease)     CHF (congestive heart failure)     Depression     Dizziness     GERD (gastroesophageal reflux disease)     Hypertension     Ischemic cardiomyopathy     Myocardial infarction     Obesity     Other hyperlipidemia     Oxygen dependent     3L/NC AT HS    PVD (peripheral vascular disease)     Renal disorder     kidney stones    Seizures     SOB (shortness of breath) on exertion     Thyroid disease     Type 2 diabetes mellitus without complications     Weakness of both legs        PSH:  Past Surgical History:   Procedure Laterality Date    CARDIAC DEFIBRILLATOR PLACEMENT N/A 6/9/2023    Procedure: Insertion, ICD;  Surgeon: Perry Eldridge MD;  Location: [facility] CATH LAB;  Service: Cardiology;  Laterality: N/A;  SICD W/ SILVESTRE. (BS)    CHOLECYSTECTOMY      COLONOSCOPY      multiple    CORONARY ARTERY BYPASS GRAFT      CORONARY STENT PLACEMENT      ESOPHAGOGASTRODUODENOSCOPY      INSERTION, TRANSVENOUS ELECTRODE LEAD, PACING ICD OR CARDIAC PACEMAKER N/A 10/19/2023    Procedure: Insertion, Transvenous Electrode Lead, Pacing ICD or Cardiac Pacemaker;  Surgeon: Colby Mcintosh MD;  Location: [facility] CATH LAB;  Service: Cardiology;  Laterality: N/A;  CCM IMPLANT    LEFT HEART CATHETERIZATION N/A 02/07/2023    Procedure: Left heart cath;  Surgeon: Jamin Melara MD;  Location: [facility] CATH LAB;  Service: Cardiology;  Laterality: N/A;    RIGHT HEART CATHETERIZATION Right 09/20/2022    Procedure: INSERTION, CATHETER, RIGHT HEART;  Surgeon: Cory Oreilly MD;  Location: [facility] CATH LAB;  Service: Cardiology;  Laterality: Right;  CARDIOMEMS RJ ACCESS    STENT, PERIPHERAL GRAFT      TONSILLECTOMY         FamHx:  Family  History   Problem Relation Name Age of Onset    Diabetes Mother      Hypertension Mother      Cancer Mother      Hypertension Father      Stroke Father      Diabetes Sister      Diabetes Brother      Kidney disease Brother         SocHx:  Social History     Socioeconomic History    Marital status:      Spouse name: Viktoria    Number of children: 1   Tobacco Use    Smoking status: Never     Passive exposure: Never    Smokeless tobacco: Never   Substance and Sexual Activity    Alcohol use: Never    Drug use: Not Currently    Sexual activity: Not Currently     Partners: Female     Social Determinants of Health     Financial Resource Strain: Medium Risk (7/30/2024)    Overall Financial Resource Strain (CARDIA)     Difficulty of Paying Living Expenses: Somewhat hard   Food Insecurity: Food Insecurity Present (7/30/2024)    Hunger Vital Sign     Worried About Running Out of Food in the Last Year: Sometimes true     Ran Out of Food in the Last Year: Never true   Transportation Needs: No Transportation Needs (7/30/2024)    TRANSPORTATION NEEDS     Transportation : No   Physical Activity: Inactive (7/30/2024)    Exercise Vital Sign     Days of Exercise per Week: 0 days     Minutes of Exercise per Session: 0 min   Stress: Stress Concern Present (7/30/2024)    [employer] [place] [employer] - Occupational Stress Questionnaire     Feeling of Stress : To some extent   Housing Stability: Low Risk  (7/30/2024)    Housing Stability Vital Sign     Unable to Pay for Housing in the Last Year: No     Homeless in the Last Year: No       Physical Exam:  There were no vitals filed for this visit.  Labs:    Latest Reference Range & Units 06/19/17 15:23 11/02/20 09:42 12/15/21 09:30 03/30/23 15:15 12/19/23 08:46 07/03/24 07:39 08/01/24 07:51   Prostate Specific Antigen <=4.00 ng/mL 1.6 0.77 1.03 1.02 0.49 0.82 0.45         Impression:  BPH    Plan:   Instructed patient to continue alfuzosin 10 mg p.o. daily and Proscar 5 mg  p.o. daily.  Instructed patient on timed voiding every 2-3 hrs, double voiding, avoidance of bladder irritants such as alcohol, citrus foods, chocolate, caffeinated drinks, energy drinks, spicy foods, sugar, caffeine products, sodas. Instructed patient to avoid drinking fluids 1-2 hours prior to bedtime & void immediately before bedtime.   RTC 6 months with PSA  Instructed patient if develops any abnormal urologic symptoms notify clinic to be re-evaluate treated or during after hours go to emergency room versus urgent here.                           F

## 2024-08-03 LAB — BACTERIA UR CULT: NORMAL

## 2024-08-05 ENCOUNTER — OFFICE VISIT (OUTPATIENT)
Dept: NEPHROLOGY | Facility: CLINIC | Age: 59
End: 2024-08-05
Payer: MEDICARE

## 2024-08-05 VITALS
SYSTOLIC BLOOD PRESSURE: 94 MMHG | HEART RATE: 75 BPM | HEIGHT: 72 IN | RESPIRATION RATE: 18 BRPM | BODY MASS INDEX: 36.97 KG/M2 | TEMPERATURE: 98 F | WEIGHT: 272.94 LBS | DIASTOLIC BLOOD PRESSURE: 64 MMHG | OXYGEN SATURATION: 96 %

## 2024-08-05 DIAGNOSIS — N25.81 SECONDARY HYPERPARATHYROIDISM OF RENAL ORIGIN: ICD-10-CM

## 2024-08-05 DIAGNOSIS — I50.22 CHRONIC SYSTOLIC HEART FAILURE: ICD-10-CM

## 2024-08-05 DIAGNOSIS — N18.32 CKD STAGE G3B/A1, GFR 30-44 AND ALBUMIN CREATININE RATIO <30 MG/G: Primary | ICD-10-CM

## 2024-08-05 DIAGNOSIS — I10 PRIMARY HYPERTENSION: ICD-10-CM

## 2024-08-05 DIAGNOSIS — M10.9 GOUT, UNSPECIFIED CAUSE, UNSPECIFIED CHRONICITY, UNSPECIFIED SITE: ICD-10-CM

## 2024-08-05 DIAGNOSIS — E66.01 SEVERE OBESITY (BMI 35.0-39.9) WITH COMORBIDITY: ICD-10-CM

## 2024-08-05 PROCEDURE — 99215 OFFICE O/P EST HI 40 MIN: CPT | Mod: PBBFAC | Performed by: NURSE PRACTITIONER

## 2024-08-05 PROCEDURE — 99214 OFFICE O/P EST MOD 30 MIN: CPT | Mod: S$PBB,,, | Performed by: NURSE PRACTITIONER

## 2024-08-05 RX ORDER — ALLOPURINOL 100 MG/1
100 TABLET ORAL DAILY
Qty: 90 TABLET | Refills: 1 | Status: SHIPPED | OUTPATIENT
Start: 2024-08-05 | End: 2025-02-01

## 2024-08-06 DIAGNOSIS — R26.81 UNSTEADY GAIT WHEN WALKING: Primary | ICD-10-CM

## 2024-08-09 ENCOUNTER — OUTPATIENT CASE MANAGEMENT (OUTPATIENT)
Dept: ADMINISTRATIVE | Facility: OTHER | Age: 59
End: 2024-08-09
Payer: MEDICARE

## 2024-08-19 ENCOUNTER — OUTPATIENT CASE MANAGEMENT (OUTPATIENT)
Dept: ADMINISTRATIVE | Facility: OTHER | Age: 59
End: 2024-08-19
Payer: MEDICARE

## 2024-08-19 NOTE — PROGRESS NOTES
Outpatient Care Management  Plan of Care Follow Up Visit    Patient: Luigi Gotti  MRN: [id number]  Date of Service: 08/09/2024  Completed by: Luna Steiner RN  Referral Date: 07/23/2024    Reason for Visit   Patient presents with    OPCM Chart Review    OPCM RN Follow Up Call       Brief Summary: OPCM follow up call completed with pt. Continued education on DM and lifestyle changes through diet and increased physical activity. Addressed resources and f/u on needs.     Next Steps: Pt agrees to follow up call on or around 8/16/2024- cbg readings? Continue education on DM. Assessment of physical activity.

## 2024-08-19 NOTE — PROGRESS NOTES
Outpatient Care Management  Plan of Care Follow Up Visit    Patient: Luigi Gotti  MRN: [id number]  Date of Service: 08/19/2024  Completed by: Luna Steiner RN  Referral Date: 07/23/2024    Reason for Visit   Patient presents with    OPCM Chart Review    OPCM RN Follow Up Call       Brief Summary: OPCM follow up call completed with pt.  Pt complains of feeling depressed due to lifestyle change and multiple illnesses, he notes it affects his daily activity and requesting antidepressant. Care plan created with pt for management of depression.     Next Steps: Pt agrees to follow up call on or around the week of 9/2/2024-  Follow up on message re: antidepressant. Rolling walker? Review meds and continue education on depression management.    Message sent to PCP re: pt complaints of depression and request for antidepressant.

## 2024-08-23 ENCOUNTER — OFFICE VISIT (OUTPATIENT)
Dept: FAMILY MEDICINE | Facility: CLINIC | Age: 59
End: 2024-08-23
Payer: MEDICARE

## 2024-08-23 ENCOUNTER — TELEPHONE (OUTPATIENT)
Dept: INTERNAL MEDICINE | Facility: CLINIC | Age: 59
End: 2024-08-23
Payer: MEDICARE

## 2024-08-23 DIAGNOSIS — F41.9 ANXIETY: Primary | ICD-10-CM

## 2024-08-23 NOTE — TELEPHONE ENCOUNTER
----- Message from Consuelo Braxton LPN sent at 8/20/2024  3:46 PM CDT -----  Regarding: FW: Patient complaint    ----- Message -----  From: Ryley Cisse FNP  Sent: 8/20/2024  12:55 PM CDT  To: [facility]anna [facility] Staff  Subject: FW: Patient complaint                            Please schedule patient for a virtual visit so we can discuss an antidepressant/therapy regarding his recent information below.  ----- Message -----  From: Luna Steiner RN  Sent: 8/20/2024   8:49 AM CDT  To: RON Kline; [facility] Staff  Subject: Patient complaint                                Hi Dr. Cisse,    During Outpatient Care Management call, pt complains that he  has been very depressed lately due to the inability to do anything. He states he has no motivation to do anything but watch tv. It is affecting his relationship with his wife. He denies thoughts of self harm. Admits to difficulty sleeping at times. Denies changes in his appetite.     Pt is requesting to try an antidepressant.     Please advise.       Thank you,    Luna Steiner, RN  Outpatient Care Management

## 2024-08-23 NOTE — TELEPHONE ENCOUNTER
Patient was scheduled for virtual appointment on /23/24 at 9:20 am. Did not connect for appointment, unable to contact via phone.

## 2024-08-26 NOTE — ADDENDUM NOTE
Addended by: MARIA GUADALUPE VANEGAS on: 8/26/2024 02:51 PM     Modules accepted: Level of Service

## 2024-09-05 DIAGNOSIS — K21.9 GASTROESOPHAGEAL REFLUX DISEASE, UNSPECIFIED WHETHER ESOPHAGITIS PRESENT: Primary | ICD-10-CM

## 2024-09-05 RX ORDER — METOCLOPRAMIDE 10 MG/1
10 TABLET ORAL DAILY
Qty: 90 TABLET | Refills: 1 | Status: SHIPPED | OUTPATIENT
Start: 2024-09-05 | End: 2025-03-04

## 2024-09-06 ENCOUNTER — OUTPATIENT CASE MANAGEMENT (OUTPATIENT)
Dept: ADMINISTRATIVE | Facility: OTHER | Age: 59
End: 2024-09-06
Payer: MEDICARE

## 2024-09-06 NOTE — PROGRESS NOTES
9/6/2024- 1st attempt to complete follow-up for Outpatient Care Management: Left message for patient requesting a return call. Will attempt to contact patient again at a later date. Unable to reach letter sent via mail.

## 2024-09-06 NOTE — LETTER
Luigi Gotti  [address] [place] [id number]      Dear Luigi Gotti,     I am your nurse with [facility] Outpatient [facility] Management Department. I was unsuccessful in reaching you today. At your earliest convenience, I would like to discuss your healthcare progress.      Please contact me at [phone] from 8:00AM to 4:30 PM on Monday thru Friday.     As you know, [facility] is a program offered to you through [facility] where a nurse is available 24/7 to answer questions or provide medical advice, their number is [phone].    Thanks,      Luna Steiner, RN  Outpatient Care Management

## 2024-09-24 ENCOUNTER — OUTPATIENT CASE MANAGEMENT (OUTPATIENT)
Dept: ADMINISTRATIVE | Facility: OTHER | Age: 59
End: 2024-09-24
Payer: MEDICARE

## 2024-09-24 NOTE — PROGRESS NOTES
9/24/2024- 3rd attempt to complete follow-up for Outpatient Care Management: Left message for patient requesting a return call. Will close case if patient does not respond.

## 2024-10-08 RX ORDER — MONTELUKAST SODIUM 10 MG/1
10 TABLET ORAL DAILY
Qty: 90 TABLET | Refills: 1 | Status: SHIPPED | OUTPATIENT
Start: 2024-10-08

## 2024-10-14 ENCOUNTER — LAB VISIT (OUTPATIENT)
Dept: LAB | Facility: HOSPITAL | Age: 59
End: 2024-10-14
Attending: NURSE PRACTITIONER
Payer: MEDICARE

## 2024-10-14 DIAGNOSIS — Z79.4 TYPE 2 DIABETES MELLITUS WITH DIABETIC POLYNEUROPATHY, WITH LONG-TERM CURRENT USE OF INSULIN: ICD-10-CM

## 2024-10-14 DIAGNOSIS — E11.42 TYPE 2 DIABETES MELLITUS WITH DIABETIC POLYNEUROPATHY, WITH LONG-TERM CURRENT USE OF INSULIN: ICD-10-CM

## 2024-10-14 LAB
ALBUMIN SERPL-MCNC: 3.6 G/DL (ref 3.5–5)
ALBUMIN/GLOB SERPL: 0.8 RATIO (ref 1.1–2)
ALP SERPL-CCNC: 140 UNIT/L (ref 40–150)
ALT SERPL-CCNC: 22 UNIT/L (ref 0–55)
ANION GAP SERPL CALC-SCNC: 10 MEQ/L
AST SERPL-CCNC: 20 UNIT/L (ref 5–34)
BILIRUB SERPL-MCNC: 0.6 MG/DL
BUN SERPL-MCNC: 15.9 MG/DL (ref 8.4–25.7)
CALCIUM SERPL-MCNC: 9.3 MG/DL (ref 8.4–10.2)
CHLORIDE SERPL-SCNC: 105 MMOL/L (ref 98–107)
CO2 SERPL-SCNC: 25 MMOL/L (ref 22–29)
CREAT SERPL-MCNC: 1.69 MG/DL (ref 0.73–1.18)
CREAT UR-MCNC: 65.9 MG/DL (ref 63–166)
CREAT/UREA NIT SERPL: 9
EST. AVERAGE GLUCOSE BLD GHB EST-MCNC: 154.2 MG/DL
GFR SERPLBLD CREATININE-BSD FMLA CKD-EPI: 46 ML/MIN/1.73/M2
GLOBULIN SER-MCNC: 4.5 GM/DL (ref 2.4–3.5)
GLUCOSE SERPL-MCNC: 90 MG/DL (ref 74–100)
HBA1C MFR BLD: 7 %
MICROALBUMIN UR-MCNC: 12.7 UG/ML
MICROALBUMIN/CREAT RATIO PNL UR: 19.3 MG/GM CR (ref 0–30)
POTASSIUM SERPL-SCNC: 3.9 MMOL/L (ref 3.5–5.1)
PROT SERPL-MCNC: 8.1 GM/DL (ref 6.4–8.3)
SODIUM SERPL-SCNC: 140 MMOL/L (ref 136–145)

## 2024-10-14 PROCEDURE — 36415 COLL VENOUS BLD VENIPUNCTURE: CPT

## 2024-10-14 PROCEDURE — 83036 HEMOGLOBIN GLYCOSYLATED A1C: CPT

## 2024-10-14 PROCEDURE — 80053 COMPREHEN METABOLIC PANEL: CPT

## 2024-10-14 PROCEDURE — 82570 ASSAY OF URINE CREATININE: CPT

## 2024-10-14 PROCEDURE — 82043 UR ALBUMIN QUANTITATIVE: CPT

## 2024-10-24 ENCOUNTER — OFFICE VISIT (OUTPATIENT)
Dept: FAMILY MEDICINE | Facility: CLINIC | Age: 59
End: 2024-10-24
Payer: MEDICARE

## 2024-10-24 VITALS
WEIGHT: 266.5 LBS | HEART RATE: 74 BPM | TEMPERATURE: 98 F | BODY MASS INDEX: 36.1 KG/M2 | SYSTOLIC BLOOD PRESSURE: 118 MMHG | DIASTOLIC BLOOD PRESSURE: 80 MMHG | RESPIRATION RATE: 18 BRPM | HEIGHT: 72 IN

## 2024-10-24 DIAGNOSIS — E78.2 MIXED HYPERLIPIDEMIA: ICD-10-CM

## 2024-10-24 DIAGNOSIS — E66.01 SEVERE OBESITY (BMI 35.0-39.9) WITH COMORBIDITY: ICD-10-CM

## 2024-10-24 DIAGNOSIS — N18.4 CHRONIC KIDNEY DISEASE (CKD), STAGE IV (SEVERE): ICD-10-CM

## 2024-10-24 DIAGNOSIS — E11.42 TYPE 2 DIABETES MELLITUS WITH DIABETIC POLYNEUROPATHY, WITH LONG-TERM CURRENT USE OF INSULIN: Primary | ICD-10-CM

## 2024-10-24 DIAGNOSIS — I10 ESSENTIAL HYPERTENSION: ICD-10-CM

## 2024-10-24 DIAGNOSIS — Z79.4 TYPE 2 DIABETES MELLITUS WITH DIABETIC POLYNEUROPATHY, WITH LONG-TERM CURRENT USE OF INSULIN: Primary | ICD-10-CM

## 2024-10-24 DIAGNOSIS — E11.40 TYPE 2 DIABETES MELLITUS WITH DIABETIC NEUROPATHY, WITH LONG-TERM CURRENT USE OF INSULIN: ICD-10-CM

## 2024-10-24 DIAGNOSIS — E03.9 HYPOTHYROIDISM, UNSPECIFIED TYPE: ICD-10-CM

## 2024-10-24 DIAGNOSIS — Z79.4 TYPE 2 DIABETES MELLITUS WITH DIABETIC NEUROPATHY, WITH LONG-TERM CURRENT USE OF INSULIN: ICD-10-CM

## 2024-10-24 DIAGNOSIS — Z12.5 SCREENING FOR MALIGNANT NEOPLASM OF PROSTATE: ICD-10-CM

## 2024-10-24 DIAGNOSIS — I25.118 CORONARY ARTERY DISEASE OF NATIVE ARTERY OF NATIVE HEART WITH STABLE ANGINA PECTORIS: ICD-10-CM

## 2024-10-24 PROCEDURE — 99215 OFFICE O/P EST HI 40 MIN: CPT | Mod: PBBFAC,PN | Performed by: NURSE PRACTITIONER

## 2024-10-24 RX ORDER — ORAL SEMAGLUTIDE 7 MG/1
7 TABLET ORAL
COMMUNITY
Start: 2024-10-08

## 2024-10-24 RX ORDER — SYRINGE AND NEEDLE,INSULIN,1ML 31GX15/64"
1 SYRINGE, EMPTY DISPOSABLE MISCELLANEOUS 3 TIMES DAILY
Qty: 10 EACH | Refills: 11 | Status: SHIPPED | OUTPATIENT
Start: 2024-10-24 | End: 2025-10-24

## 2024-10-24 RX ORDER — PERPHENAZINE 16 MG
1 TABLET ORAL
COMMUNITY
Start: 2024-09-05

## 2024-10-24 RX ORDER — LIDOCAINE AND PRILOCAINE 25; 25 MG/G; MG/G
CREAM TOPICAL
COMMUNITY
Start: 2024-09-28

## 2024-10-24 NOTE — ASSESSMENT & PLAN NOTE
Lab Results   Component Value Date    HGBA1C 7.0 10/14/2024    HGBA1C 7.0 07/03/2024    LDL 89.00 07/03/2024    CREATININE 1.69 (H) 10/14/2024    CREATININE 0.91 05/24/2022      Diabetes Medications               FARXIGA 10 mg tablet Take 10 mg by mouth once daily.    NOVOLIN 70/30 U-100 INSULIN 100 unit/mL (70-30) injection Inject 45 Units into the skin 2 (two) times daily with meals. Inject 45 units in am and 70 units with evening meals          On ACE and Statin according to ADA guidelines.  Discussed caution with SGLT2s + diuretics as concomitant use can cause volume depletion. Discussed caution with DPP-Ivs and HF risk.  Follow ADA Diet. Avoid soda, simple sweets, and limit rice/pasta/breads/starches (no more than 45-50 grams per meal).  Maintain healthy weight with goal BMI <30.  Exercise 5 times per week for 30 minutes per day.  Stressed importance of daily foot exams and to wear approved DM shoes.   Stressed importance of annual dilated eye exam.

## 2024-10-24 NOTE — PROGRESS NOTES
[facility]  Ryley Cisse DNP     Patient ID: [id number]     Chief Complaint: Diabetes (Lab results  )      HPI:     Luigi Gotti is a 59 y.o. male here today for follow up with PCP.     Medical dx include: chronic kidney disease, heart failure with reduced ejection fraction (20%, s/p ICD 6/9/2023), coronary artery disease, gastroparesis, gastritis, ischemic colitis, peripheral vascular disease, dyslipidemia, hypertension, BPH, diabetes mellitus type 2, hx of provoked seizures during hospitalization ref(follows neuro), and obesity.       Health Maintenance         Date Due Completion Date    COVID-19 Vaccine (4 - 2024-25 season) 09/01/2024 2/11/2022    Influenza Vaccine (1) 06/30/2025 (Originally 9/1/2024) 10/5/2023    Pneumococcal Vaccines (Age 0-64) (2 of 2 - PCV) 07/23/2025 (Originally 2/10/2022) 2/10/2021    Foot Exam 01/23/2025 1/23/2024    Override on 8/17/2023: Done    Override on 7/14/2022: Done    Hemoglobin A1c 04/14/2025 10/14/2024    Eye Exam 05/07/2025 5/7/2024    Lipid Panel 07/03/2025 7/3/2024    Colorectal Cancer Screening 09/30/2025 9/30/2020    Diabetes Urine Screening 10/14/2025 10/14/2024    High Dose Statin 10/24/2025 10/24/2024    Aspirin/Antiplatelet Therapy 10/24/2025 10/24/2024    TETANUS VACCINE 05/08/2028 5/8/2018    RSV Vaccine (Age 60+ and Pregnant patients) (1 - 1-dose 75+ series) 04/25/2040 ---            Past Medical History:   Diagnosis Date    Anxiety     Arthritis     BPH (benign prostatic hyperplasia)     CAD (coronary artery disease)     CHF (congestive heart failure)     Depression     Dizziness     GERD (gastroesophageal reflux disease)     Hypertension     Ischemic cardiomyopathy     Myocardial infarction     Obesity     Other hyperlipidemia     Oxygen dependent     3L/NC AT HS    PVD (peripheral vascular disease)     Renal disorder     kidney stones    Seizures     SOB (shortness of breath) on exertion     Thyroid disease     Type 2 diabetes mellitus  without complications     Weakness of both legs         Past Surgical History:   Procedure Laterality Date    CARDIAC DEFIBRILLATOR PLACEMENT N/A 6/9/2023    Procedure: Insertion, ICD;  Surgeon: Perry Eldridge MD;  Location: [facility] CATH LAB;  Service: Cardiology;  Laterality: N/A;  SICD W/ SILVESTRE. (BS)    CHOLECYSTECTOMY      COLONOSCOPY      multiple    CORONARY ARTERY BYPASS GRAFT      CORONARY STENT PLACEMENT      ESOPHAGOGASTRODUODENOSCOPY      INSERTION, TRANSVENOUS ELECTRODE LEAD, PACING ICD OR CARDIAC PACEMAKER N/A 10/19/2023    Procedure: Insertion, Transvenous Electrode Lead, Pacing ICD or Cardiac Pacemaker;  Surgeon: Colby Mcintosh MD;  Location: [facility] CATH LAB;  Service: Cardiology;  Laterality: N/A;  CCM IMPLANT    LEFT HEART CATHETERIZATION N/A 02/07/2023    Procedure: Left heart cath;  Surgeon: Jamin Melara MD;  Location: [facility] CATH LAB;  Service: Cardiology;  Laterality: N/A;    RIGHT HEART CATHETERIZATION Right 09/20/2022    Procedure: INSERTION, CATHETER, RIGHT HEART;  Surgeon: Cory Oreilly MD;  Location: [facility] CATH LAB;  Service: Cardiology;  Laterality: Right;  CARDIOMEMS RJ ACCESS    STENT, PERIPHERAL GRAFT      TONSILLECTOMY          Social History     Tobacco Use    Smoking status: Never     Passive exposure: Never    Smokeless tobacco: Never   Substance and Sexual Activity    Alcohol use: Never    Drug use: Not Currently    Sexual activity: Not Currently     Partners: Female        Current Outpatient Medications   Medication Instructions    alfuzosin (UROXATRAL) 10 mg, Oral, With breakfast    allopurinoL (ZYLOPRIM) 100 mg, Oral, Daily, Take half a tab daily for 1 week, then take 1 tablet daily.    alpha lipoic acid 600 mg Cap 1 capsule    aspirin (ECOTRIN) 81 mg, Daily    atorvastatin (LIPITOR) 80 mg, Nightly    azelastine (ASTELIN) 137 mcg (0.1 %) nasal spray 1 spray, 2 times daily    blood sugar diagnostic (TRUE METRIX GLUCOSE TEST STRIP) Strp USE TO TEST 3 TIMES A DAY     "carvediloL (COREG) 25 mg, 2 times daily    cyanocobalamin (VITAMIN B-12) 2,000 mcg, Daily    ENTRESTO 24-26 mg per tablet 1 tablet, 2 times daily    eplerenone (INSPRA) 25 mg, Daily    esomeprazole (NEXIUM) 40 MG capsule 1 capsule, Nightly    ezetimibe (ZETIA) 10 mg, Daily    FARXIGA 10 mg, Daily    ferrous gluconate (FERGON) 324 MG tablet 1 tablet    finasteride (PROSCAR) 5 mg, Oral, Daily    fluticasone propionate (FLONASE) 50 mcg/actuation nasal spray Flonase Allergy Relief Take No date recorded No form recorded No frequency recorded No route recorded No set duration recorded No set duration amount recorded active No dosage strength recorded No dosage strength units of measure recorded    HYDROcodone-acetaminophen (NORCO) 5-325 mg per tablet 1 tablet, Every 6 hours PRN    hydrOXYzine (ATARAX) 50 mg, 3 times daily    insulin syringe-needle U-100 1 mL 31 gauge x 15/64" Syrg 1 each, Misc.(Non-Drug; Combo Route), 3 times daily    isosorbide mononitrate (IMDUR) 60 mg, Daily    lactulose (CHRONULAC) 10 g, Oral, 2 times daily PRN    levETIRAcetam (KEPPRA) 250 mg, Oral, Daily    levocetirizine (XYZAL) 5 mg, Oral, Nightly PRN    levothyroxine (SYNTHROID) 25 mcg, Oral, Before breakfast    LIDOcaine-prilocaine (EMLA) cream APPLY SPARINGLY TO AFFECTED AREA TWICE A DAY    metoclopramide HCl (REGLAN) 10 mg, Oral, Daily    montelukast (SINGULAIR) 10 mg, Oral, Daily    nitroGLYCERIN (NITROSTAT) 0.4 mg, As needed (PRN)    NOVOLIN 70/30 U-100 INSULIN 100 unit/mL (70-30) injection 45 Units, Subcutaneous, 2 times daily with meals, Inject 45 units in am and 70 units with evening meals    pantoprazole (PROTONIX) 40 mg    prasugreL (EFFIENT) 10 mg, Daily    QUEtiapine (SEROQUEL) 25 mg, Oral, Nightly    ranolazine (RANEXA) 1,000 mg, 2 times daily    RYBELSUS 7 mg    torsemide (DEMADEX) 20 mg, 2 times daily    TRUE METRIX GLUCOSE METER Misc TEST 3 TIMES A DAY    VERQUVO 2.5 mg, 2 times daily    XARELTO 2.5 mg, 2 times daily       Review " "of patient's allergies indicates:   Allergen Reactions    Pork derived (porcine)      Other reaction(s): Pork    Hindu choice      Plavix [clopidogrel] Hives and Rash        Patient Care Team:  Ryley Cisse FNP as PCP - General (Family Medicine)  Robina Garrison FNP as Nurse Practitioner (Nephrology)     Subjective:     Review of Systems   Constitutional:  Negative for appetite change, chills, diaphoresis and fever.   HENT:  Negative for ear pain, sinus pain and sore throat.    Eyes:  Negative for pain and visual disturbance.   Respiratory:  Negative for cough, shortness of breath and wheezing.    Cardiovascular:  Negative for chest pain, palpitations and leg swelling.   Gastrointestinal:  Negative for abdominal pain, blood in stool, diarrhea, nausea and vomiting.   Endocrine: Negative for cold intolerance.   Genitourinary:  Negative for difficulty urinating, dysuria, frequency and hematuria.   Musculoskeletal:  Negative for arthralgias, joint swelling and myalgias.   Skin:  Negative for color change and rash.   Allergic/Immunologic: Negative.    Neurological: Negative.  Negative for dizziness, syncope, light-headedness and numbness.   Hematological: Negative.    Psychiatric/Behavioral: Negative.  Negative for dysphoric mood and suicidal ideas. The patient is not nervous/anxious.    All other systems reviewed and are negative.      12 point review of systems conducted, negative except as stated in the history of present illness. See HPI for details.    Objective:     Visit Vitals  /80 (BP Location: Left arm, Patient Position: Sitting)   Pulse 74   Temp 97.8 °F (36.6 °C) (Oral)   Resp 18   Ht 5' 11.65" (1.82 m)   Wt 120.9 kg (266 lb 8 oz)   BMI 36.49 kg/m²       Physical Exam  Vitals and nursing note reviewed.   Constitutional:       General: He is not in acute distress.     Appearance: He is not ill-appearing.   HENT:      Head: Normocephalic and atraumatic.      Mouth/Throat:      Mouth: Mucous " membranes are moist.      Pharynx: Oropharynx is clear.   Eyes:      General: No scleral icterus.     Extraocular Movements: Extraocular movements intact.      Conjunctiva/sclera: Conjunctivae normal.      Pupils: Pupils are equal, round, and reactive to light.   Neck:      Vascular: No carotid bruit.   Cardiovascular:      Rate and Rhythm: Normal rate and regular rhythm.      Heart sounds: No murmur heard.     No friction rub. No gallop.   Pulmonary:      Effort: Pulmonary effort is normal. No respiratory distress.      Breath sounds: Normal breath sounds. No wheezing, rhonchi or rales.   Abdominal:      General: Abdomen is flat. Bowel sounds are normal. There is no distension.      Palpations: Abdomen is soft. There is no mass.      Tenderness: There is no abdominal tenderness.   Musculoskeletal:         General: Normal range of motion.      Cervical back: Normal range of motion and neck supple.   Skin:     General: Skin is warm and dry.   Neurological:      General: No focal deficit present.      Mental Status: He is alert.   Psychiatric:         Mood and Affect: Mood normal.         Labs Reviewed:     Chemistry:  Lab Results   Component Value Date     10/14/2024    K 3.9 10/14/2024    BUN 15.9 10/14/2024    CREATININE 1.69 (H) 10/14/2024    EGFRNORACEVR 46 10/14/2024    GLUCOSE 90 10/14/2024    CALCIUM 9.3 10/14/2024    ALKPHOS 140 10/14/2024    LABPROT 8.1 10/14/2024    ALBUMIN 3.6 10/14/2024    BILIDIR 0.5 04/13/2022    IBILI 0.40 04/13/2022    AST 20 10/14/2024    ALT 22 10/14/2024    MG 2.20 11/28/2022    PHOS 4.0 07/03/2024    TSH 2.140 07/03/2024    DJMFGA9KMGQ 0.88 03/30/2023    PSA 0.45 08/01/2024        Lab Results   Component Value Date    HGBA1C 7.0 10/14/2024        Hematology:  Lab Results   Component Value Date    WBC 8.79 01/26/2024    HGB 11.9 (L) 01/26/2024    HCT 36.8 (L) 01/26/2024     01/26/2024       Lipid Panel:  Lab Results   Component Value Date    CHOL 163 07/03/2024    HDL  48 07/03/2024    LDL 89.00 07/03/2024    TRIG 130 07/03/2024    TOTALCHOLEST 3 07/03/2024        Urine:  Lab Results   Component Value Date    APPEARANCEUA Clear 01/26/2024    SGUA 1.020 01/26/2024    PROTEINUA Negative 01/26/2024    KETONESUA Negative 01/26/2024    LEUKOCYTESUR 75 (A) 01/26/2024    RBCUA 0-5 01/26/2024    WBCUA 6-10 (A) 01/26/2024    BACTERIA None Seen 01/26/2024    SQEPUA Occ (A) 01/26/2024    HYALINECASTS 0-2 (A) 01/26/2024    CREATRANDUR 65.9 10/14/2024    PROTEINURINE <6.8 01/26/2024    UPROTCREA 0.1 02/13/2023        Assessment:       ICD-10-CM ICD-9-CM   1. Type 2 diabetes mellitus with diabetic polyneuropathy, with long-term current use of insulin  E11.42 250.60    Z79.4 357.2     V58.67   2. Coronary artery disease of native artery of native heart with stable angina pectoris  I25.118 414.01     413.9   3. Mixed hyperlipidemia  E78.2 272.2   4. Essential hypertension  I10 401.9   5. Chronic kidney disease (CKD), stage IV (severe)  N18.4 585.4   6. Hypothyroidism, unspecified type  E03.9 244.9   7. Severe obesity (BMI 35.0-39.9) with comorbidity  E66.01 278.01   8. Screening for malignant neoplasm of prostate  Z12.5 V76.44   9. Type 2 diabetes mellitus with diabetic neuropathy, with long-term current use of insulin  E11.40 250.60    Z79.4 357.2     V58.67        Plan:     1. Type 2 diabetes mellitus with diabetic polyneuropathy, with long-term current use of insulin  Assessment & Plan:  Lab Results   Component Value Date    HGBA1C 7.0 10/14/2024    HGBA1C 7.0 07/03/2024    LDL 89.00 07/03/2024    CREATININE 1.69 (H) 10/14/2024    CREATININE 0.91 05/24/2022      Diabetes Medications               FARXIGA 10 mg tablet Take 10 mg by mouth once daily.    NOVOLIN 70/30 U-100 INSULIN 100 unit/mL (70-30) injection Inject 45 Units into the skin 2 (two) times daily with meals. Inject 45 units in am and 70 units with evening meals          On ACE and Statin according to ADA guidelines.  Discussed  caution with SGLT2s + diuretics as concomitant use can cause volume depletion. Discussed caution with DPP-Ivs and HF risk.  Follow ADA Diet. Avoid soda, simple sweets, and limit rice/pasta/breads/starches (no more than 45-50 grams per meal).  Maintain healthy weight with goal BMI <30.  Exercise 5 times per week for 30 minutes per day.  Stressed importance of daily foot exams and to wear approved DM shoes.   Stressed importance of annual dilated eye exam.          Orders:  -     Hemoglobin A1C; Future; Expected date: 04/24/2025  -     Microalbumin/Creatinine Ratio, Urine; Future; Expected date: 04/24/2025    2. Coronary artery disease of native artery of native heart with stable angina pectoris  Overview:  [facility] 2/7/23: Stable    Assessment & Plan:  Reviewed medications, denies any exertional chest pain or shortness for breath symptoms.  Continue baby aspirin, atorvastatin, Farxiga, isosorbide, Entresto, carvedilol.  Continue follow-up with Cardiology.      3. Mixed hyperlipidemia  Assessment & Plan:  Counseled for low-sodium, low carb, low-cholesterol, good fat, Dash diet.  Recommend increasing fiber in the diet to lower LDL, increasing fish oil and fish such as salmon may help increase HDL good cholesterol.  Increasing aerobic activity as tolerated may also help lower LDL.  Healthy weight maintenance is advised, portion control, calorie counting, and discussed difference between complex carbs and simple carbs.    Continue current statin therapy.        4. Essential hypertension  Assessment & Plan:  Goal BP < 140/90, best goal is BP <130/80 consistently   Reduce the amount of salt in your diet, follow a 2 gm sodium, DASH diet daily            Lose weight if you are overweight or have obesity  Avoid drinking too much alcohol  Stop smoking  Exercise at least 30 minutes per day most days of the week  Blood pressure has been well-controlled and stable on current medical plan    Orders:  -     CBC Auto Differential;  Future; Expected date: 04/24/2025  -     Comprehensive Metabolic Panel; Future; Expected date: 04/24/2025  -     Lipid Panel; Future; Expected date: 04/24/2025  -     TSH; Future; Expected date: 04/24/2025  -     Urinalysis; Future; Expected date: 04/24/2025  -     T4, Free; Future; Expected date: 04/24/2025  -     Microalbumin/Creatinine Ratio, Urine; Future; Expected date: 04/24/2025    5. Chronic kidney disease (CKD), stage IV (severe)  Assessment & Plan:  Stable, chronic.  Avoid NSAIDs, avoid nephrotoxic agents, continue follow-up with Nephrology.  Continue to hydrate well daily.  Reviewed trending labs.  Discussed renal diet advice.  Discussed prevention of worsening renal indices and how to maintain healthy kidney function. Maintain optimal blood pressure control.        6. Hypothyroidism, unspecified type  Assessment & Plan:  TFTs WNL  Continue current regimen      7. Severe obesity (BMI 35.0-39.9) with comorbidity  Assessment & Plan:  Discussed importance of healthy weight maintenance and disease prevention.  Advised of low carb, low-fat, low-cholesterol, good fat diet, and importance of portion control measures.  Discussed drinking plenty of water daily, getting plenty of sleep nightly, stress reduction, and addressing any underlying lifestyle habits or past/recent stressors that may be contributing to obesity.  Advised of importance of getting 20-30 minutes of aerobic activity and daily.        8. Screening for malignant neoplasm of prostate  -     PSA, Screening; Future; Expected date: 04/24/2025    9. Type 2 diabetes mellitus with diabetic neuropathy, with long-term current use of insulin         Follow up in about 6 months (around 4/24/2025) for follow up, with lab work prior to visit. In addition to their scheduled follow up, the patient has also been instructed to follow up on as needed basis.     Future Appointments   Date Time Provider Department Center   12/5/2024 12:30 PM Robina Garrison FNP [facility]  NEPHR [place]   12/30/2024 10:30 AM [facility] US2 [facility] US [place]   1/3/2025 10:30 AM Porter Coulter DO [facility] OPWND [place]   1/6/2025 10:00 AM Matt Ray NP [facility] UROLO [place]    2/20/2025  3:30 PM Claribel Canales MD [facility] NEURO [place]   4/24/2025  9:40 AM Ryley Cisse FNP [facility]        [place]

## 2024-12-10 DIAGNOSIS — N40.1 BPH WITH URINARY OBSTRUCTION: Primary | ICD-10-CM

## 2024-12-10 DIAGNOSIS — N13.8 BPH WITH URINARY OBSTRUCTION: Primary | ICD-10-CM

## 2024-12-19 RX ORDER — LEVOCETIRIZINE DIHYDROCHLORIDE 5 MG/1
5 TABLET, FILM COATED ORAL NIGHTLY PRN
Qty: 90 TABLET | Refills: 1 | Status: SHIPPED | OUTPATIENT
Start: 2024-12-19 | End: 2025-06-17

## 2025-01-07 ENCOUNTER — LAB VISIT (OUTPATIENT)
Dept: LAB | Facility: HOSPITAL | Age: 60
End: 2025-01-07
Attending: NURSE PRACTITIONER
Payer: MEDICARE

## 2025-01-07 DIAGNOSIS — N13.8 BPH WITH URINARY OBSTRUCTION: ICD-10-CM

## 2025-01-07 DIAGNOSIS — N40.1 BPH WITH URINARY OBSTRUCTION: ICD-10-CM

## 2025-01-07 LAB — PSA SERPL-MCNC: 0.38 NG/ML

## 2025-01-07 PROCEDURE — 36415 COLL VENOUS BLD VENIPUNCTURE: CPT

## 2025-01-07 PROCEDURE — 84153 ASSAY OF PSA TOTAL: CPT

## 2025-01-12 ENCOUNTER — HOSPITAL ENCOUNTER (EMERGENCY)
Facility: HOSPITAL | Age: 60
Discharge: HOME | End: 2025-01-12
Attending: EMERGENCY MEDICINE
Payer: MEDICARE

## 2025-01-12 VITALS
RESPIRATION RATE: 18 BRPM | DIASTOLIC BLOOD PRESSURE: 77 MMHG | WEIGHT: 271 LBS | OXYGEN SATURATION: 96 % | SYSTOLIC BLOOD PRESSURE: 135 MMHG | HEIGHT: 73 IN | BODY MASS INDEX: 35.92 KG/M2 | TEMPERATURE: 98 F | HEART RATE: 91 BPM

## 2025-01-12 DIAGNOSIS — I95.1 ORTHOSTATIC HYPOTENSION: Primary | ICD-10-CM

## 2025-01-12 DIAGNOSIS — M25.579 ANKLE PAIN: ICD-10-CM

## 2025-01-12 DIAGNOSIS — R42 DIZZINESS: ICD-10-CM

## 2025-01-12 DIAGNOSIS — S82.035A CLOSED NONDISPLACED TRANSVERSE FRACTURE OF LEFT PATELLA, INITIAL ENCOUNTER: ICD-10-CM
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
## 2025-01-12 LAB
ALBUMIN SERPL-MCNC: 3.4 G/DL (ref 3.5–5)
ALBUMIN/GLOB SERPL: 0.8 RATIO (ref 1.1–2)
ALP SERPL-CCNC: 133 UNIT/L (ref 40–150)
ALT SERPL-CCNC: 20 UNIT/L (ref 0–55)
ANION GAP SERPL CALC-SCNC: 12 MEQ/L
AST SERPL-CCNC: 21 UNIT/L (ref 5–34)
BASOPHILS # BLD AUTO: 0.03 X10(3)/MCL
BASOPHILS NFR BLD AUTO: 0.2 %
BILIRUB SERPL-MCNC: 0.8 MG/DL
BUN SERPL-MCNC: 15.1 MG/DL (ref 8.4–25.7)
CALCIUM SERPL-MCNC: 8.9 MG/DL (ref 8.4–10.2)
CHLORIDE SERPL-SCNC: 105 MMOL/L (ref 98–107)
CO2 SERPL-SCNC: 24 MMOL/L (ref 22–29)
CREAT SERPL-MCNC: 1.69 MG/DL (ref 0.72–1.25)
CREAT/UREA NIT SERPL: 9
EOSINOPHIL # BLD AUTO: 0.2 X10(3)/MCL (ref 0–0.9)
EOSINOPHIL NFR BLD AUTO: 1.5 %
ERYTHROCYTE [DISTWIDTH] IN BLOOD BY AUTOMATED COUNT: 13.4 % (ref 11.5–17)
GFR SERPLBLD CREATININE-BSD FMLA CKD-EPI: 46 ML/MIN/1.73/M2
GLOBULIN SER-MCNC: 4.3 GM/DL (ref 2.4–3.5)
GLUCOSE SERPL-MCNC: 185 MG/DL (ref 74–100)
HCT VFR BLD AUTO: 41.5 % (ref 42–52)
HGB BLD-MCNC: 13.5 G/DL (ref 14–18)
IMM GRANULOCYTES # BLD AUTO: 0.05 X10(3)/MCL (ref 0–0.04)
IMM GRANULOCYTES NFR BLD AUTO: 0.4 %
LYMPHOCYTES # BLD AUTO: 1.23 X10(3)/MCL (ref 0.6–4.6)
LYMPHOCYTES NFR BLD AUTO: 8.9 %
MCH RBC QN AUTO: 29.9 PG (ref 27–31)
MCHC RBC AUTO-ENTMCNC: 32.5 G/DL (ref 33–36)
MCV RBC AUTO: 91.8 FL (ref 80–94)
MONOCYTES # BLD AUTO: 0.8 X10(3)/MCL (ref 0.1–1.3)
MONOCYTES NFR BLD AUTO: 5.8 %
NEUTROPHILS # BLD AUTO: 11.47 X10(3)/MCL (ref 2.1–9.2)
NEUTROPHILS NFR BLD AUTO: 83.2 %
NRBC BLD AUTO-RTO: 0 %
OHS QRS DURATION: 92 MS
OHS QTC CALCULATION: 469 MS
PLATELET # BLD AUTO: 248 X10(3)/MCL (ref 130–400)
PMV BLD AUTO: 12.3 FL (ref 7.4–10.4)
POTASSIUM SERPL-SCNC: 4.2 MMOL/L (ref 3.5–5.1)
PROT SERPL-MCNC: 7.7 GM/DL (ref 6.4–8.3)
RBC # BLD AUTO: 4.52 X10(6)/MCL (ref 4.7–6.1)
SODIUM SERPL-SCNC: 141 MMOL/L (ref 136–145)
TROPONIN I SERPL-MCNC: 0.01 NG/ML (ref 0–0.04)
WBC # BLD AUTO: 13.78 X10(3)/MCL (ref 4.5–11.5)

## 2025-01-12 PROCEDURE — 93005 ELECTROCARDIOGRAM TRACING: CPT

## 2025-01-12 PROCEDURE — 80053 COMPREHEN METABOLIC PANEL: CPT | Performed by: EMERGENCY MEDICINE

## 2025-01-12 PROCEDURE — 93010 ELECTROCARDIOGRAM REPORT: CPT | Mod: ,,, | Performed by: INTERNAL MEDICINE

## 2025-01-12 PROCEDURE — 85025 COMPLETE CBC W/AUTO DIFF WBC: CPT | Performed by: EMERGENCY MEDICINE

## 2025-01-12 PROCEDURE — 29505 APPLICATION LONG LEG SPLINT: CPT | Mod: LT

## 2025-01-12 PROCEDURE — 99285 EMERGENCY DEPT VISIT HI MDM: CPT | Mod: 25

## 2025-01-12 PROCEDURE — 84484 ASSAY OF TROPONIN QUANT: CPT | Performed by: EMERGENCY MEDICINE

## 2025-01-12 NOTE — ED PROVIDER NOTES
"Encounter Date: 1/12/2025       History     Chief Complaint   Patient presents with    Fall     Pt c/o pain to bilateral knees and ankles s/p fall after getting dizzy. Denies hitting head. + blood thinners.     59-year-old male with a history of CAD, CHF, diabetes, hypertension, hyperlipidemia, seizure disorder, lower extremity weakness, dizziness, PVD, kidney stones, BPH, anxiety, presents to the emergency room because he believes his knees are "broken".  He states that he got dizzy this morning and he fell directly on his knees.  He did not hit his head.  He also has pain in both hips, both ankles and both ankles.  He states he frequently gets dizzy and believes it is due to his medications.  He has no chest pain.  He does state that he has shortness of breath which is chronic and at baseline.  No abdominal pain, neck pain, back pain.  The majority of the pain is in the left knee and has an abrasion over the patella.  He was ambulatory without assistance on arrival accompanied by his son.    The history is provided by the patient and a relative.     Review of patient's allergies indicates:   Allergen Reactions    Pork derived (porcine)      Other reaction(s): Pork    Druze choice      Plavix [clopidogrel] Hives and Rash     Past Medical History:   Diagnosis Date    Anxiety     Arthritis     BPH (benign prostatic hyperplasia)     CAD (coronary artery disease)     CHF (congestive heart failure)     Depression     Dizziness     GERD (gastroesophageal reflux disease)     Hypertension     Ischemic cardiomyopathy     Myocardial infarction     Obesity     Other hyperlipidemia     Oxygen dependent     3L/NC AT HS    PVD (peripheral vascular disease)     Renal disorder     kidney stones    Seizures     SOB (shortness of breath) on exertion     Thyroid disease     Type 2 diabetes mellitus without complications     Weakness of both legs      Past Surgical History:   Procedure Laterality Date    CARDIAC DEFIBRILLATOR " PLACEMENT N/A 6/9/2023    Procedure: Insertion, ICD;  Surgeon: Perry Eldridge MD;  Location: [facility] CATH LAB;  Service: Cardiology;  Laterality: N/A;  SICD W/ ANEST. (BS)    CHOLECYSTECTOMY      COLONOSCOPY      multiple    CORONARY ARTERY BYPASS GRAFT      CORONARY STENT PLACEMENT      ESOPHAGOGASTRODUODENOSCOPY      INSERTION, TRANSVENOUS ELECTRODE LEAD, PACING ICD OR CARDIAC PACEMAKER N/A 10/19/2023    Procedure: Insertion, Transvenous Electrode Lead, Pacing ICD or Cardiac Pacemaker;  Surgeon: Colby Mcintosh MD;  Location: [facility] CATH LAB;  Service: Cardiology;  Laterality: N/A;  CCM IMPLANT    LEFT HEART CATHETERIZATION N/A 02/07/2023    Procedure: Left heart cath;  Surgeon: Jamin Melara MD;  Location: [facility] CATH LAB;  Service: Cardiology;  Laterality: N/A;    RIGHT HEART CATHETERIZATION Right 09/20/2022    Procedure: INSERTION, CATHETER, RIGHT HEART;  Surgeon: Cory Oreilly MD;  Location: [facility] CATH LAB;  Service: Cardiology;  Laterality: Right;  CARDIOMEMS RJ ACCESS    STENT, PERIPHERAL GRAFT      TONSILLECTOMY       Family History   Problem Relation Name Age of Onset    Diabetes Mother      Hypertension Mother      Cancer Mother      Hypertension Father      Stroke Father      Diabetes Sister      Diabetes Brother      Kidney disease Brother       Social History     Tobacco Use    Smoking status: Never     Passive exposure: Never    Smokeless tobacco: Never   Substance Use Topics    Alcohol use: Never    Drug use: Not Currently     Review of Systems   Musculoskeletal:  Positive for arthralgias.   Neurological:  Positive for dizziness.   All other systems reviewed and are negative.      Physical Exam     Initial Vitals [01/12/25 0843]   BP Pulse Resp Temp SpO2   109/72 100 20 97.7 °F (36.5 °C) 99 %      MAP       --         Physical Exam    Nursing note and vitals reviewed.  Constitutional: Vital signs are normal. He appears well-developed and well-nourished.   HENT:   Head: Normocephalic and  atraumatic. Mouth/Throat: Oropharynx is clear and moist.   Tms are normal   Eyes: Pupils are equal, round, and reactive to light.   Neck: Neck supple. No JVD present.   Cardiovascular:  Normal rate, regular rhythm and normal heart sounds.           Pulmonary/Chest: Breath sounds normal. No respiratory distress.   Abdominal: Abdomen is soft. There is no abdominal tenderness.   Musculoskeletal:         General: No edema.      Cervical back: Neck supple. No edema or erythema.      Comments: Patient is ambulatory without assistance with a slow gait.  Both hips have mild subjective tenderness but full range of motion without pain.  No hip pain with internal or external rotation of the hip.  Left knee has a faint abrasion and he can flex about 45°.  He is tender diffusely, but mostly over the patella.  No effusion.  No deformity.  No bruising.  Right knee has no abrasion, bruising, swelling or deformity.  He has full range of motion of the right knee but complains of pain with movement.  He complains of bilateral ankle pain with no visible sign of injury, no swelling, no bruising, full range of motion.  Foot exam bilaterally is normal with trace pedal pulse bilaterally and brisk capillary refill to the toes.     Lymphadenopathy:     He has no cervical adenopathy.   Neurological: He is alert and oriented to person, place, and time. No cranial nerve deficit. GCS score is 15. GCS eye subscore is 4. GCS verbal subscore is 5. GCS motor subscore is 6.   Skin: Skin is warm and dry. Capillary refill takes less than 2 seconds.         ED Course   Procedures  Labs Reviewed   CBC WITH DIFFERENTIAL - Abnormal       Result Value    WBC 13.78 (*)     RBC 4.52 (*)     Hgb 13.5 (*)     Hct 41.5 (*)     MCV 91.8      MCH 29.9      MCHC 32.5 (*)     RDW 13.4      Platelet 248      MPV 12.3 (*)     Neut % 83.2      Lymph % 8.9      Mono % 5.8      Eos % 1.5      Basophil % 0.2      Imm Grans % 0.4      Neut # 11.47 (*)     Lymph # 1.23       Mono # 0.80      Eos # 0.20      Baso # 0.03      Imm Gran # 0.05 (*)     NRBC% 0.0     COMPREHENSIVE METABOLIC PANEL - Abnormal    Sodium 141      Potassium 4.2      Chloride 105      CO2 24      Glucose 185 (*)     Blood Urea Nitrogen 15.1      Creatinine 1.69 (*)     Calcium 8.9      Protein Total 7.7      Albumin 3.4 (*)     Globulin 4.3 (*)     Albumin/Globulin Ratio 0.8 (*)     Bilirubin Total 0.8            ALT 20      AST 21      eGFR 46      Anion Gap 12.0      BUN/Creatinine Ratio 9     TROPONIN I - Normal    Troponin-I 0.013     URINALYSIS, REFLEX TO URINE CULTURE     EKG Readings: (Independently Interpreted)   Initial Reading: No STEMI. Previous EKG Date: October 19, 2023. Rhythm: Normal Sinus Rhythm. Heart Rate: 86. ST Segments: Normal ST Segments. Clinical Impression: Normal Sinus Rhythm Other Impression: low voltage QRS     ECG Results              EKG 12-lead (In process)        Collection Time Result Time QRS Duration [facility] QTC Calculation    01/12/25 09:15:32 01/12/25 12:13:50 92 469                     In process by Interface, Lab In [facility] (01/12/25 12:13:53)                   Narrative:    Test Reason : R42,    Vent. Rate :  86 BPM     Atrial Rate :  86 BPM     P-R Int : 164 ms          QRS Dur :  92 ms      QT Int : 392 ms       P-R-T Axes :  39  28  28 degrees    QTcB Int : 469 ms    Sinus rhythm with Fusion complexes  Low voltage QRS  Borderline Abnormal ECG  When compared with ECG of 19-Oct-2023 17:25,  Fusion complexes are now Present  The axis Shifted right  QRS voltage has decreased  Nonspecific T wave abnormality now evident in Inferior leads  Nonspecific T wave abnormality, improved in Lateral leads    Referred By: AAAREFERRAL SELF           Confirmed By:                                   Imaging Results              X-Ray Hip 2 or 3 views Right with Pelvis when performed (Final result)  Result time 01/12/25 10:06:15      Final result by Homero Olivas MD (01/12/25 10:06:15)                    Narrative:    EXAMINATION  XR HIP WITH PELVIS WHEN PERFORMED 2 OR 3 VIEWS RIGHT    CLINICAL HISTORY  fall;    TECHNIQUE  A total of 3 images submitted of the right hip/pelvis.    COMPARISON  None available at the time of initial interpretation.    FINDINGS  No displaced fracture or dislocation is identified. The visualized pelvic ring structures and articular spaces are preserved. There is no suggestion of large joint effusion. No aggressive osseous lesion or periosteal reaction is appreciated. The trabecular pattern is unremarkable.    No acute soft tissue abnormality is identified.  Scattered vascular calcification is present.    IMPRESSION  No convincing acute abnormality.      Electronically signed by: Homero Olivas  Date:    01/12/2025  Time:    10:06                                     X-Ray Ankle Complete Right (Final result)  Result time 01/12/25 10:10:55      Final result by Homero Olivas MD (01/12/25 10:10:55)                   Narrative:    EXAMINATION  XR ANKLE COMPLETE 3 VIEW RIGHT    CLINICAL HISTORY  Pain in unspecified ankle and joints of unspecified foot    TECHNIQUE  A total of 3 images submitted of the right ankle.    COMPARISON  None available at the time of initial interpretation.    FINDINGS  No displaced fracture or dislocation is identified. The visualized joint spaces are preserved and there are no findings indicative of a joint effusion. No aggressive osseous lesion or periosteal reaction is identified.    The included soft tissues are without acute abnormality.    IMPRESSION  No evidence of acute abnormality.      Electronically signed by: Homero Olivas  Date:    01/12/2025  Time:    10:10                                     X-Ray Ankle Complete Left (Final result)  Result time 01/12/25 10:11:16      Final result by Homero Olivas MD (01/12/25 10:11:16)                   Narrative:    EXAMINATION  XR ANKLE COMPLETE 3 VIEW LEFT    CLINICAL HISTORY  Unspecified fall, initial  encounter    TECHNIQUE  A total of 3 images submitted of the left ankle.    COMPARISON  None available at the time of initial interpretation.    FINDINGS  No displaced fracture or dislocation is identified. The visualized joint spaces are preserved and there are no findings indicative of a joint effusion. No aggressive osseous lesion or periosteal reaction is identified.    No acute soft tissue abnormality is identified.  Scattered vascular calcification is present.    IMPRESSION  No convincing acute abnormality.      Electronically signed by: Homero Olivas  Date:    01/12/2025  Time:    10:11                                     X-Ray Knee Complete 4 Or More Views Right (Final result)  Result time 01/12/25 10:10:57      Final result by Tyson Gallegos MD (01/12/25 10:10:57)                   Impression:      No acute osseous abnormality identified.      Electronically signed by: Tyson Gallegos  Date:    01/12/2025  Time:    10:10               Narrative:    EXAMINATION:  XR KNEE COMP 4 OR MORE VIEWS RIGHT    CLINICAL HISTORY:  Unspecified fall, initial encounter    TECHNIQUE:  AP, oblique and lateral views of the right knee    COMPARISON:  Radiograph 03/30/2012    FINDINGS:  No acute displaced fracture, dislocation or osseous destructive process.  Mild medial tibiofemoral joint space narrowing.  No right knee effusion identified.  Minimal patellar quadriceps enthesopathy.  Vascular stent overlies the right thigh.  Linear metallic focus in the proximal tibia measures 3.5 cm, of uncertain etiology.                                       X-Ray Knee Complete 4 or More Views Left (Final result)  Result time 01/12/25 10:08:28      Final result by Tyson Gallegos MD (01/12/25 10:08:28)                   Impression:      Subtle lucency through the superior aspect of the patella.  Nondisplaced fracture is not excluded.  Recommend correlation with point tenderness and further evaluation as warranted.      Electronically  signed by: Tyson Gallegos  Date:    01/12/2025  Time:    10:08               Narrative:    EXAMINATION:  XR KNEE COMP 4 OR MORE VIEWS LEFT    CLINICAL HISTORY:  Unspecified fall, initial encounter    TECHNIQUE:  AP, Lateral, Sunrise and Tunnel views of the left knee were preformed    COMPARISON:  None    FINDINGS:  Subtle lucency through the superior aspect of the patella.  No dislocation or osseous destructive process identified.  Mild joint space narrowing in the medial tibiofemoral compartment.  No definite left knee effusion.  Partially visualized vascular stent overlies the left thigh.                                       X-Ray Hip 2 or 3 views Left with Pelvis when performed (Final result)  Result time 01/12/25 10:11:53      Final result by Homero Olivas MD (01/12/25 10:11:53)                   Narrative:    EXAMINATION  XR HIP WITH PELVIS WHEN PERFORMED 2 OR 3 VIEWS LEFT    CLINICAL HISTORY  Unspecified fall, initial encounter    TECHNIQUE  A total of 2 images submitted of the left hip/pelvis.    COMPARISON  None available at the time of initial interpretation.    FINDINGS  Regional degenerative changes are present. No displaced fracture or dislocation is identified. The visualized pelvic ring structures and articular spaces are preserved. There is no suggestion of large joint effusion. No aggressive osseous lesion or periosteal reaction is appreciated. The trabecular pattern is unremarkable.    There is no acute soft tissue abnormality.  Widespread vascular calcification is present, as well as partially visualized femoral artery stent.    IMPRESSION  1. Arthritic changes without convincing acute abnormality.  2. Chronic secondary details discussed above.      Electronically signed by: Homero Olivas  Date:    01/12/2025  Time:    10:11                                     Medications - No data to display  Medical Decision Making  See HPI for narrative    Differential diagnosis includes but is not limited to  abrasion, contusion, fracture, strain, chronic dizziness, volume depletion, cardiac arrhythmia, adverse drug effect    Problems Addressed:  Orthostatic hypotension:     Details: Patient was seen and evaluated in the emergency department with history, physical exam, lab work, imaging.  He got dizzy this morning and does have some orthostatic hypotension this time.  However, he states this is chronic and that he has an appointment next week with his cardiologist.  He states this is due to his Entresto but he will discuss the issue further with his cardiologist but does not believe there is any thing to do about the problem.  He can not drink more fluids due to his congestive heart failure.  He does have a subtle nondisplaced fracture of the left patella.  Knee immobilizer was placed.  This was discussed with the patient and he can follow up with the orthopedist as an outpatient.  All questions were answered and he is stable for discharge home.    Amount and/or Complexity of Data Reviewed  Labs: ordered. Decision-making details documented in ED Course.  Radiology: ordered.               ED Course as of 01/12/25 1214   Sun Jan 12, 2025   0919 WBC(!): 13.78 [SH]   0919 Hemoglobin(!): 13.5 [SH]   0919 Hematocrit(!): 41.5 [SH]   0919 Platelet Count: 248 [SH]   1030 Creatinine(!): 1.69 [SH]   1030 Glucose(!): 185  Kidney function at baseline [SH]   1042 Patient states that the orthostatic hypotension is chronic and he is aware of this issue.  He states his cardiologist isn't aware of the issue and it is due to his Entresto.  He states that there is nothing they can do, he just has to be careful.  He has an appointment with his cardiologist Dr. Oreilly on 01/17/2025 and I encouraged him to discuss the issue further with Dr. CECY zhu. he has congestive heart failure so do not think IV fluids are appropriate and at this time, I recommend that he continue his medications as prescribed.  He does have a subtle nondisplaced left  patellar fracture which we have discussed and placed a knee immobilizer.  He states he has used knee immobilizers many times before and is familiar with it and understands the fall risk.  He has a walker and will be using his walker.  Fall precautions were discussed at length. [SH]      ED Course User Index  [SH] Yoanna Gold MD                           Clinical Impression:  Final diagnoses:  [R42] Dizziness  [W19.XXXA] Fall  [M25.579] Ankle pain  [I95.1] Orthostatic hypotension (Primary)  [S82.035A] Closed nondisplaced transverse fracture of left patella, initial encounter          ED Disposition Condition    Discharge Stable          ED Prescriptions    None       Follow-up Information       Follow up With Specialties Details Why Contact Info    Kushal Escobar, DO Orthopedic Surgery Schedule an appointment as soon as possible for a visit   [address] [facility] [address]  [address]  [place] [address]  [phone]      Cory Oreilly MD Cardiology  As scheduled on 1- [address]  [facility] [address]  [phone]               Yoanna Gold MD  01/12/25 1219

## 2025-01-14 ENCOUNTER — OFFICE VISIT (OUTPATIENT)
Dept: ORTHOPEDICS | Facility: CLINIC | Age: 60
End: 2025-01-14
Payer: MEDICARE

## 2025-01-14 VITALS — HEART RATE: 97 BPM | SYSTOLIC BLOOD PRESSURE: 109 MMHG | DIASTOLIC BLOOD PRESSURE: 76 MMHG

## 2025-01-14 DIAGNOSIS — S82.032D CLOSED DISPLACED TRANSVERSE FRACTURE OF LEFT PATELLA WITH ROUTINE HEALING, SUBSEQUENT ENCOUNTER: Primary | ICD-10-CM

## 2025-01-14 PROCEDURE — 99204 OFFICE O/P NEW MOD 45 MIN: CPT | Mod: ,,, | Performed by: REHABILITATION UNIT

## 2025-01-14 NOTE — PROGRESS NOTES
Subjective:      Patient ID: Luigi Gotti is a 59 y.o. male.    Chief Complaint: Injury of the Left Knee (L knee fx. DOI 1/12/25 Pt fell on both knees and injuring his L. Having a lot of pain. No swelling. Able to walk but walks with a limp. Wearing brace that's keeping it straight. Has numbness and tingling but due to diabetes. No other concerns with it.)    HPI:   Luigi Gotti is a 59 y.o. male who presents today for initial evaluation of his left knee    History of Present Illness    CHIEF COMPLAINT:  - Left knee pain following a fall    HPI:  Luigi presents to the clinic for evaluation of left knee pain following a fall two days ago on Sunday. He initially went to the hospital where X-rays were taken, revealing a line fracture in the left knee. He was given a knee immobilizer at the hospital. His current pain is not severe, noting that the immobilizer has helped reduce it. He points to the left knee as the primary site of pain, feeling it when the knee is manipulated. He mentions a previously unnoticed scratch from the fall. Pain is worse with knee manipulation. He can still straighten his knee despite the injury. He denies using any assistive devices for mobility prior to this incident. He is not taking any pain medication, as the hospital did not prescribe any due to concerns about his high blood pressure.    PREVIOUS TREATMENTS:  - Knee immobilizer: Given at the hospital two days ago following a fall, has helped reduce pain           Past Medical History:   Diagnosis Date    Anxiety     Arthritis     BPH (benign prostatic hyperplasia)     CAD (coronary artery disease)     CHF (congestive heart failure)     Depression     Dizziness     GERD (gastroesophageal reflux disease)     Hypertension     Ischemic cardiomyopathy     Myocardial infarction     Obesity     Other hyperlipidemia     Oxygen dependent     3L/NC AT HS    PVD (peripheral vascular disease)     Renal disorder     kidney stones    Seizures      SOB (shortness of breath) on exertion     Thyroid disease     Type 2 diabetes mellitus without complications     Weakness of both legs      Past Surgical History:   Procedure Laterality Date    CARDIAC DEFIBRILLATOR PLACEMENT N/A 6/9/2023    Procedure: Insertion, ICD;  Surgeon: Perry Eldridge MD;  Location: [facility] CATH LAB;  Service: Cardiology;  Laterality: N/A;  SICD W/ NABEELST. (BS)    CHOLECYSTECTOMY      COLONOSCOPY      multiple    CORONARY ARTERY BYPASS GRAFT      CORONARY STENT PLACEMENT      ESOPHAGOGASTRODUODENOSCOPY      INSERTION, TRANSVENOUS ELECTRODE LEAD, PACING ICD OR CARDIAC PACEMAKER N/A 10/19/2023    Procedure: Insertion, Transvenous Electrode Lead, Pacing ICD or Cardiac Pacemaker;  Surgeon: Colby Mcintosh MD;  Location: [facility] CATH LAB;  Service: Cardiology;  Laterality: N/A;  CCM IMPLANT    LEFT HEART CATHETERIZATION N/A 02/07/2023    Procedure: Left heart cath;  Surgeon: Jamin Melara MD;  Location: [facility] CATH LAB;  Service: Cardiology;  Laterality: N/A;    RIGHT HEART CATHETERIZATION Right 09/20/2022    Procedure: INSERTION, CATHETER, RIGHT HEART;  Surgeon: Cory Oreilly MD;  Location: [facility] CATH LAB;  Service: Cardiology;  Laterality: Right;  CARDIOMEMS RJ ACCESS    STENT, PERIPHERAL GRAFT      TONSILLECTOMY       Social History     Socioeconomic History    Marital status:      Spouse name: Viktoria    Number of children: 1   Tobacco Use    Smoking status: Never     Passive exposure: Never    Smokeless tobacco: Never   Substance and Sexual Activity    Alcohol use: Never    Drug use: Not Currently    Sexual activity: Not Currently     Partners: Female     Social Drivers of Health     Financial Resource Strain: Medium Risk (7/30/2024)    Overall Financial Resource Strain (CARDIA)     Difficulty of Paying Living Expenses: Somewhat hard   Food Insecurity: Food Insecurity Present (7/30/2024)    Hunger Vital Sign     Worried About Running Out of Food in the Last Year: Sometimes true      Ran Out of Food in the Last Year: Never true   Transportation Needs: No Transportation Needs (7/30/2024)    TRANSPORTATION NEEDS     Transportation : No   Physical Activity: Inactive (7/30/2024)    Exercise Vital Sign     Days of Exercise per Week: 0 days     Minutes of Exercise per Session: 0 min   Stress: Stress Concern Present (7/30/2024)    [employer] - Occupational Stress Questionnaire     Feeling of Stress : To some extent   Housing Stability: Low Risk  (7/30/2024)    Housing Stability Vital Sign     Unable to Pay for Housing in the Last Year: No     Homeless in the Last Year: No         Current Outpatient Medications:     alfuzosin (UROXATRAL) 10 mg Tb24, Take 1 tablet (10 mg total) by mouth daily with breakfast., Disp: 90 tablet, Rfl: 3    allopurinoL (ZYLOPRIM) 100 MG tablet, Take 1 tablet (100 mg total) by mouth once daily. Take half a tab daily for 1 week, then take 1 tablet daily., Disp: 90 tablet, Rfl: 1    alpha lipoic acid 600 mg Cap, Take 1 capsule by mouth., Disp: , Rfl:     aspirin (ECOTRIN) 81 MG EC tablet, Take 81 mg by mouth once daily., Disp: , Rfl:     atorvastatin (LIPITOR) 80 MG tablet, Take 80 mg by mouth every evening., Disp: , Rfl:     azelastine (ASTELIN) 137 mcg (0.1 %) nasal spray, 1 spray by Nasal route 2 (two) times daily., Disp: , Rfl:     blood sugar diagnostic (TRUE METRIX GLUCOSE TEST STRIP) Strp, USE TO TEST 3 TIMES A DAY, Disp: 100 strip, Rfl: 11    carvediloL (COREG) 25 MG tablet, Take 25 mg by mouth 2 (two) times daily., Disp: , Rfl:     cyanocobalamin (VITAMIN B-12) 1000 MCG tablet, Take 2,000 mcg by mouth once daily at 6am., Disp: , Rfl:     ENTRESTO 24-26 mg per tablet, Take 1 tablet by mouth 2 (two) times daily., Disp: , Rfl:     eplerenone (INSPRA) 25 MG Tab, Take 25 mg by mouth once daily., Disp: , Rfl:     esomeprazole (NEXIUM) 40 MG capsule, Take 1 capsule by mouth every evening., Disp: , Rfl:     ezetimibe (ZETIA) 10 mg tablet, Take  "10 mg by mouth once daily at 6am., Disp: , Rfl:     FARXIGA 10 mg tablet, Take 10 mg by mouth once daily., Disp: , Rfl:     ferrous gluconate (FERGON) 324 MG tablet, Take 1 tablet by mouth., Disp: , Rfl:     finasteride (PROSCAR) 5 mg tablet, Take 1 tablet (5 mg total) by mouth once daily., Disp: 90 tablet, Rfl: 3    fluticasone propionate (FLONASE) 50 mcg/actuation nasal spray, Flonase Allergy Relief Take No date recorded No form recorded No frequency recorded No route recorded No set duration recorded No set duration amount recorded active No dosage strength recorded No dosage strength units of measure recorded, Disp: , Rfl:     HYDROcodone-acetaminophen (NORCO) 5-325 mg per tablet, Take 1 tablet by mouth every 6 (six) hours as needed., Disp: , Rfl:     hydrOXYzine (ATARAX) 50 MG tablet, Take 50 mg by mouth 3 (three) times daily., Disp: , Rfl:     insulin syringe-needle U-100 1 mL 31 gauge x 15/64" Syrg, 1 each by Misc.(Non-Drug; Combo Route) route 3 (three) times daily., Disp: 10 each, Rfl: 11    isosorbide mononitrate (IMDUR) 60 MG 24 hr tablet, Take 60 mg by mouth once daily., Disp: , Rfl:     lactulose (CHRONULAC) 10 gram/15 mL solution, TAKE 15 MLS (10 G TOTAL) BY MOUTH 2 (TWO) TIMES DAILY AS NEEDED (CONSTIPATION)., Disp: 2700 mL, Rfl: 1    levETIRAcetam (KEPPRA) 250 MG Tab, Take 1 tablet (250 mg total) by mouth once daily. (Patient not taking: Reported on 8/5/2024), Disp: 30 tablet, Rfl: 0    levocetirizine (XYZAL) 5 MG tablet, Take 1 tablet (5 mg total) by mouth nightly as needed for Allergies., Disp: 90 tablet, Rfl: 1    levothyroxine (SYNTHROID) 25 MCG tablet, Take 1 tablet (25 mcg total) by mouth before breakfast., Disp: 90 tablet, Rfl: 1    LIDOcaine-prilocaine (EMLA) cream, APPLY SPARINGLY TO AFFECTED AREA TWICE A DAY, Disp: , Rfl:     metoclopramide HCl (REGLAN) 10 MG tablet, Take 1 tablet (10 mg total) by mouth once daily., Disp: 90 tablet, Rfl: 1    montelukast (SINGULAIR) 10 mg tablet, Take 1 " tablet (10 mg total) by mouth once daily., Disp: 90 tablet, Rfl: 1    nitroGLYCERIN (NITROSTAT) 0.4 MG SL tablet, Place 0.4 mg under the tongue as needed., Disp: , Rfl:     NOVOLIN 70/30 U-100 INSULIN 100 unit/mL (70-30) injection, Inject 45 Units into the skin 2 (two) times daily with meals. Inject 45 units in am and 70 units with evening meals, Disp: 130 mL, Rfl: 1    pantoprazole (PROTONIX) 40 MG tablet, Take 40 mg by mouth., Disp: , Rfl:     prasugreL (EFFIENT) 10 mg Tab, Take 10 mg by mouth once daily., Disp: , Rfl:     QUEtiapine (SEROQUEL) 25 MG Tab, Take 1 tablet (25 mg total) by mouth every evening., Disp: 90 tablet, Rfl: 1    ranolazine (RANEXA) 1,000 mg Tb12, Take 1,000 mg by mouth 2 (two) times daily., Disp: , Rfl:     RYBELSUS 7 mg tablet, Take 7 mg by mouth., Disp: , Rfl:     torsemide (DEMADEX) 20 MG Tab, Take 20 mg by mouth 2 (two) times a day., Disp: , Rfl:     TRUE METRIX GLUCOSE METER Misc, TEST 3 TIMES A DAY, Disp: , Rfl:     VERQUVO 2.5 mg Tab, Take 2.5 mg by mouth 2 (two) times a day., Disp: , Rfl:     XARELTO 2.5 mg Tab, Take 2.5 mg by mouth 2 (two) times a day., Disp: , Rfl:   Review of patient's allergies indicates:   Allergen Reactions    Pork derived (porcine)      Other reaction(s): Pork    Islam choice      Plavix [clopidogrel] Hives and Rash       /76   Pulse 97     Comprehensive review of systems completed and negative except as per HPI.        Objective:   Head: Normocephalic, without obvious abnormality, atraumatic  Eyes: conjunctivae/corneas clear. EOM's intact  Ears: normal external appearance  Nose: Nares normal. Septum midline. Mucosa normal. No drainage  Throat: normal findings: lips normal without lesions  Lungs: unlabored breathing on room air  Chest wall: symmetric chest rise  Heart: regular rate and rhythm  Pulses: 2+ and symmetric  Skin: Skin color, texture, turgor normal. No rashes or lesions  Neurologic: Grossly normal    left KNEE:     Alignment:  neutral  Appearance: skin is intact with abrasion to anterior knee overlying patellar tendon   Effusion: none  Gait: antalgic    Ankle ROM: full and painless   Knee ROM:  0 - 120  Tenderness: TTP mild patella   Patellar Mobility: normal       Valgus Stress @ 0 deg: stable  Valgus Stress @ 30 deg: stable  Varus Stress @ 0 deg: stable  Varus Stress @ 30 deg: stable  Lachman: stable  Ant Drawer: negative   Post Drawer: negative  Posterior Sag Sign: negative  Neurological deficits: SILT dp/sp/t distributions  Motor: 5/5 EHL/FHL/TA/GS    Warm well perfused lower extremity with capillary refill less than 2 seconds and sensation intact to light touch in the terminal nerve distributions. Calf soft and easily compressible without clinical sign of DVT. No palpable popliteal lymphadenopathy    Assessment:     Imaging:   X-Ray Hip 2 or 3 views Left with Pelvis when performed  EXAMINATION  XR HIP WITH PELVIS WHEN PERFORMED 2 OR 3 VIEWS LEFT    CLINICAL HISTORY  Unspecified fall, initial encounter    TECHNIQUE  A total of 2 images submitted of the left hip/pelvis.    COMPARISON  None available at the time of initial interpretation.    FINDINGS  Regional degenerative changes are present. No displaced fracture or dislocation is identified. The visualized pelvic ring structures and articular spaces are preserved. There is no suggestion of large joint effusion. No aggressive osseous lesion or periosteal reaction is appreciated. The trabecular pattern is unremarkable.    There is no acute soft tissue abnormality.  Widespread vascular calcification is present, as well as partially visualized femoral artery stent.    IMPRESSION  1. Arthritic changes without convincing acute abnormality.  2. Chronic secondary details discussed above.    Electronically signed by: Homero Olivas  Date:    01/12/2025  Time:    10:11  X-Ray Ankle Complete Left  EXAMINATION  XR ANKLE COMPLETE 3 VIEW LEFT    CLINICAL HISTORY  Unspecified fall, initial  encounter    TECHNIQUE  A total of 3 images submitted of the left ankle.    COMPARISON  None available at the time of initial interpretation.    FINDINGS  No displaced fracture or dislocation is identified. The visualized joint spaces are preserved and there are no findings indicative of a joint effusion. No aggressive osseous lesion or periosteal reaction is identified.    No acute soft tissue abnormality is identified.  Scattered vascular calcification is present.    IMPRESSION  No convincing acute abnormality.    Electronically signed by: Homero Olivas  Date:    01/12/2025  Time:    10:11  X-Ray Ankle Complete Right  EXAMINATION  XR ANKLE COMPLETE 3 VIEW RIGHT    CLINICAL HISTORY  Pain in unspecified ankle and joints of unspecified foot    TECHNIQUE  A total of 3 images submitted of the right ankle.    COMPARISON  None available at the time of initial interpretation.    FINDINGS  No displaced fracture or dislocation is identified. The visualized joint spaces are preserved and there are no findings indicative of a joint effusion. No aggressive osseous lesion or periosteal reaction is identified.    The included soft tissues are without acute abnormality.    IMPRESSION  No evidence of acute abnormality.    Electronically signed by: Homero Olivas  Date:    01/12/2025  Time:    10:10  X-Ray Knee Complete 4 Or More Views Right  Narrative: EXAMINATION:  XR KNEE COMP 4 OR MORE VIEWS RIGHT    CLINICAL HISTORY:  Unspecified fall, initial encounter    TECHNIQUE:  AP, oblique and lateral views of the right knee    COMPARISON:  Radiograph 03/30/2012    FINDINGS:  No acute displaced fracture, dislocation or osseous destructive process.  Mild medial tibiofemoral joint space narrowing.  No right knee effusion identified.  Minimal patellar quadriceps enthesopathy.  Vascular stent overlies the right thigh.  Linear metallic focus in the proximal tibia measures 3.5 cm, of uncertain etiology.  Impression: No acute osseous abnormality  identified.    Electronically signed by: Tyson Gallegos  Date:    01/12/2025  Time:    10:10  X-Ray Knee Complete 4 or More Views Left  Narrative: EXAMINATION:  XR KNEE COMP 4 OR MORE VIEWS LEFT    CLINICAL HISTORY:  Unspecified fall, initial encounter    TECHNIQUE:  AP, Lateral, Sunrise and Tunnel views of the left knee were preformed    COMPARISON:  None    FINDINGS:  Subtle lucency through the superior aspect of the patella.  No dislocation or osseous destructive process identified.  Mild joint space narrowing in the medial tibiofemoral compartment.  No definite left knee effusion.  Partially visualized vascular stent overlies the left thigh.  Impression: Subtle lucency through the superior aspect of the patella.  Nondisplaced fracture is not excluded.  Recommend correlation with point tenderness and further evaluation as warranted.    Electronically signed by: Tyson Gallegos  Date:    01/12/2025  Time:    10:08  X-Ray Hip 2 or 3 views Right with Pelvis when performed  EXAMINATION  XR HIP WITH PELVIS WHEN PERFORMED 2 OR 3 VIEWS RIGHT    CLINICAL HISTORY  fall;    TECHNIQUE  A total of 3 images submitted of the right hip/pelvis.    COMPARISON  None available at the time of initial interpretation.    FINDINGS  No displaced fracture or dislocation is identified. The visualized pelvic ring structures and articular spaces are preserved. There is no suggestion of large joint effusion. No aggressive osseous lesion or periosteal reaction is appreciated. The trabecular pattern is unremarkable.    No acute soft tissue abnormality is identified.  Scattered vascular calcification is present.    IMPRESSION  No convincing acute abnormality.    Electronically signed by: Homero Olivas  Date:    01/12/2025  Time:    10:06    - X-rays of the left knee: Two days ago, Multiple views obtained, revealed a very small, non-displaced line fracture in the superior patella        1. Closed displaced transverse fracture of left patella with  routine healing, subsequent encounter          Plan:       Orders Placed This Encounter    Ambulatory Referral/Consult to Physical Therapy        Imaging and exam findings discussed. This can be managed non-operatively.     Assessment & Plan    REFERRALS:  - Referred to physical therapy for progressive motion and rehab    PROCEDURES:  - Reviewed x-rays from hospital visit two days ago.  - Placed patient in a hinged knee brace    FOLLOW UP:  - Follow up in 6 weeks.  - Repeat x-rays at follow-up visit.    PATIENT INSTRUCTIONS:  - Use new knee brace for 6-8 weeks.  - Limit knee bending initially, gradually increasing range of motion.  - Can put weight on the affected knee.   RICE encouraged.   Symptomatic management.       All questions were answered. Patient happy and in agreement with the plan.     This note was generated with the assistance of ambient listening technology. Verbal consent was obtained by the patient and accompanying visitor(s) for the recording of patient appointment to facilitate this note. I attest to having reviewed and edited the generated note for accuracy, though some syntax or spelling errors may persist. Please contact the author of this note for any clarification.

## 2025-01-29 DIAGNOSIS — F41.9 ANXIETY: ICD-10-CM

## 2025-01-29 RX ORDER — QUETIAPINE FUMARATE 25 MG/1
25 TABLET, FILM COATED ORAL NIGHTLY
Qty: 90 TABLET | Refills: 0 | Status: SHIPPED | OUTPATIENT
Start: 2025-01-29 | End: 2025-04-29

## 2025-02-05 DIAGNOSIS — M10.9 GOUT, UNSPECIFIED CAUSE, UNSPECIFIED CHRONICITY, UNSPECIFIED SITE: ICD-10-CM

## 2025-02-05 RX ORDER — ALLOPURINOL 100 MG/1
100 TABLET ORAL DAILY
Qty: 90 TABLET | Refills: 1 | Status: SHIPPED | OUTPATIENT
Start: 2025-02-05 | End: 2025-02-20

## 2025-02-12 RX ORDER — MONTELUKAST SODIUM 10 MG/1
10 TABLET ORAL DAILY
Qty: 90 TABLET | Refills: 1 | Status: SHIPPED | OUTPATIENT
Start: 2025-02-12

## 2025-02-13 DIAGNOSIS — N18.32 CKD STAGE G3B/A1, GFR 30-44 AND ALBUMIN CREATININE RATIO <30 MG/G: Primary | ICD-10-CM

## 2025-02-17 ENCOUNTER — LAB VISIT (OUTPATIENT)
Dept: LAB | Facility: HOSPITAL | Age: 60
End: 2025-02-17
Attending: NURSE PRACTITIONER
Payer: MEDICARE

## 2025-02-17 DIAGNOSIS — N18.32 CKD STAGE G3B/A1, GFR 30-44 AND ALBUMIN CREATININE RATIO <30 MG/G: ICD-10-CM

## 2025-02-17 LAB
ALBUMIN SERPL-MCNC: 3.4 G/DL (ref 3.5–5)
ALBUMIN/GLOB SERPL: 0.7 RATIO (ref 1.1–2)
ALP SERPL-CCNC: 147 UNIT/L (ref 40–150)
ALT SERPL-CCNC: 20 UNIT/L (ref 0–55)
ANION GAP SERPL CALC-SCNC: 9 MEQ/L
AST SERPL-CCNC: 20 UNIT/L (ref 5–34)
BACTERIA #/AREA URNS AUTO: ABNORMAL /HPF
BASOPHILS # BLD AUTO: 0.05 X10(3)/MCL
BASOPHILS NFR BLD AUTO: 0.4 %
BILIRUB SERPL-MCNC: 0.5 MG/DL
BILIRUB UR QL STRIP.AUTO: NEGATIVE
BUN SERPL-MCNC: 13.9 MG/DL (ref 8.4–25.7)
CALCIUM SERPL-MCNC: 9.2 MG/DL (ref 8.4–10.2)
CHLORIDE SERPL-SCNC: 104 MMOL/L (ref 98–107)
CLARITY UR: CLEAR
CO2 SERPL-SCNC: 27 MMOL/L (ref 22–29)
COLOR UR AUTO: ABNORMAL
CREAT SERPL-MCNC: 1.55 MG/DL (ref 0.72–1.25)
CREAT UR-MCNC: 51.2 MG/DL (ref 63–166)
CREAT/UREA NIT SERPL: 9
EOSINOPHIL # BLD AUTO: 0.34 X10(3)/MCL (ref 0–0.9)
EOSINOPHIL NFR BLD AUTO: 2.4 %
ERYTHROCYTE [DISTWIDTH] IN BLOOD BY AUTOMATED COUNT: 13.1 % (ref 11.5–17)
GFR SERPLBLD CREATININE-BSD FMLA CKD-EPI: 51 ML/MIN/1.73/M2
GLOBULIN SER-MCNC: 5.2 GM/DL (ref 2.4–3.5)
GLUCOSE SERPL-MCNC: 116 MG/DL (ref 74–100)
GLUCOSE UR QL STRIP: ABNORMAL
HCT VFR BLD AUTO: 41.8 % (ref 42–52)
HGB BLD-MCNC: 13.3 G/DL (ref 14–18)
HGB UR QL STRIP: NEGATIVE
HYALINE CASTS #/AREA URNS LPF: ABNORMAL /LPF
IMM GRANULOCYTES # BLD AUTO: 0.1 X10(3)/MCL (ref 0–0.04)
IMM GRANULOCYTES NFR BLD AUTO: 0.7 %
KETONES UR QL STRIP: NEGATIVE
LEUKOCYTE ESTERASE UR QL STRIP: NEGATIVE
LYMPHOCYTES # BLD AUTO: 2.3 X10(3)/MCL (ref 0.6–4.6)
LYMPHOCYTES NFR BLD AUTO: 16.6 %
MCH RBC QN AUTO: 29.3 PG (ref 27–31)
MCHC RBC AUTO-ENTMCNC: 31.8 G/DL (ref 33–36)
MCV RBC AUTO: 92.1 FL (ref 80–94)
MONOCYTES # BLD AUTO: 0.67 X10(3)/MCL (ref 0.1–1.3)
MONOCYTES NFR BLD AUTO: 4.8 %
MUCOUS THREADS URNS QL MICRO: ABNORMAL /LPF
NEUTROPHILS # BLD AUTO: 10.43 X10(3)/MCL (ref 2.1–9.2)
NEUTROPHILS NFR BLD AUTO: 75.1 %
NITRITE UR QL STRIP: NEGATIVE
NRBC BLD AUTO-RTO: 0 %
PH UR STRIP: 5.5 [PH]
PHOSPHATE SERPL-MCNC: 3.2 MG/DL (ref 2.3–4.7)
PLATELET # BLD AUTO: 349 X10(3)/MCL (ref 130–400)
PMV BLD AUTO: 11.9 FL (ref 7.4–10.4)
POTASSIUM SERPL-SCNC: 3.6 MMOL/L (ref 3.5–5.1)
PROT SERPL-MCNC: 8.6 GM/DL (ref 6.4–8.3)
PROT UR QL STRIP: NEGATIVE
PROT UR STRIP-MCNC: <6.8 MG/DL
PTH-INTACT SERPL-MCNC: 221.7 PG/ML (ref 8.7–77)
RBC # BLD AUTO: 4.54 X10(6)/MCL (ref 4.7–6.1)
RBC #/AREA URNS AUTO: ABNORMAL /HPF
SODIUM SERPL-SCNC: 140 MMOL/L (ref 136–145)
SP GR UR STRIP.AUTO: 1.01 (ref 1–1.03)
SQUAMOUS #/AREA URNS LPF: ABNORMAL /HPF
URATE SERPL-MCNC: 7.3 MG/DL (ref 3.5–7.2)
UROBILINOGEN UR STRIP-ACNC: NORMAL
WBC # BLD AUTO: 13.89 X10(3)/MCL (ref 4.5–11.5)
WBC #/AREA URNS AUTO: ABNORMAL /HPF

## 2025-02-17 PROCEDURE — 84156 ASSAY OF PROTEIN URINE: CPT

## 2025-02-17 PROCEDURE — 81001 URINALYSIS AUTO W/SCOPE: CPT

## 2025-02-17 PROCEDURE — 84550 ASSAY OF BLOOD/URIC ACID: CPT

## 2025-02-17 PROCEDURE — 80053 COMPREHEN METABOLIC PANEL: CPT

## 2025-02-17 PROCEDURE — 85025 COMPLETE CBC W/AUTO DIFF WBC: CPT

## 2025-02-17 PROCEDURE — 84100 ASSAY OF PHOSPHORUS: CPT

## 2025-02-17 PROCEDURE — 36415 COLL VENOUS BLD VENIPUNCTURE: CPT

## 2025-02-17 PROCEDURE — 83970 ASSAY OF PARATHORMONE: CPT

## 2025-02-20 ENCOUNTER — OFFICE VISIT (OUTPATIENT)
Dept: NEPHROLOGY | Facility: CLINIC | Age: 60
End: 2025-02-20
Payer: MEDICARE

## 2025-02-20 VITALS
DIASTOLIC BLOOD PRESSURE: 63 MMHG | OXYGEN SATURATION: 99 % | HEART RATE: 94 BPM | TEMPERATURE: 98 F | HEIGHT: 73 IN | BODY MASS INDEX: 33.51 KG/M2 | RESPIRATION RATE: 18 BRPM | WEIGHT: 252.88 LBS | SYSTOLIC BLOOD PRESSURE: 98 MMHG

## 2025-02-20 DIAGNOSIS — E11.42 TYPE 2 DIABETES MELLITUS WITH DIABETIC POLYNEUROPATHY, WITH LONG-TERM CURRENT USE OF INSULIN: ICD-10-CM

## 2025-02-20 DIAGNOSIS — E66.01 SEVERE OBESITY (BMI 35.0-39.9) WITH COMORBIDITY: ICD-10-CM

## 2025-02-20 DIAGNOSIS — Z79.4 TYPE 2 DIABETES MELLITUS WITH DIABETIC POLYNEUROPATHY, WITH LONG-TERM CURRENT USE OF INSULIN: ICD-10-CM

## 2025-02-20 DIAGNOSIS — I50.22 CHRONIC SYSTOLIC (CONGESTIVE) HEART FAILURE: ICD-10-CM

## 2025-02-20 DIAGNOSIS — M10.9 GOUT, UNSPECIFIED CAUSE, UNSPECIFIED CHRONICITY, UNSPECIFIED SITE: ICD-10-CM

## 2025-02-20 DIAGNOSIS — I42.9 CARDIOMYOPATHY, UNSPECIFIED TYPE: ICD-10-CM

## 2025-02-20 DIAGNOSIS — N18.31 CKD STAGE G3A/A1, GFR 45-59 AND ALBUMIN CREATININE RATIO <30 MG/G: Primary | ICD-10-CM

## 2025-02-20 DIAGNOSIS — I10 PRIMARY HYPERTENSION: ICD-10-CM

## 2025-02-20 DIAGNOSIS — N25.81 SECONDARY HYPERPARATHYROIDISM OF RENAL ORIGIN: ICD-10-CM

## 2025-02-20 PROBLEM — N18.32 STAGE 3B CHRONIC KIDNEY DISEASE: Status: RESOLVED | Noted: 2022-10-14 | Resolved: 2025-02-20

## 2025-02-20 PROCEDURE — 99215 OFFICE O/P EST HI 40 MIN: CPT | Mod: PBBFAC | Performed by: NURSE PRACTITIONER

## 2025-02-20 RX ORDER — HYDROCODONE BITARTRATE AND ACETAMINOPHEN 7.5; 325 MG/1; MG/1
1 TABLET ORAL 2 TIMES DAILY PRN
COMMUNITY
Start: 2024-11-01

## 2025-02-20 RX ORDER — ALLOPURINOL 100 MG/1
200 TABLET ORAL DAILY
Qty: 180 TABLET | Refills: 1 | Status: SHIPPED | OUTPATIENT
Start: 2025-02-20 | End: 2025-08-19

## 2025-02-20 RX ORDER — COLCHICINE 0.6 MG/1
0.6 TABLET ORAL DAILY
Qty: 4 TABLET | Refills: 0 | Status: SHIPPED | OUTPATIENT
Start: 2025-02-20 | End: 2025-02-24

## 2025-02-20 RX ORDER — VALSARTAN 40 MG/1
40 TABLET ORAL 2 TIMES DAILY
COMMUNITY
Start: 2024-11-23 | End: 2025-02-20

## 2025-02-20 RX ORDER — ORAL SEMAGLUTIDE 3 MG/1
3 TABLET ORAL DAILY
COMMUNITY
Start: 2024-12-09

## 2025-02-20 NOTE — PROGRESS NOTES
[facility]  Nephrology Clinic Note    Chief complaint: Follow-up (RTC, took meds, 6 weeks ago fell)    History of present illness:   Luigi Gotti is a 59 y.o. Other male with past medical history of chronic kidney disease, heart failure with reduced ejection fraction (20%, s/p ICD 6/9/2023), coronary artery disease, gastroparesis, gastritis, ischemic colitis, peripheral vascular disease, dyslipidemia, hypertension, BPH, diabetes mellitus type 2, and obesity.    Patient presents for follow-up appointment in Nephrology Clinic today. Denies complaints.     Review of Systems  12 point review of systems conducted, negative except as stated in the history of present illness.    Allergies: Patient is allergic to pork derived (porcine) and plavix [clopidogrel].     Past Medical History:  has a past medical history of Anxiety, Arthritis, BPH (benign prostatic hyperplasia), CAD (coronary artery disease), CHF (congestive heart failure), Depression, Dizziness, GERD (gastroesophageal reflux disease), Hypertension, Ischemic cardiomyopathy, Myocardial infarction, Obesity, Other hyperlipidemia, Oxygen dependent, PVD (peripheral vascular disease), Renal disorder, Seizures, SOB (shortness of breath) on exertion, Thyroid disease, Type 2 diabetes mellitus without complications, and Weakness of both legs.    Procedure History:  has a past surgical history that includes Coronary artery bypass graft; Tonsillectomy; Cholecystectomy; Right heart catheterization (Right, 09/20/2022); Left heart catheterization (N/A, 02/07/2023); stent, peripheral graft; Coronary stent placement; Colonoscopy; Esophagogastroduodenoscopy; Cardiac defibrillator placement (N/A, 6/9/2023); and insertion, transvenous electrode lead, pacing icd or cardiac pacemaker (N/A, 10/19/2023).    Family History: family history includes Cancer in his mother; Diabetes in his brother, mother, and sister; Hypertension in his father and mother;  "Kidney disease in his brother; Stroke in his father.    Social History:  reports that he has never smoked. He has never been exposed to tobacco smoke. He has never used smokeless tobacco. He reports that he does not currently use drugs. He reports that he does not drink alcohol.    Physical exam  BP 98/63   Pulse 94   Temp 98 °F (36.7 °C) (Oral)   Resp 18   Ht 6' 0.84" (1.85 m)   Wt 114.7 kg (252 lb 13.9 oz)   SpO2 99%   BMI 33.51 kg/m²   General appearance: Patient is in no acute distress.  Skin: No rashes or wounds.  HEENT: PERRLA, EOMI, no scleral icterus, no JVD. Neck is supple.  Chest: Respirations are unlabored. Lungs sounds are clear.   Heart: S1, S2.   Abdomen: Benign.  : Deferred.  Extremities: No edema, peripheral pulses are palpable.   Neuro: No focal deficits.     Home Medications:  Current Medications[1]    Laboratory data    Lab Results   Component Value Date    WBC 13.89 (H) 02/17/2025    HGB 13.3 (L) 02/17/2025    HCT 41.8 (L) 02/17/2025     02/17/2025    IRON 32 05/09/2022    TIBC 265 05/09/2022    LABIRON 7 04/05/2022    FERRITIN 377.3 (H) 05/09/2022    FOLATE 6.1 04/05/2022    ELVUCXCC09 943 04/05/2022    HAPTO 436 (H) 10/25/2021     10/25/2021     02/17/2025    K 3.6 02/17/2025    CO2 27 02/17/2025    BUN 13.9 02/17/2025    CREATININE 1.55 (H) 02/17/2025    EGFRNORACEVR 51 02/17/2025    GLUCOSE 116 (H) 02/17/2025    CALCIUM 9.2 02/17/2025    ALKPHOS 147 02/17/2025    LABPROT 8.6 (H) 02/17/2025    ALBUMIN 3.4 (L) 02/17/2025    BILIDIR 0.5 04/13/2022    IBILI 0.40 04/13/2022    AST 20 02/17/2025    ALT 20 02/17/2025    MG 2.20 11/28/2022    PHOS 3.2 02/17/2025      Lab Results   Component Value Date    HGBA1C 7.0 10/14/2024    .7 (H) 02/17/2025    HIV Nonreactive 08/22/2022    HEPCAB Nonreactive 08/22/2022     Urine:  Lab Results   Component Value Date    APPEARANCEUA Clear 02/17/2025    SGUA 1.009 02/17/2025    PROTEINUA Negative 02/17/2025    KETONESUA Negative " 02/17/2025    LEUKOCYTESUR Negative 02/17/2025    RBCUA 0-5 02/17/2025    WBCUA 0-5 02/17/2025    BACTERIA None Seen 02/17/2025    SQEPUA Trace (A) 02/17/2025    HYALINECASTS 6-10 (A) 02/17/2025    CREATRANDUR 51.2 (L) 02/17/2025    PROTEINURINE <6.8 02/17/2025    UPROTCREA 0.1 02/13/2023         Imaging  US Retroperitoneal Limited 01/06/2023  Right Kidney:  Length: 10.8 x 5.5 x 5.7 cm  Appearance: Normal echogenicity.  Collecting system: No hydronephrosis  Stones: None  Cyst/Mass: Hyperechoic area in the upper pole of the right kidney measures 1.1 x 9 mm x 1.1 cm a small angiomyolipoma cannot be completely excluded  Left Kidney:  Length: 12 x 5.7 x 4.4 cm  Appearance: Normal echogenicity.  Collecting system: No hydronephrosis  Stones: Small calcification identified in the upper pole measuring 9 mm by 3 mm x 7 mm these might represent a nonocclusive stone  Cyst/Mass: None  Bladder:  Normal  Vessels:  Visualized portions of the IVC and aorta have a normal grayscale appearance.  Impression  Small echogenic area in the upper pole of the kidney small angiomyolipoma cannot be completely excluded.  Nonocclusive stone of the left kidney.  No other abnormalities  Electronically signed by:Ranjan Taveras  Date:                                         01/06/2023  Time:                                        15:19    Impression    ICD-10-CM ICD-9-CM   1. CKD stage G3a/A1, GFR 45-59 and albumin creatinine ratio <30 mg/g  N18.31 585.3   2. Cardiomyopathy, unspecified type  I42.9 425.4   3. Chronic systolic (congestive) heart failure  I50.22 428.22     428.0   4. Type 2 diabetes mellitus with diabetic polyneuropathy, with long-term current use of insulin  E11.42 250.60    Z79.4 357.2     V58.67   5. Gout, unspecified cause, unspecified chronicity, unspecified site  M10.9 274.9   6. Primary hypertension  I10 401.9   7. Secondary hyperparathyroidism of renal origin  N25.81 588.81   8. Severe obesity (BMI 35.0-39.9) with  comorbidity  E66.01 278.01        Plan  CKD stage G3a/A1, GFR 45-59 and albumin creatinine ratio <30 mg/g  -     Comprehensive Metabolic Panel; Future; Expected date: 08/10/2025  -     CBC Auto Differential; Future; Expected date: 08/10/2025  -     Phosphorus; Future; Expected date: 08/10/2025  -     Protein/Creatinine Ratio, Urine; Future; Expected date: 08/10/2025  -     Urinalysis, Reflex to Urine Culture; Future; Expected date: 08/10/2025  Serum creatinine has been relatively stable over the past several months, there is no significant proteinuria or active urinary sediment.    Continue RAASi, MRA, and SGLT 2 inhibitor therapy along with risk factor management/renal sparing activities. Continue renal sparing activities:  -2 g a day dietary sodium restriction  -optimize glycemic control (goal A1c is less than 7%)  -control high blood pressure (goal blood pressure is less than 130/80, patient was advised to check blood pressure once or twice a week and bring blood pressure logs to next office visit)  -exercise at least 30 minutes a day, 5 days a week  -maintain healthy weight  -decrease or stop alcohol use  -do not smoke  -stay well hydrated (drink water only, avoid juices, sweet tea, and sodas)  -ask about staying up-to-date on vaccinations (flu vaccine, pneumonia vaccine, hepatitis B vaccine)  -avoid excessive use of NSAIDs (ibuprofen, naproxen, Aleve, Advil, Toradol, Mobic), take Tylenol as needed for headache or mild pain  -take cholesterol lowering medications if prescribed (LDL goal less than 100)    Cardiomyopathy, unspecified type  Patient is euvolemic on exam. Continue GDMT.     Chronic systolic (congestive) heart failure  Patient is euvolemic on exam. Continue GDMT.     Type 2 diabetes mellitus with diabetic polyneuropathy, with long-term current use of insulin  A1C is at goal. Continue SGLT2i and ARB.     Gout, unspecified cause, unspecified chronicity, unspecified site  -     allopurinoL (ZYLOPRIM) 100  MG tablet; Take 2 tablets (200 mg total) by mouth once daily. Take half a tab daily for 1 week, then take 1 tablet daily.  Dispense: 180 tablet; Refill: 1  -     colchicine (COLCRYS) 0.6 mg tablet; Take 1 tablet (0.6 mg total) by mouth once daily. for 4 days  Dispense: 4 tablet; Refill: 0  -     Uric Acid; Future; Expected date: 08/10/2025  Patient is c/o bilateral elbow pain. Will treat acute symptoms with colchicine and increase allopurinol to 200 mg/day.     Primary hypertension  Blood pressure reading is at goal, continue current antihypertensive regimen and 2 g a day dietary sodium restriction.      Secondary hyperparathyroidism of renal origin  -     PTH, Intact; Future; Expected date: 08/10/2025  There are no indications for active vitamin-D analogs at this time.  Will continue to monitor intact PTH, calcium, and phosphorus levels periodically.      Severe obesity (BMI 35.0-39.9) with comorbidity  Lifestyle and dietary interventions discussed, patient encouraged to maintain non-sedentary lifestyle and well-balanced diet.       Follow up in about 6 months (around 8/20/2025).     Robina Garrison NP  [facility]            [1]   Current Outpatient Medications:     alfuzosin (UROXATRAL) 10 mg Tb24, Take 1 tablet (10 mg total) by mouth daily with breakfast., Disp: 90 tablet, Rfl: 3    alpha lipoic acid 600 mg Cap, Take 1 capsule by mouth., Disp: , Rfl:     aspirin (ECOTRIN) 81 MG EC tablet, Take 81 mg by mouth once daily., Disp: , Rfl:     atorvastatin (LIPITOR) 80 MG tablet, Take 80 mg by mouth every evening., Disp: , Rfl:     azelastine (ASTELIN) 137 mcg (0.1 %) nasal spray, 1 spray by Nasal route 2 (two) times daily., Disp: , Rfl:     blood sugar diagnostic (TRUE METRIX GLUCOSE TEST STRIP) Strp, USE TO TEST 3 TIMES A DAY, Disp: 100 strip, Rfl: 11    carvediloL (COREG) 25 MG tablet, Take 25 mg by mouth 2 (two) times daily., Disp: , Rfl:     cyanocobalamin (VITAMIN B-12) 1000 MCG tablet, Take 2,000 mcg by  "mouth once daily at 6am., Disp: , Rfl:     ENTRESTO 24-26 mg per tablet, Take 1 tablet by mouth 2 (two) times daily., Disp: , Rfl:     eplerenone (INSPRA) 25 MG Tab, Take 25 mg by mouth once daily., Disp: , Rfl:     esomeprazole (NEXIUM) 40 MG capsule, Take 1 capsule by mouth every evening., Disp: , Rfl:     ezetimibe (ZETIA) 10 mg tablet, Take 10 mg by mouth once daily at 6am., Disp: , Rfl:     FARXIGA 10 mg tablet, Take 10 mg by mouth once daily., Disp: , Rfl:     ferrous gluconate (FERGON) 324 MG tablet, Take 1 tablet by mouth., Disp: , Rfl:     finasteride (PROSCAR) 5 mg tablet, Take 1 tablet (5 mg total) by mouth once daily., Disp: 90 tablet, Rfl: 3    fluticasone propionate (FLONASE) 50 mcg/actuation nasal spray, Flonase Allergy Relief Take No date recorded No form recorded No frequency recorded No route recorded No set duration recorded No set duration amount recorded active No dosage strength recorded No dosage strength units of measure recorded, Disp: , Rfl:     HYDROcodone-acetaminophen (NORCO) 5-325 mg per tablet, Take 1 tablet by mouth every 6 (six) hours as needed., Disp: , Rfl:     HYDROcodone-acetaminophen (NORCO) 7.5-325 mg per tablet, Take 1 tablet by mouth 2 (two) times daily as needed., Disp: , Rfl:     hydrOXYzine (ATARAX) 50 MG tablet, Take 50 mg by mouth 3 (three) times daily., Disp: , Rfl:     insulin syringe-needle U-100 1 mL 31 gauge x 15/64" Syrg, 1 each by Misc.(Non-Drug; Combo Route) route 3 (three) times daily., Disp: 10 each, Rfl: 11    isosorbide mononitrate (IMDUR) 60 MG 24 hr tablet, Take 60 mg by mouth once daily., Disp: , Rfl:     lactulose (CHRONULAC) 10 gram/15 mL solution, TAKE 15 MLS (10 G TOTAL) BY MOUTH 2 (TWO) TIMES DAILY AS NEEDED (CONSTIPATION)., Disp: 2700 mL, Rfl: 1    levETIRAcetam (KEPPRA) 250 MG Tab, Take 1 tablet (250 mg total) by mouth once daily., Disp: 30 tablet, Rfl: 0    levocetirizine (XYZAL) 5 MG tablet, Take 1 tablet (5 mg total) by mouth nightly as needed " for Allergies., Disp: 90 tablet, Rfl: 1    levothyroxine (SYNTHROID) 25 MCG tablet, Take 1 tablet (25 mcg total) by mouth before breakfast., Disp: 90 tablet, Rfl: 1    LIDOcaine-prilocaine (EMLA) cream, APPLY SPARINGLY TO AFFECTED AREA TWICE A DAY, Disp: , Rfl:     metoclopramide HCl (REGLAN) 10 MG tablet, Take 1 tablet (10 mg total) by mouth once daily., Disp: 90 tablet, Rfl: 1    montelukast (SINGULAIR) 10 mg tablet, Take 1 tablet (10 mg total) by mouth once daily., Disp: 90 tablet, Rfl: 1    nitroGLYCERIN (NITROSTAT) 0.4 MG SL tablet, Place 0.4 mg under the tongue as needed., Disp: , Rfl:     NOVOLIN 70/30 U-100 INSULIN 100 unit/mL (70-30) injection, Inject 45 Units into the skin 2 (two) times daily with meals. Inject 45 units in am and 70 units with evening meals, Disp: 130 mL, Rfl: 1    pantoprazole (PROTONIX) 40 MG tablet, Take 40 mg by mouth., Disp: , Rfl:     prasugreL (EFFIENT) 10 mg Tab, Take 10 mg by mouth once daily., Disp: , Rfl:     QUEtiapine (SEROQUEL) 25 MG Tab, Take 1 tablet (25 mg total) by mouth every evening., Disp: 90 tablet, Rfl: 0    ranolazine (RANEXA) 1,000 mg Tb12, Take 1,000 mg by mouth 2 (two) times daily., Disp: , Rfl:     RYBELSUS 3 mg tablet, Take 3 mg by mouth once daily., Disp: , Rfl:     RYBELSUS 7 mg tablet, Take 7 mg by mouth., Disp: , Rfl:     torsemide (DEMADEX) 20 MG Tab, Take 20 mg by mouth 2 (two) times a day., Disp: , Rfl:     TRUE METRIX GLUCOSE METER Misc, TEST 3 TIMES A DAY, Disp: , Rfl:     VERQUVO 2.5 mg Tab, Take 2.5 mg by mouth 2 (two) times a day., Disp: , Rfl:     XARELTO 2.5 mg Tab, Take 2.5 mg by mouth 2 (two) times a day., Disp: , Rfl:     allopurinoL (ZYLOPRIM) 100 MG tablet, Take 2 tablets (200 mg total) by mouth once daily. Take half a tab daily for 1 week, then take 1 tablet daily., Disp: 180 tablet, Rfl: 1    colchicine (COLCRYS) 0.6 mg tablet, Take 1 tablet (0.6 mg total) by mouth once daily. for 4 days, Disp: 4 tablet, Rfl: 0

## 2025-02-25 ENCOUNTER — HOSPITAL ENCOUNTER (OUTPATIENT)
Dept: RADIOLOGY | Facility: CLINIC | Age: 60
Discharge: HOME | End: 2025-02-25
Attending: REHABILITATION UNIT
Payer: MEDICARE

## 2025-02-25 ENCOUNTER — OFFICE VISIT (OUTPATIENT)
Dept: ORTHOPEDICS | Facility: CLINIC | Age: 60
End: 2025-02-25
Payer: MEDICARE

## 2025-02-25 VITALS
HEART RATE: 92 BPM | DIASTOLIC BLOOD PRESSURE: 72 MMHG | SYSTOLIC BLOOD PRESSURE: 105 MMHG | WEIGHT: 252 LBS | BODY MASS INDEX: 34.13 KG/M2 | HEIGHT: 72 IN

## 2025-02-25 DIAGNOSIS — S82.032D CLOSED DISPLACED TRANSVERSE FRACTURE OF LEFT PATELLA WITH ROUTINE HEALING, SUBSEQUENT ENCOUNTER: Primary | ICD-10-CM

## 2025-02-25 DIAGNOSIS — S82.032D CLOSED DISPLACED TRANSVERSE FRACTURE OF LEFT PATELLA WITH ROUTINE HEALING, SUBSEQUENT ENCOUNTER: ICD-10-CM

## 2025-02-25 PROCEDURE — 73560 X-RAY EXAM OF KNEE 1 OR 2: CPT | Mod: LT,,, | Performed by: REHABILITATION UNIT

## 2025-02-25 PROCEDURE — 99213 OFFICE O/P EST LOW 20 MIN: CPT | Mod: ,,, | Performed by: REHABILITATION UNIT

## 2025-02-25 NOTE — PROGRESS NOTES
Subjective:      Patient ID: Luigi Gotti is a 59 y.o. male.    Chief Complaint: Follow-up of the Left Knee (Left Patella patient states he is doing good he has been released from PT. No concerns. )    HPI:   Luigi Gotti is a 59 y.o. male who presents today for f/u evaluation of his left knee    History of Present Illness    CHIEF COMPLAINT:  - Left knee pain following a fall    He is doing very well.  No pain.  He is discharged from physical therapy.  He has returned to his prior level of function with no issues.  He is very pleased.    Initial HPI:  Luigi presents to the clinic for evaluation of left knee pain following a fall two days ago on Sunday. He initially went to the hospital where X-rays were taken, revealing a line fracture in the left knee. He was given a knee immobilizer at the hospital. His current pain is not severe, noting that the immobilizer has helped reduce it. He points to the left knee as the primary site of pain, feeling it when the knee is manipulated. He mentions a previously unnoticed scratch from the fall. Pain is worse with knee manipulation. He can still straighten his knee despite the injury. He denies using any assistive devices for mobility prior to this incident. He is not taking any pain medication, as the hospital did not prescribe any due to concerns about his high blood pressure.    PREVIOUS TREATMENTS:  - Knee immobilizer: Given at the hospital two days ago following a fall, has helped reduce pain           Past Medical History:   Diagnosis Date    Anxiety     Arthritis     BPH (benign prostatic hyperplasia)     CAD (coronary artery disease)     CHF (congestive heart failure)     Depression     Dizziness     GERD (gastroesophageal reflux disease)     Hypertension     Ischemic cardiomyopathy     Myocardial infarction     Obesity     Other hyperlipidemia     Oxygen dependent     3L/NC AT HS    PVD (peripheral vascular disease)     Renal disorder     kidney stones     Seizures     SOB (shortness of breath) on exertion     Thyroid disease     Type 2 diabetes mellitus without complications     Weakness of both legs      Past Surgical History:   Procedure Laterality Date    CARDIAC DEFIBRILLATOR PLACEMENT N/A 6/9/2023    Procedure: Insertion, ICD;  Surgeon: Perry Eldridge MD;  Location: [facility] CATH LAB;  Service: Cardiology;  Laterality: N/A;  SICD W/ ANEST. (BS)    CHOLECYSTECTOMY      COLONOSCOPY      multiple    CORONARY ARTERY BYPASS GRAFT      CORONARY STENT PLACEMENT      ESOPHAGOGASTRODUODENOSCOPY      INSERTION, TRANSVENOUS ELECTRODE LEAD, PACING ICD OR CARDIAC PACEMAKER N/A 10/19/2023    Procedure: Insertion, Transvenous Electrode Lead, Pacing ICD or Cardiac Pacemaker;  Surgeon: Colby Mcintosh MD;  Location: [facility] CATH LAB;  Service: Cardiology;  Laterality: N/A;  CCM IMPLANT    LEFT HEART CATHETERIZATION N/A 02/07/2023    Procedure: Left heart cath;  Surgeon: Jaimn Melara MD;  Location: [facility] CATH LAB;  Service: Cardiology;  Laterality: N/A;    RIGHT HEART CATHETERIZATION Right 09/20/2022    Procedure: INSERTION, CATHETER, RIGHT HEART;  Surgeon: Cory Oreilly MD;  Location: [facility] CATH LAB;  Service: Cardiology;  Laterality: Right;  CARDIOMEMS RJ ACCESS    STENT, PERIPHERAL GRAFT      TONSILLECTOMY       Social History     Socioeconomic History    Marital status:      Spouse name: Viktoria    Number of children: 1   Tobacco Use    Smoking status: Never     Passive exposure: Never    Smokeless tobacco: Never   Substance and Sexual Activity    Alcohol use: Never    Drug use: Not Currently    Sexual activity: Not Currently     Partners: Female     Social Drivers of Health     Financial Resource Strain: Medium Risk (7/30/2024)    Overall Financial Resource Strain (CARDIA)     Difficulty of Paying Living Expenses: Somewhat hard   Food Insecurity: Food Insecurity Present (7/30/2024)    Hunger Vital Sign     Worried About Running Out of Food in the Last Year:  Sometimes true     Ran Out of Food in the Last Year: Never true   Transportation Needs: No Transportation Needs (7/30/2024)    TRANSPORTATION NEEDS     Transportation : No   Physical Activity: Inactive (7/30/2024)    Exercise Vital Sign     Days of Exercise per Week: 0 days     Minutes of Exercise per Session: 0 min   Stress: Stress Concern Present (7/30/2024)    [employer] - Occupational Stress Questionnaire     Feeling of Stress : To some extent   Housing Stability: Unknown (7/30/2024)    Housing Stability Vital Sign     Unable to Pay for Housing in the Last Year: No     Homeless in the Last Year: No         Current Outpatient Medications:     alfuzosin (UROXATRAL) 10 mg Tb24, Take 1 tablet (10 mg total) by mouth daily with breakfast., Disp: 90 tablet, Rfl: 3    allopurinoL (ZYLOPRIM) 100 MG tablet, Take 2 tablets (200 mg total) by mouth once daily. Take half a tab daily for 1 week, then take 1 tablet daily., Disp: 180 tablet, Rfl: 1    alpha lipoic acid 600 mg Cap, Take 1 capsule by mouth., Disp: , Rfl:     aspirin (ECOTRIN) 81 MG EC tablet, Take 81 mg by mouth once daily., Disp: , Rfl:     atorvastatin (LIPITOR) 80 MG tablet, Take 80 mg by mouth every evening., Disp: , Rfl:     azelastine (ASTELIN) 137 mcg (0.1 %) nasal spray, 1 spray by Nasal route 2 (two) times daily., Disp: , Rfl:     blood sugar diagnostic (TRUE METRIX GLUCOSE TEST STRIP) Strp, USE TO TEST 3 TIMES A DAY, Disp: 100 strip, Rfl: 11    carvediloL (COREG) 25 MG tablet, Take 25 mg by mouth 2 (two) times daily., Disp: , Rfl:     cyanocobalamin (VITAMIN B-12) 1000 MCG tablet, Take 2,000 mcg by mouth once daily at 6am., Disp: , Rfl:     ENTRESTO 24-26 mg per tablet, Take 1 tablet by mouth 2 (two) times daily., Disp: , Rfl:     eplerenone (INSPRA) 25 MG Tab, Take 25 mg by mouth once daily., Disp: , Rfl:     esomeprazole (NEXIUM) 40 MG capsule, Take 1 capsule by mouth every evening., Disp: , Rfl:     ezetimibe (ZETIA) 10  "mg tablet, Take 10 mg by mouth once daily at 6am., Disp: , Rfl:     FARXIGA 10 mg tablet, Take 10 mg by mouth once daily., Disp: , Rfl:     ferrous gluconate (FERGON) 324 MG tablet, Take 1 tablet by mouth., Disp: , Rfl:     finasteride (PROSCAR) 5 mg tablet, Take 1 tablet (5 mg total) by mouth once daily., Disp: 90 tablet, Rfl: 3    fluticasone propionate (FLONASE) 50 mcg/actuation nasal spray, Flonase Allergy Relief Take No date recorded No form recorded No frequency recorded No route recorded No set duration recorded No set duration amount recorded active No dosage strength recorded No dosage strength units of measure recorded, Disp: , Rfl:     HYDROcodone-acetaminophen (NORCO) 5-325 mg per tablet, Take 1 tablet by mouth every 6 (six) hours as needed., Disp: , Rfl:     HYDROcodone-acetaminophen (NORCO) 7.5-325 mg per tablet, Take 1 tablet by mouth 2 (two) times daily as needed., Disp: , Rfl:     hydrOXYzine (ATARAX) 50 MG tablet, Take 50 mg by mouth 3 (three) times daily., Disp: , Rfl:     insulin syringe-needle U-100 1 mL 31 gauge x 15/64" Syrg, 1 each by Misc.(Non-Drug; Combo Route) route 3 (three) times daily., Disp: 10 each, Rfl: 11    isosorbide mononitrate (IMDUR) 60 MG 24 hr tablet, Take 60 mg by mouth once daily., Disp: , Rfl:     lactulose (CHRONULAC) 10 gram/15 mL solution, TAKE 15 MLS (10 G TOTAL) BY MOUTH 2 (TWO) TIMES DAILY AS NEEDED (CONSTIPATION)., Disp: 2700 mL, Rfl: 1    levocetirizine (XYZAL) 5 MG tablet, Take 1 tablet (5 mg total) by mouth nightly as needed for Allergies., Disp: 90 tablet, Rfl: 1    levothyroxine (SYNTHROID) 25 MCG tablet, Take 1 tablet (25 mcg total) by mouth before breakfast., Disp: 90 tablet, Rfl: 1    LIDOcaine-prilocaine (EMLA) cream, APPLY SPARINGLY TO AFFECTED AREA TWICE A DAY, Disp: , Rfl:     metoclopramide HCl (REGLAN) 10 MG tablet, Take 1 tablet (10 mg total) by mouth once daily., Disp: 90 tablet, Rfl: 1    montelukast (SINGULAIR) 10 mg tablet, Take 1 tablet (10 mg " total) by mouth once daily., Disp: 90 tablet, Rfl: 1    nitroGLYCERIN (NITROSTAT) 0.4 MG SL tablet, Place 0.4 mg under the tongue as needed., Disp: , Rfl:     pantoprazole (PROTONIX) 40 MG tablet, Take 40 mg by mouth., Disp: , Rfl:     prasugreL (EFFIENT) 10 mg Tab, Take 10 mg by mouth once daily., Disp: , Rfl:     QUEtiapine (SEROQUEL) 25 MG Tab, Take 1 tablet (25 mg total) by mouth every evening., Disp: 90 tablet, Rfl: 0    ranolazine (RANEXA) 1,000 mg Tb12, Take 1,000 mg by mouth 2 (two) times daily., Disp: , Rfl:     RYBELSUS 3 mg tablet, Take 3 mg by mouth once daily., Disp: , Rfl:     RYBELSUS 7 mg tablet, Take 7 mg by mouth., Disp: , Rfl:     torsemide (DEMADEX) 20 MG Tab, Take 20 mg by mouth 2 (two) times a day., Disp: , Rfl:     TRUE METRIX GLUCOSE METER Misc, TEST 3 TIMES A DAY, Disp: , Rfl:     VERQUVO 2.5 mg Tab, Take 2.5 mg by mouth 2 (two) times a day., Disp: , Rfl:     XARELTO 2.5 mg Tab, Take 2.5 mg by mouth 2 (two) times a day., Disp: , Rfl:     colchicine (COLCRYS) 0.6 mg tablet, Take 1 tablet (0.6 mg total) by mouth once daily. for 4 days, Disp: 4 tablet, Rfl: 0    levETIRAcetam (KEPPRA) 250 MG Tab, Take 1 tablet (250 mg total) by mouth once daily., Disp: 30 tablet, Rfl: 0    NOVOLIN 70/30 U-100 INSULIN 100 unit/mL (70-30) injection, Inject 45 Units into the skin 2 (two) times daily with meals. Inject 45 units in am and 70 units with evening meals, Disp: 130 mL, Rfl: 1  Review of patient's allergies indicates:   Allergen Reactions    Pork derived (porcine)      Other reaction(s): Pork    [employer] choice      Plavix [clopidogrel] Hives and Rash       /72 (BP Location: Left arm, Patient Position: Sitting)   Pulse 92   Ht 6' (1.829 m)   Wt 114.3 kg (252 lb)   BMI 34.18 kg/m²     Comprehensive review of systems completed and negative except as per HPI.        Objective:   Head: Normocephalic, without obvious abnormality, atraumatic  Eyes: conjunctivae/corneas clear. EOM's intact  Ears:  normal external appearance  Nose: Nares normal. Septum midline. Mucosa normal. No drainage  Throat: normal findings: lips normal without lesions  Lungs: unlabored breathing on room air  Chest wall: symmetric chest rise  Heart: regular rate and rhythm  Pulses: 2+ and symmetric  Skin: Skin color, texture, turgor normal. No rashes or lesions  Neurologic: Grossly normal    left KNEE:     Alignment: neutral  Appearance: skin is intact   Effusion: none  Gait: normal     Ankle ROM: full and painless   Knee ROM:  0 - 130  Tenderness:  None   Patellar Mobility: normal       Valgus Stress @ 0 deg: stable  Valgus Stress @ 30 deg: stable  Varus Stress @ 0 deg: stable  Varus Stress @ 30 deg: stable  Lachman: stable  Ant Drawer: negative   Post Drawer: negative  Posterior Sag Sign: negative  Neurological deficits: SILT dp/sp/t distributions  Motor: 5/5 EHL/FHL/TA/GS    Warm well perfused lower extremity with capillary refill less than 2 seconds and sensation intact to light touch in the terminal nerve distributions. Calf soft and easily compressible without clinical sign of DVT. No palpable popliteal lymphadenopathy    Assessment:     Imaging:     Radiographs of the left knee show well-healed superior pole fracture        1. Closed displaced transverse fracture of left patella with routine healing, subsequent encounter          Plan:       Orders Placed This Encounter    X-Ray Knee 1 or 2 View Left        Imaging and exam findings discussed.  He is doing very well.  Clinically he has no pain with full motion and strength.  Radiographs are healed.  Continue all activities as able.  Follow up with me as needed. All questions were answered. Patient happy and in agreement with the plan.     This note was generated with the assistance of ambient listening technology. Verbal consent was obtained by the patient and accompanying visitor(s) for the recording of patient appointment to facilitate this note. I attest to having reviewed and  edited the generated note for accuracy, though some syntax or spelling errors may persist. Please contact the author of this note for any clarification.

## 2025-03-03 ENCOUNTER — OFFICE VISIT (OUTPATIENT)
Dept: UROLOGY | Facility: CLINIC | Age: 60
End: 2025-03-03
Payer: MEDICARE

## 2025-03-03 VITALS
BODY MASS INDEX: 34.43 KG/M2 | DIASTOLIC BLOOD PRESSURE: 81 MMHG | WEIGHT: 254.19 LBS | OXYGEN SATURATION: 99 % | SYSTOLIC BLOOD PRESSURE: 123 MMHG | HEART RATE: 80 BPM | RESPIRATION RATE: 20 BRPM | TEMPERATURE: 98 F | HEIGHT: 72 IN

## 2025-03-03 DIAGNOSIS — N13.8 BPH WITH URINARY OBSTRUCTION: Primary | ICD-10-CM

## 2025-03-03 DIAGNOSIS — N40.1 BPH WITH URINARY OBSTRUCTION: Primary | ICD-10-CM

## 2025-03-03 LAB
BILIRUB SERPL-MCNC: NORMAL MG/DL
BLOOD URINE, POC: NORMAL
COLOR, POC UA: YELLOW
GLUCOSE UR QL STRIP: 500
KETONES UR QL STRIP: NORMAL
LEUKOCYTE ESTERASE URINE, POC: NORMAL
NITRITE, POC UA: NORMAL
PH, POC UA: 6
POC RESIDUAL URINE VOLUME: 98 ML (ref 0–100)
PROTEIN, POC: NORMAL
SPECIFIC GRAVITY, POC UA: 1.01
UROBILINOGEN, POC UA: 0.2

## 2025-03-03 PROCEDURE — 81001 URINALYSIS AUTO W/SCOPE: CPT | Mod: PBBFAC | Performed by: NURSE PRACTITIONER

## 2025-03-03 PROCEDURE — 99215 OFFICE O/P EST HI 40 MIN: CPT | Mod: PBBFAC | Performed by: NURSE PRACTITIONER

## 2025-03-03 PROCEDURE — 51798 US URINE CAPACITY MEASURE: CPT | Mod: PBBFAC | Performed by: NURSE PRACTITIONER

## 2025-03-03 PROCEDURE — 99213 OFFICE O/P EST LOW 20 MIN: CPT | Mod: S$PBB,,, | Performed by: NURSE PRACTITIONER

## 2025-03-03 RX ORDER — FINASTERIDE 5 MG/1
5 TABLET, FILM COATED ORAL DAILY
Qty: 90 TABLET | Refills: 3 | Status: SHIPPED | OUTPATIENT
Start: 2025-03-03

## 2025-03-03 RX ORDER — ALFUZOSIN HYDROCHLORIDE 10 MG/1
10 TABLET, EXTENDED RELEASE ORAL
Qty: 90 TABLET | Refills: 3 | Status: SHIPPED | OUTPATIENT
Start: 2025-03-03 | End: 2026-03-03

## 2025-03-03 NOTE — PROGRESS NOTES
Chief Complaint: No chief complaint on file.      HPI:   Patient is a 59-year-old male with a history of BPH.  Patient currently on alfuzosin 10 mg p.o. daily after failed treatment from tamsulosin 0.8 mg p.o. daily, patient also recently started on last visit finasteride 5 mg p.o. daily started on 06/29/2023.   Bladder scan 98 mL.  Patient PSA 0.  0.38 corrected to 0.76. due to patient on Proscar it was corrected from 0.45 to 0.90.  Patient denies any abnormal urologic symptoms of dysuria urinary frequency urgency, incontinence urinary retention, urinary nocturia, gross hematuria.      Allergies:  Review of patient's allergies indicates:   Allergen Reactions    Pork derived (porcine)      Other reaction(s): Pork    Church choice      Plavix [clopidogrel] Hives and Rash       Medications:  Current Medications[1]    Review of Systems:  General: No fever, chills, fatigability, or weight loss.  Skin: No rashes, itching, or changes in color or texture of skin.  Chest: Denies HART, cyanosis, wheezing, cough, and sputum production.  Abdomen: Appetite fine. No weight loss. Denies diarrhea, abdominal pain, hematemesis, or blood in stool.  Musculoskeletal: No joint stiffness or swelling. Denies back pain.  : As above.  All other review of systems negative.    PMH:  Past Medical History:   Diagnosis Date    Anxiety     Arthritis     BPH (benign prostatic hyperplasia)     CAD (coronary artery disease)     CHF (congestive heart failure)     Depression     Dizziness     GERD (gastroesophageal reflux disease)     Hypertension     Ischemic cardiomyopathy     Myocardial infarction     Obesity     Other hyperlipidemia     Oxygen dependent     3L/NC AT HS    PVD (peripheral vascular disease)     Renal disorder     kidney stones    Seizures     SOB (shortness of breath) on exertion     Thyroid disease     Type 2 diabetes mellitus without complications     Weakness of both legs        PSH:  Past Surgical History:   Procedure  Laterality Date    CARDIAC DEFIBRILLATOR PLACEMENT N/A 6/9/2023    Procedure: Insertion, ICD;  Surgeon: Perry Eldridge MD;  Location: [facility] CATH LAB;  Service: Cardiology;  Laterality: N/A;  SICD W/ ANEST. (BS)    CHOLECYSTECTOMY      COLONOSCOPY      multiple    CORONARY ARTERY BYPASS GRAFT      CORONARY STENT PLACEMENT      ESOPHAGOGASTRODUODENOSCOPY      INSERTION, TRANSVENOUS ELECTRODE LEAD, PACING ICD OR CARDIAC PACEMAKER N/A 10/19/2023    Procedure: Insertion, Transvenous Electrode Lead, Pacing ICD or Cardiac Pacemaker;  Surgeon: Colby Mcintosh MD;  Location: [facility] CATH LAB;  Service: Cardiology;  Laterality: N/A;  CCM IMPLANT    LEFT HEART CATHETERIZATION N/A 02/07/2023    Procedure: Left heart cath;  Surgeon: Jamin Melara MD;  Location: [facility] CATH LAB;  Service: Cardiology;  Laterality: N/A;    RIGHT HEART CATHETERIZATION Right 09/20/2022    Procedure: INSERTION, CATHETER, RIGHT HEART;  Surgeon: Cory Oreilly MD;  Location: [facility] CATH LAB;  Service: Cardiology;  Laterality: Right;  CARDIOMEMS RJ ACCESS    STENT, PERIPHERAL GRAFT      TONSILLECTOMY         FamHx:  Family History   Problem Relation Name Age of Onset    Diabetes Mother      Hypertension Mother      Cancer Mother      Hypertension Father      Stroke Father      Diabetes Sister      Diabetes Brother      Kidney disease Brother         SocHx:  Social History[2]    Physical Exam:  There were no vitals filed for this visit.  General: A&Ox3, no apparent distress, no deformities  Neck: No masses, normal thyroid  Lungs: CTA bill, no use of accessory muscles  Heart: RRR, no arrhythmias  Abdomen: Soft, NT, ND, no masses, no hernias, no hepatosplenomegaly  Lymphatic: Neck and groin nodes negative  Skin: The skin is warm and dry. No jaundice.  Ext: No c/c/e.    GUMale: Test desc bill, no abnormalities of epididymus. Penis circumcised, with normal penile and scrotal skin. Meatus normal. Normal rectal tone, no hemorrhoids. Prostate:  2 finger  breadth wide, 1/2 fingerbreadths thick, smooth, soft, nontender, no nodules or masses appreciated. SV not palpable. Perineum and anus normal.      Labs:    Latest Reference Range & Units 03/30/23 15:15 12/19/23 08:46 07/03/24 07:39 08/01/24 07:51 01/07/25 12:00   Prostate Specific Antigen <=4.00 ng/mL 1.02 0.49 0.82 0.45 0.38       Urinalysis:  Component  Ref Range & Units (hover) 10:44   Color, UA Yellow   Spec Grav UA 1.010   pH, UA 6.0   WBC, UA neg   Nitrite, UA neg   Protein, POC neg   Glucose,    Ketones, UA neg   Urobilinogen, UA 0.2   Bilirubin, POC neg   Blood, UA neg           Microscopic urinalysis negative WBCs, negative nitrites, negative RBCs.  Specimen Collected: 03/03/25 10:44 CST Last Resulted: 03/03/25 10:44 CST           Impression:  BPH with obstruction    Plan:  Instructed patient to continue alfuzosin 10 mg p.o. daily and finasteride 5 mg p.o. daily.  Instructed patient on timed voiding every 2-3 hrs, double voiding, avoidance of bladder irritants such as alcohol, citrus foods, chocolate, caffeinated drinks, energy drinks, spicy foods, sugar, caffeine products, sodas. Instructed patient to avoid drinking fluids 1-2 hours prior to bedtime & void immediately before bedtime.   RTC six-month with PSA and bladder scan.  Instructed patient if develops any abnormal urologic symptoms notify clinic to be re-evaluate treated or during after hours go to emergency room versus urgent here.                           [facility]         [1]   Current Outpatient Medications   Medication Sig Dispense Refill    alfuzosin (UROXATRAL) 10 mg Tb24 Take 1 tablet (10 mg total) by mouth daily with breakfast. 90 tablet 3    allopurinoL (ZYLOPRIM) 100 MG tablet Take 2 tablets (200 mg total) by mouth once daily. Take half a tab daily for 1 week, then take 1 tablet daily. 180 tablet 1    alpha lipoic acid 600 mg Cap Take 1 capsule by mouth.      aspirin (ECOTRIN) 81 MG EC tablet Take 81 mg by mouth once daily.       "atorvastatin (LIPITOR) 80 MG tablet Take 80 mg by mouth every evening.      azelastine (ASTELIN) 137 mcg (0.1 %) nasal spray 1 spray by Nasal route 2 (two) times daily.      blood sugar diagnostic (TRUE METRIX GLUCOSE TEST STRIP) Strp USE TO TEST 3 TIMES A  strip 11    carvediloL (COREG) 25 MG tablet Take 25 mg by mouth 2 (two) times daily.      colchicine (COLCRYS) 0.6 mg tablet Take 1 tablet (0.6 mg total) by mouth once daily. for 4 days 4 tablet 0    cyanocobalamin (VITAMIN B-12) 1000 MCG tablet Take 2,000 mcg by mouth once daily at 6am.      ENTRESTO 24-26 mg per tablet Take 1 tablet by mouth 2 (two) times daily.      eplerenone (INSPRA) 25 MG Tab Take 25 mg by mouth once daily.      esomeprazole (NEXIUM) 40 MG capsule Take 1 capsule by mouth every evening.      ezetimibe (ZETIA) 10 mg tablet Take 10 mg by mouth once daily at 6am.      FARXIGA 10 mg tablet Take 10 mg by mouth once daily.      ferrous gluconate (FERGON) 324 MG tablet Take 1 tablet by mouth.      finasteride (PROSCAR) 5 mg tablet Take 1 tablet (5 mg total) by mouth once daily. 90 tablet 3    fluticasone propionate (FLONASE) 50 mcg/actuation nasal spray Flonase Allergy Relief Take No date recorded No form recorded No frequency recorded No route recorded No set duration recorded No set duration amount recorded active No dosage strength recorded No dosage strength units of measure recorded      HYDROcodone-acetaminophen (NORCO) 5-325 mg per tablet Take 1 tablet by mouth every 6 (six) hours as needed.      HYDROcodone-acetaminophen (NORCO) 7.5-325 mg per tablet Take 1 tablet by mouth 2 (two) times daily as needed.      hydrOXYzine (ATARAX) 50 MG tablet Take 50 mg by mouth 3 (three) times daily.      insulin syringe-needle U-100 1 mL 31 gauge x 15/64" Syrg 1 each by Misc.(Non-Drug; Combo Route) route 3 (three) times daily. 10 each 11    isosorbide mononitrate (IMDUR) 60 MG 24 hr tablet Take 60 mg by mouth once daily.      lactulose (CHRONULAC) " 10 gram/15 mL solution TAKE 15 MLS (10 G TOTAL) BY MOUTH 2 (TWO) TIMES DAILY AS NEEDED (CONSTIPATION). 2700 mL 1    levETIRAcetam (KEPPRA) 250 MG Tab Take 1 tablet (250 mg total) by mouth once daily. 30 tablet 0    levocetirizine (XYZAL) 5 MG tablet Take 1 tablet (5 mg total) by mouth nightly as needed for Allergies. 90 tablet 1    levothyroxine (SYNTHROID) 25 MCG tablet Take 1 tablet (25 mcg total) by mouth before breakfast. 90 tablet 1    LIDOcaine-prilocaine (EMLA) cream APPLY SPARINGLY TO AFFECTED AREA TWICE A DAY      metoclopramide HCl (REGLAN) 10 MG tablet Take 1 tablet (10 mg total) by mouth once daily. 90 tablet 1    montelukast (SINGULAIR) 10 mg tablet Take 1 tablet (10 mg total) by mouth once daily. 90 tablet 1    nitroGLYCERIN (NITROSTAT) 0.4 MG SL tablet Place 0.4 mg under the tongue as needed.      NOVOLIN 70/30 U-100 INSULIN 100 unit/mL (70-30) injection Inject 45 Units into the skin 2 (two) times daily with meals. Inject 45 units in am and 70 units with evening meals 130 mL 1    pantoprazole (PROTONIX) 40 MG tablet Take 40 mg by mouth.      prasugreL (EFFIENT) 10 mg Tab Take 10 mg by mouth once daily.      QUEtiapine (SEROQUEL) 25 MG Tab Take 1 tablet (25 mg total) by mouth every evening. 90 tablet 0    ranolazine (RANEXA) 1,000 mg Tb12 Take 1,000 mg by mouth 2 (two) times daily.      RYBELSUS 3 mg tablet Take 3 mg by mouth once daily.      RYBELSUS 7 mg tablet Take 7 mg by mouth.      torsemide (DEMADEX) 20 MG Tab Take 20 mg by mouth 2 (two) times a day.      TRUE METRIX GLUCOSE METER Misc TEST 3 TIMES A DAY      VERQUVO 2.5 mg Tab Take 2.5 mg by mouth 2 (two) times a day.      XARELTO 2.5 mg Tab Take 2.5 mg by mouth 2 (two) times a day.       No current facility-administered medications for this visit.   [2]   Social History  Socioeconomic History    Marital status:      Spouse name: Viktoria    Number of children: 1   Tobacco Use    Smoking status: Never     Passive exposure: Never     Smokeless tobacco: Never   Substance and Sexual Activity    Alcohol use: Never    Drug use: Not Currently    Sexual activity: Not Currently     Partners: Female     Social Drivers of Health     Financial Resource Strain: Medium Risk (7/30/2024)    Overall Financial Resource Strain (CARDIA)     Difficulty of Paying Living Expenses: Somewhat hard   Food Insecurity: Food Insecurity Present (7/30/2024)    Hunger Vital Sign     Worried About Running Out of Food in the Last Year: Sometimes true     Ran Out of Food in the Last Year: Never true   Transportation Needs: No Transportation Needs (7/30/2024)    TRANSPORTATION NEEDS     Transportation : No   Physical Activity: Inactive (7/30/2024)    Exercise Vital Sign     Days of Exercise per Week: 0 days     Minutes of Exercise per Session: 0 min   Stress: Stress Concern Present (7/30/2024)    [employer] - Occupational Stress Questionnaire     Feeling of Stress : To some extent   Housing Stability: Unknown (7/30/2024)    Housing Stability Vital Sign     Unable to Pay for Housing in the Last Year: No     Homeless in the Last Year: No

## 2025-03-06 ENCOUNTER — TELEPHONE (OUTPATIENT)
Dept: INTERNAL MEDICINE | Facility: CLINIC | Age: 60
End: 2025-03-06
Payer: MEDICARE

## 2025-03-06 NOTE — TELEPHONE ENCOUNTER
Pharmacy requesting refills on metoclopramide HCl (REGLAN) 10 MG tablet. Unable to propose. Please review.      LOV:10/24/24    NOV: 3/20/25

## 2025-03-14 ENCOUNTER — TELEPHONE (OUTPATIENT)
Dept: INTERNAL MEDICINE | Facility: CLINIC | Age: 60
End: 2025-03-14
Payer: MEDICARE

## 2025-03-14 NOTE — TELEPHONE ENCOUNTER
Pharmacy requesting refills on metoclopramide HCl (REGLAN) 10 MG tablet. Unable to propose.      LOV:8/23/24    NOV: 3/20/25

## 2025-03-19 ENCOUNTER — LAB VISIT (OUTPATIENT)
Dept: LAB | Facility: HOSPITAL | Age: 60
End: 2025-03-19
Attending: NURSE PRACTITIONER
Payer: MEDICARE

## 2025-03-19 ENCOUNTER — RESULTS FOLLOW-UP (OUTPATIENT)
Dept: INTERNAL MEDICINE | Facility: CLINIC | Age: 60
End: 2025-03-19

## 2025-03-19 DIAGNOSIS — E11.42 TYPE 2 DIABETES MELLITUS WITH DIABETIC POLYNEUROPATHY, WITH LONG-TERM CURRENT USE OF INSULIN: ICD-10-CM

## 2025-03-19 DIAGNOSIS — E78.2 MIXED HYPERLIPIDEMIA: ICD-10-CM

## 2025-03-19 DIAGNOSIS — N18.32 STAGE 3B CHRONIC KIDNEY DISEASE: ICD-10-CM

## 2025-03-19 DIAGNOSIS — I10 ESSENTIAL HYPERTENSION: ICD-10-CM

## 2025-03-19 DIAGNOSIS — Z00.00 WELLNESS EXAMINATION: Primary | ICD-10-CM

## 2025-03-19 DIAGNOSIS — Z79.4 TYPE 2 DIABETES MELLITUS WITH DIABETIC POLYNEUROPATHY, WITH LONG-TERM CURRENT USE OF INSULIN: ICD-10-CM

## 2025-03-19 DIAGNOSIS — E03.9 HYPOTHYROIDISM, UNSPECIFIED TYPE: ICD-10-CM

## 2025-03-19 DIAGNOSIS — Z12.5 PROSTATE CANCER SCREENING: ICD-10-CM

## 2025-03-19 LAB
ALBUMIN SERPL-MCNC: 3.6 G/DL (ref 3.5–5)
ALBUMIN/GLOB SERPL: 0.7 RATIO (ref 1.1–2)
ALP SERPL-CCNC: 168 UNIT/L (ref 40–150)
ALT SERPL-CCNC: 20 UNIT/L (ref 0–55)
ANION GAP SERPL CALC-SCNC: 10 MEQ/L
AST SERPL-CCNC: 18 UNIT/L (ref 5–34)
BASOPHILS # BLD AUTO: 0.05 X10(3)/MCL
BASOPHILS NFR BLD AUTO: 0.3 %
BILIRUB SERPL-MCNC: 0.6 MG/DL
BUN SERPL-MCNC: 16.2 MG/DL (ref 8.4–25.7)
CALCIUM SERPL-MCNC: 9.5 MG/DL (ref 8.4–10.2)
CHLORIDE SERPL-SCNC: 106 MMOL/L (ref 98–107)
CHOLEST SERPL-MCNC: 131 MG/DL
CHOLEST/HDLC SERPL: 3 {RATIO} (ref 0–5)
CO2 SERPL-SCNC: 24 MMOL/L (ref 22–29)
CREAT SERPL-MCNC: 1.57 MG/DL (ref 0.72–1.25)
CREAT/UREA NIT SERPL: 10
EOSINOPHIL # BLD AUTO: 0.34 X10(3)/MCL (ref 0–0.9)
EOSINOPHIL NFR BLD AUTO: 2.2 %
ERYTHROCYTE [DISTWIDTH] IN BLOOD BY AUTOMATED COUNT: 13.4 % (ref 11.5–17)
EST. AVERAGE GLUCOSE BLD GHB EST-MCNC: 148.5 MG/DL
GFR SERPLBLD CREATININE-BSD FMLA CKD-EPI: 50 ML/MIN/1.73/M2
GLOBULIN SER-MCNC: 5.5 GM/DL (ref 2.4–3.5)
GLUCOSE SERPL-MCNC: 181 MG/DL (ref 74–100)
HBA1C MFR BLD: 6.8 %
HCT VFR BLD AUTO: 42.9 % (ref 42–52)
HDLC SERPL-MCNC: 42 MG/DL (ref 35–60)
HGB BLD-MCNC: 13.9 G/DL (ref 14–18)
IMM GRANULOCYTES # BLD AUTO: 0.11 X10(3)/MCL (ref 0–0.04)
IMM GRANULOCYTES NFR BLD AUTO: 0.7 %
LDLC SERPL CALC-MCNC: 59 MG/DL (ref 50–140)
LYMPHOCYTES # BLD AUTO: 2.21 X10(3)/MCL (ref 0.6–4.6)
LYMPHOCYTES NFR BLD AUTO: 14 %
MCH RBC QN AUTO: 29.9 PG (ref 27–31)
MCHC RBC AUTO-ENTMCNC: 32.4 G/DL (ref 33–36)
MCV RBC AUTO: 92.3 FL (ref 80–94)
MONOCYTES # BLD AUTO: 0.71 X10(3)/MCL (ref 0.1–1.3)
MONOCYTES NFR BLD AUTO: 4.5 %
NEUTROPHILS # BLD AUTO: 12.39 X10(3)/MCL (ref 2.1–9.2)
NEUTROPHILS NFR BLD AUTO: 78.3 %
NRBC BLD AUTO-RTO: 0 %
PLATELET # BLD AUTO: 307 X10(3)/MCL (ref 130–400)
PMV BLD AUTO: 11.9 FL (ref 7.4–10.4)
POTASSIUM SERPL-SCNC: 4 MMOL/L (ref 3.5–5.1)
PROT SERPL-MCNC: 9.1 GM/DL (ref 6.4–8.3)
PSA SERPL-MCNC: 0.43 NG/ML
RBC # BLD AUTO: 4.65 X10(6)/MCL (ref 4.7–6.1)
SODIUM SERPL-SCNC: 140 MMOL/L (ref 136–145)
TRIGL SERPL-MCNC: 152 MG/DL (ref 34–140)
TSH SERPL-ACNC: 1.49 UIU/ML (ref 0.35–4.94)
VLDLC SERPL CALC-MCNC: 30 MG/DL
WBC # BLD AUTO: 15.81 X10(3)/MCL (ref 4.5–11.5)

## 2025-03-19 PROCEDURE — 80061 LIPID PANEL: CPT

## 2025-03-19 PROCEDURE — 36415 COLL VENOUS BLD VENIPUNCTURE: CPT

## 2025-03-19 PROCEDURE — 84153 ASSAY OF PSA TOTAL: CPT

## 2025-03-19 PROCEDURE — 83036 HEMOGLOBIN GLYCOSYLATED A1C: CPT

## 2025-03-19 PROCEDURE — 80053 COMPREHEN METABOLIC PANEL: CPT

## 2025-03-19 PROCEDURE — 84443 ASSAY THYROID STIM HORMONE: CPT

## 2025-03-19 PROCEDURE — 85025 COMPLETE CBC W/AUTO DIFF WBC: CPT

## 2025-03-20 ENCOUNTER — OFFICE VISIT (OUTPATIENT)
Dept: INTERNAL MEDICINE | Facility: CLINIC | Age: 60
End: 2025-03-20
Payer: MEDICARE

## 2025-03-20 VITALS
HEIGHT: 72 IN | HEART RATE: 92 BPM | WEIGHT: 252 LBS | DIASTOLIC BLOOD PRESSURE: 63 MMHG | TEMPERATURE: 97 F | BODY MASS INDEX: 34.13 KG/M2 | RESPIRATION RATE: 18 BRPM | SYSTOLIC BLOOD PRESSURE: 95 MMHG

## 2025-03-20 DIAGNOSIS — E11.42 TYPE 2 DIABETES MELLITUS WITH DIABETIC POLYNEUROPATHY, WITH LONG-TERM CURRENT USE OF INSULIN: Primary | ICD-10-CM

## 2025-03-20 DIAGNOSIS — Z79.4 TYPE 2 DIABETES MELLITUS WITH DIABETIC POLYNEUROPATHY, WITH LONG-TERM CURRENT USE OF INSULIN: Primary | ICD-10-CM

## 2025-03-20 DIAGNOSIS — R39.12 BENIGN PROSTATIC HYPERPLASIA WITH WEAK URINARY STREAM: ICD-10-CM

## 2025-03-20 DIAGNOSIS — F41.9 ANXIETY: ICD-10-CM

## 2025-03-20 DIAGNOSIS — N40.1 BENIGN PROSTATIC HYPERPLASIA WITH WEAK URINARY STREAM: ICD-10-CM

## 2025-03-20 DIAGNOSIS — I25.118 CORONARY ARTERY DISEASE OF NATIVE ARTERY OF NATIVE HEART WITH STABLE ANGINA PECTORIS: ICD-10-CM

## 2025-03-20 DIAGNOSIS — E66.811 OBESITY (BMI 30.0-34.9): ICD-10-CM

## 2025-03-20 DIAGNOSIS — G47.39 OTHER SLEEP APNEA: ICD-10-CM

## 2025-03-20 DIAGNOSIS — E03.8 OTHER SPECIFIED HYPOTHYROIDISM: ICD-10-CM

## 2025-03-20 DIAGNOSIS — Z79.4 TYPE 2 DIABETES MELLITUS WITH DIABETIC POLYNEUROPATHY, WITH LONG-TERM CURRENT USE OF INSULIN: ICD-10-CM

## 2025-03-20 DIAGNOSIS — E11.42 TYPE 2 DIABETES MELLITUS WITH DIABETIC POLYNEUROPATHY, WITH LONG-TERM CURRENT USE OF INSULIN: ICD-10-CM

## 2025-03-20 DIAGNOSIS — K21.9 GASTROESOPHAGEAL REFLUX DISEASE, UNSPECIFIED WHETHER ESOPHAGITIS PRESENT: ICD-10-CM

## 2025-03-20 DIAGNOSIS — J44.9 MODERATE COPD (CHRONIC OBSTRUCTIVE PULMONARY DISEASE): ICD-10-CM

## 2025-03-20 DIAGNOSIS — J96.12 CHRONIC HYPERCAPNIC RESPIRATORY FAILURE: ICD-10-CM

## 2025-03-20 DIAGNOSIS — J30.89 SEASONAL ALLERGIC RHINITIS DUE TO OTHER ALLERGIC TRIGGER: ICD-10-CM

## 2025-03-20 DIAGNOSIS — N18.31 CKD STAGE G3A/A1, GFR 45-59 AND ALBUMIN CREATININE RATIO <30 MG/G: ICD-10-CM

## 2025-03-20 DIAGNOSIS — I10 ESSENTIAL HYPERTENSION: ICD-10-CM

## 2025-03-20 PROBLEM — N40.0 BPH (BENIGN PROSTATIC HYPERPLASIA): Status: RESOLVED | Noted: 2023-06-29 | Resolved: 2025-03-20

## 2025-03-20 PROBLEM — G45.9 TRANSIENT ISCHEMIC ATTACK: Status: RESOLVED | Noted: 2022-07-12 | Resolved: 2025-03-20

## 2025-03-20 PROBLEM — E53.8 B12 DEFICIENCY: Status: RESOLVED | Noted: 2023-12-18 | Resolved: 2025-03-20

## 2025-03-20 PROBLEM — H90.6 MIXED CONDUCTIVE AND SENSORINEURAL HEARING LOSS OF BOTH EARS: Status: ACTIVE | Noted: 2023-12-11

## 2025-03-20 PROBLEM — I25.10 CORONARY ATHEROSCLEROSIS: Status: RESOLVED | Noted: 2022-03-23 | Resolved: 2025-03-20

## 2025-03-20 PROBLEM — D72.829 LEUKOCYTOSIS: Status: RESOLVED | Noted: 2023-12-18 | Resolved: 2025-03-20

## 2025-03-20 PROBLEM — D64.9 ANEMIA: Status: RESOLVED | Noted: 2022-03-23 | Resolved: 2025-03-20

## 2025-03-20 PROBLEM — R35.1 NOCTURIA: Status: RESOLVED | Noted: 2023-03-30 | Resolved: 2025-03-20

## 2025-03-20 PROCEDURE — 99215 OFFICE O/P EST HI 40 MIN: CPT | Mod: PBBFAC | Performed by: NURSE PRACTITIONER

## 2025-03-20 RX ORDER — ORAL SEMAGLUTIDE 7 MG/1
7 TABLET ORAL DAILY
Qty: 90 TABLET | Refills: 2 | Status: SHIPPED | OUTPATIENT
Start: 2025-03-20 | End: 2025-12-15

## 2025-03-20 RX ORDER — FLUTICASONE PROPIONATE 50 MCG
1 SPRAY, SUSPENSION (ML) NASAL DAILY
Qty: 16 G | Refills: 14 | Status: SHIPPED | OUTPATIENT
Start: 2025-03-20 | End: 2026-06-13

## 2025-03-20 RX ORDER — HUMAN INSULIN 100 [USP'U]/ML
45 INJECTION, SUSPENSION SUBCUTANEOUS 2 TIMES DAILY WITH MEALS
Qty: 27 ML | Refills: 9 | Status: SHIPPED | OUTPATIENT
Start: 2025-03-20 | End: 2026-01-03

## 2025-03-20 RX ORDER — PANTOPRAZOLE SODIUM 40 MG/1
40 TABLET, DELAYED RELEASE ORAL DAILY
Qty: 90 TABLET | Refills: 4 | Status: SHIPPED | OUTPATIENT
Start: 2025-03-20 | End: 2026-06-13

## 2025-03-20 RX ORDER — SYRINGE AND NEEDLE,INSULIN,1ML 31GX15/64"
1 SYRINGE, EMPTY DISPOSABLE MISCELLANEOUS 3 TIMES DAILY
Qty: 10 EACH | Refills: 11 | Status: SHIPPED | OUTPATIENT
Start: 2025-03-20 | End: 2026-03-20

## 2025-03-20 RX ORDER — LEVOCETIRIZINE DIHYDROCHLORIDE 5 MG/1
5 TABLET, FILM COATED ORAL NIGHTLY PRN
Qty: 90 TABLET | Refills: 4 | Status: SHIPPED | OUTPATIENT
Start: 2025-03-20 | End: 2026-06-13

## 2025-03-20 RX ORDER — MONTELUKAST SODIUM 10 MG/1
10 TABLET ORAL DAILY
Qty: 90 TABLET | Refills: 4 | Status: SHIPPED | OUTPATIENT
Start: 2025-03-20 | End: 2026-06-13

## 2025-03-20 RX ORDER — QUETIAPINE FUMARATE 25 MG/1
25 TABLET, FILM COATED ORAL NIGHTLY
Qty: 90 TABLET | Refills: 2 | Status: SHIPPED | OUTPATIENT
Start: 2025-03-20 | End: 2025-12-15

## 2025-03-20 RX ORDER — AZELASTINE 1 MG/ML
1 SPRAY, METERED NASAL 2 TIMES DAILY
Qty: 30 ML | Refills: 14 | Status: SHIPPED | OUTPATIENT
Start: 2025-03-20 | End: 2026-06-13

## 2025-03-20 RX ORDER — LEVOTHYROXINE SODIUM 25 UG/1
25 TABLET ORAL
Qty: 90 TABLET | Refills: 2 | Status: SHIPPED | OUTPATIENT
Start: 2025-03-20 | End: 2025-12-15

## 2025-03-20 RX ORDER — METOCLOPRAMIDE 10 MG/1
10 TABLET ORAL DAILY
Qty: 90 TABLET | Refills: 2 | Status: SHIPPED | OUTPATIENT
Start: 2025-03-20 | End: 2025-12-15

## 2025-03-20 NOTE — ASSESSMENT & PLAN NOTE
Stable  Continue RX Seroquel 25 mg q hs  Read positive daily meditations, avoid negative media, set healthy boundaries.  Exercise daily, keep consistent sleep pattern, eat a healthy diet.  Establish good social support, make changes to reduce stress.  Do not drink alcohol or use illicit drugs.  Reports any symptoms of suicidal/homicidal ideations or self harm immediately, go to nearest emergency room.

## 2025-03-20 NOTE — ASSESSMENT & PLAN NOTE
A1C at goal  Continue RX Farxiga 10 mg daily & Novolin 70/30 45 units BID and 70 units q hs  Diabetes Medications              FARXIGA 10 mg tablet Take 10 mg by mouth once daily.    RYBELSUS 7 mg tablet Take 7 mg by mouth.    NOVOLIN 70/30 U-100 INSULIN 100 unit/mL (70-30) injection Inject 45 Units into the skin 2 (two) times daily with meals. Inject 45 units in am and 70 units with evening meals    RYBELSUS 3 mg tablet Take 3 mg by mouth once daily.          Follow ADA diet.  Avoid soda, simple sweets, and limit rice/pasta/bread/starches and consume brown options when possible.   Maintain healthy weight with BMI goal <30.   Perform aerobic exercise for 150 minutes per week (or 5 days a week for 30 minutes each day).   Examine feet daily.     Lab Results   Component Value Date    HGBA1C 6.8 03/19/2025

## 2025-03-20 NOTE — ASSESSMENT & PLAN NOTE
Stable  Continued FU with Renal  Continue RX Farxiga 10 mg daily  Follow renoprotective measures including Renal Diet (reduce intake of nuts, peanut butter, milk, cheese, dried beans, peas), low protein, and Low Sodium Diet (less than 2 grams per day).  Avoid NSAIDs (Aleve, Mobic, Celebrex, Ibuprofen, Advil, Toradol and Diclofenac). May take Tylenol as needed for headache/pain.  If Applicable: Control DM with goal A1C <7. BP goal <130/80. LDL goal < 100.  Stay well hydrated. Avoid alcohol and soda. Limit tea and coffee.  Smoking Cessation recommended.    Lab Results   Component Value Date    EGFRNORACEVR 50 03/19/2025     Lab Results   Component Value Date    BUN 16.2 03/19/2025     Lab Results   Component Value Date    CREATININE 1.57 (H) 03/19/2025

## 2025-03-20 NOTE — ASSESSMENT & PLAN NOTE
BP at goal  BP: 95/63  Low Sodium Diet (Dash Diet - less than 2 grams of sodium per day).  Maintain healthy weight with goal BMI <30. Exercise 30 minutes per day 5 days per week.

## 2025-03-20 NOTE — PROGRESS NOTES
Morenita Apodaca, RON   [facility]  [address] [address]      PATIENT NAME: Luigi Gotti  : 1965  DATE: 3/20/25  MRN: [id number]      Reason for Visit / Chief Complaint: Dizziness (Lab review )       History of Present Illness / Problem Focused Workflow     Luigi Gotti presents to the clinic with Dizziness (Lab review )     58 yo male here to establish care, previous NP Tanna patient. Medical dx include: chronic kidney disease, heart failure with reduced ejection fraction (20%, s/p ICD 2023), coronary artery disease, gastroparesis, gastritis, ischemic colitis, peripheral vascular disease, dyslipidemia, hypertension, BPH, diabetes mellitus type 2, hx of provoked seizures during hospitalization ref(follows neuro), chronic hypercapnic respiratory failure on Trilogy 3L, and obesity.  Followed by [facility] Nephrology, Ortho, Urology, [facility], Podiatry - Dr. Sandro Haywood, Ophthalmology - Dr. Alex Barrientos, [facility] Sleep Medicine - Dr. Jersey Garcia,     Prostate Cancer Screening - 25 PSA Level 0.43  Colon Cancer Screening -  2020  Osteoporosis Screening - Vitamin D level  Diabetic Screening - 2024  AWV Screening -     2025    History of Present Illness  Patient presents today for follow up and to establish care. He reports persistent dizziness which he attributes to chronic medical problems including various Cardiac and Lung issues. Reports has an appt with Cardiology tomorrow for f/u and eval. He reports worsening leg condition with known vascular disease causing decreased blood flow to the legs. He notes new skin discoloration on the lower legs and bilateral feet. He checks blood sugar regularly and has all necessary testing supplies including needles and test strips. He has insulin as prescribed. A1C has improved from 7.0 to 6.8. He manages medications independently using a large  bag for organization and denies any difficulty maintaining his medication regimen. He previously had home medication review services but now prefers self-management. Blood counts were normal and stable. Kidney function remains stable at 50, consistent with previous values of 51 in February and 46 in January. Liver function tests were normal. Cholesterol levels were within normal range. PSA level was normal. Urinalysis was normal. Pt reports compliance with all medications and with specialty follow up appointments. Pt foot exam completed, extensive education regarding foot protection due to loss of sensation to bilateral feet, pt does see podiatry as well, denies wanting diabetic shoes at this time. Medications all reviewed and updated on MAR, refills sent appropriately. Will f/u with pt anders bout 6 months for AWV and prn.                     Review of Systems     Review of Systems   Constitutional: Negative.    HENT: Negative.     Eyes: Negative.    Respiratory: Negative.     Cardiovascular: Negative.    Gastrointestinal: Negative.    Endocrine: Negative.    Genitourinary: Negative.    Neurological:  Positive for dizziness.   Psychiatric/Behavioral: Negative.           Medications and Allergies     Medications  Medication List with Changes/Refills   Current Medications    ALFUZOSIN (UROXATRAL) 10 MG TB24    Take 1 tablet (10 mg total) by mouth daily with breakfast.    ALLOPURINOL (ZYLOPRIM) 100 MG TABLET    Take 2 tablets (200 mg total) by mouth once daily. Take half a tab daily for 1 week, then take 1 tablet daily.    ALPHA LIPOIC ACID 600 MG CAP    Take 1 capsule by mouth.    ASPIRIN (ECOTRIN) 81 MG EC TABLET    Take 81 mg by mouth once daily.    ATORVASTATIN (LIPITOR) 80 MG TABLET    Take 80 mg by mouth every evening.    CARVEDILOL (COREG) 25 MG TABLET    Take 25 mg by mouth 2 (two) times daily.    CYANOCOBALAMIN (VITAMIN B-12) 1000 MCG TABLET    Take 2,000 mcg by mouth once daily at 6am.    ENTRESTO 24-26 MG PER  TABLET    Take 1 tablet by mouth 2 (two) times daily.    EPLERENONE (INSPRA) 25 MG TAB    Take 25 mg by mouth once daily.    EZETIMIBE (ZETIA) 10 MG TABLET    Take 10 mg by mouth once daily at 6am.    FARXIGA 10 MG TABLET    Take 10 mg by mouth once daily.    FERROUS GLUCONATE (FERGON) 324 MG TABLET    Take 1 tablet by mouth daily with breakfast.    FINASTERIDE (PROSCAR) 5 MG TABLET    Take 1 tablet (5 mg total) by mouth once daily.    HYDROCODONE-ACETAMINOPHEN (NORCO) 7.5-325 MG PER TABLET    Take 1 tablet by mouth 2 (two) times daily as needed for Pain.    HYDROXYZINE (ATARAX) 50 MG TABLET    Take 50 mg by mouth 3 (three) times daily.    ISOSORBIDE MONONITRATE (IMDUR) 60 MG 24 HR TABLET    Take 60 mg by mouth once daily.    NITROGLYCERIN (NITROSTAT) 0.4 MG SL TABLET    Place 0.4 mg under the tongue as needed for Chest pain.    PRASUGREL (EFFIENT) 10 MG TAB    Take 10 mg by mouth once daily.    RANOLAZINE (RANEXA) 1,000 MG TB12    Take 1,000 mg by mouth 2 (two) times daily.    TORSEMIDE (DEMADEX) 20 MG TAB    Take 20 mg by mouth 2 (two) times a day.    VERQUVO 2.5 MG TAB    Take 2.5 mg by mouth 2 (two) times a day.    XARELTO 2.5 MG TAB    Take 2.5 mg by mouth 2 (two) times a day.   Changed and/or Refilled Medications    Modified Medication Previous Medication    AZELASTINE (ASTELIN) 137 MCG (0.1 %) NASAL SPRAY azelastine (ASTELIN) 137 mcg (0.1 %) nasal spray       1 spray (137 mcg total) by Nasal route 2 (two) times daily.    1 spray by Nasal route 2 (two) times daily.    BLOOD SUGAR DIAGNOSTIC (TRUE METRIX GLUCOSE TEST STRIP) STRP blood sugar diagnostic (TRUE METRIX GLUCOSE TEST STRIP) Strp       USE TO TEST 3 TIMES A DAY    USE TO TEST 3 TIMES A DAY    FLUTICASONE PROPIONATE (FLONASE) 50 MCG/ACTUATION NASAL SPRAY fluticasone propionate (FLONASE) 50 mcg/actuation nasal spray       1 spray (50 mcg total) by Each Nostril route Daily.    Flonase Allergy Relief Take No date recorded No form recorded No frequency  "recorded No route recorded No set duration recorded No set duration amount recorded active No dosage strength recorded No dosage strength units of measure recorded    INSULIN SYRINGE-NEEDLE U-100 1 ML 31 GAUGE X 15/64" SYRG insulin syringe-needle U-100 1 mL 31 gauge x 15/64" Syrg       1 each by Misc.(Non-Drug; Combo Route) route 3 (three) times daily.    1 each by Misc.(Non-Drug; Combo Route) route 3 (three) times daily.    LEVOCETIRIZINE (XYZAL) 5 MG TABLET levocetirizine (XYZAL) 5 MG tablet       Take 1 tablet (5 mg total) by mouth nightly as needed for Allergies.    Take 1 tablet (5 mg total) by mouth nightly as needed for Allergies.    LEVOTHYROXINE (SYNTHROID) 25 MCG TABLET levothyroxine (SYNTHROID) 25 MCG tablet       Take 1 tablet (25 mcg total) by mouth before breakfast. For Thyroid    Take 1 tablet (25 mcg total) by mouth before breakfast.    METOCLOPRAMIDE HCL (REGLAN) 10 MG TABLET metoclopramide HCl (REGLAN) 10 MG tablet       Take 1 tablet (10 mg total) by mouth once daily. For Acid Reflux    Take 1 tablet (10 mg total) by mouth once daily.    MONTELUKAST (SINGULAIR) 10 MG TABLET montelukast (SINGULAIR) 10 mg tablet       Take 1 tablet (10 mg total) by mouth once daily. For COPD    Take 1 tablet (10 mg total) by mouth once daily.    NOVOLIN 70/30 U-100 INSULIN 100 UNIT/ML (70-30) INJECTION NOVOLIN 70/30 U-100 INSULIN 100 unit/mL (70-30) injection       Inject 45 Units into the skin 2 (two) times daily with meals. Inject 45 units in am and 70 units with evening meals    Inject 45 Units into the skin 2 (two) times daily with meals. Inject 45 units in am and 70 units with evening meals    PANTOPRAZOLE (PROTONIX) 40 MG TABLET pantoprazole (PROTONIX) 40 MG tablet       Take 1 tablet (40 mg total) by mouth once daily. For Acid Reflux    Take 40 mg by mouth.    QUETIAPINE (SEROQUEL) 25 MG TAB QUEtiapine (SEROQUEL) 25 MG Tab       Take 1 tablet (25 mg total) by mouth every evening. For Anxiety & Sleep    Take 1 " tablet (25 mg total) by mouth every evening.    RYBELSUS 7 MG TABLET RYBELSUS 7 mg tablet       Take 1 tablet (7 mg total) by mouth once daily. For Diabetes    Take 7 mg by mouth.   Discontinued Medications    COLCHICINE (COLCRYS) 0.6 MG TABLET    Take 1 tablet (0.6 mg total) by mouth once daily. for 4 days    ESOMEPRAZOLE (NEXIUM) 40 MG CAPSULE    Take 1 capsule by mouth every evening.    HYDROCODONE-ACETAMINOPHEN (NORCO) 5-325 MG PER TABLET    Take 1 tablet by mouth every 6 (six) hours as needed.    LACTULOSE (CHRONULAC) 10 GRAM/15 ML SOLUTION    TAKE 15 MLS (10 G TOTAL) BY MOUTH 2 (TWO) TIMES DAILY AS NEEDED (CONSTIPATION).    LEVETIRACETAM (KEPPRA) 250 MG TAB    Take 1 tablet (250 mg total) by mouth once daily.    LIDOCAINE-PRILOCAINE (EMLA) CREAM    APPLY SPARINGLY TO AFFECTED AREA TWICE A DAY    RYBELSUS 3 MG TABLET    Take 3 mg by mouth once daily.    TRUE METRIX GLUCOSE METER MISC    TEST 3 TIMES A DAY         Allergies  Review of patient's allergies indicates:   Allergen Reactions    Pork derived (porcine)      Other reaction(s): Pork    Tenriism choice      Plavix [clopidogrel] Hives and Rash       Physical Examination     Vitals:    03/20/25 0723   BP: 95/63   Pulse: 92   Resp: 18   Temp: 97.2 °F (36.2 °C)     Physical Exam  Constitutional:       Appearance: Normal appearance.   Cardiovascular:      Rate and Rhythm: Normal rate and regular rhythm.      Pulses:           Dorsalis pedis pulses are 2+ on the right side and 2+ on the left side.        Posterior tibial pulses are 2+ on the right side and 2+ on the left side.   Pulmonary:      Effort: Pulmonary effort is normal.      Breath sounds: Normal breath sounds.   Musculoskeletal:         General: Normal range of motion.      Cervical back: Normal range of motion.   Feet:      Right foot:      Protective Sensation: 9 sites tested.  0 sites sensed.      Skin integrity: Dry skin present.      Toenail Condition: Right toenails are normal.      Left foot:       Protective Sensation: 9 sites tested.  0 sites sensed.      Skin integrity: Dry skin present.      Toenail Condition: Left toenails are normal.   Skin:     General: Skin is warm and dry.   Neurological:      General: No focal deficit present.      Mental Status: He is alert and oriented to person, place, and time.   Psychiatric:         Mood and Affect: Mood normal.           Results     Lab Results   Component Value Date    WBC 15.81 (H) 03/19/2025    RBC 4.65 (L) 03/19/2025    HGB 13.9 (L) 03/19/2025    HCT 42.9 03/19/2025    MCV 92.3 03/19/2025    MCH 29.9 03/19/2025    MCHC 32.4 (L) 03/19/2025    RDW 13.4 03/19/2025     03/19/2025    MPV 11.9 (H) 03/19/2025     Sodium   Date Value Ref Range Status   03/19/2025 140 136 - 145 mmol/L Final   05/24/2022 136 136 - 145 mmol/L Final     Potassium   Date Value Ref Range Status   03/19/2025 4.0 3.5 - 5.1 mmol/L Final   05/24/2022 4.4 3.5 - 5.1 mmol/L Final     Comment:     Slight hemolysis present, interpret results with caution     Chloride   Date Value Ref Range Status   03/19/2025 106 98 - 107 mmol/L Final   05/24/2022 102 100 - 109 mmol/L Final     CO2   Date Value Ref Range Status   03/19/2025 24 22 - 29 mmol/L Final     Carbon Dioxide   Date Value Ref Range Status   05/24/2022 24 22 - 33 mmol/L Final     Blood Urea Nitrogen   Date Value Ref Range Status   03/19/2025 16.2 8.4 - 25.7 mg/dL Final   05/24/2022 20 5 - 25 mg/dL Final     Creatinine   Date Value Ref Range Status   03/19/2025 1.57 (H) 0.72 - 1.25 mg/dL Final   05/24/2022 0.91 0.57 - 1.25 mg/dL Final     Calcium   Date Value Ref Range Status   03/19/2025 9.5 8.4 - 10.2 mg/dL Final   05/24/2022 8.5 (L) 8.8 - 10.6 mg/dL Final     Albumin   Date Value Ref Range Status   03/19/2025 3.6 3.5 - 5.0 g/dL Final     Bilirubin Total   Date Value Ref Range Status   03/19/2025 0.6 <=1.5 mg/dL Final     ALP   Date Value Ref Range Status   03/19/2025 168 (H) 40 - 150 unit/L Final     AST   Date Value Ref Range  Status   03/19/2025 18 5 - 34 unit/L Final     ALT   Date Value Ref Range Status   03/19/2025 20 0 - 55 unit/L Final     Anion Gap   Date Value Ref Range Status   05/24/2022 10 8 - 16 mmol/L Final     eGFR    Date Value Ref Range Status   05/24/2022 98 mL/min/1.73mSq Final     Comment:     In accordance with NKF-ASN Task Force recommendation, calculation based on the Chronic Kidney Disease Epidemiology Collaboration (CKD-EPI) equation without adjustment for race. eGFR adjusted for gender and age and calculated in ml/min/1.73mSquared. eGFR cannot be calculated if patient is under 18 years of age.     Reference Range:   >= 60 ml/min/1.73mSquared.     Estimated GFR-Non    Date Value Ref Range Status   04/13/2022 >60       Lab Results   Component Value Date    CHOL 131 03/19/2025     Lab Results   Component Value Date    HDL 42 03/19/2025     Lab Results   Component Value Date    TRIG 152 (H) 03/19/2025     Lab Results   Component Value Date    VLDL 30 03/19/2025     Lab Results   Component Value Date    LDL 59.00 03/19/2025     Lab Results   Component Value Date    TSH 1.491 03/19/2025     Lab Results   Component Value Date    PHUR 6.0 03/03/2025    SPECGRAV 1.010 03/03/2025    PROTEINUA Negative 02/17/2025    GLUCUA 4+ (A) 02/17/2025    KETONESU neg 03/03/2025    OCCULTUA Negative 02/17/2025    NITRITE neg 03/03/2025    LEUKOCYTESUR Negative 02/17/2025     Lab Results   Component Value Date    HGBA1C 6.8 03/19/2025    HGBA1C 7.0 10/14/2024    HGBA1C 7.0 07/03/2024           Assessment         ICD-10-CM ICD-9-CM   1. Type 2 diabetes mellitus with diabetic polyneuropathy, with long-term current use of insulin  E11.42 250.60    Z79.4 357.2     V58.67   2. Moderate COPD (chronic obstructive pulmonary disease)  J44.9 496   3. Chronic hypercapnic respiratory failure  J96.12 518.83   4. Coronary artery disease of native artery of native heart with stable angina pectoris  I25.118 414.01      "413.9   5. Other specified hypothyroidism  E03.8 244.8   6. Gastroesophageal reflux disease, unspecified whether esophagitis present  K21.9 530.81   7. Other sleep apnea  G47.39 327.29   8. Obesity (BMI 30.0-34.9)  E66.811 278.00   9. CKD stage G3a/A1, GFR 45-59 and albumin creatinine ratio <30 mg/g  N18.31 585.3   10. Benign prostatic hyperplasia with weak urinary stream  N40.1 600.01    R39.12 788.62   11. Essential hypertension  I10 401.9   12. Seasonal allergic rhinitis due to other allergic trigger  J30.89 477.8   13. Anxiety  F41.9 300.00       Plan      1. Type 2 diabetes mellitus with diabetic polyneuropathy, with long-term current use of insulin  Assessment & Plan:  A1C at goal  Continue RX Farxiga 10 mg daily & Novolin 70/30 45 units BID and 70 units q hs  Diabetes Medications              FARXIGA 10 mg tablet Take 10 mg by mouth once daily.    RYBELSUS 7 mg tablet Take 7 mg by mouth.    NOVOLIN 70/30 U-100 INSULIN 100 unit/mL (70-30) injection Inject 45 Units into the skin 2 (two) times daily with meals. Inject 45 units in am and 70 units with evening meals    RYBELSUS 3 mg tablet Take 3 mg by mouth once daily.          Follow ADA diet.  Avoid soda, simple sweets, and limit rice/pasta/bread/starches and consume brown options when possible.   Maintain healthy weight with BMI goal <30.   Perform aerobic exercise for 150 minutes per week (or 5 days a week for 30 minutes each day).   Examine feet daily.     Lab Results   Component Value Date    HGBA1C 6.8 03/19/2025         Orders:  -     insulin syringe-needle U-100 1 mL 31 gauge x 15/64" Syrg; 1 each by Misc.(Non-Drug; Combo Route) route 3 (three) times daily.  Dispense: 10 each; Refill: 11  -     NOVOLIN 70/30 U-100 INSULIN 100 unit/mL (70-30) injection; Inject 45 Units into the skin 2 (two) times daily with meals. Inject 45 units in am and 70 units with evening meals  Dispense: 27 mL; Refill: 9  -     Urinalysis, Reflex to Urine Culture; Future; Expected " date: 09/20/2025  -     Microalbumin/Creatinine Ratio, Urine; Future; Expected date: 09/20/2025  -     Hemoglobin A1C; Future; Expected date: 09/20/2025  -     Basic Metabolic Panel; Future; Expected date: 09/20/2025  -     RYBELSUS 7 mg tablet; Take 1 tablet (7 mg total) by mouth once daily. For Diabetes  Dispense: 90 tablet; Refill: 2  -     blood sugar diagnostic (TRUE METRIX GLUCOSE TEST STRIP) Strp; USE TO TEST 3 TIMES A DAY  Dispense: 100 strip; Refill: 11    2. Moderate COPD (chronic obstructive pulmonary disease)  Assessment & Plan:  Stable  Continue f/u with [facility]       Orders:  -     montelukast (SINGULAIR) 10 mg tablet; Take 1 tablet (10 mg total) by mouth once daily. For COPD  Dispense: 90 tablet; Refill: 4    3. Chronic hypercapnic respiratory failure  Assessment & Plan:  Stable  Continue f/uw with [place]  Continue Trilogy machine as directed  ED Precautions     Orders:  -     montelukast (SINGULAIR) 10 mg tablet; Take 1 tablet (10 mg total) by mouth once daily. For COPD  Dispense: 90 tablet; Refill: 4    4. Coronary artery disease of native artery of native heart with stable angina pectoris  Overview:  [facility] 2/7/23: Stable      5. Other specified hypothyroidism  Assessment & Plan:  TSH WNL  Continue RX levothyroxine 25 mcg q am  Take medicine on an empty stomach with water (no other medications or beverages). Wait 30 minutes to eat or drink.  Report any symptoms of thinning hair, breaking nails, fatigue, weight gain or loss, palpitations.   Lab Results   Component Value Date    TSH 1.491 03/19/2025         Orders:  -     levothyroxine (SYNTHROID) 25 MCG tablet; Take 1 tablet (25 mcg total) by mouth before breakfast. For Thyroid  Dispense: 90 tablet; Refill: 2  -     TSH; Future; Expected date: 09/20/2025  -     T4, Free; Future; Expected date: 09/20/2025  -     Urinalysis, Reflex to Urine Culture; Future; Expected date: 09/20/2025    6. Gastroesophageal reflux disease, unspecified whether esophagitis  present  -     metoclopramide HCl (REGLAN) 10 MG tablet; Take 1 tablet (10 mg total) by mouth once daily. For Acid Reflux  Dispense: 90 tablet; Refill: 2  -     pantoprazole (PROTONIX) 40 MG tablet; Take 1 tablet (40 mg total) by mouth once daily. For Acid Reflux  Dispense: 90 tablet; Refill: 4    7. Other sleep apnea  Assessment & Plan:  Continue f/u with [facility]   Continue Trilogy 3L as directed      8. Obesity (BMI 30.0-34.9)  Assessment & Plan:  Goal BMI < 30  Dicussed Healthy Diet &   Encouraged to exercise 3 x weekly  Increase Water Intake  Eat more fruits and vegetables  Avoid soda & alcohol        9. CKD stage G3a/A1, GFR 45-59 and albumin creatinine ratio <30 mg/g  Assessment & Plan:  Stable  Continued FU with Renal  Continue RX Farxiga 10 mg daily  Follow renoprotective measures including Renal Diet (reduce intake of nuts, peanut butter, milk, cheese, dried beans, peas), low protein, and Low Sodium Diet (less than 2 grams per day).  Avoid NSAIDs (Aleve, Mobic, Celebrex, Ibuprofen, Advil, Toradol and Diclofenac). May take Tylenol as needed for headache/pain.  If Applicable: Control DM with goal A1C <7. BP goal <130/80. LDL goal < 100.  Stay well hydrated. Avoid alcohol and soda. Limit tea and coffee.  Smoking Cessation recommended.    Lab Results   Component Value Date    EGFRNORACEVR 50 03/19/2025     Lab Results   Component Value Date    BUN 16.2 03/19/2025     Lab Results   Component Value Date    CREATININE 1.57 (H) 03/19/2025           10. Benign prostatic hyperplasia with weak urinary stream  Assessment & Plan:  Stable  Continue Proscar 5 mg daily  Continue f/u with Urology Clinic       11. Essential hypertension  Assessment & Plan:  BP at goal  BP: 95/63  Low Sodium Diet (Dash Diet - less than 2 grams of sodium per day).  Maintain healthy weight with goal BMI <30. Exercise 30 minutes per day 5 days per week.        12. Seasonal allergic rhinitis due to other allergic trigger  Assessment &  Plan:  Stable  OTC antihistamines as needed (i.e. Benadryl)  Increase PO Fluids  Avoid/limit triggers such as pollen, dust, molds, and pet dander.   Avoid smoke, strong chemicals, cleaning products, perfumes/colognes.      Orders:  -     fluticasone propionate (FLONASE) 50 mcg/actuation nasal spray; 1 spray (50 mcg total) by Each Nostril route Daily.  Dispense: 16 g; Refill: 14  -     azelastine (ASTELIN) 137 mcg (0.1 %) nasal spray; 1 spray (137 mcg total) by Nasal route 2 (two) times daily.  Dispense: 30 mL; Refill: 14    13. Anxiety  Assessment & Plan:  Stable  Continue RX Seroquel 25 mg q hs  Read positive daily meditations, avoid negative media, set healthy boundaries.  Exercise daily, keep consistent sleep pattern, eat a healthy diet.  Establish good social support, make changes to reduce stress.  Do not drink alcohol or use illicit drugs.  Reports any symptoms of suicidal/homicidal ideations or self harm immediately, go to nearest emergency room.      Orders:  -     QUEtiapine (SEROQUEL) 25 MG Tab; Take 1 tablet (25 mg total) by mouth every evening. For Anxiety & Sleep  Dispense: 90 tablet; Refill: 2    Other orders  -     levocetirizine (XYZAL) 5 MG tablet; Take 1 tablet (5 mg total) by mouth nightly as needed for Allergies.  Dispense: 90 tablet; Refill: 4         Future Appointments   Date Time Provider Department Center   8/20/2025 10:15 AM Robina Garrison FNP [facility] NEPHR [place] [facility]   9/3/2025  9:00 AM Matt Ray NP [facility] UROLO [place] [facility]   9/23/2025  7:00 AM Morenita Apodaca FNP [facility] INTMED [place] [facility]            Signature:     [facility]  [address] [address]    Date of encounter: 3/20/25    This note was generated with the assistance of ambient listening technology. Verbal consent was obtained by the patient and accompanying visitor(s) for the recording of patient appointment to facilitate this note. I attest to having  reviewed and edited the generated note for accuracy, though some syntax or spelling errors may persist. Please contact the author of this note for any clarification.    I spent a total of 55 minutes on the day of the visit.

## 2025-03-20 NOTE — ASSESSMENT & PLAN NOTE
TSH WNL  Continue RX levothyroxine 25 mcg q am  Take medicine on an empty stomach with water (no other medications or beverages). Wait 30 minutes to eat or drink.  Report any symptoms of thinning hair, breaking nails, fatigue, weight gain or loss, palpitations.   Lab Results   Component Value Date    TSH 1.491 03/19/2025

## 2025-03-20 NOTE — ASSESSMENT & PLAN NOTE
Stable  OTC antihistamines as needed (i.e. Benadryl)  Increase PO Fluids  Avoid/limit triggers such as pollen, dust, molds, and pet dander.   Avoid smoke, strong chemicals, cleaning products, perfumes/colognes.

## 2025-03-21 RX ORDER — HUMAN INSULIN 100 [USP'U]/ML
INJECTION, SUSPENSION SUBCUTANEOUS
Qty: 30 ML | Refills: 9 | OUTPATIENT
Start: 2025-03-21

## 2025-03-24 RX ORDER — CALCIUM CITRATE/VITAMIN D3 200MG-6.25
TABLET ORAL
Qty: 100 STRIP | Refills: 11 | Status: SHIPPED | OUTPATIENT
Start: 2025-03-24

## 2025-03-24 NOTE — TELEPHONE ENCOUNTER
I contacted patient. He is taking 45 units in am and 70 units at hs. Patient voiced understanding and appreciation.

## 2025-03-27 ENCOUNTER — LAB VISIT (OUTPATIENT)
Dept: LAB | Facility: HOSPITAL | Age: 60
End: 2025-03-27
Attending: NURSE PRACTITIONER
Payer: MEDICARE

## 2025-03-27 DIAGNOSIS — I10 ESSENTIAL HYPERTENSION, MALIGNANT: ICD-10-CM

## 2025-03-27 DIAGNOSIS — E78.5 HYPERLIPIDEMIA, UNSPECIFIED HYPERLIPIDEMIA TYPE: ICD-10-CM

## 2025-03-27 DIAGNOSIS — I70.213 ATHEROSCLEROSIS OF NATIVE ARTERY OF BOTH LOWER EXTREMITIES WITH INTERMITTENT CLAUDICATION: ICD-10-CM

## 2025-03-27 DIAGNOSIS — I73.9 PERIPHERAL VASCULAR DISEASE, UNSPECIFIED: ICD-10-CM

## 2025-03-27 DIAGNOSIS — I25.10 CORONARY ATHEROSCLEROSIS OF NATIVE CORONARY ARTERY: Primary | ICD-10-CM

## 2025-03-27 LAB
ANION GAP SERPL CALC-SCNC: 13 MEQ/L
BUN SERPL-MCNC: 18.2 MG/DL (ref 8.4–25.7)
CALCIUM SERPL-MCNC: 9.6 MG/DL (ref 8.4–10.2)
CHLORIDE SERPL-SCNC: 104 MMOL/L (ref 98–107)
CO2 SERPL-SCNC: 26 MMOL/L (ref 22–29)
CREAT SERPL-MCNC: 2.01 MG/DL (ref 0.72–1.25)
CREAT/UREA NIT SERPL: 9
ERYTHROCYTE [DISTWIDTH] IN BLOOD BY AUTOMATED COUNT: 13.4 % (ref 11.5–17)
GFR SERPLBLD CREATININE-BSD FMLA CKD-EPI: 38 ML/MIN/1.73/M2
GLUCOSE SERPL-MCNC: 226 MG/DL (ref 74–100)
HCT VFR BLD AUTO: 43.5 % (ref 42–52)
HGB BLD-MCNC: 14.1 G/DL (ref 14–18)
MCH RBC QN AUTO: 29.7 PG (ref 27–31)
MCHC RBC AUTO-ENTMCNC: 32.4 G/DL (ref 33–36)
MCV RBC AUTO: 91.6 FL (ref 80–94)
NRBC BLD AUTO-RTO: 0 %
PLATELET # BLD AUTO: 288 X10(3)/MCL (ref 130–400)
PMV BLD AUTO: 12.1 FL (ref 7.4–10.4)
POTASSIUM SERPL-SCNC: 4.3 MMOL/L (ref 3.5–5.1)
RBC # BLD AUTO: 4.75 X10(6)/MCL (ref 4.7–6.1)
SODIUM SERPL-SCNC: 143 MMOL/L (ref 136–145)
WBC # BLD AUTO: 13.67 X10(3)/MCL (ref 4.5–11.5)

## 2025-03-27 PROCEDURE — 36415 COLL VENOUS BLD VENIPUNCTURE: CPT

## 2025-03-27 PROCEDURE — 85027 COMPLETE CBC AUTOMATED: CPT

## 2025-03-27 PROCEDURE — 80048 BASIC METABOLIC PNL TOTAL CA: CPT

## 2025-04-02 ENCOUNTER — TELEPHONE (OUTPATIENT)
Dept: INTERNAL MEDICINE | Facility: CLINIC | Age: 60
End: 2025-04-02
Payer: MEDICARE

## 2025-04-02 ENCOUNTER — LAB VISIT (OUTPATIENT)
Dept: LAB | Facility: HOSPITAL | Age: 60
End: 2025-04-02
Attending: INTERNAL MEDICINE
Payer: MEDICARE

## 2025-04-02 DIAGNOSIS — I70.213 ATHEROSCLEROSIS OF NATIVE ARTERY OF BOTH LOWER EXTREMITIES WITH INTERMITTENT CLAUDICATION: ICD-10-CM

## 2025-04-02 DIAGNOSIS — I73.9 PERIPHERAL VASCULAR DISEASE, UNSPECIFIED: ICD-10-CM

## 2025-04-02 DIAGNOSIS — I25.10 CORONARY ATHEROSCLEROSIS OF NATIVE CORONARY ARTERY: Primary | ICD-10-CM

## 2025-04-02 LAB
INR PPP: 1.1 (ref 2–3)
PROTHROMBIN TIME: 14.6 SECONDS (ref 11.7–14.5)

## 2025-04-02 PROCEDURE — 85610 PROTHROMBIN TIME: CPT

## 2025-04-02 PROCEDURE — 36415 COLL VENOUS BLD VENIPUNCTURE: CPT

## 2025-04-02 NOTE — TELEPHONE ENCOUNTER
----- Message from Gabriel Drew sent at 2025 12:48 PM CDT -----  Good Afternoon,Patient Luigi Gotti : 1965 prescription for Rybelsus is not covered on patient insurance. The preferred is Ozempic.Please adviseRajendra [facility] [phone]

## 2025-04-02 NOTE — TELEPHONE ENCOUNTER
I attempted to contact Pt. Regarding prescription for Rybelsus. No answer. LVM with instructions to call clinic.

## 2025-04-02 NOTE — TELEPHONE ENCOUNTER
Please contact patient and inquire if he is okay with changing form a pill to a weekly injection     Thanks,    PENNY BellaP

## 2025-04-03 NOTE — TELEPHONE ENCOUNTER
I attempted to contact Pt. Regarding Rybelsus med. No answer. LVM with instructions to call clinic.

## 2025-06-10 ENCOUNTER — TELEPHONE (OUTPATIENT)
Dept: NEPHROLOGY | Facility: CLINIC | Age: 60
End: 2025-06-10
Payer: MEDICARE

## 2025-06-10 NOTE — TELEPHONE ENCOUNTER
Informed pharmacy correct directions for allopurinol take 2 tablets daily by mouth. Pharmacy stated understanding.

## 2025-08-02 DIAGNOSIS — E11.42 TYPE 2 DIABETES MELLITUS WITH DIABETIC POLYNEUROPATHY, WITH LONG-TERM CURRENT USE OF INSULIN: ICD-10-CM

## 2025-08-02 DIAGNOSIS — Z79.4 TYPE 2 DIABETES MELLITUS WITH DIABETIC POLYNEUROPATHY, WITH LONG-TERM CURRENT USE OF INSULIN: ICD-10-CM

## 2025-08-05 RX ORDER — HUMAN INSULIN 100 [USP'U]/ML
INJECTION, SUSPENSION SUBCUTANEOUS
Qty: 90 ML | Refills: 2 | Status: SHIPPED | OUTPATIENT
Start: 2025-08-05

## 2025-08-21 DIAGNOSIS — N13.8 BPH WITH URINARY OBSTRUCTION: Primary | ICD-10-CM

## 2025-08-21 DIAGNOSIS — N40.1 BPH WITH URINARY OBSTRUCTION: Primary | ICD-10-CM

## 2025-08-26 ENCOUNTER — HOSPITAL ENCOUNTER (OUTPATIENT)
Facility: HOSPITAL | Age: 60
Discharge: HOME | End: 2025-08-26
Attending: INTERNAL MEDICINE | Admitting: INTERNAL MEDICINE
Payer: MEDICARE

## 2025-08-26 VITALS
OXYGEN SATURATION: 98 % | WEIGHT: 263.44 LBS | RESPIRATION RATE: 16 BRPM | HEIGHT: 73 IN | TEMPERATURE: 98 F | SYSTOLIC BLOOD PRESSURE: 151 MMHG | BODY MASS INDEX: 34.91 KG/M2 | HEART RATE: 66 BPM | DIASTOLIC BLOOD PRESSURE: 80 MMHG

## 2025-08-26 DIAGNOSIS — R07.9 CHEST PAIN: ICD-10-CM

## 2025-08-26 DIAGNOSIS — I87.2 PERIPHERAL VENOUS INSUFFICIENCY: ICD-10-CM

## 2025-08-26 LAB — POCT GLUCOSE: 198 MG/DL (ref 70–110)

## 2025-08-26 PROCEDURE — 25000003 PHARM REV CODE 250: Performed by: INTERNAL MEDICINE

## 2025-08-26 PROCEDURE — 93459 L HRT ART/GRFT ANGIO: CPT | Performed by: INTERNAL MEDICINE

## 2025-08-26 PROCEDURE — 63600175 PHARM REV CODE 636 W HCPCS: Performed by: INTERNAL MEDICINE

## 2025-08-26 PROCEDURE — 99153 MOD SED SAME PHYS/QHP EA: CPT | Performed by: INTERNAL MEDICINE

## 2025-08-26 PROCEDURE — C1769 GUIDE WIRE: HCPCS | Performed by: INTERNAL MEDICINE

## 2025-08-26 PROCEDURE — C1894 INTRO/SHEATH, NON-LASER: HCPCS | Performed by: INTERNAL MEDICINE

## 2025-08-26 PROCEDURE — 99152 MOD SED SAME PHYS/QHP 5/>YRS: CPT | Performed by: INTERNAL MEDICINE

## 2025-08-26 RX ORDER — FENTANYL CITRATE 50 UG/ML
INJECTION, SOLUTION INTRAMUSCULAR; INTRAVENOUS
Status: DISCONTINUED | OUTPATIENT
Start: 2025-08-26 | End: 2025-08-26 | Stop reason: HOSPADM

## 2025-08-26 RX ORDER — DIPHENHYDRAMINE HCL 25 MG
50 CAPSULE ORAL
Status: DISCONTINUED | OUTPATIENT
Start: 2025-08-26 | End: 2025-08-26 | Stop reason: HOSPADM

## 2025-08-26 RX ORDER — DIAZEPAM 5 MG/1
10 TABLET ORAL
Status: DISCONTINUED | OUTPATIENT
Start: 2025-08-26 | End: 2025-08-26 | Stop reason: HOSPADM

## 2025-08-26 RX ORDER — ONDANSETRON 4 MG/1
8 TABLET, ORALLY DISINTEGRATING ORAL EVERY 8 HOURS PRN
Status: DISCONTINUED | OUTPATIENT
Start: 2025-08-26 | End: 2025-08-26 | Stop reason: HOSPADM

## 2025-08-26 RX ORDER — SODIUM CHLORIDE 9 MG/ML
INJECTION, SOLUTION INTRAVENOUS CONTINUOUS
Status: DISCONTINUED | OUTPATIENT
Start: 2025-08-26 | End: 2025-08-26 | Stop reason: HOSPADM

## 2025-08-26 RX ORDER — CYANOCOBALAMIN/FOLIC AC/VIT B6 115-0.8-1
1 TABLET ORAL DAILY
COMMUNITY

## 2025-08-26 RX ORDER — HYDROCODONE BITARTRATE AND ACETAMINOPHEN 5; 325 MG/1; MG/1
1 TABLET ORAL EVERY 4 HOURS PRN
Refills: 0 | Status: DISCONTINUED | OUTPATIENT
Start: 2025-08-26 | End: 2025-08-26 | Stop reason: HOSPADM

## 2025-08-26 RX ORDER — MIDAZOLAM HYDROCHLORIDE 1 MG/ML
INJECTION INTRAMUSCULAR; INTRAVENOUS
Status: DISCONTINUED | OUTPATIENT
Start: 2025-08-26 | End: 2025-08-26 | Stop reason: HOSPADM

## 2025-08-26 RX ORDER — ALLOPURINOL 100 MG/1
100 TABLET ORAL 2 TIMES DAILY
COMMUNITY

## 2025-08-26 RX ORDER — MORPHINE SULFATE 4 MG/ML
2 INJECTION, SOLUTION INTRAMUSCULAR; INTRAVENOUS EVERY 4 HOURS PRN
Refills: 0 | Status: DISCONTINUED | OUTPATIENT
Start: 2025-08-26 | End: 2025-08-26 | Stop reason: HOSPADM

## 2025-08-26 RX ORDER — SODIUM CHLORIDE 0.9 % (FLUSH) 0.9 %
10 SYRINGE (ML) INJECTION
Status: DISCONTINUED | OUTPATIENT
Start: 2025-08-26 | End: 2025-08-26 | Stop reason: HOSPADM

## 2025-08-26 RX ORDER — LIDOCAINE HYDROCHLORIDE 10 MG/ML
INJECTION, SOLUTION EPIDURAL; INFILTRATION; INTRACAUDAL; PERINEURAL
Status: DISCONTINUED | OUTPATIENT
Start: 2025-08-26 | End: 2025-08-26 | Stop reason: HOSPADM

## 2025-08-26 RX ORDER — ACETAMINOPHEN 325 MG/1
650 TABLET ORAL EVERY 4 HOURS PRN
Status: DISCONTINUED | OUTPATIENT
Start: 2025-08-26 | End: 2025-08-26 | Stop reason: HOSPADM

## 2025-08-26 RX ORDER — HYDRALAZINE HYDROCHLORIDE 20 MG/ML
10 INJECTION INTRAMUSCULAR; INTRAVENOUS EVERY 4 HOURS PRN
Status: DISCONTINUED | OUTPATIENT
Start: 2025-08-26 | End: 2025-08-26 | Stop reason: HOSPADM

## 2025-08-26 RX ORDER — SODIUM CHLORIDE 9 MG/ML
INJECTION, SOLUTION INTRAVENOUS ONCE
Status: COMPLETED | OUTPATIENT
Start: 2025-08-26 | End: 2025-08-26

## 2025-08-26 RX ADMIN — SODIUM CHLORIDE: 9 INJECTION, SOLUTION INTRAVENOUS at 08:08

## 2025-08-26 RX ADMIN — DIPHENHYDRAMINE HYDROCHLORIDE 50 MG: 25 CAPSULE ORAL at 10:08

## 2025-08-26 RX ADMIN — DIAZEPAM 10 MG: 5 TABLET ORAL at 10:08

## 2025-09-02 ENCOUNTER — LAB VISIT (OUTPATIENT)
Dept: LAB | Facility: HOSPITAL | Age: 60
End: 2025-09-02
Attending: NURSE PRACTITIONER
Payer: MEDICARE

## 2025-09-02 DIAGNOSIS — E11.42 TYPE 2 DIABETES MELLITUS WITH DIABETIC POLYNEUROPATHY, WITH LONG-TERM CURRENT USE OF INSULIN: ICD-10-CM

## 2025-09-02 DIAGNOSIS — M10.9 GOUT, UNSPECIFIED CAUSE, UNSPECIFIED CHRONICITY, UNSPECIFIED SITE: ICD-10-CM

## 2025-09-02 DIAGNOSIS — N13.8 BPH WITH URINARY OBSTRUCTION: ICD-10-CM

## 2025-09-02 DIAGNOSIS — Z79.4 TYPE 2 DIABETES MELLITUS WITH DIABETIC POLYNEUROPATHY, WITH LONG-TERM CURRENT USE OF INSULIN: ICD-10-CM

## 2025-09-02 DIAGNOSIS — N18.31 CKD STAGE G3A/A1, GFR 45-59 AND ALBUMIN CREATININE RATIO <30 MG/G: ICD-10-CM

## 2025-09-02 DIAGNOSIS — N25.81 SECONDARY HYPERPARATHYROIDISM OF RENAL ORIGIN: ICD-10-CM

## 2025-09-02 DIAGNOSIS — N40.1 BPH WITH URINARY OBSTRUCTION: ICD-10-CM

## 2025-09-02 LAB
ALBUMIN SERPL-MCNC: 3.5 G/DL (ref 3.4–4.8)
ALBUMIN/CREAT UR: ABNORMAL
ALBUMIN/GLOB SERPL: 0.7 RATIO (ref 1.1–2)
ALP SERPL-CCNC: 152 UNIT/L (ref 40–150)
ALT SERPL-CCNC: 21 UNIT/L (ref 0–55)
ANION GAP SERPL CALC-SCNC: 10 MEQ/L
AST SERPL-CCNC: 18 UNIT/L (ref 11–45)
BACTERIA #/AREA URNS AUTO: ABNORMAL /HPF
BASOPHILS # BLD AUTO: 0.04 X10(3)/MCL
BASOPHILS NFR BLD AUTO: 0.3 %
BILIRUB SERPL-MCNC: 0.6 MG/DL
BILIRUB UR QL STRIP.AUTO: NEGATIVE
BUN SERPL-MCNC: 20 MG/DL (ref 8.4–25.7)
CALCIUM SERPL-MCNC: 9.3 MG/DL (ref 8.8–10)
CHLORIDE SERPL-SCNC: 105 MMOL/L (ref 98–107)
CLARITY UR: CLEAR
CO2 SERPL-SCNC: 28 MMOL/L (ref 23–31)
COLOR UR AUTO: ABNORMAL
CREAT SERPL-MCNC: 1.69 MG/DL (ref 0.72–1.25)
CREAT UR-MCNC: 39.6 MG/DL (ref 63–166)
CREAT UR-MCNC: 39.7 MG/DL (ref 63–166)
CREAT/UREA NIT SERPL: 12
EOSINOPHIL # BLD AUTO: 0.32 X10(3)/MCL (ref 0–0.9)
EOSINOPHIL NFR BLD AUTO: 2.7 %
ERYTHROCYTE [DISTWIDTH] IN BLOOD BY AUTOMATED COUNT: 13.1 % (ref 11.5–17)
GFR SERPLBLD CREATININE-BSD FMLA CKD-EPI: 46 ML/MIN/1.73/M2
GLOBULIN SER-MCNC: 5.3 GM/DL (ref 2.4–3.5)
GLUCOSE SERPL-MCNC: 62 MG/DL (ref 82–115)
GLUCOSE UR QL STRIP: ABNORMAL
HCT VFR BLD AUTO: 43.1 % (ref 42–52)
HGB BLD-MCNC: 14.1 G/DL (ref 14–18)
HGB UR QL STRIP: NEGATIVE
HYALINE CASTS #/AREA URNS LPF: ABNORMAL /LPF
IMM GRANULOCYTES # BLD AUTO: 0.08 X10(3)/MCL (ref 0–0.04)
IMM GRANULOCYTES NFR BLD AUTO: 0.7 %
KETONES UR QL STRIP: NEGATIVE
LEUKOCYTE ESTERASE UR QL STRIP: NEGATIVE
LYMPHOCYTES # BLD AUTO: 2.06 X10(3)/MCL (ref 0.6–4.6)
LYMPHOCYTES NFR BLD AUTO: 17.6 %
MCH RBC QN AUTO: 30.6 PG (ref 27–31)
MCHC RBC AUTO-ENTMCNC: 32.7 G/DL (ref 33–36)
MCV RBC AUTO: 93.5 FL (ref 80–94)
MICROALBUMIN UR-MCNC: <5 UG/ML
MONOCYTES # BLD AUTO: 0.85 X10(3)/MCL (ref 0.1–1.3)
MONOCYTES NFR BLD AUTO: 7.3 %
MUCOUS THREADS URNS QL MICRO: ABNORMAL /LPF
NEUTROPHILS # BLD AUTO: 8.34 X10(3)/MCL (ref 2.1–9.2)
NEUTROPHILS NFR BLD AUTO: 71.4 %
NITRITE UR QL STRIP: NEGATIVE
NRBC BLD AUTO-RTO: 0 %
PH UR STRIP: 5.5 [PH]
PHOSPHATE SERPL-MCNC: 4.4 MG/DL (ref 2.3–4.7)
PLATELET # BLD AUTO: 246 X10(3)/MCL (ref 130–400)
PMV BLD AUTO: 12 FL (ref 7.4–10.4)
POTASSIUM SERPL-SCNC: 3.7 MMOL/L (ref 3.5–5.1)
PROT SERPL-MCNC: 8.8 GM/DL (ref 5.8–7.6)
PROT UR QL STRIP: NEGATIVE
PROT UR STRIP-MCNC: <6.8 MG/DL
PSA SERPL-MCNC: 0.44 NG/ML
PTH-INTACT SERPL-MCNC: 224.5 PG/ML (ref 8.7–77)
RBC # BLD AUTO: 4.61 X10(6)/MCL (ref 4.7–6.1)
RBC #/AREA URNS AUTO: ABNORMAL /HPF
SODIUM SERPL-SCNC: 143 MMOL/L (ref 136–145)
SP GR UR STRIP.AUTO: 1.01 (ref 1–1.03)
SQUAMOUS #/AREA URNS LPF: ABNORMAL /HPF
URATE SERPL-MCNC: 6.2 MG/DL (ref 3.5–7.2)
UROBILINOGEN UR STRIP-ACNC: NORMAL
WBC # BLD AUTO: 11.69 X10(3)/MCL (ref 4.5–11.5)
WBC #/AREA URNS AUTO: ABNORMAL /HPF

## 2025-09-02 PROCEDURE — 81001 URINALYSIS AUTO W/SCOPE: CPT

## 2025-09-02 PROCEDURE — 82570 ASSAY OF URINE CREATININE: CPT

## 2025-09-02 PROCEDURE — 82570 ASSAY OF URINE CREATININE: CPT | Mod: 59

## 2025-09-02 PROCEDURE — 84100 ASSAY OF PHOSPHORUS: CPT

## 2025-09-02 PROCEDURE — 36415 COLL VENOUS BLD VENIPUNCTURE: CPT

## 2025-09-02 PROCEDURE — 85025 COMPLETE CBC W/AUTO DIFF WBC: CPT

## 2025-09-02 PROCEDURE — 84550 ASSAY OF BLOOD/URIC ACID: CPT

## 2025-09-02 PROCEDURE — 84153 ASSAY OF PSA TOTAL: CPT

## 2025-09-02 PROCEDURE — 80053 COMPREHEN METABOLIC PANEL: CPT

## 2025-09-02 PROCEDURE — 83970 ASSAY OF PARATHORMONE: CPT

## 2025-09-03 ENCOUNTER — OFFICE VISIT (OUTPATIENT)
Dept: UROLOGY | Facility: CLINIC | Age: 60
End: 2025-09-03
Payer: MEDICARE

## 2025-09-03 VITALS
TEMPERATURE: 98 F | HEART RATE: 77 BPM | HEIGHT: 73 IN | DIASTOLIC BLOOD PRESSURE: 84 MMHG | OXYGEN SATURATION: 100 % | WEIGHT: 268.63 LBS | BODY MASS INDEX: 35.6 KG/M2 | SYSTOLIC BLOOD PRESSURE: 125 MMHG

## 2025-09-03 DIAGNOSIS — N40.1 BPH WITH URINARY OBSTRUCTION: Primary | ICD-10-CM

## 2025-09-03 DIAGNOSIS — N13.8 BPH WITH URINARY OBSTRUCTION: Primary | ICD-10-CM

## 2025-09-03 LAB
BILIRUB SERPL-MCNC: NORMAL MG/DL
BLOOD URINE, POC: NORMAL
COLOR, POC UA: YELLOW
GLUCOSE UR QL STRIP: 500
KETONES UR QL STRIP: NORMAL
LEUKOCYTE ESTERASE URINE, POC: NORMAL
NITRITE, POC UA: NORMAL
PH, POC UA: 5.5
POC RESIDUAL URINE VOLUME: 0 ML (ref 0–100)
PROTEIN, POC: NORMAL
SPECIFIC GRAVITY, POC UA: 1.01
UROBILINOGEN, POC UA: 0.2

## 2025-09-03 PROCEDURE — 81001 URINALYSIS AUTO W/SCOPE: CPT | Mod: PBBFAC | Performed by: NURSE PRACTITIONER

## 2025-09-03 PROCEDURE — 51798 US URINE CAPACITY MEASURE: CPT | Mod: PBBFAC | Performed by: NURSE PRACTITIONER

## 2025-09-03 PROCEDURE — 99213 OFFICE O/P EST LOW 20 MIN: CPT | Mod: S$PBB,,, | Performed by: NURSE PRACTITIONER

## 2025-09-03 PROCEDURE — 99215 OFFICE O/P EST HI 40 MIN: CPT | Mod: PBBFAC | Performed by: NURSE PRACTITIONER

## (undated) DEVICE — SUT SILK 0 SH 30IN BLK BR

## (undated) DEVICE — Device

## (undated) DEVICE — CATH 5FR MP 100CM

## (undated) DEVICE — PACK PACEMAKER

## (undated) DEVICE — SUT VICRYL CTD 2-0 GI 27 SH

## (undated) DEVICE — SHEATH INTRODUCER 5FR 10CM

## (undated) DEVICE — GUIDEWIRE INQWIRE SE 3MM JTIP

## (undated) DEVICE — PAD DEFIB RADIOTRANSPARENT

## (undated) DEVICE — COVER LIGHT HANDLE CHROMOPHARE

## (undated) DEVICE — PACK PACER PERMANENT

## (undated) DEVICE — SET ANGIO ACIST CVI ANGIOTOUCH

## (undated) DEVICE — CANNULA ADULT NASAL 7FT

## (undated) DEVICE — NDL HYPO REG 25G X 1 1/2

## (undated) DEVICE — SUT VICRYL 3-0 27 SH

## (undated) DEVICE — CANNULA NASAL ADULT

## (undated) DEVICE — SHEATH PINNACLE INTRO 11FR

## (undated) DEVICE — SLEEVE STERILE

## (undated) DEVICE — PAD DEFIB RADIOLUCENT ADULT

## (undated) DEVICE — CATH PULMONARY WEDGE PRESS MON

## (undated) DEVICE — DRESSING TRANS 4X4 TEGADERM

## (undated) DEVICE — DRAPE INCISE IOBAN 2 23X17IN

## (undated) DEVICE — KIT MINI STICK MAX 5F 21GX7CM

## (undated) DEVICE — ADHESIVE DERMABOND ADVANCED

## (undated) DEVICE — ELECTRODE REM PLYHSV RETURN 9

## (undated) DEVICE — SET MICROPUNCTUREECHO STIFF

## (undated) DEVICE — PAD HEARTSTART DEFIB ADULT

## (undated) DEVICE — CATH MULTIPACK EXPO 6FR

## (undated) DEVICE — SUT 2-0 VICRYL / CT-1

## (undated) DEVICE — SHEATH PINNACLE 8FR

## (undated) DEVICE — INTRODUCER PRELUDESNAP 6F 13CM

## (undated) DEVICE — SUT CTD VICRYL PLUS 4/0

## (undated) DEVICE — KIT HAND CONTROL HIGH PRESSUR

## (undated) DEVICE — CANNULA NASAL CO2 O2 7'

## (undated) DEVICE — CONTRAST ISOVUE 370 500ML MULT

## (undated) DEVICE — GUIDEWIRE AMPLATZ .035X260 SS

## (undated) DEVICE — PAD DEFIB CADENCE ADULT R2

## (undated) DEVICE — CLOSURE SKIN STERI STRIP 1/4X4

## (undated) DEVICE — DRAPE UTILITY W/ TAPE 20X30IN

## (undated) DEVICE — SET MICPUNC ACC STIFF CANNULA

## (undated) DEVICE — KIT MINI STK MAX COAX 5FR 10CM

## (undated) DEVICE — SYS WASTE FLD DISPOSAL 1400ML

## (undated) DEVICE — SUT 2/0 30IN SILK BLK BRAI

## (undated) DEVICE — WIRE GUIDE SAFE-T-J .035 260CM

## (undated) DEVICE — GUIDEWIRE AMPLATZ .035X260